# Patient Record
Sex: MALE | Race: WHITE | Employment: OTHER | URBAN - METROPOLITAN AREA
[De-identification: names, ages, dates, MRNs, and addresses within clinical notes are randomized per-mention and may not be internally consistent; named-entity substitution may affect disease eponyms.]

---

## 2017-01-17 ENCOUNTER — ANTI-COAG VISIT (OUTPATIENT)
Dept: PHARMACY | Facility: CLINIC | Age: 82
End: 2017-01-17

## 2017-01-17 DIAGNOSIS — I48.91 ATRIAL FIBRILLATION, UNSPECIFIED TYPE (HCC): ICD-10-CM

## 2017-01-17 LAB — INR BLD: 2.1

## 2017-02-14 ENCOUNTER — ANTI-COAG VISIT (OUTPATIENT)
Dept: PHARMACY | Facility: CLINIC | Age: 82
End: 2017-02-14

## 2017-02-14 DIAGNOSIS — I48.91 ATRIAL FIBRILLATION, UNSPECIFIED TYPE (HCC): ICD-10-CM

## 2017-02-14 LAB — INR BLD: 2.4

## 2017-03-14 ENCOUNTER — ANTI-COAG VISIT (OUTPATIENT)
Dept: PHARMACY | Facility: CLINIC | Age: 82
End: 2017-03-14

## 2017-03-14 DIAGNOSIS — I48.91 ATRIAL FIBRILLATION, UNSPECIFIED TYPE (HCC): ICD-10-CM

## 2017-03-14 LAB — INR BLD: 2.5

## 2017-04-01 ENCOUNTER — HOSPITAL ENCOUNTER (OUTPATIENT)
Dept: OTHER | Age: 82
Discharge: HOME | End: 2017-04-30
Attending: INTERNAL MEDICINE | Admitting: INTERNAL MEDICINE

## 2017-04-10 ENCOUNTER — ANTI-COAG VISIT (OUTPATIENT)
Dept: PHARMACY | Facility: CLINIC | Age: 82
End: 2017-04-10

## 2017-04-10 DIAGNOSIS — I48.91 ATRIAL FIBRILLATION, UNSPECIFIED TYPE (HCC): ICD-10-CM

## 2017-04-10 LAB — INR BLD: 2.1

## 2017-05-08 ENCOUNTER — ANTI-COAG VISIT (OUTPATIENT)
Dept: PHARMACY | Facility: CLINIC | Age: 82
End: 2017-05-08

## 2017-05-08 DIAGNOSIS — I48.91 ATRIAL FIBRILLATION, UNSPECIFIED TYPE (HCC): ICD-10-CM

## 2017-05-08 LAB — INR BLD: 1.9

## 2017-06-05 ENCOUNTER — ANTI-COAG VISIT (OUTPATIENT)
Dept: PHARMACY | Facility: CLINIC | Age: 82
End: 2017-06-05

## 2017-06-05 LAB — INR BLD: 2

## 2017-07-03 ENCOUNTER — ANTI-COAG VISIT (OUTPATIENT)
Dept: PHARMACY | Facility: CLINIC | Age: 82
End: 2017-07-03

## 2017-07-03 DIAGNOSIS — I48.91 ATRIAL FIBRILLATION, UNSPECIFIED TYPE (HCC): ICD-10-CM

## 2017-07-03 LAB — INR BLD: 2.2

## 2017-08-01 ENCOUNTER — ANTI-COAG VISIT (OUTPATIENT)
Dept: PHARMACY | Facility: CLINIC | Age: 82
End: 2017-08-01

## 2017-08-01 DIAGNOSIS — I48.91 ATRIAL FIBRILLATION, UNSPECIFIED TYPE (HCC): ICD-10-CM

## 2017-08-01 LAB — INR BLD: 2.2

## 2017-08-29 ENCOUNTER — ANTI-COAG VISIT (OUTPATIENT)
Dept: PHARMACY | Facility: CLINIC | Age: 82
End: 2017-08-29

## 2017-08-29 DIAGNOSIS — I48.91 ATRIAL FIBRILLATION, UNSPECIFIED TYPE (HCC): ICD-10-CM

## 2017-08-29 LAB — INR BLD: 2.3

## 2017-09-26 ENCOUNTER — ANTI-COAG VISIT (OUTPATIENT)
Dept: PHARMACY | Facility: CLINIC | Age: 82
End: 2017-09-26

## 2017-09-26 DIAGNOSIS — I48.91 ATRIAL FIBRILLATION, UNSPECIFIED TYPE (HCC): ICD-10-CM

## 2017-09-26 LAB — INR BLD: 2.5

## 2017-10-24 ENCOUNTER — ANTI-COAG VISIT (OUTPATIENT)
Dept: PHARMACY | Facility: CLINIC | Age: 82
End: 2017-10-24

## 2017-10-24 LAB — INR BLD: 2

## 2017-10-24 NOTE — PROGRESS NOTES
Mr. Nissa Todd is a 80 y.o.  male with history of Afib who presents today for anticoagulation monitoring and adjustment. Patient verifies current dosing regimen. Patient denies s/s bleeding/bruising/swelling/SOB. No blood in urine or stool. No dietary changes. No changes in medication/OTC agents/Herbals. No change in alcohol use. No missed doses. No Procedures scheduled in the future at this time. Lab Results   Component Value Date    INR 2 10/24/2017    INR 2.5 09/26/2017    INR 2.3 08/29/2017       Looks and feels well today. No changes in medications or diet. No bruising or bleeding noted. No change in dose for INR of  2. Will see in 4 weeks for next INR. Coumadin dose: Continue Warfarin 2.5mg daily EXCEPT 1.25mg every Tuesday and Thursday. .  Recheck INR in 4 weeks. Patient reminded to call the Anticoagulation Clinic with any signs or symptoms of bleeding or with any medication changes. Patient given instructions utilizing the teach back method. After visit summary printed and reviewed with patient.       Influenza vaccine:   [] given    [x] declined   [x] received previously   [] plans to receive at a later time   [] refused    [x] documented in EPIC

## 2017-11-01 ENCOUNTER — HOSPITAL ENCOUNTER (OUTPATIENT)
Dept: OTHER | Age: 82
Discharge: HOME | End: 2017-11-30
Attending: INTERNAL MEDICINE | Admitting: INTERNAL MEDICINE

## 2017-11-21 ENCOUNTER — ANTI-COAG VISIT (OUTPATIENT)
Dept: PHARMACY | Facility: CLINIC | Age: 82
End: 2017-11-21

## 2017-11-21 DIAGNOSIS — I48.91 ATRIAL FIBRILLATION, UNSPECIFIED TYPE (HCC): ICD-10-CM

## 2017-11-21 LAB — INR BLD: 1.8

## 2017-11-21 RX ORDER — DICYCLOMINE HYDROCHLORIDE 10 MG/1
10 CAPSULE ORAL PRN
COMMUNITY
End: 2019-04-05

## 2017-11-21 RX ORDER — AMOXICILLIN AND CLAVULANATE POTASSIUM 875; 125 MG/1; MG/1
1 TABLET, FILM COATED ORAL 2 TIMES DAILY
COMMUNITY
Start: 2017-11-20 | End: 2017-11-29

## 2017-11-29 ENCOUNTER — ANTI-COAG VISIT (OUTPATIENT)
Dept: PHARMACY | Facility: CLINIC | Age: 82
End: 2017-11-29

## 2017-11-29 LAB — INTERNATIONAL NORMALIZATION RATIO, POC: 1.9

## 2017-11-29 NOTE — PROGRESS NOTES
Mr. Corinna Torres is a 80 y.o.  male with history of Afib who presents today for anticoagulation monitoring and adjustment. Patient verifies current dosing regimen. Patient denies s/s bleeding/bruising/swelling/SOB. No blood in urine or stool. No dietary changes. No changes in medication/OTC agents/Herbals. No change in alcohol use. No missed doses. No Procedures scheduled in the future at this time. Lab Results   Component Value Date    INR 1.9 11/29/2017    INR 1.8 11/21/2017    INR 2 10/24/2017       He looks and feels well today. No changes in meds or diet noted. No bruising or bleeding issues. His INR remains low at 1.9 today. We will increase his warfarin dose to 2.5mg daily except 1.25mg on Tuesday. He will return on 12-19 for his next INR. Coumadin dose: NEW DOSE:Warfarin 2.5mg daily EXCEPT 1.25mg(1/2 tablet) every Tuesday    . Recheck INR in 3 weeks. Patient reminded to call the Anticoagulation Clinic with any signs or symptoms of bleeding or with any medication changes. Patient given instructions utilizing the teach back method. After visit summary printed and reviewed with patient.       Medications reviewed and updated on home medication list Yes  Warfarin dose updated on patient home medication list  Yes

## 2017-12-01 ENCOUNTER — HOSPITAL ENCOUNTER (OUTPATIENT)
Dept: OTHER | Age: 82
Discharge: HOME | End: 2017-12-31
Attending: INTERNAL MEDICINE | Admitting: INTERNAL MEDICINE

## 2017-12-19 ENCOUNTER — ANTI-COAG VISIT (OUTPATIENT)
Dept: PHARMACY | Facility: CLINIC | Age: 82
End: 2017-12-19

## 2017-12-19 DIAGNOSIS — I48.91 ATRIAL FIBRILLATION, UNSPECIFIED TYPE (HCC): ICD-10-CM

## 2017-12-19 LAB — INTERNATIONAL NORMALIZATION RATIO, POC: 2.3

## 2018-01-01 ENCOUNTER — HOSPITAL ENCOUNTER (OUTPATIENT)
Dept: OTHER | Age: 83
Discharge: HOME | End: 2018-01-31
Attending: INTERNAL MEDICINE | Admitting: INTERNAL MEDICINE

## 2018-01-16 ENCOUNTER — TELEPHONE (OUTPATIENT)
Dept: PHARMACY | Facility: CLINIC | Age: 83
End: 2018-01-16

## 2018-01-16 NOTE — TELEPHONE ENCOUNTER
Mr. Sally Vanegas called to let us know he would not be able to make his appointment today. Patient has been sick (flu). He has been on Cefprozil 250mg for 5 days and has 2 more day of the antibiotic. Mr. Sally Vanegas reports he missed his medication one day including his Coumadin. I discussed with patient that the antibiotic he is on can possibly affect his INR, but usually I do not see much affect clinically. Patient's INR = 2.3 at his last appointment and he has missed a Coumadin dose, so I feel his INR will not go too high. Instructed patient to continue his current Coumadin dose and I rescheduled him to come in next Tuesday for INR.

## 2018-01-23 ENCOUNTER — ANTI-COAG VISIT (OUTPATIENT)
Dept: PHARMACY | Facility: CLINIC | Age: 83
End: 2018-01-23

## 2018-01-23 DIAGNOSIS — I48.91 ATRIAL FIBRILLATION, UNSPECIFIED TYPE (HCC): ICD-10-CM

## 2018-01-23 LAB — INTERNATIONAL NORMALIZATION RATIO, POC: 2.4

## 2018-01-23 RX ORDER — FLUTICASONE PROPIONATE 50 MCG
1 SPRAY, SUSPENSION (ML) NASAL DAILY PRN
COMMUNITY
End: 2019-05-08

## 2018-01-23 RX ORDER — ALBUTEROL SULFATE 90 UG/1
2 AEROSOL, METERED RESPIRATORY (INHALATION) EVERY 6 HOURS PRN
COMMUNITY
End: 2019-05-08

## 2018-01-23 NOTE — PROGRESS NOTES
Mr. Ángel Willett is a 80 y.o.  male with history of Afib who presents today for anticoagulation monitoring and adjustment. Patient verifies current dosing regimen. Patient denies s/s bleeding/bruising/swelling/SOB. No blood in urine or stool. No dietary changes. No changes in medication/OTC agents/Herbals. No change in alcohol use. No missed doses. No Procedures scheduled in the future at this time. Lab Results   Component Value Date    INR 2.4 01/23/2018    INR 2.3 12/19/2017    INR 1.9 11/29/2017       Patient appears well. No changes, no problems. Patient reminded to call the Anticoagulation Clinic with any medication changes. Patient given instructions utilizing the teach back method. After visit summary printed and reviewed with patient. Warfarin dose updated.

## 2018-02-01 ENCOUNTER — HOSPITAL ENCOUNTER (OUTPATIENT)
Dept: OTHER | Age: 83
Discharge: HOME | End: 2018-02-28
Attending: INTERNAL MEDICINE | Admitting: INTERNAL MEDICINE

## 2018-02-20 ENCOUNTER — ANTI-COAG VISIT (OUTPATIENT)
Dept: PHARMACY | Facility: CLINIC | Age: 83
End: 2018-02-20

## 2018-02-20 DIAGNOSIS — I48.91 ATRIAL FIBRILLATION, UNSPECIFIED TYPE (HCC): ICD-10-CM

## 2018-02-20 LAB — INR BLD: 2.5

## 2018-03-01 ENCOUNTER — HOSPITAL ENCOUNTER (OUTPATIENT)
Dept: OTHER | Age: 83
Discharge: HOME | End: 2018-03-31
Attending: INTERNAL MEDICINE | Admitting: INTERNAL MEDICINE

## 2018-03-20 ENCOUNTER — ANTI-COAG VISIT (OUTPATIENT)
Dept: PHARMACY | Facility: CLINIC | Age: 83
End: 2018-03-20

## 2018-03-20 DIAGNOSIS — I48.91 ATRIAL FIBRILLATION, UNSPECIFIED TYPE (HCC): ICD-10-CM

## 2018-03-20 LAB — INTERNATIONAL NORMALIZATION RATIO, POC: 2.1

## 2018-04-01 ENCOUNTER — HOSPITAL ENCOUNTER (OUTPATIENT)
Dept: OTHER | Age: 83
Discharge: HOME | End: 2018-04-30
Attending: INTERNAL MEDICINE | Admitting: INTERNAL MEDICINE

## 2018-04-17 ENCOUNTER — ANTI-COAG VISIT (OUTPATIENT)
Dept: PHARMACY | Facility: CLINIC | Age: 83
End: 2018-04-17

## 2018-04-17 DIAGNOSIS — I48.91 ATRIAL FIBRILLATION, UNSPECIFIED TYPE (HCC): ICD-10-CM

## 2018-04-17 LAB — INTERNATIONAL NORMALIZATION RATIO, POC: 2.1

## 2018-05-01 ENCOUNTER — HOSPITAL ENCOUNTER (OUTPATIENT)
Dept: OTHER | Age: 83
Discharge: HOME | End: 2018-05-31
Attending: INTERNAL MEDICINE | Admitting: INTERNAL MEDICINE

## 2018-05-15 ENCOUNTER — ANTI-COAG VISIT (OUTPATIENT)
Dept: PHARMACY | Facility: CLINIC | Age: 83
End: 2018-05-15

## 2018-05-15 DIAGNOSIS — I48.91 ATRIAL FIBRILLATION, UNSPECIFIED TYPE (HCC): ICD-10-CM

## 2018-05-15 LAB — INR BLD: 2.1

## 2018-06-01 ENCOUNTER — HOSPITAL ENCOUNTER (OUTPATIENT)
Dept: OTHER | Age: 83
Discharge: HOME | End: 2018-06-30
Attending: INTERNAL MEDICINE | Admitting: INTERNAL MEDICINE

## 2018-06-19 ENCOUNTER — ANTI-COAG VISIT (OUTPATIENT)
Dept: PHARMACY | Facility: CLINIC | Age: 83
End: 2018-06-19

## 2018-06-19 DIAGNOSIS — I48.91 ATRIAL FIBRILLATION, UNSPECIFIED TYPE (HCC): ICD-10-CM

## 2018-06-19 LAB — INR BLD: 2.1

## 2018-07-01 ENCOUNTER — HOSPITAL ENCOUNTER (OUTPATIENT)
Dept: OTHER | Age: 83
Discharge: HOME | End: 2018-07-31
Attending: INTERNAL MEDICINE | Admitting: INTERNAL MEDICINE

## 2018-07-24 ENCOUNTER — ANTI-COAG VISIT (OUTPATIENT)
Dept: PHARMACY | Facility: CLINIC | Age: 83
End: 2018-07-24

## 2018-07-24 DIAGNOSIS — I48.91 ATRIAL FIBRILLATION, UNSPECIFIED TYPE (HCC): ICD-10-CM

## 2018-07-24 LAB — INTERNATIONAL NORMALIZATION RATIO, POC: 2.6

## 2018-08-01 ENCOUNTER — HOSPITAL ENCOUNTER (OUTPATIENT)
Dept: OTHER | Age: 83
Discharge: HOME | End: 2018-08-31
Attending: INTERNAL MEDICINE | Admitting: INTERNAL MEDICINE

## 2018-08-21 ENCOUNTER — ANTI-COAG VISIT (OUTPATIENT)
Dept: PHARMACY | Facility: CLINIC | Age: 83
End: 2018-08-21

## 2018-08-21 DIAGNOSIS — I48.91 ATRIAL FIBRILLATION, UNSPECIFIED TYPE (HCC): ICD-10-CM

## 2018-08-21 LAB — INTERNATIONAL NORMALIZATION RATIO, POC: 3.4

## 2018-09-01 ENCOUNTER — HOSPITAL ENCOUNTER (OUTPATIENT)
Dept: OTHER | Age: 83
Discharge: HOME | End: 2018-09-01
Attending: INTERNAL MEDICINE | Admitting: INTERNAL MEDICINE

## 2018-09-18 ENCOUNTER — ANTI-COAG VISIT (OUTPATIENT)
Dept: PHARMACY | Facility: CLINIC | Age: 83
End: 2018-09-18

## 2018-09-18 DIAGNOSIS — I48.91 ATRIAL FIBRILLATION, UNSPECIFIED TYPE (HCC): ICD-10-CM

## 2018-09-18 LAB — INTERNATIONAL NORMALIZATION RATIO, POC: 2.3

## 2018-10-16 ENCOUNTER — ANTI-COAG VISIT (OUTPATIENT)
Dept: PHARMACY | Age: 83
End: 2018-10-16
Payer: MEDICARE

## 2018-10-16 LAB
INTERNATIONAL NORMALIZATION RATIO, POC: 3.1
INTERNATIONAL NORMALIZATION RATIO, POC: 3.1

## 2018-10-16 PROCEDURE — 85610 PROTHROMBIN TIME: CPT

## 2018-10-16 PROCEDURE — 99211 OFF/OP EST MAY X REQ PHY/QHP: CPT

## 2018-11-14 ENCOUNTER — ANTI-COAG VISIT (OUTPATIENT)
Dept: PHARMACY | Age: 83
End: 2018-11-14
Payer: MEDICARE

## 2018-11-14 DIAGNOSIS — I48.91 ATRIAL FIBRILLATION, UNSPECIFIED TYPE (HCC): ICD-10-CM

## 2018-11-14 LAB — INR BLD: 2.4

## 2018-11-14 PROCEDURE — 85610 PROTHROMBIN TIME: CPT

## 2018-11-14 PROCEDURE — 99211 OFF/OP EST MAY X REQ PHY/QHP: CPT

## 2018-11-14 NOTE — PROGRESS NOTES
Mr. Nelli Cruz is a 80 y.o.  male with history of Afib who presents today for anticoagulation monitoring and adjustment. Patient verifies current dosing regimen  Patient denies s/s bleeding/bruising/swelling/SOB  No blood in urine or stool. No dietary changes. No changes in medication/OTC agents/Herbals. No change in alcohol use. No Procedures scheduled in the future at this time. Lab Results   Component Value Date    INR 2.4 11/14/2018    INR 3.1 10/16/2018    INR 3.1 10/16/2018       Pertinent findings: Patient states he missed his half tablet dose 2 weeks ago on 10/30/18. And then continued his normal regimen the next day. His INR is good today so we will continue his current warfarin dose. Warfarin dosing: Continue Warfarin 2.5mg daily EXCEPT 1.25mg(1/2 tablet) every Tuesday    Recheck in 4 weeks. After visit summary printed and reviewed with patient.

## 2018-12-11 ENCOUNTER — ANTI-COAG VISIT (OUTPATIENT)
Dept: PHARMACY | Age: 83
End: 2018-12-11
Payer: MEDICARE

## 2018-12-11 LAB — INTERNATIONAL NORMALIZATION RATIO, POC: 2.6

## 2018-12-11 PROCEDURE — 85610 PROTHROMBIN TIME: CPT

## 2018-12-11 PROCEDURE — 99211 OFF/OP EST MAY X REQ PHY/QHP: CPT

## 2018-12-11 NOTE — PROGRESS NOTES
Mr. Krystle Arnold is a 80 y.o.  male with history of Afib who presents today for anticoagulation monitoring and adjustment. Patient verifies current dosing regimen  Patient denies s/s bleeding/bruising/swelling/SOB  No blood in urine or stool. No dietary changes. No changes in medication/OTC agents/Herbals. No change in alcohol use. No missed doses. No Procedures scheduled in the future at this time. Lab Results   Component Value Date    INR 2.6 12/11/2018    INR 2.4 11/14/2018    INR 3.1 10/16/2018    INR 3.1 10/16/2018       Pertinent findings: No problems, no changes, patient is in good spirits. INR today is therapeutic and stable. No dose adjustment is necessary. Will follow up with patient in 4 weeks for next INR check. Warfarin dosing: Continue Warfarin 2.5mg daily EXCEPT 1.25mg(1/2 tablet) every Tuesday    After visit summary printed and reviewed with patient.       Warfarin dose updated on patient home medication list: Yes

## 2018-12-28 ENCOUNTER — TELEPHONE (OUTPATIENT)
Dept: PHARMACY | Age: 83
End: 2018-12-28

## 2018-12-28 NOTE — TELEPHONE ENCOUNTER
I placed a call to Mr. Shannan Wang but he was not home. His wife said that he has an upper respiratory infection. He went to the store to  a new prescription and do some shopping, but she did not know the name of the new medication. She will have Mr. Shannan Wang call us when he gets back.

## 2018-12-28 NOTE — TELEPHONE ENCOUNTER
Kaley Donnieurdon called to let me know he is starting Amoxicillin 500mg TID x 10 days. I instructed him to continue his current Warfarin dose and keep his next appointment.

## 2019-01-08 ENCOUNTER — ANTI-COAG VISIT (OUTPATIENT)
Dept: PHARMACY | Age: 84
End: 2019-01-08
Payer: MEDICARE

## 2019-01-08 LAB — INTERNATIONAL NORMALIZATION RATIO, POC: 2.3

## 2019-01-08 PROCEDURE — 99211 OFF/OP EST MAY X REQ PHY/QHP: CPT

## 2019-01-08 PROCEDURE — 85610 PROTHROMBIN TIME: CPT

## 2019-02-05 ENCOUNTER — ANTI-COAG VISIT (OUTPATIENT)
Dept: PHARMACY | Age: 84
End: 2019-02-05
Payer: MEDICARE

## 2019-02-05 LAB — INTERNATIONAL NORMALIZATION RATIO, POC: 2.3

## 2019-02-05 PROCEDURE — 99211 OFF/OP EST MAY X REQ PHY/QHP: CPT

## 2019-02-05 PROCEDURE — 85610 PROTHROMBIN TIME: CPT

## 2019-03-05 ENCOUNTER — ANTI-COAG VISIT (OUTPATIENT)
Dept: PHARMACY | Age: 84
End: 2019-03-05
Payer: MEDICARE

## 2019-03-05 LAB — INTERNATIONAL NORMALIZATION RATIO, POC: 3

## 2019-03-05 PROCEDURE — 99211 OFF/OP EST MAY X REQ PHY/QHP: CPT

## 2019-03-05 PROCEDURE — 85610 PROTHROMBIN TIME: CPT

## 2019-04-02 ENCOUNTER — APPOINTMENT (OUTPATIENT)
Dept: PHARMACY | Age: 84
End: 2019-04-02
Payer: MEDICARE

## 2019-04-05 ENCOUNTER — ANTI-COAG VISIT (OUTPATIENT)
Dept: PHARMACY | Age: 84
End: 2019-04-05
Payer: MEDICARE

## 2019-04-05 DIAGNOSIS — I48.91 ATRIAL FIBRILLATION, UNSPECIFIED TYPE (HCC): ICD-10-CM

## 2019-04-05 LAB — INTERNATIONAL NORMALIZATION RATIO, POC: 2.8

## 2019-04-05 PROCEDURE — 85610 PROTHROMBIN TIME: CPT

## 2019-04-05 PROCEDURE — 99211 OFF/OP EST MAY X REQ PHY/QHP: CPT

## 2019-04-05 NOTE — PROGRESS NOTES
Mr. Graeme Nicole is a 80 y.o.  male with history of Afib who presents today for anticoagulation monitoring and adjustment. Patient verifies current dosing regimen. Lab Results   Component Value Date    INR 2.8 04/05/2019    INR 3.0 03/05/2019    INR 2.3 02/05/2019     Patient doing really well. No problems or changes. INR has been stable    Coumadin dose: continue same dose. Recheck INR in ~4 weeks. Patient reminded to call the Anticoagulation Clinic with any signs or symptoms of bleeding or with any medication changes. Patient given instructions utilizing the teach back method. After visit summary printed and reviewed with patient.     Medications reviewed and Warfarin dose updated

## 2019-05-08 ENCOUNTER — ANTI-COAG VISIT (OUTPATIENT)
Dept: PHARMACY | Age: 84
End: 2019-05-08
Payer: MEDICARE

## 2019-05-08 LAB — INTERNATIONAL NORMALIZATION RATIO, POC: 2.4

## 2019-05-08 PROCEDURE — 99211 OFF/OP EST MAY X REQ PHY/QHP: CPT

## 2019-05-08 PROCEDURE — 85610 PROTHROMBIN TIME: CPT

## 2019-05-08 NOTE — PROGRESS NOTES
Mr. Александр Beyer is a 80 y.o.  male with history of Afib who presents today for anticoagulation monitoring and adjustment. Patient verifies current dosing regimen. Patient denies s/s bleeding/bruising/swelling/SOB. No blood in urine or stool. No dietary changes. No changes in medication/OTC agents/Herbals. No change in alcohol use. No missed doses. No Procedures scheduled in the future at this time. Lab Results   Component Value Date    INR 2.4 05/08/2019    INR 2.8 04/05/2019    INR 3.0 03/05/2019       Patient appears well. No changes, no problems. Coumadin Dose :   Continue Warfarin 2.5mg daily EXCEPT 1.25mg(1/2 tablet) every Tuesday    Return in 1 month. Patient reminded to call the Anticoagulation Clinic with any medication changes. Patient given instructions utilizing the teach back method. After visit summary printed and reviewed with patient. Medications reviewed and Warfarin dose updated.

## 2019-06-11 ENCOUNTER — ANTI-COAG VISIT (OUTPATIENT)
Dept: PHARMACY | Age: 84
End: 2019-06-11
Payer: MEDICARE

## 2019-06-11 LAB — INTERNATIONAL NORMALIZATION RATIO, POC: 1.2

## 2019-06-11 PROCEDURE — 99211 OFF/OP EST MAY X REQ PHY/QHP: CPT

## 2019-06-11 PROCEDURE — 85610 PROTHROMBIN TIME: CPT

## 2019-06-11 NOTE — PROGRESS NOTES
Mr. Donavon Tirado is a 80 y.o.  male with history of Afib who presents today for anticoagulation monitoring and adjustment. Patient denies s/s bleeding/bruising/swelling/SOB  No blood in urine or stool. No dietary changes. No change in alcohol use. No Procedures scheduled in the future at this time. Lab Results   Component Value Date    INR 1.2 06/11/2019    INR 2.4 05/08/2019    INR 2.8 04/05/2019       Pertinent findings: Patient had an epidural on 6/7/19. He reports stopping his warfarin on 5/30/19 and restarting his normal warfarin dosing schedule on 6/7/19 after the procedure. His INR was subtherapeutic today due to being off warfarin for 8 days. Patient was instructed to take 5 mg today and tomorrow and then continue his normal dosing schedule to try and raise his INR back into therapeutic range. He will be rechecked in 2 weeks. Warfarin dosing:   Take 5 mg (2 tablets) today 6/11/19 and tomorrow 6/12/19, then  Continue Warfarin 2.5mg daily EXCEPT 1.25mg(1/2 tablet) every Tuesday    After visit summary printed and reviewed with patient. Medications reviewed and updated on home medication list: Yes, patient denied any changes.   Warfarin dose updated on patient home medication list: Yes

## 2019-06-25 ENCOUNTER — ANTI-COAG VISIT (OUTPATIENT)
Dept: PHARMACY | Age: 84
End: 2019-06-25
Payer: MEDICARE

## 2019-06-25 DIAGNOSIS — I48.91 ATRIAL FIBRILLATION, UNSPECIFIED TYPE (HCC): ICD-10-CM

## 2019-06-25 LAB — INTERNATIONAL NORMALIZATION RATIO, POC: 2.8

## 2019-06-25 PROCEDURE — 85610 PROTHROMBIN TIME: CPT

## 2019-06-25 PROCEDURE — 99211 OFF/OP EST MAY X REQ PHY/QHP: CPT

## 2019-06-25 NOTE — PROGRESS NOTES
Mr. Susanne Graham is a 80 y.o.  male with history of Afib who presents today for anticoagulation monitoring and adjustment. Patient verifies current dosing regimen  Patient denies s/s bleeding/bruising/swelling/SOB  No blood in urine or stool. No dietary changes. No changes in medication/OTC agents/Herbals. No change in alcohol use. No missed doses. No Procedures scheduled in the future at this time. Lab Results   Component Value Date    INR 2.8 06/25/2019    INR 1.2 06/11/2019    INR 2.4 05/08/2019       Pertinent findings: Patient appears well, no changes. His INR is back in therapeutic range today since his procedure. We will recheck him in 4 weeks. Warfarin dosing:   Continue Warfarin 2.5mg daily EXCEPT 1.25mg(1/2 tablet) every Tuesday    After visit summary printed and reviewed with patient.       Medications reviewed and updated on home medication list: Yes, patient denied any changes  Warfarin dose updated on patient home medication list: Yes

## 2019-07-23 ENCOUNTER — ANTI-COAG VISIT (OUTPATIENT)
Dept: PHARMACY | Age: 84
End: 2019-07-23
Payer: MEDICARE

## 2019-07-23 DIAGNOSIS — I48.91 ATRIAL FIBRILLATION, UNSPECIFIED TYPE (HCC): ICD-10-CM

## 2019-07-23 LAB — INTERNATIONAL NORMALIZATION RATIO, POC: 2.6

## 2019-07-23 PROCEDURE — 99211 OFF/OP EST MAY X REQ PHY/QHP: CPT

## 2019-07-23 PROCEDURE — 85610 PROTHROMBIN TIME: CPT

## 2019-08-20 ENCOUNTER — ANTI-COAG VISIT (OUTPATIENT)
Dept: PHARMACY | Age: 84
End: 2019-08-20
Payer: MEDICARE

## 2019-08-20 DIAGNOSIS — I48.91 ATRIAL FIBRILLATION, UNSPECIFIED TYPE (HCC): ICD-10-CM

## 2019-08-20 LAB — INTERNATIONAL NORMALIZATION RATIO, POC: 2.2

## 2019-08-20 PROCEDURE — 85610 PROTHROMBIN TIME: CPT

## 2019-08-20 PROCEDURE — 99211 OFF/OP EST MAY X REQ PHY/QHP: CPT

## 2019-08-20 RX ORDER — FLUTICASONE FUROATE AND VILANTEROL 100; 25 UG/1; UG/1
1 POWDER RESPIRATORY (INHALATION) DAILY
COMMUNITY
End: 2019-10-15

## 2019-08-20 NOTE — PROGRESS NOTES
Mr. Tyson Monge is a 80 y.o.  male with history of Afib who presents today for anticoagulation monitoring and adjustment. Patient verifies current dosing regimen. Patient denies s/s bleeding/bruising/swelling/SOB. No blood in urine or stool. No dietary changes. No changes in medication/OTC agents/Herbals. No change in alcohol use. No missed doses. No Procedures scheduled in the future at this time. Lab Results   Component Value Date    INR 2.2 08/20/2019    INR 2.6 07/23/2019    INR 2.8 06/25/2019       Patient appears well. No changes, no problems. Coumadin Dose :   Continue Warfarin 2.5mg daily EXCEPT 1.25mg(1/2 tablet) every Tuesday    Return in 4 weeks. Patient reminded to call the Anticoagulation Clinic with any medication changes. Patient given instructions utilizing the teach back method. After visit summary printed and reviewed with patient. Medications reviewed and Warfarin dose updated.

## 2019-09-17 ENCOUNTER — ANTI-COAG VISIT (OUTPATIENT)
Dept: PHARMACY | Age: 84
End: 2019-09-17
Payer: MEDICARE

## 2019-09-17 DIAGNOSIS — I48.91 ATRIAL FIBRILLATION, UNSPECIFIED TYPE (HCC): ICD-10-CM

## 2019-09-17 LAB — INTERNATIONAL NORMALIZATION RATIO, POC: 2.5

## 2019-09-17 PROCEDURE — 85610 PROTHROMBIN TIME: CPT

## 2019-09-17 PROCEDURE — 99211 OFF/OP EST MAY X REQ PHY/QHP: CPT

## 2019-09-17 NOTE — PROGRESS NOTES
Mr. Osmani Lott is a 80 y.o.  male with history of Afib who presents today for anticoagulation monitoring and adjustment. Patient verifies current dosing regimen  Patient denies s/s bleeding/bruising/swelling/SOB  No blood in urine or stool. No dietary changes. No changes in medication/OTC agents/Herbals. No change in alcohol use. No missed doses. No Procedures scheduled in the future at this time. Lab Results   Component Value Date    INR 2.5 09/17/2019    INR 2.2 08/20/2019    INR 2.6 07/23/2019       Pertinent findings: None    Warfarin dosing: Continue Warfarin 2.5mg daily EXCEPT 1.25mg(1/2 tablet) every Tuesday    After visit summary printed and reviewed with patient.       Medications reviewed and updated on home medication list: No: Patient states no changes    Warfarin dose updated on patient home medication list: Yes

## 2019-10-15 ENCOUNTER — ANTI-COAG VISIT (OUTPATIENT)
Dept: PHARMACY | Age: 84
End: 2019-10-15
Payer: MEDICARE

## 2019-10-15 DIAGNOSIS — I48.91 ATRIAL FIBRILLATION, UNSPECIFIED TYPE (HCC): ICD-10-CM

## 2019-10-15 LAB — INTERNATIONAL NORMALIZATION RATIO, POC: 2.5

## 2019-10-15 PROCEDURE — 99211 OFF/OP EST MAY X REQ PHY/QHP: CPT

## 2019-10-15 PROCEDURE — 85610 PROTHROMBIN TIME: CPT

## 2019-10-15 RX ORDER — BUDESONIDE AND FORMOTEROL FUMARATE DIHYDRATE 80; 4.5 UG/1; UG/1
2 AEROSOL RESPIRATORY (INHALATION) 2 TIMES DAILY
COMMUNITY
End: 2020-06-30 | Stop reason: ALTCHOICE

## 2019-11-08 ENCOUNTER — TELEPHONE (OUTPATIENT)
Dept: PHARMACY | Age: 84
End: 2019-11-08

## 2019-11-13 ENCOUNTER — ANTI-COAG VISIT (OUTPATIENT)
Dept: PHARMACY | Age: 84
End: 2019-11-13
Payer: MEDICARE

## 2019-11-13 DIAGNOSIS — I48.91 ATRIAL FIBRILLATION, UNSPECIFIED TYPE (HCC): ICD-10-CM

## 2019-11-13 LAB — INTERNATIONAL NORMALIZATION RATIO, POC: 2.5

## 2019-11-13 PROCEDURE — 99211 OFF/OP EST MAY X REQ PHY/QHP: CPT

## 2019-11-13 PROCEDURE — 85610 PROTHROMBIN TIME: CPT

## 2019-12-10 ENCOUNTER — ANTI-COAG VISIT (OUTPATIENT)
Dept: PHARMACY | Age: 84
End: 2019-12-10
Payer: MEDICARE

## 2019-12-10 DIAGNOSIS — I48.91 ATRIAL FIBRILLATION, UNSPECIFIED TYPE (HCC): ICD-10-CM

## 2019-12-10 LAB — INTERNATIONAL NORMALIZATION RATIO, POC: 3.2

## 2019-12-10 PROCEDURE — 99211 OFF/OP EST MAY X REQ PHY/QHP: CPT

## 2019-12-10 PROCEDURE — 85610 PROTHROMBIN TIME: CPT

## 2019-12-11 ENCOUNTER — APPOINTMENT (OUTPATIENT)
Dept: PHARMACY | Age: 84
End: 2019-12-11
Payer: MEDICARE

## 2020-01-09 ENCOUNTER — TELEPHONE (OUTPATIENT)
Dept: PHARMACY | Age: 85
End: 2020-01-09

## 2020-01-09 NOTE — TELEPHONE ENCOUNTER
Patient called to report that he is having surgery on January 13th. He stopped his warfarin and Aspirin 5 days prior on the 8th. Patient believes he will be coming home same day as procedure. Instructed patient to restart his Warfarin when his surgeon tells him to. Patient should restart his current Warfarin dose. I changed patient's appointment to the week following his surgery. Patient is to call with any other changes.

## 2020-01-14 ENCOUNTER — APPOINTMENT (OUTPATIENT)
Dept: PHARMACY | Age: 85
End: 2020-01-14
Payer: MEDICARE

## 2020-01-21 ENCOUNTER — ANTI-COAG VISIT (OUTPATIENT)
Dept: PHARMACY | Age: 85
End: 2020-01-21
Payer: MEDICARE

## 2020-01-21 LAB — INTERNATIONAL NORMALIZATION RATIO, POC: 1.7

## 2020-01-21 PROCEDURE — 85610 PROTHROMBIN TIME: CPT

## 2020-01-21 PROCEDURE — 99211 OFF/OP EST MAY X REQ PHY/QHP: CPT

## 2020-01-21 NOTE — PROGRESS NOTES
Mr. Gallo Pineda is a 80 y.o.  male with history of Afib who presents today for anticoagulation monitoring and adjustment. Patient verifies current dosing regimen  Patient denies s/s bleeding/bruising/swelling/SOB  No blood in urine or stool. No dietary changes. No changes in medication/OTC agents/Herbals. No change in alcohol use. No Procedures scheduled in the future at this time. Lab Results   Component Value Date    INR 1.7 01/21/2020    INR 3.2 12/10/2019    INR 2.5 11/13/2019       Pertinent findings:   Patient states doing well. No complaints at this time. He was off his warfarin for 5 days prior and three days after his surgery. INR low today. Gave a 2.5 mg dose for today then continued his weekly dosing of 2.5 mg daily except 1.25 mg on Tuesdays. Next INR in 4 weeks. After visit summary printed and reviewed with patient.       Medications reviewed and updated on home medication list: YES     Warfarin dose updated on patient home medication list: NO      Immunizations:   Most Recent Immunizations   Administered Date(s) Administered    Influenza Vaccine, unspecified formulation 10/10/2019    Pneumococcal Conjugate 7-valent (Prevnar7) 09/19/2016    Pneumococcal Polysaccharide ([id number]) 04/13/2004

## 2020-02-19 ENCOUNTER — ANTI-COAG VISIT (OUTPATIENT)
Dept: PHARMACY | Age: 85
End: 2020-02-19
Payer: MEDICARE

## 2020-02-19 LAB — INTERNATIONAL NORMALIZATION RATIO, POC: 2.3

## 2020-02-19 PROCEDURE — 99211 OFF/OP EST MAY X REQ PHY/QHP: CPT

## 2020-02-19 PROCEDURE — 85610 PROTHROMBIN TIME: CPT

## 2020-02-19 NOTE — PROGRESS NOTES
Mr. Belkys Osei is a 80 y.o.  male with history of Afib who presents today for anticoagulation monitoring and adjustment. Patient verifies current dosing regimen  Patient denies s/s bleeding/bruising/swelling/SOB  No blood in urine or stool. No dietary changes. No changes in medication/OTC agents/Herbals. No change in alcohol use. No missed doses. No Procedures scheduled in the future at this time. Lab Results   Component Value Date    INR 2.3 02/19/2020    INR 1.7 01/21/2020    INR 3.2 12/10/2019       Pertinent findings: Chan Lau will be having another back procedure on 2-28-20. He will begin holding his warfarin and aspirin tomorrow, 2-20-20. He denies any medication or dietary changes. I advised him to follow his physician's instructions for restarting his warfarin. Assuming her will restart on 2-28, we will see him 7 days after the procedure. He will restart at his previous dosing. Warfarin dosing: Hold warfarin as directed by your physician then CONTINUE: Warfarin 2.5mg daily EXCEPT 1.25mg(1/2 tablet) every Tuesday    After visit summary printed and reviewed with patient.

## 2020-03-02 ENCOUNTER — TELEPHONE (OUTPATIENT)
Dept: PHARMACY | Age: 85
End: 2020-03-02

## 2020-03-06 ENCOUNTER — APPOINTMENT (OUTPATIENT)
Dept: PHARMACY | Age: 85
End: 2020-03-06
Payer: MEDICARE

## 2020-03-13 ENCOUNTER — ANTI-COAG VISIT (OUTPATIENT)
Dept: PHARMACY | Age: 85
End: 2020-03-13
Payer: MEDICARE

## 2020-03-13 LAB — INTERNATIONAL NORMALIZATION RATIO, POC: 1.7

## 2020-03-13 PROCEDURE — 99211 OFF/OP EST MAY X REQ PHY/QHP: CPT

## 2020-03-13 PROCEDURE — 85610 PROTHROMBIN TIME: CPT

## 2020-03-13 RX ORDER — WARFARIN SODIUM 2.5 MG/1
TABLET ORAL
Qty: 30 TABLET | Refills: 0
Start: 2020-03-13 | End: 2021-02-26

## 2020-04-06 ENCOUNTER — TELEPHONE (OUTPATIENT)
Dept: PHARMACY | Age: 85
End: 2020-04-06

## 2020-04-07 ENCOUNTER — ANTI-COAG VISIT (OUTPATIENT)
Dept: PHARMACY | Age: 85
End: 2020-04-07
Payer: MEDICARE

## 2020-04-07 LAB — INR BLD: 3.5

## 2020-04-07 PROCEDURE — 99211 OFF/OP EST MAY X REQ PHY/QHP: CPT

## 2020-05-05 ENCOUNTER — ANTI-COAG VISIT (OUTPATIENT)
Dept: PHARMACY | Age: 85
End: 2020-05-05
Payer: MEDICARE

## 2020-05-05 LAB — INR BLD: 2.5

## 2020-05-05 PROCEDURE — 99211 OFF/OP EST MAY X REQ PHY/QHP: CPT

## 2020-06-01 ENCOUNTER — TELEPHONE (OUTPATIENT)
Dept: PHARMACY | Age: 85
End: 2020-06-01

## 2020-06-02 ENCOUNTER — ANTI-COAG VISIT (OUTPATIENT)
Dept: PHARMACY | Age: 85
End: 2020-06-02

## 2020-06-02 VITALS — TEMPERATURE: 98.2 F

## 2020-06-02 LAB — INTERNATIONAL NORMALIZATION RATIO, POC: 1.6

## 2020-06-02 PROCEDURE — 99211 OFF/OP EST MAY X REQ PHY/QHP: CPT

## 2020-06-02 NOTE — PROGRESS NOTES
Tracking Only    PHSO: Yes  Total # of Interventions Recommended: 0      Total Interventions Accepted: 0  Time Spent (min): [address], RPh, [place] 6/2/2020  2:05 PM

## 2020-06-29 ENCOUNTER — TELEPHONE (OUTPATIENT)
Dept: PHARMACY | Age: 85
End: 2020-06-29

## 2020-06-30 ENCOUNTER — ANTI-COAG VISIT (OUTPATIENT)
Dept: PHARMACY | Age: 85
End: 2020-06-30
Payer: MEDICARE

## 2020-06-30 VITALS — TEMPERATURE: 97 F

## 2020-06-30 LAB — INTERNATIONAL NORMALIZATION RATIO, POC: 3.3

## 2020-06-30 PROCEDURE — 99211 OFF/OP EST MAY X REQ PHY/QHP: CPT

## 2020-06-30 PROCEDURE — 85610 PROTHROMBIN TIME: CPT

## 2020-07-30 ENCOUNTER — ANTI-COAG VISIT (OUTPATIENT)
Dept: PHARMACY | Age: 85
End: 2020-07-30
Payer: MEDICARE

## 2020-07-30 VITALS — TEMPERATURE: 96.7 F

## 2020-07-30 LAB — INTERNATIONAL NORMALIZATION RATIO, POC: 2

## 2020-07-30 PROCEDURE — 99211 OFF/OP EST MAY X REQ PHY/QHP: CPT

## 2020-07-30 PROCEDURE — 85610 PROTHROMBIN TIME: CPT

## 2020-07-30 NOTE — PROGRESS NOTES
Mr. Sophia Archer is a 80 y.o.  male. Mr. Sophia Archer had an INR test today. Results were reviewed and appropriate warfarin management was completed. THIS VISIT WAS COMPLETED AS:   []    A VIRTUAL VISIT VIA TELEPHONE IN EFFORTS TO REDUCE THE SPREAD OF COVID-19.  []    A DRIVE-THRU VISIT IN EFFORTS TO REDUCE THE SPREAD OF COVID-19. [x]    AN IN PERSON VISIT. PROTOCOLS WERE FOLLOWED WITH PRECAUTIONS TO REDUCE THE SPREAD OF COVID-19. Patient verifies current dosing regimen: Yes     Medications reviewed and updated on the patient 's home medication list: No: no changes     Lab Results   Component Value Date    INR 2.0 2020    INR 3.3 2020    INR 1.6 2020       Patient Findings     Positives:   Bruising, Other complaints    Negatives:   Signs/symptoms of bleeding, Missed doses, Change in medications, Change in diet/appetite    Comments:   Bumped a door and has a bruise on his left forearm but states it is improving. Doesn't eat many greens-iceberg lettuce sometimes and broccoli every once in a while  He had an injection done on  and his warfarin was held for 10 days. His INR was 1.3 the day of the injection but they went ahead and did it anyway. He restarted his warfarin on . Anticoagulation Summary  As of 2020    INR goal:   2.0-3.0   TTR:   77.0 % (7.9 y)   INR used for dosin.0 (2020)   Warfarin maintenance plan:   1.25 mg (2.5 mg x 0.5) every Tue; 2.5 mg (2.5 mg x 1) all other days   Weekly warfarin total:   16.25 mg   Plan last modified:   Mami Arciniega RPH (2020)   Next INR check:   2020   Priority:   Maintenance   Target end date:    Indefinite    Indications    A-fib ([address] [I48.91]             Anticoagulation Episode Summary     INR check location:       Preferred lab:       Send INR reminders to:   [facility]    Comments:   [facility]        Anticoagulation Care Providers     Provider Role Specialty Phone number

## 2020-08-25 ENCOUNTER — ANTI-COAG VISIT (OUTPATIENT)
Dept: PHARMACY | Age: 85
End: 2020-08-25
Payer: MEDICARE

## 2020-08-25 VITALS — TEMPERATURE: 96.4 F

## 2020-08-25 LAB — INTERNATIONAL NORMALIZATION RATIO, POC: 2.8

## 2020-08-25 PROCEDURE — 85610 PROTHROMBIN TIME: CPT

## 2020-08-25 PROCEDURE — 99211 OFF/OP EST MAY X REQ PHY/QHP: CPT

## 2020-08-25 NOTE — PROGRESS NOTES
Mr. Karri Herrera is a 80 y.o.  male. Mr. Karri Herrera had an INR test today. Results were reviewed and appropriate warfarin management was completed. THIS VISIT WAS COMPLETED AS:   []    A VIRTUAL VISIT VIA TELEPHONE IN EFFORTS TO REDUCE THE SPREAD OF COVID-19.  []    A DRIVE-THRU VISIT IN EFFORTS TO REDUCE THE SPREAD OF COVID-19. [x]    AN IN PERSON VISIT. PROTOCOLS WERE FOLLOWED WITH PRECAUTIONS TO REDUCE THE SPREAD OF COVID-19. Patient verifies current dosing regimen: Yes     Warfarin medication reviewed and updated on the patient 's home medication list: Yes   All other medications reviewed and updated on the patient 's home medication list: No: There has not been any changes in patient's other medications. Lab Results   Component Value Date    INR 2.8 2020    INR 2.0 2020    INR 3.3 2020           Anticoagulation Summary  As of 2020    INR goal:   2.0-3.0   TTR:   77.2 % (7.9 y)   INR used for dosin.8 (2020)   Warfarin maintenance plan:   1.25 mg (2.5 mg x 0.5) every Tue; 2.5 mg (2.5 mg x 1) all other days   Weekly warfarin total:   16.25 mg   Plan last modified:   Mami Arciniega RPH (2020)   Next INR check:   2020   Priority:   Maintenance   Target end date: Indefinite    Indications    A-fib ([address] [I48.91]             Anticoagulation Episode Summary     INR check location:       Preferred lab:       Send INR reminders to:   [facility]    Comments:   [facility]        Anticoagulation Care Providers     Provider Role Specialty Phone number    Nery Messer Responsible Internal Medicine [phone]          Warfarin plan:   Patient states he is doing well on his warfarin dose. He denies any signs of bleeding but has bruises on his arms and legs which he states are usual and only happens when he takes warfarin. He denies any missed doses. His INR (2.8) today (20) is within range.  Taking into consideration the fact that patient's INR today (08-25-20) and the immediate past INR on 07-30-20 have been at goal on the same weekly warfarin maintenance plan, it is not necessary to adjust his warfarin dose at this time. Patient will return for his next INR check after four weeks. Description    CONTINUE: Warfarin 2.5mg daily EXCEPT 1.25mg every Tuesday     Keep greens consistent     Call with medications changes, especially antibiotics and steroids and including any over-the-counter medications or herbal products. Call if you stop any medications. Please arrived 15 minutes prior to your appointment           Reviewed AVS with patient / caregiver.       CLINICAL PHARMACY CONSULT: MED RECONCILIATION/REVIEW ADDENDUM    For Pharmacy Admin Tracking Only    PHSO: No  Total # of Interventions Recommended: 0    - Maintenance Safety Lab Monitoring #: 1  Total Interventions Accepted: 0  Time Spent (min): 15

## 2020-08-26 ENCOUNTER — TELEPHONE (OUTPATIENT)
Dept: PHARMACY | Age: 85
End: 2020-08-26

## 2020-08-26 NOTE — TELEPHONE ENCOUNTER
I received a call from Chelsea Levine at Dr. Bridges Do office to let us know that Narendra Abel will be starting a course of Levaquin for a UTI. I spoke to Narendra Abel and he states that he will be on a 5 day course of Levaquin. I advised him to reduce his warfarin dose, dur to the interaction with the antibiotic. He will alternate warfarin 2.5mg with 1.25mg every other day for the next 5 days. He will then resume his previous dosing. He will call with any problems or issues.     [address]  Anticoagulation Service  [phone]

## 2020-09-22 ENCOUNTER — ANTI-COAG VISIT (OUTPATIENT)
Dept: PHARMACY | Age: 85
End: 2020-09-22
Payer: MEDICARE

## 2020-09-22 LAB — INTERNATIONAL NORMALIZATION RATIO, POC: 3.2

## 2020-09-22 PROCEDURE — 99211 OFF/OP EST MAY X REQ PHY/QHP: CPT

## 2020-09-22 PROCEDURE — 85610 PROTHROMBIN TIME: CPT

## 2020-09-22 NOTE — PROGRESS NOTES
[phone]          Warfarin plan:   Patient states doing well. No complaints regarding warfarin therapy. No change to weekly warfarin dosing. No changes in diet, medications, no extra doses. Description    Hold warfarin on 9-22-20  CONTINUE: Warfarin 2.5mg daily EXCEPT 1.25mg every Tuesday     Keep greens consistent     Call with medications changes, especially antibiotics and steroids and including any over-the-counter medications or herbal products. Call if you stop any medications. Please arrived 15 minutes prior to your appointment           Reviewed AVS with patient / caregiver.       CLINICAL PHARMACY CONSULT: MED RECONCILIATION/REVIEW ADDENDUM    For Pharmacy Admin Tracking Only    PHSO: Yes  Total # of Interventions Recommended: 1  - Decreased Dose #: 1  - Maintenance Safety Lab Monitoring #: 1  Total Interventions Accepted: 1  Time Spent (min): [address], [place]Ph, [place] 9/22/2020  11:25 AM

## 2020-10-27 ENCOUNTER — ANTI-COAG VISIT (OUTPATIENT)
Dept: PHARMACY | Age: 85
End: 2020-10-27
Payer: MEDICARE

## 2020-10-27 VITALS — TEMPERATURE: 97 F

## 2020-10-27 LAB — INTERNATIONAL NORMALIZATION RATIO, POC: 2.7

## 2020-10-27 PROCEDURE — 99211 OFF/OP EST MAY X REQ PHY/QHP: CPT

## 2020-10-27 PROCEDURE — 85610 PROTHROMBIN TIME: CPT

## 2020-10-27 NOTE — PROGRESS NOTES
Mr. Xochitl Watkins is a 80 y.o.  male. Mr. Xochitl Watkins had an INR test today. Results were reviewed and appropriate warfarin management was completed. THIS VISIT WAS COMPLETED AS:   []    A VIRTUAL VISIT VIA TELEPHONE IN EFFORTS TO REDUCE THE SPREAD OF COVID-19.  []    A DRIVE-THRU VISIT IN EFFORTS TO REDUCE THE SPREAD OF COVID-19. [x]    AN IN PERSON VISIT. PROTOCOLS WERE FOLLOWED WITH PRECAUTIONS TO REDUCE THE SPREAD OF COVID-19. Patient verifies current dosing regimen: Yes     Warfarin medication reviewed and updated on the patient 's home medication list: Yes   All other medications reviewed and updated on the patient 's home medication list: No: No changes     Lab Results   Component Value Date    INR 2.7 10/27/2020    INR 3.2 2020    INR 2.8 2020           Anticoagulation Summary  As of 10/27/2020    INR goal:   2.0-3.0   TTR:   76.7 % (8.1 y)   INR used for dosin.7 (10/27/2020)   Warfarin maintenance plan:   1.25 mg (2.5 mg x 0.5) every Tue; 2.5 mg (2.5 mg x 1) all other days   Weekly warfarin total:   16.25 mg   Plan last modified:   Mami Arciniega, [address] [facility] (2020)   Next INR check:   2020   Priority:   Maintenance   Target end date: Indefinite    Indications    [facility]fib ([facility] [I48.91]             Anticoagulation Episode Summary     INR check location:   Anticoagulation Clinic    Preferred lab:       Send INR reminders to:   [address] [place] [address] 60    Comments:   Nayan Mariscal        Anticoagulation Care Providers     Provider Role Specialty Phone number    Avani Elkins [facility] Internal Medicine [phone]          Warfarin plan:   Patient states he is doing well on warfarin therapy. Denies any missed doses and changes in medications or his diet. Reports he bruises easily but nothing out of the ordinary. No changes to current dose for an INR of 2.7 today. Will recheck INR in 5 weeks.      Description    CONTINUE: Warfarin 2.5mg daily EXCEPT 1.25mg every Tuesday     Keep greens consistent     Call with medications changes, especially antibiotics and steroids and including any over-the-counter medications or herbal products. Call if you stop any medications. Please arrived 15 minutes prior to your appointment           Reviewed AVS with patient / caregiver. CLINICAL PHARMACY CONSULT: MED RECONCILIATION/REVIEW ADDENDUM    For Pharmacy Admin Tracking Only    PHSO: No  Total # of Interventions Recommended: 0  - Maintenance Safety Lab Monitoring #: 1  Total Interventions Accepted: 0  Time Spent (min): 50 Jerzy Arteaga, Pharmacy Student.  10-

## 2020-11-03 PROBLEM — I48.91 A-FIB (HCC): Status: RESOLVED | Noted: 2020-11-03 | Resolved: 2020-11-03

## 2020-12-01 ENCOUNTER — ANTI-COAG VISIT (OUTPATIENT)
Dept: PHARMACY | Age: 85
End: 2020-12-01
Payer: MEDICARE

## 2020-12-01 VITALS — TEMPERATURE: 97 F

## 2020-12-01 LAB — INTERNATIONAL NORMALIZATION RATIO, POC: 2.5

## 2020-12-01 PROCEDURE — 99211 OFF/OP EST MAY X REQ PHY/QHP: CPT

## 2020-12-01 PROCEDURE — 85610 PROTHROMBIN TIME: CPT

## 2020-12-01 NOTE — PROGRESS NOTES
Mr. Namrata Hsu is a 80 y.o.  male. Mr. Namrata Hsu had an INR test today. Results were reviewed and appropriate warfarin management was completed. THIS VISIT WAS COMPLETED AS:   []    A VIRTUAL VISIT VIA TELEPHONE IN EFFORTS TO REDUCE THE SPREAD OF COVID-19.  []    A DRIVE-THRU VISIT IN EFFORTS TO REDUCE THE SPREAD OF COVID-19. [x]    AN IN PERSON VISIT. PROTOCOLS WERE FOLLOWED WITH PRECAUTIONS TO REDUCE THE SPREAD OF COVID-19. Patient verifies current dosing regimen: Yes     Warfarin medication reviewed and updated on the patient 's home medication list: Yes   All other medications reviewed and updated on the patient 's home medication list: No: no change     Lab Results   Component Value Date    INR 2.5 2020    INR 2.7 10/27/2020    INR 3.2 2020           Anticoagulation Summary  As of 2020    INR goal:   2.0-3.0   TTR:   77.0 % (8.2 y)   INR used for dosin.5 (2020)   Warfarin maintenance plan:   1.25 mg (2.5 mg x 0.5) every Tue; 2.5 mg (2.5 mg x 1) all other days   Weekly warfarin total:   16.25 mg   Plan last modified:   Mami Arciniega, [address] [facility] (2020)   Next INR check:   2021   Priority:   Maintenance   Target end date: Indefinite    Indications    A-fib ([facility] (Resolved) [I48.91]             Anticoagulation Episode Summary     INR check location:   Anticoagulation Clinic    Preferred lab:       Send INR reminders to:   [address] [place] [address] 60    Comments:   [facility]        Anticoagulation Care Providers     Provider Role Specialty Phone number    Edin Zuñiga [facility] Internal Medicine [phone]          Warfarin plan:   Looks and feels well today. No changes in medications or diet. No bruising or bleeding noted. No change in dose for INR of  2.5. Will see in 5 weeks for next INR.       Description    CONTINUE: Warfarin 2.5mg daily EXCEPT 1.25mg every Tuesday     Keep greens consistent     Call with medications changes, especially

## 2020-12-31 ENCOUNTER — TELEPHONE (OUTPATIENT)
Dept: PHARMACY | Age: 85
End: 2020-12-31

## 2020-12-31 NOTE — TELEPHONE ENCOUNTER
Patient called, started Keflex on 12/30/20. I anticipate a low to moderate interaction with Coumadin. Advised patient to Take a one time lower dose 1/1/21 of 1.25mg (vs 2.5mg) and follow up 1/5/21 as planned.     Lab Results   Component Value Date    INR 2.5 12/01/2020         Mami Arciniega, PharmD    CLINICAL PHARMACY CONSULT: MED RECONCILIATION/REVIEW ADDENDUM    For Pharmacy Admin Tracking Only    PHSO: No  Total # of Interventions Recommended: 1  - Decreased Dose #: 1  Total Interventions Accepted: 1  Time Spent (min): 15

## 2021-01-05 ENCOUNTER — ANTI-COAG VISIT (OUTPATIENT)
Dept: PHARMACY | Age: 86
End: 2021-01-05
Payer: MEDICARE

## 2021-01-05 VITALS — TEMPERATURE: 97.2 F

## 2021-01-05 LAB — INTERNATIONAL NORMALIZATION RATIO, POC: 2.5

## 2021-01-05 PROCEDURE — 99211 OFF/OP EST MAY X REQ PHY/QHP: CPT

## 2021-01-05 PROCEDURE — 85610 PROTHROMBIN TIME: CPT

## 2021-01-05 NOTE — PROGRESS NOTES
Mr. Diaz Chen is a 80 y.o.  male. Mr. Diaz Chen had an INR test today. Results were reviewed and appropriate warfarin management was completed. THIS VISIT WAS COMPLETED AS:   []    A VIRTUAL VISIT VIA TELEPHONE IN EFFORTS TO REDUCE THE SPREAD OF COVID-19.  []    A DRIVE-THRU VISIT IN EFFORTS TO REDUCE THE SPREAD OF COVID-19. [x]    AN IN PERSON VISIT. PROTOCOLS WERE FOLLOWED WITH PRECAUTIONS TO REDUCE THE SPREAD OF COVID-19. Patient verifies current dosing regimen: Yes     Warfarin medication reviewed and updated on the patient 's home medication list: Yes   All other medications reviewed and updated on the patient 's home medication list: No: no permanent changes     Lab Results   Component Value Date    INR 2.5 2021    INR 2.5 2020    INR 2.7 10/27/2020       Patient Findings     Positives:  Change in medications, Bruising    Negatives:  Signs/symptoms of bleeding, Change in diet/appetite    Comments:  Bruising - but not unusual. Keflex - low interaction          Anticoagulation Summary  As of 2021    INR goal:  2.0-3.0   TTR:  77.2 % (8.3 y)   INR used for dosin.5 (2021)   Warfarin maintenance plan:  1.25 mg (2.5 mg x 0.5) every Tue; 2.5 mg (2.5 mg x 1) all other days   Weekly warfarin total:  16.25 mg   Plan last modified:  Mami Arciniega [facility] (2020)   Next INR check:  2021   Priority:  Maintenance   Target end date: Indefinite    Indications    A-fib ([address] (Resolved) [I48.91]             Anticoagulation Episode Summary     INR check location:  Anticoagulation Clinic    Preferred lab:      Send INR reminders to:  [address] [facility] 60    Comments:  [facility]        Anticoagulation Care Providers     Provider Role Specialty Phone number    Rusty Charles [facility] Internal Medicine [phone]          Warfarin plan:   Mr. Jessica Coronado is on Keflex. I adjusted one day of warfarin while taking this and the adjustment worked well. We will continue his regular warfarin dosing schedule. Description    CONTINUE: Warfarin 2.5mg daily EXCEPT 1.25mg every Tuesday     Keep greens consistent     Call with medications changes, especially antibiotics and steroids and including any over-the-counter medications or herbal products. Call if you stop any medications. Please arrived 15 minutes prior to your appointment           Immunization History   Administered Date(s) Administered    Influenza Vaccine, unspecified formulation 09/28/2012, 09/25/2013, 09/04/2014, 09/16/2015, 10/04/2016, 09/20/2017, 10/01/2018, 10/10/2019    Pneumococcal Conjugate 7-valent (Prevnar7) 09/19/2016    Pneumococcal Polysaccharide ([id number]) 04/13/2004       Reviewed AVS with patient / caregiver.       CLINICAL PHARMACY CONSULT: MED RECONCILIATION/REVIEW ADDENDUM    For Pharmacy Admin Tracking Only    PHSO (orange banner): No  Total # of Interventions Recommended (warfarin changes): 0  (#1 for reviewing INR) - Maintenance Safety Lab Monitoring #: 1  Total Interventions Accepted (warfarin related): 0  Time Spent (min) (round up): 15

## 2021-02-16 ENCOUNTER — TELEPHONE (OUTPATIENT)
Dept: PHARMACY | Age: 86
End: 2021-02-16

## 2021-02-16 NOTE — TELEPHONE ENCOUNTER
Mr. Michael Fagan was contacted 2/16 @ [phone] for follow-up appointment. Patient stated he prefers to come in on Wednesdays and would call when the weather improves to schedule f/u. Luis Manuel Crane, Pharm. D Candidate 2021

## 2021-02-25 ENCOUNTER — TELEPHONE (OUTPATIENT)
Dept: PHARMACY | Age: 86
End: 2021-02-25

## 2021-02-26 ENCOUNTER — ANTI-COAG VISIT (OUTPATIENT)
Dept: PHARMACY | Age: 86
End: 2021-02-26
Payer: MEDICARE

## 2021-02-26 VITALS — TEMPERATURE: 97 F

## 2021-02-26 DIAGNOSIS — I48.91 ATRIAL FIBRILLATION, UNSPECIFIED TYPE (HCC): ICD-10-CM

## 2021-02-26 LAB — INTERNATIONAL NORMALIZATION RATIO, POC: 2.5

## 2021-02-26 PROCEDURE — 85610 PROTHROMBIN TIME: CPT

## 2021-02-26 PROCEDURE — 99211 OFF/OP EST MAY X REQ PHY/QHP: CPT

## 2021-02-26 RX ORDER — WARFARIN SODIUM 2.5 MG/1
2.5 TABLET ORAL DAILY
COMMUNITY

## 2021-02-26 NOTE — PROGRESS NOTES
Mr. Jacquie Dance is a 80 y.o.  male. Mr. Jacquie Dance had an INR test today. Results were reviewed and appropriate warfarin management was completed. THIS VISIT WAS COMPLETED AS:   []    A VIRTUAL VISIT VIA TELEPHONE IN EFFORTS TO REDUCE THE SPREAD OF COVID-19.  []    A DRIVE-THRU VISIT IN EFFORTS TO REDUCE THE SPREAD OF COVID-19. [x]    AN IN PERSON VISIT. PROTOCOLS WERE FOLLOWED WITH PRECAUTIONS TO REDUCE THE SPREAD OF COVID-19. Patient verifies current dosing regimen: Yes     Warfarin medication reviewed and updated on the patient 's home medication list: Yes   All other medications reviewed and updated on the patient 's home medication list: No: no changes     Lab Results   Component Value Date    INR 2.5 2021    INR 2.5 2021    INR 2.5 2020       Patient Findings     Negatives:  Signs/symptoms of bleeding, Change in medications, Change in diet/appetite, Bruising          Anticoagulation Summary  As of 2021    INR goal:  2.0-3.0   TTR:  77.6 % (8.4 y)   INR used for dosin.5 (2021)   Warfarin maintenance plan:  1.25 mg (2.5 mg x 0.5) every Tue; 2.5 mg (2.5 mg x 1) all other days   Weekly warfarin total:  16.25 mg   Plan last modified:  Mami Arciniega RPH (2020)   Next INR check:  3/30/2021   Priority:  Maintenance   Target end date: Indefinite    Indications    A-fib ([address] (Resolved) [I48.91]             Anticoagulation Episode Summary     INR check location:  Anticoagulation Clinic    Preferred lab:      Send INR reminders to:  [address] [place] [address] 60    Comments:  [address] [facility]        Anticoagulation Care Providers     Provider Role Specialty Phone number    Shannan Bryant [facility] Internal Medicine [phone]          Warfarin assessment / plan:   No complaints, no changes. No change to warfarin therapy today.      Description    CONTINUE: Warfarin 2.5mg daily EXCEPT 1.25mg every Tuesday     Keep greens consistent Call with medications changes, especially antibiotics and steroids and including any over-the-counter medications or herbal products. Call if you stop any medications. Please arrived 15 minutes prior to your appointment           Immunization History   Administered Date(s) Administered    Influenza Vaccine, unspecified formulation 09/28/2012, 09/25/2013, 09/04/2014, 09/16/2015, 10/04/2016, 09/20/2017, 10/01/2018, 10/10/2019    Pneumococcal Conjugate 7-valent (Prevnar7) 09/19/2016    Pneumococcal Polysaccharide ([id number]) 04/13/2004     Reviewed AVS with patient / caregiver.       CLINICAL PHARMACY CONSULT: MED RECONCILIATION/REVIEW ADDENDUM    For Pharmacy Admin Tracking Only    PHSO (orange banner): No  Total # of Interventions Recommended (warfarin changes): 0  (#1 for reviewing INR) - Maintenance Safety Lab Monitoring #: 1  Total Interventions Accepted (warfarin related): 0  Time Spent (min) (round up): 15

## 2021-03-30 ENCOUNTER — ANTI-COAG VISIT (OUTPATIENT)
Dept: PHARMACY | Age: 86
End: 2021-03-30
Payer: MEDICARE

## 2021-03-30 DIAGNOSIS — I48.91 ATRIAL FIBRILLATION, UNSPECIFIED TYPE (HCC): ICD-10-CM

## 2021-03-30 LAB — INR BLD: 2

## 2021-03-30 PROCEDURE — 85610 PROTHROMBIN TIME: CPT

## 2021-03-30 PROCEDURE — 99211 OFF/OP EST MAY X REQ PHY/QHP: CPT

## 2021-03-30 NOTE — PROGRESS NOTES
Mr. Jacquie Dance is a 80 y.o.  male. Mr. Jacquie Dance had an INR test today. Results were reviewed and appropriate warfarin management was completed. THIS VISIT WAS COMPLETED AS:   []    A VIRTUAL VISIT VIA TELEPHONE IN EFFORTS TO REDUCE THE SPREAD OF COVID-19.  []    A DRIVE-THRU VISIT IN EFFORTS TO REDUCE THE SPREAD OF COVID-19. [x]    AN IN PERSON VISIT. PROTOCOLS WERE FOLLOWED WITH PRECAUTIONS TO REDUCE THE SPREAD OF COVID-19. Patient verifies current dosing regimen: Yes     Warfarin medication reviewed and updated on the patient 's home medication list: Yes   All other medications reviewed and updated on the patient 's home medication list: Yes     Lab Results   Component Value Date    INR 2.00 2021    INR 2.5 2021    INR 2.5 2021       Patient Findings     Positives:  Missed doses, Change in medications    Negatives:  Signs/symptoms of bleeding, Change in diet/appetite    Comments: On amoxicillin x10 days (3/15-3/24). Low interaction w warfarin. Took 1/2 doses of warfarin on two doses to compensate which he says has worked in the past.          Anticoagulation Summary  As of 3/30/2021    INR goal:  2.0-3.0   TTR:  77.9 % (8.5 y)   INR used for dosin.00 (3/30/2021)   Warfarin maintenance plan:  1.25 mg (2.5 mg x 0.5) every Tue; 2.5 mg (2.5 mg x 1) all other days   Weekly warfarin total:  16.25 mg   Plan last modified:  Albertus Gottron, [address] [facility] (2020)   Next INR check:  2021   Priority:  Maintenance   Target end date:   Indefinite    Indications    Atrial fibrillation (HCC) [I48.91]             Anticoagulation Episode Summary     INR check location:  Anticoagulation Clinic    Preferred lab:      Send INR reminders to:  [address]    Comments:  [facility]        Anticoagulation Care Providers     Provider Role Specialty Phone number    Shannan Annette [facility] Internal Medicine [phone]          Warfarin assessment / plan:   Patient appears well, no complaints. Was on Amoxicillin x10 days for LE cellulitis (3/15-3/24). He took half-doses on 2 days during that course because we had told him to do that in the past.    INR is within goal at 2.0. No changes to warfarin therapy, f/u 4 weeks    Description    CONTINUE: Warfarin 2.5mg daily EXCEPT 1.25mg every Tuesday     Keep greens consistent     Call with medications changes, especially antibiotics and steroids and including any over-the-counter medications or herbal products. Call if you stop any medications. Please arrived 15 minutes prior to your appointment           Immunization History   Administered Date(s) Administered    Influenza Vaccine, unspecified formulation 09/28/2012, 09/25/2013, 09/04/2014, 09/16/2015, 10/04/2016, 09/20/2017, 10/01/2018, 10/10/2019    Pneumococcal Conjugate 7-valent (Prevnar7) 09/19/2016    Pneumococcal Polysaccharide ([id number]) 04/13/2004         Reviewed AVS with patient / caregiver.       CLINICAL PHARMACY CONSULT: MED RECONCILIATION/REVIEW ADDENDUM    For Pharmacy Admin Tracking Only    PHSO (orange banner): No  Total # of Interventions Recommended (warfarin changes): 0  (other medication updates)- Updated Order #: 0 Updated Order Reason(s)  (#1 for reviewing INR) - Maintenance Safety Lab Monitoring #: 1  Total Interventions Accepted (warfarin related): 0  Time Spent (min) (round up): 15

## 2021-04-29 ENCOUNTER — ANTI-COAG VISIT (OUTPATIENT)
Dept: PHARMACY | Age: 86
End: 2021-04-29
Payer: MEDICARE

## 2021-04-29 DIAGNOSIS — I48.91 ATRIAL FIBRILLATION, UNSPECIFIED TYPE (HCC): ICD-10-CM

## 2021-04-29 LAB — INTERNATIONAL NORMALIZATION RATIO, POC: 2.3

## 2021-04-29 PROCEDURE — 99211 OFF/OP EST MAY X REQ PHY/QHP: CPT

## 2021-04-29 PROCEDURE — 85610 PROTHROMBIN TIME: CPT

## 2021-04-29 NOTE — PROGRESS NOTES
Mr. Cassandra Parada is a 80 y.o.  male. Mr. Cassandra Parada had an INR test today. Results were reviewed and appropriate warfarin management was completed. THIS VISIT WAS COMPLETED AS:  []    A VIRTUAL VISIT VIA TELEPHONE IN EFFORTS TO REDUCE THE SPREAD OF COVID-19.  []    A DRIVE-THRU VISIT IN EFFORTS TO REDUCE THE SPREAD OF COVID-19. [x]    AN IN PERSON VISIT. PROTOCOLS WERE FOLLOWED WITH PRECAUTIONS TO REDUCE THE SPREAD OF COVID-19. Patient verifies current dosing regimen: Yes     Warfarin medication reviewed and updated on the patient 's home medication list: Yes   All other medications reviewed and updated on the patient 's home medication list: No new medications     Lab Results   Component Value Date    INR 2.3 2021    INR 2.00 2021    INR 2.5 2021       Patient Findings     Negatives:  Signs/symptoms of thrombosis, Signs/symptoms of bleeding, Change in health, Missed doses, Change in medications, Change in diet/appetite, Bruising          Anticoagulation Summary  As of 2021    INR goal:  2.0-3.0   TTR:  78.1 % (8.6 y)   INR used for dosin.3 (2021)   Warfarin maintenance plan:  1.25 mg (2.5 mg x 0.5) every Tue; 2.5 mg (2.5 mg x 1) all other days   Weekly warfarin total:  16.25 mg   Plan last modified:  [place], [facility] (2020)   Next INR check:  2021   Priority:  Maintenance   Target end date: Indefinite    Indications    Atrial fibrillation (HCC) [I48.91]             Anticoagulation Episode Summary     INR check location:  Anticoagulation Clinic    Preferred lab:      Send INR reminders to:  [address] [facility] 60    Comments:  [facility]        Anticoagulation Care Providers     Provider Role Specialty Phone number    Jody Zca [facility] Internal Medicine [phone]          Warfarin assessment / plan:   Patient appears well. No complaints regarding warfarin therapy. No change to weekly warfarin dosing.     Description    CONTINUE: Warfarin 2.5mg daily EXCEPT 1.25mg every Tuesday     Keep greens consistent     Call with medications changes, especially antibiotics and steroids and including any over-the-counter medications or herbal products. Call if you stop any medications. Please arrived 15 minutes prior to your appointment           Immunization History   Administered Date(s) Administered    COVID-19, Moderna, PF, 100mcg/0.5mL 01/29/2021, 02/26/2021    Influenza Vaccine, unspecified formulation 09/28/2012, 09/25/2013, 09/04/2014, 09/16/2015, 10/04/2016, 09/20/2017, 10/01/2018, 10/10/2019    Pneumococcal Conjugate 7-valent (Prevnar7) 09/19/2016    Pneumococcal Polysaccharide ([id number]) 04/13/2004         Reviewed AVS with patient / caregiver.       CLINICAL PHARMACY CONSULT: MED RECONCILIATION/REVIEW ADDENDUM    For Pharmacy Admin Tracking Only    PHSO (orange banner): No  Total # of Interventions Recommended (warfarin changes): 0  (warfarin changes)   (other medication updates)- Updated Order #: 0 Updated Order Reason(s):   (#1 for reviewing INR) - Maintenance Safety Lab Monitoring #: 1  Total Interventions Accepted (warfarin related): 0  Time Spent (min) (round up): 15

## 2021-05-27 ENCOUNTER — ANTI-COAG VISIT (OUTPATIENT)
Dept: PHARMACY | Age: 86
End: 2021-05-27
Payer: MEDICARE

## 2021-05-27 DIAGNOSIS — I48.91 ATRIAL FIBRILLATION, UNSPECIFIED TYPE (HCC): Primary | ICD-10-CM

## 2021-05-27 LAB — INTERNATIONAL NORMALIZATION RATIO, POC: 2.4

## 2021-05-27 PROCEDURE — 99211 OFF/OP EST MAY X REQ PHY/QHP: CPT

## 2021-05-27 PROCEDURE — 85610 PROTHROMBIN TIME: CPT

## 2021-05-27 NOTE — PROGRESS NOTES
Mr. Nando Barclay is a 80 y.o.  male. Mr. Nando Barclay had an INR test today. Results were reviewed and appropriate warfarin management was completed. THIS VISIT WAS PERFORMED AS: AN IN PERSON VISIT. PROTOCOLS WERE FOLLOWED WITH PRECAUTIONS TO REDUCE THE SPREAD OF COVID-19. Patient verifies current dosing regimen: Yes     Warfarin medication reviewed and updated on the patient 's home medication list: Yes   All other medications reviewed and updated on the patient 's home medication list: No changes reported     Lab Results   Component Value Date    INR 2.4 2021    INR 2.3 2021    INR 2.00 2021       Patient Findings     Negatives:  Signs/symptoms of thrombosis, Signs/symptoms of bleeding, Change in health, Missed doses, Change in medications, Change in diet/appetite, Bruising          Anticoagulation Summary  As of 2021    INR goal:  2.0-3.0   TTR:  78.3 % (8.7 y)   INR used for dosin.4 (2021)   Warfarin maintenance plan:  1.25 mg (2.5 mg x 0.5) every Tue; 2.5 mg (2.5 mg x 1) all other days   Weekly warfarin total:  16.25 mg   Plan last modified:  Filomena Monroe, [facility] (2020)   Next INR check:  2021   Priority:  Maintenance   Target end date: Indefinite    Indications    Atrial fibrillation (HCC) [I48.91]             Anticoagulation Episode Summary     INR check location:  Anticoagulation Clinic    Preferred lab:      Send INR reminders to:  [address] [facility] 60    Comments:  [facility]        Anticoagulation Care Providers     Provider Role Specialty Phone number    Janine Pedraza [facility] Internal Medicine [phone]          Warfarin assessment / plan:   Patient appears well. No complaints regarding warfarin therapy. No change to weekly warfarin dosing.       Description    CONTINUE: Warfarin 2.5mg daily EXCEPT 1.25mg every Tuesday     Keep greens consistent     Call with medications changes, especially antibiotics and steroids and including any over-the-counter medications or herbal products. Call if you stop any medications. Please arrived 15 minutes prior to your appointment           Immunization History   Administered Date(s) Administered    COVID-19, Moderna, PF, 100mcg/0.5mL 01/29/2021, 02/26/2021    Influenza Vaccine, unspecified formulation 09/28/2012, 09/25/2013, 09/04/2014, 09/16/2015, 10/04/2016, 09/20/2017, 10/01/2018, 10/10/2019    Pneumococcal Conjugate 7-valent (Prevnar7) 09/19/2016    Pneumococcal Polysaccharide ([id number]) 04/13/2004         Reviewed AVS with patient / caregiver.       CLINICAL PHARMACY CONSULT: MED RECONCILIATION/REVIEW ADDENDUM    For Pharmacy Admin Tracking Only     Total # of Interventions Recommended: 0   Total # of Interventions Accepted: 0   Time Spent (min): [facility] [address] [facility]: Brooks Michael [facility], [facility] 5/27/2021  11:24 AM

## 2021-06-24 ENCOUNTER — ANTI-COAG VISIT (OUTPATIENT)
Dept: PHARMACY | Age: 86
End: 2021-06-24
Payer: MEDICARE

## 2021-06-24 DIAGNOSIS — I48.11 LONGSTANDING PERSISTENT ATRIAL FIBRILLATION (HCC): Primary | ICD-10-CM

## 2021-06-24 LAB — INR BLD: 2.8

## 2021-06-24 PROCEDURE — 99211 OFF/OP EST MAY X REQ PHY/QHP: CPT

## 2021-06-24 PROCEDURE — 85610 PROTHROMBIN TIME: CPT

## 2021-06-24 NOTE — PROGRESS NOTES
medications or herbal products. Call if you stop any medications. Please arrived 15 minutes prior to your appointment           Immunization History   Administered Date(s) Administered    COVID-19, Moderna, PF, 100mcg/0.5mL 01/29/2021, 02/26/2021    Influenza Vaccine, unspecified formulation 09/28/2012, 09/25/2013, 09/04/2014, 09/16/2015, 10/04/2016, 09/20/2017, 10/01/2018, 10/10/2019    Pneumococcal Conjugate 7-valent (Prevnar7) 09/19/2016    Pneumococcal Polysaccharide ([id number]) 04/13/2004         Reviewed AVS with patient / caregiver.       CLINICAL PHARMACY CONSULT: MED RECONCILIATION/REVIEW ADDENDUM    For Pharmacy Admin Tracking Only     Intervention Detail:   Total # of Interventions Recommended: 0   Total # of Interventions Accepted: 0   Time Spent (min): 20

## 2021-07-22 ENCOUNTER — APPOINTMENT (OUTPATIENT)
Dept: PHARMACY | Age: 86
End: 2021-07-22
Payer: MEDICARE

## 2021-07-22 ENCOUNTER — ANTI-COAG VISIT (OUTPATIENT)
Dept: PHARMACY | Age: 86
End: 2021-07-22
Payer: MEDICARE

## 2021-07-22 DIAGNOSIS — I48.91 ATRIAL FIBRILLATION, UNSPECIFIED TYPE (HCC): Primary | ICD-10-CM

## 2021-07-22 LAB — INTERNATIONAL NORMALIZATION RATIO, POC: 3.2

## 2021-07-22 PROCEDURE — 99211 OFF/OP EST MAY X REQ PHY/QHP: CPT

## 2021-07-22 PROCEDURE — 85610 PROTHROMBIN TIME: CPT

## 2021-07-22 NOTE — PROGRESS NOTES
elevated INR today. We will hold his warfarin today and then resume his previous dosing. He will call with any issues. Description    HOLD WARFARIN TOMORROW 7/23/21  CONTINUE: Warfarin 2.5mg daily EXCEPT 1.25mg every Tuesday     Keep greens consistent     Call with medications changes, especially antibiotics and steroids and including any over-the-counter medications or herbal products. Call if you stop any medications. Please arrived 15 minutes prior to your appointment           Immunization History   Administered Date(s) Administered    COVID-19, Moderna, PF, 100mcg/0.5mL 01/29/2021, 02/26/2021    Influenza Vaccine, unspecified formulation 09/28/2012, 09/25/2013, 09/04/2014, 09/16/2015, 10/04/2016, 09/20/2017, 10/01/2018, 10/10/2019    Pneumococcal Conjugate 7-valent (Prevnar7) 09/19/2016    Pneumococcal Polysaccharide ([id number]) 04/13/2004         Reviewed AVS with patient / caregiver.       CLINICAL PHARMACY CONSULT: MED RECONCILIATION/REVIEW ADDENDUM    For Pharmacy Admin Tracking Only     Intervention Detail: Dose Adjustment: 1, reason: Therapy Optimization   Total # of Interventions Recommended: 1   Total # of Interventions Accepted: 1   Time Spent (min): 20       Seen by Jackie Garcia, Student P4

## 2021-08-26 ENCOUNTER — ANTI-COAG VISIT (OUTPATIENT)
Dept: PHARMACY | Age: 86
End: 2021-08-26
Payer: MEDICARE

## 2021-08-26 DIAGNOSIS — I48.91 ATRIAL FIBRILLATION, UNSPECIFIED TYPE (HCC): Primary | ICD-10-CM

## 2021-08-26 LAB — INTERNATIONAL NORMALIZATION RATIO, POC: 2.6

## 2021-08-26 PROCEDURE — 99211 OFF/OP EST MAY X REQ PHY/QHP: CPT

## 2021-08-26 PROCEDURE — 85610 PROTHROMBIN TIME: CPT

## 2021-08-26 NOTE — PROGRESS NOTES
Mr. Jeffry Payan is a 80 y.o.  male. Mr. Jeffry Payan had an INR test today. Results were reviewed and appropriate warfarin management was completed. This visit was performed as: An in person visit. Protocols were followed with precautions to reduce the spread of COVID-19. Patient verifies current dosing regimen: Yes     Warfarin medication reviewed and updated on the patient 's home medication list: Yes   All other medications reviewed and updated on the patient 's home medication list: No: no changes     Lab Results   Component Value Date    INR 2.6 2021    INR 3.2 2021    INR 2.80 2021       Patient Findings     Positives:  Bruising    Negatives:  Signs/symptoms of bleeding, Change in medications, Change in diet/appetite          Anticoagulation Summary  As of 2021    INR goal:  2.0-3.0   TTR:  78.1 % (8.9 y)   INR used for dosin.6 (2021)   Warfarin maintenance plan:  1.25 mg (2.5 mg x 0.5) every Tue; 2.5 mg (2.5 mg x 1) all other days   Weekly warfarin total:  16.25 mg   Plan last modified:  Mami Arciniega RPH (2020)   Next INR check:  2021   Priority:  Maintenance   Target end date: Indefinite    Indications    Atrial fibrillation (HCC) [I48.91]             Anticoagulation Episode Summary     INR check location:  Anticoagulation Clinic    Preferred lab:      Send INR reminders to:  [address] [place] [address] 60    Comments:  [facility]        Anticoagulation Care Providers     Provider Role Specialty Phone number    Michelle Lorraine [facility] Internal Medicine [phone]          Warfarin assessment / plan:     Appears well. No changes   No acute findings     No change to warfarin therapy today. Description    CONTINUE: Warfarin 2.5mg daily EXCEPT 1.25mg every Tuesday     Keep greens consistent     Call with medications changes, especially antibiotics and steroids and including any over-the-counter medications or herbal products.   Call if you stop any medications. Immunization History   Administered Date(s) Administered    COVID-19, Moderna, PF, 100mcg/0.5mL 01/29/2021, 02/26/2021    Influenza Vaccine, unspecified formulation 09/28/2012, 09/25/2013, 09/04/2014, 09/16/2015, 10/04/2016, 09/20/2017, 10/01/2018, 10/10/2019    Pneumococcal Conjugate 7-valent (Prevnar7) 09/19/2016    Pneumococcal Polysaccharide ([id number]) 04/13/2004         Reviewed AVS with patient / caregiver.       CLINICAL PHARMACY CONSULT: MED RECONCILIATION/REVIEW ADDENDUM    For Pharmacy Admin Tracking Only     Intervention Detail:    Total # of Interventions Recommended: 0   Total # of Interventions Accepted: 0   Time Spent (min): 15

## 2021-09-24 ENCOUNTER — ANTI-COAG VISIT (OUTPATIENT)
Dept: PHARMACY | Age: 86
End: 2021-09-24
Payer: MEDICARE

## 2021-09-24 DIAGNOSIS — I48.20 CHRONIC ATRIAL FIBRILLATION (HCC): Primary | ICD-10-CM

## 2021-09-24 LAB — INR BLD: 2.7

## 2021-09-24 PROCEDURE — 99211 OFF/OP EST MAY X REQ PHY/QHP: CPT

## 2021-09-24 PROCEDURE — 85610 PROTHROMBIN TIME: CPT

## 2021-09-24 NOTE — PROGRESS NOTES
Mr. Vicki Gusman is a 80 y.o.  male. Mr. Vicki uGsman had an INR test today. Results were reviewed and appropriate warfarin management was completed. This visit was performed as: An in person visit. Protocols were followed with precautions to reduce the spread of COVID-19. Patient verifies current dosing regimen: Yes     Warfarin medication reviewed and updated on the patient 's home medication list: Yes   All other medications reviewed and updated on the patient 's home medication list: No: no changes     Lab Results   Component Value Date    INR 2.70 2021    INR 2.6 2021    INR 3.2 2021       Patient Findings     Positives:  Signs/symptoms of bleeding, Bruising    Negatives:  Missed doses, Change in medications, Change in diet/appetite    Comments:  Reported an injury of his forearm - reports it was taken care of. Anticoagulation Summary  As of 2021    INR goal:  2.0-3.0   TTR:  78.3 % (9 y)   INR used for dosin.70 (2021)   Warfarin maintenance plan:  1.25 mg (2.5 mg x 0.5) every Tue; 2.5 mg (2.5 mg x 1) all other days   Weekly warfarin total:  16.25 mg   Plan last modified:  Pat Stewart RPH (2020)   Next INR check:  10/28/2021   Priority:  Maintenance   Target end date: Indefinite    Indications    Atrial fibrillation (HCC) [I48.91]             Anticoagulation Episode Summary     INR check location:  Anticoagulation Clinic    Preferred lab:      Send INR reminders to:  [facility] 60    Comments:  [facility]        Anticoagulation Care Providers     Provider Role Specialty Phone number    Amara Guerrero [facility] Internal Medicine [phone]          Warfarin assessment / plan:   Appears well. No changes   No acute findings     No change to warfarin therapy today.    Description    CONTINUE: Warfarin 2.5mg daily EXCEPT 1.25mg every Tuesday     Keep greens consistent     Call with medications changes, especially antibiotics and steroids and including any over-the-counter medications or herbal products. Call if you stop any medications. Immunization History   Administered Date(s) Administered    COVID-19, Moderna, PF, 100mcg/0.5mL 2021, 2021    Influenza Vaccine, unspecified formulation 2012, 2013, 2014, 2015, 10/04/2016, 2017, 10/01/2018, 10/10/2019    Pneumococcal Conjugate 7-valent (Prevnar7) 2016    Pneumococcal Polysaccharide ([id number]) 2004         Reviewed AVS with patient / caregiver.       CLINICAL PHARMACY CONSULT: MED RECONCILIATION/REVIEW ADDENDUM    For Pharmacy Admin Tracking Only     Intervention Detail: Adherence Monitorin   Total # of Interventions Recommended: 0   Total # of Interventions Accepted: 0   Time Spent (min): 15

## 2021-10-28 ENCOUNTER — ANTI-COAG VISIT (OUTPATIENT)
Dept: PHARMACY | Age: 86
End: 2021-10-28
Payer: MEDICARE

## 2021-10-28 DIAGNOSIS — I48.91 ATRIAL FIBRILLATION, UNSPECIFIED TYPE (HCC): Primary | ICD-10-CM

## 2021-10-28 LAB — INR BLD: 2.5

## 2021-10-28 PROCEDURE — 85610 PROTHROMBIN TIME: CPT

## 2021-10-28 PROCEDURE — 99211 OFF/OP EST MAY X REQ PHY/QHP: CPT

## 2021-10-28 NOTE — PROGRESS NOTES
Mr. Vinod Dias is a 80 y.o.  male. Mr. Vinod Dias had an INR test today. Results were reviewed and appropriate warfarin management was completed. This visit was performed as: An in person visit. Protocols were followed with precautions to reduce the spread of COVID-19. Patient verifies current dosing regimen: Yes     Warfarin medication reviewed and updated on the patient 's home medication list: Yes     All other medications reviewed and updated on the patient 's home medication list: No: No Changes      Lab Results   Component Value Date    INR 2.50 10/28/2021    INR 2.70 2021    INR 2.6 2021       Patient Findings     Negatives:  Signs/symptoms of bleeding, Missed doses, Change in medications, Change in diet/appetite, Bruising          Anticoagulation Summary  As of 10/28/2021    INR goal:  2.0-3.0   TTR:  78.5 % (9.1 y)   INR used for dosin.50 (10/28/2021)   Warfarin maintenance plan:  1.25 mg (2.5 mg x 0.5) every Tue; 2.5 mg (2.5 mg x 1) all other days   Weekly warfarin total:  16.25 mg   Plan last modified:  [place], GILBERT [facility] (2020)   Next INR check:  2021   Priority:  Maintenance   Target end date: Indefinite    Indications    Atrial fibrillation ([facility] [address] [I48.91]             Anticoagulation Episode Summary     INR check location:  Anticoagulation Clinic    Preferred lab:      Send INR reminders to:  [address] [facility] 60    Comments:  [facility]        Anticoagulation Care Providers     Provider Role Specialty Phone number    Aide Deb [facility] Internal Medicine [phone]          Warfarin assessment / plan:     Patient appears well with no complaints. Reports no unusual signs of bleeding, bruising, or missed doses. States no diet changes. Today's INR was 2.5. Patient has been very consistent and will continue current warfarin dose. Will recheck INR in 5 weeks.      Description    CONTINUE: Warfarin 2.5mg daily EXCEPT 1.25mg every Tuesday     Keep greens consistent     Call with medications changes, especially antibiotics and steroids and including any over-the-counter medications or herbal products. Call if you stop any medications. Immunization History   Administered Date(s) Administered    COVID-19, Lara Mccracken, Primary or Immunocompromised, PF, 100mcg/0.5mL 01/29/2021, 02/26/2021    Influenza Vaccine, unspecified formulation 09/28/2012, 09/25/2013, 09/04/2014, 09/16/2015, 10/04/2016, 09/20/2017, 10/01/2018, 10/10/2019    Influenza, High-dose, Quadv, 65 yrs +, IM (Fluzone) 10/19/2021    Pneumococcal Conjugate 7-valent (Prevnar7) 09/19/2016    Pneumococcal Polysaccharide ([id number]) 04/13/2004             Reviewed AVS with patient / caregiver.       CLINICAL PHARMACY CONSULT: MED RECONCILIATION/REVIEW ADDENDUM    For Pharmacy Admin Tracking Only     Intervention Detail:    Total # of Interventions Recommended: 0   Total # of Interventions Accepted: 0   Time Spent (min): 20

## 2021-12-02 ENCOUNTER — ANTI-COAG VISIT (OUTPATIENT)
Dept: PHARMACY | Age: 86
End: 2021-12-02
Payer: MEDICARE

## 2021-12-02 DIAGNOSIS — I48.91 ATRIAL FIBRILLATION, UNSPECIFIED TYPE (HCC): Primary | ICD-10-CM

## 2021-12-02 LAB — INTERNATIONAL NORMALIZATION RATIO, POC: 3.6

## 2021-12-02 PROCEDURE — 99211 OFF/OP EST MAY X REQ PHY/QHP: CPT

## 2021-12-02 PROCEDURE — 85610 PROTHROMBIN TIME: CPT

## 2021-12-02 NOTE — PROGRESS NOTES
Oliver Mann is a 80 y.o. here for warfarin management. Daron Montoya had an INR test today. Results were reviewed and appropriate warfarin management was completed. This visit was performed as: An in person visit. Protocols were followed with precautions to reduce the spread of COVID-19. Patient verifies current dosing regimen: Yes     Warfarin medication reviewed and updated on the patient 's home medication list: Yes   All other medications reviewed and updated on the patient 's home medication list: No: No changes     Lab Results   Component Value Date    INR 3.6 12/02/2021    INR 2.50 10/28/2021    INR 2.70 09/24/2021       Patient Findings     Negatives:  Signs/symptoms of bleeding, Change in health, Missed doses, Change in diet/appetite, Bruising          Anticoagulation Summary  As of 12/2/2021    INR goal:  2.0-3.0   TTR:  78.2 % (9.2 y)   INR used for dosing:  3.6 (12/2/2021)   Warfarin maintenance plan:  1.25 mg (2.5 mg x 0.5) every Tue; 2.5 mg (2.5 mg x 1) all other days   Weekly warfarin total:  16.25 mg   Plan last modified:  Mami Arciniega RPH (5/5/2020)   Next INR check:  12/30/2021   Priority:  Maintenance   Target end date: Indefinite    Indications    Atrial fibrillation (HCC) [I48.91]             Anticoagulation Episode Summary     INR check location:  Anticoagulation Clinic    Preferred lab:      Send INR reminders to:  Mimi STAFF    Comments:  [facility]        Anticoagulation Care Providers     Provider Role Specialty Phone number    Bonnie Castañeda [facility] Internal Medicine [phone]          Warfarin assessment / plan:     Denies medication changes   Denies extra warfarin doses  Denies increased alcohol intake  Denies change in appetite  Denies cranberry juice intake  Denies signs and symptoms of bleeding/bruising    Due to his supratherapeutic INR of 3.6 today, patient has been instructed to hold today's dose then resume his normal dosing regimen.  Next INR check in 4 weeks on 12/30/21. Description    Hold warfarin TODAY ONLY then CONTINUE: Warfarin 2.5mg daily EXCEPT 1.25mg every Tuesday     Keep greens consistent     Call with medications changes, especially antibiotics and steroids and including any over-the-counter medications or herbal products. Call if you stop any medications. Immunization History   Administered Date(s) Administered    COVID-19, Claudeen Fermo, Primary or Immunocompromised, PF, 100mcg/0.5mL 01/29/2021, 02/26/2021, 11/09/2021    Influenza Vaccine, unspecified formulation 09/28/2012, 09/25/2013, 09/04/2014, 09/16/2015, 10/04/2016, 09/20/2017, 10/01/2018, 10/10/2019    Influenza, High-dose, Quadv, 65 yrs +, IM (Fluzone) 10/19/2021    Pneumococcal Conjugate 7-valent (Prevnar7) 09/19/2016    Pneumococcal Polysaccharide ([id number]) 04/13/2004       Reviewed AVS with patient / caregiver.       CLINICAL PHARMACY CONSULT: MED RECONCILIATION/REVIEW ADDENDUM    For Pharmacy Admin Tracking Only     Intervention Detail: Dose Adjustment: 1, reason: Therapy Optimization   Total # of Interventions Recommended: 1   Total # of Interventions Accepted: 1   Time Spent (min): 15

## 2021-12-29 ENCOUNTER — ANTI-COAG VISIT (OUTPATIENT)
Dept: PHARMACY | Age: 86
End: 2021-12-29
Payer: MEDICARE

## 2021-12-29 DIAGNOSIS — I48.91 ATRIAL FIBRILLATION, UNSPECIFIED TYPE (HCC): Primary | ICD-10-CM

## 2021-12-29 LAB — INR BLD: 3.2

## 2021-12-29 PROCEDURE — 99211 OFF/OP EST MAY X REQ PHY/QHP: CPT

## 2021-12-29 PROCEDURE — 85610 PROTHROMBIN TIME: CPT

## 2021-12-29 NOTE — PROGRESS NOTES
Margie Peralta is a 80 y.o. here for warfarin management. James Cumberland had an INR test today. Results were reviewed and appropriate warfarin management was completed. This visit was performed as: An in person visit. Protocols were followed with precautions to reduce the spread of COVID-19. Patient verifies current dosing regimen: Yes     Warfarin medication reviewed and updated on the patient 's home medication list: Yes   All other medications reviewed and updated on the patient 's home medication list: No: No changes     Lab Results   Component Value Date    INR 3.20 12/29/2021    INR 3.6 12/02/2021    INR 2.50 10/28/2021       Patient Findings     Negatives:  Signs/symptoms of bleeding, Missed doses, Change in medications, Change in diet/appetite, Bruising          Anticoagulation Summary  As of 12/29/2021    INR goal:  2.0-3.0   TTR:  77.5 % (9.3 y)   INR used for dosing:  3.20 (12/29/2021)   Warfarin maintenance plan:  1.25 mg (2.5 mg x 0.5) every Tue, Fri; 2.5 mg (2.5 mg x 1) all other days   Weekly warfarin total:  15 mg   Plan last modified:  Belinda Lucio (12/29/2021)   Next INR check:  1/27/2022   Priority:  Maintenance   Target end date: Indefinite    Indications    Atrial fibrillation (HCC) [I48.91]             Anticoagulation Episode Summary     INR check location:  Anticoagulation Clinic    Preferred lab:      Send INR reminders to:  [facility]    Comments:  [facility]        Anticoagulation Care Providers     Provider Role Specialty Phone number    Mariaa Herrera [facility] Internal Medicine [phone]          Warfarin assessment / plan:     Denies medication changes   Denies extra warfarin doses  Denies change in appetite  Decrease in vitamin K intake      Patient looks and feels well today. INR was supratherapeutic at 3.2. He reports he usually eats about 2 salads per week but has not had any this week which may be the cause of his supratherapeutic INR today. His INR was supratherapeutic at his last appointment as well, so he was instructed to hold warfarin tomorrow only (he had already taken warfarin today) then begin a new regimen of warfarin 2.5 mg (1 tablet) daily except 1.25 mg (half tablet) every Tuesday and Friday. This is a 7.7% decrease in dose. Next INR check 1/27/22. Description    TOMORROW ONLY: hold warfarin then   NEW DOSE: Warfarin 2.5mg daily EXCEPT 1.25mg every Tuesday and Friday    INR >3 is too thin  INR <2 is too thick    Keep greens consistent. Usually eats about 2 salads per week    Call with medications changes, especially antibiotics and steroids and including any over-the-counter medications or herbal products. Call if you stop any medications. Immunization History   Administered Date(s) Administered    COVID-19, Clartrinity Reynosop, Primary or Immunocompromised, PF, 100mcg/0.5mL 01/29/2021, 02/26/2021, 11/09/2021    Influenza Vaccine, unspecified formulation 09/28/2012, 09/25/2013, 09/04/2014, 09/16/2015, 10/04/2016, 09/20/2017, 10/01/2018, 10/10/2019    Influenza, High-dose, Quadv, 65 yrs +, IM (Fluzone) 10/19/2021    Pneumococcal Conjugate 7-valent (Prevnar7) 09/19/2016    Pneumococcal Polysaccharide ([id number]) 04/13/2004             Reviewed AVS with patient / caregiver.       CLINICAL PHARMACY CONSULT: MED RECONCILIATION/REVIEW ADDENDUM    For Pharmacy Admin Tracking Only     Intervention Detail: Dose Adjustment: 1, reason: Therapy Optimization   Total # of Interventions Recommended: 1   Total # of Interventions Accepted: 1   Time Spent (min): 20

## 2022-01-27 ENCOUNTER — ANTI-COAG VISIT (OUTPATIENT)
Dept: PHARMACY | Age: 87
End: 2022-01-27
Payer: MEDICARE

## 2022-01-27 DIAGNOSIS — I48.91 ATRIAL FIBRILLATION, UNSPECIFIED TYPE (HCC): Primary | ICD-10-CM

## 2022-01-27 LAB — INTERNATIONAL NORMALIZATION RATIO, POC: 2.6

## 2022-01-27 PROCEDURE — 99211 OFF/OP EST MAY X REQ PHY/QHP: CPT

## 2022-01-27 PROCEDURE — 85610 PROTHROMBIN TIME: CPT

## 2022-01-27 NOTE — PROGRESS NOTES
Samantha Santos is a 80 y.o. here for warfarin management. France Hickey had an INR test today. Results were reviewed and appropriate warfarin management was completed. This visit was performed as: An in person visit. Protocols were followed with precautions to reduce the spread of COVID-19. Patient verifies current dosing regimen: Yes     Warfarin medication reviewed and updated on the patient 's home medication list: Yes   All other medications reviewed and updated on the patient 's home medication list: No: no changes     Lab Results   Component Value Date    INR 2.6 2022    INR 3.20 2021    INR 3.6 2021       Patient Findings     Negatives:  Signs/symptoms of bleeding, Change in medications, Change in diet/appetite, Bruising          Anticoagulation Summary  As of 2022    INR goal:  2.0-3.0   TTR:  77.4 % (9.3 y)   INR used for dosin.6 (2022)   Warfarin maintenance plan:  1.25 mg (2.5 mg x 0.5) every Tue, Fri; 2.5 mg (2.5 mg x 1) all other days   Weekly warfarin total:  15 mg   Plan last modified:  Summer Garcia (2021)   Next INR check:  2022   Priority:  Maintenance   Target end date: Indefinite    Indications    Atrial fibrillation (HCC) [I48.91]             Anticoagulation Episode Summary     INR check location:  Anticoagulation Clinic    Preferred lab:      Send INR reminders to:  [address] [facility] 60    Comments:  [facility]        Anticoagulation Care Providers     Provider Role Specialty Phone number    Alisha Cotton [facility] Internal Medicine [phone]          Warfarin assessment / plan:     Appears well. No changes   No acute findings     No change to warfarin therapy today. Description    Warfarin 2.5mg daily EXCEPT 1.25mg every Tuesday and Friday    INR above 3 is too thin  INR below 2 is too thick    Keep greens consistent.  Usually eats about 2 salads per week    Call with medications changes, especially antibiotics and steroids and including any over-the-counter medications or herbal products. Call if you stop any medications. Immunization History   Administered Date(s) Administered    COVID-19, Keeley Markham, Primary or Immunocompromised, PF, 100mcg/0.5mL 01/29/2021, 02/26/2021, 11/09/2021    Influenza Vaccine, unspecified formulation 09/28/2012, 09/25/2013, 09/04/2014, 09/16/2015, 10/04/2016, 09/20/2017, 10/01/2018, 10/10/2019    Influenza, High-dose, Quadv, 65 yrs +, IM (Fluzone) 10/19/2021    Pneumococcal Conjugate 7-valent (Prevnar7) 09/19/2016    Pneumococcal Polysaccharide ([id number]) 04/13/2004             Reviewed AVS with patient / caregiver.       CLINICAL PHARMACY CONSULT: MED RECONCILIATION/REVIEW ADDENDUM    For Pharmacy Admin Tracking Only     Intervention Detail:    Total # of Interventions Recommended: 0   Total # of Interventions Accepted: 0   Time Spent (min): 10

## 2022-02-21 ENCOUNTER — ANTI-COAG VISIT (OUTPATIENT)
Dept: PHARMACY | Age: 87
End: 2022-02-21
Payer: MEDICARE

## 2022-02-21 DIAGNOSIS — I48.91 ATRIAL FIBRILLATION, UNSPECIFIED TYPE (HCC): Primary | ICD-10-CM

## 2022-02-21 LAB — INTERNATIONAL NORMALIZATION RATIO, POC: 2.2

## 2022-02-21 PROCEDURE — 99211 OFF/OP EST MAY X REQ PHY/QHP: CPT

## 2022-02-21 PROCEDURE — 85610 PROTHROMBIN TIME: CPT

## 2022-02-21 NOTE — PROGRESS NOTES
Li Castellanos is a 80 y.o. here for warfarin management. Devika Harrison had an INR test today. Results were reviewed and appropriate warfarin management was completed. This visit was performed as: An in person visit. Protocols were followed with precautions to reduce the spread of COVID-19. Patient verifies current dosing regimen: Yes     Warfarin medication reviewed and updated on the patient 's home medication list: Yes   All other medications reviewed and updated on the patient 's home medication list: No new medications     Lab Results   Component Value Date    INR 2.2 2022    INR 2.6 2022    INR 3.20 2021       Patient Findings     Negatives:  Signs/symptoms of thrombosis, Signs/symptoms of bleeding, Change in health, Missed doses, Change in medications, Change in diet/appetite, Bruising          Anticoagulation Summary  As of 2022    INR goal:  2.0-3.0   TTR:  77.6 % (9.4 y)   INR used for dosin.2 (2022)   Warfarin maintenance plan:  1.25 mg (2.5 mg x 0.5) every Tue, Fri; 2.5 mg (2.5 mg x 1) all other days   Weekly warfarin total:  15 mg   Plan last modified:  [place] (2021)   Next INR check:  3/21/2022   Priority:  Maintenance   Target end date: Indefinite    Indications    Atrial fibrillation (HCC) [I48.91]             Anticoagulation Episode Summary     INR check location:  Anticoagulation Clinic    Preferred lab:      Send INR reminders to:  [address] [facility] 60    Comments:  [facility]ol[facility] Lipoma        Anticoagulation Care Providers     Provider Role Specialty Phone number    Victorinaira Lebron [facility] Internal Medicine [phone]          Warfarin assessment / plan:   Patient appears well. No complaints regarding warfarin therapy. No change to weekly warfarin dosing. Description    CONTINUE: Warfarin 2.5mg daily EXCEPT 1.25mg every Tuesday and Friday    INR above 3 is too thin  INR below 2 is too thick    Keep greens consistent.  Usually eats about 2 salads per week    Call with medications changes, especially antibiotics and steroids and including any over-the-counter medications or herbal products. Call if you stop any medications. Immunization History   Administered Date(s) Administered    COVID-Arleen, Merlyn Silva, Primary or Immunocompromised, PF, 100mcg/0.5mL 01/29/2021, 02/26/2021, 11/09/2021    Influenza Vaccine, unspecified formulation 09/28/2012, 09/25/2013, 09/04/2014, 09/16/2015, 10/04/2016, 09/20/2017, 10/01/2018, 10/10/2019    Influenza, High-dose, Quadv, 65 yrs +, IM (Fluzone) 10/19/2021    Pneumococcal Conjugate 7-valent (Prevnar7) 09/19/2016    Pneumococcal Polysaccharide ([id number]) 04/13/2004           Orders Placed This Encounter   Procedures    POCT INR     This external order was created through the results console. No orders of the defined types were placed in this encounter. Reviewed AVS with patient / caregiver.     Billing Points:  0 billing points this visit       CLINICAL PHARMACY CONSULT: MED RECONCILIATION/REVIEW ADDENDUM    For Pharmacy Admin Tracking Only     Intervention Detail:    Total # of Interventions Recommended: 0   Total # of Interventions Accepted: 0   Time Spent (min): 20

## 2022-02-25 ENCOUNTER — TELEPHONE (OUTPATIENT)
Dept: PHARMACY | Age: 87
End: 2022-02-25

## 2022-02-25 NOTE — TELEPHONE ENCOUNTER
Jesse Coe called from Dr. Jordon Ewing office  And left a message to report Lupe Mclean will be having a sacroiliac joint injection 3/8/22 and has been approved to hold his warfarin for 7 days prior and to hold aspirin 6 days prior. To restart as soon as possible after the procedure either that night or the following day with a f/u INR 5 days after his procedure. Advised to call with any questions [phone]. I reached out to Mr. Yaz Wheeler. No answer. I left a message to please return our call for instructions with regards to his upcoming procedure. He would take his last dose of warfarin 2/28/22. He would take his last dose of aspirin 3/1/22. Restart warfarin the evening after his procedure on 3/8/22 at Warfarin 2.5mg daily EXCEPT 1.25mg every Tuesday and Friday.     Next INR 3/14/22 at 11:40am.    Maddi Saenz, PharmD, [address] [place] [address]  Anticoagulation Service  [phone]

## 2022-03-14 ENCOUNTER — ANTI-COAG VISIT (OUTPATIENT)
Dept: PHARMACY | Age: 87
End: 2022-03-14
Payer: MEDICARE

## 2022-03-14 DIAGNOSIS — I48.91 ATRIAL FIBRILLATION, UNSPECIFIED TYPE (HCC): Primary | ICD-10-CM

## 2022-03-14 LAB — INR BLD: 1.3

## 2022-03-14 PROCEDURE — 99211 OFF/OP EST MAY X REQ PHY/QHP: CPT

## 2022-03-14 PROCEDURE — 85610 PROTHROMBIN TIME: CPT

## 2022-03-14 NOTE — PROGRESS NOTES
Mitesh Haines is a 80 y.o. here for warfarin management. Luz Gill had an INR test today. Results were reviewed and appropriate warfarin management was completed. This visit was performed as: An in person visit. Protocols were followed with precautions to reduce the spread of COVID-19. Patient verifies current dosing regimen: Yes     Warfarin medication reviewed and updated on the patient 's home medication list: Yes   All other medications reviewed and updated on the patient 's home medication list: Yes     Lab Results   Component Value Date    INR 1.30 2022    INR 2.2 2022    INR 2.6 2022       Patient Findings     Positives:  Missed doses    Negatives:  Signs/symptoms of bleeding, Change in medications, Change in diet/appetite, Bruising    Comments:  Off Warfarin 7 days and Aspirin for 6 days prior to 3/8 procedure. Restarted on the . Also had a steroid injection           Anticoagulation Summary  As of 3/14/2022    INR goal:  2.0-3.0   TTR:  77.3 % (9.5 y)   INR used for dosin.30 (3/14/2022)   Warfarin maintenance plan:  1.25 mg (2.5 mg x 0.5) every Tue, Fri; 2.5 mg (2.5 mg x 1) all other days   Weekly warfarin total:  15 mg   Plan last modified:  Charanjit Salgado (2021)   Next INR check:  3/28/2022   Priority:  High   Target end date: Indefinite    Indications    Atrial fibrillation ([facility] [address] [I48.91]             Anticoagulation Episode Summary     INR check location:  Anticoagulation Clinic    Preferred lab:      Send INR reminders to:  [address] [facility] 60    Comments:  [facility]        Anticoagulation Care Providers     Provider Role Specialty Phone number    Rachel Fleming [facility] Internal Medicine [phone]          Warfarin assessment / plan:     Denies increased vitamin K intake. Denies medication changes. Denies alcohol changes. Denies increased activity. Denies signs or symptoms of clotting.   Denies signs or symptoms of a stroke  Denies changes to smoking. Missed dose(s). Off Warfarin and Aspirin for procedure 3/8. Restarted on the 9th. Also had a steroid injection March 8th. Will give a higher warfarin dose today and tomorrow to get him back in range a little sooner. TODAY TAKE WARFARIN 3.75 mg (1.5 tablets) and TOMORROW TAKE 2.5 mg (1 tablet) then CONTINUE: Warfarin 2.5mg daily EXCEPT 1.25mg every Tuesday and Friday    Description    TODAY TAKE WARFARIN 3.75 mg (1.5 tablets) and TOMORROW TAKE 2.5 mg (1 tablet) then CONTINUE: Warfarin 2.5mg daily EXCEPT 1.25mg every Tuesday and Friday    Keep greens consistent. Usually eats about 2 salads per week    Call [phone] with signs or symptoms of bleeding or ANY medication changes (including over-the-counter medications or herbal supplements). Especially if you begin oral steroids and/or antibiotics. If significant bleeding occurs please seek immediate medical attention. Keep the number of servings and portion size of vitamin K containing foods (dark green, leafy vegetables) the same each week. Vegetables high in vitamin K : broccoli, spinach, leaf lettuce, keke lettuce, kale, collards, asparagus, brussel sprouts. Please call if you routine diet changes. Limit alcohol intake. Please call if this changes. Please arrive 15 minutes prior to your appointment. Allow 72 hours for warfarin refills.            Immunization History   Administered Date(s) Administered    COVIDStephanie Steinberg, Primary or Immunocompromised, PF, 100mcg/0.5mL 01/29/2021, 02/26/2021, 11/09/2021    Influenza Vaccine, unspecified formulation 09/28/2012, 09/25/2013, 09/04/2014, 09/16/2015, 10/04/2016, 09/20/2017, 10/01/2018, 10/10/2019    Influenza, High-dose, Quadv, 65 yrs +, IM (Fluzone) 10/19/2021    Pneumococcal Conjugate 7-valent (Prevnar7) 09/19/2016    Pneumococcal Polysaccharide ([id number]) 04/13/2004           Orders Placed This Encounter   Procedures    Protime-INR This external order was created through the results console. No orders of the defined types were placed in this encounter. Reviewed AVS with patient / caregiver.     Billing Points:  Adjust dosage and/or reconcile meds (fill pill box) </= 5 medications - 2 points       CLINICAL PHARMACY CONSULT: MED RECONCILIATION/REVIEW ADDENDUM    For Pharmacy Admin Tracking Only     Intervention Detail: Dose Adjustment: 1, reason: Therapy Optimization   Total # of Interventions Recommended: 1   Total # of Interventions Accepted: 1   Time Spent (min): 20

## 2022-03-28 ENCOUNTER — ANTI-COAG VISIT (OUTPATIENT)
Dept: PHARMACY | Age: 87
End: 2022-03-28
Payer: MEDICARE

## 2022-03-28 DIAGNOSIS — I48.91 ATRIAL FIBRILLATION, UNSPECIFIED TYPE (HCC): Primary | ICD-10-CM

## 2022-03-28 LAB — INTERNATIONAL NORMALIZATION RATIO, POC: 1.6

## 2022-03-28 PROCEDURE — 85610 PROTHROMBIN TIME: CPT

## 2022-03-28 PROCEDURE — 99211 OFF/OP EST MAY X REQ PHY/QHP: CPT

## 2022-03-28 NOTE — PROGRESS NOTES
Yoni Haney is a 80 y.o. here for warfarin management. Isidro Jones had an INR test today. Results were reviewed and appropriate warfarin management was completed. This visit was performed as: An in person visit. Protocols were followed with precautions to reduce the spread of COVID-19. Patient verifies current dosing regimen: Yes     Warfarin medication reviewed and updated on the patient 's home medication list: Yes   All other medications reviewed and updated on the patient 's home medication list: No new medications     Lab Results   Component Value Date    INR 1.6 2022    INR 1.30 2022    INR 2.2 2022       Patient Findings     Negatives:  Signs/symptoms of thrombosis, Signs/symptoms of bleeding, Change in health, Missed doses, Change in medications, Change in diet/appetite, Bruising          Anticoagulation Summary  As of 3/28/2022    INR goal:  2.0-3.0   TTR:  76.9 % (9.5 y)   INR used for dosin.6 (3/28/2022)   Warfarin maintenance plan:  1.25 mg (2.5 mg x 0.5) every Tue, Fri; 2.5 mg (2.5 mg x 1) all other days   Weekly warfarin total:  15 mg   Plan last modified:  Andry Mansfield (2021)   Next INR check:  2022   Priority:  High   Target end date: Indefinite    Indications    Atrial fibrillation (HCC) [I48.91]             Anticoagulation Episode Summary     INR check location:  Anticoagulation Clinic    Preferred lab:      Send INR reminders to:  [address] [facility] 60    Comments:  [facility]        Anticoagulation Care Providers     Provider Role Specialty Phone number    Govind Hunter [facility] Internal Medicine [phone]          Warfarin assessment / plan:   Patient appears well. No complaints regarding warfarin therapy. INR still low from holding warfarin due to a procedure. No change to weekly warfarin dosing.   Description    Take warfarin 5 mg on 3-28-22  CONTINUE: Warfarin 2.5mg daily EXCEPT 1.25mg every Tuesday and Friday    Keep greens consistent. Usually eats about 2 salads per week    Call [phone] with signs or symptoms of bleeding or ANY medication changes (including over-the-counter medications or herbal supplements). Especially if you begin oral steroids and/or antibiotics. If significant bleeding occurs please seek immediate medical attention. Keep the number of servings and portion size of vitamin K containing foods (dark green, leafy vegetables) the same each week. Vegetables high in vitamin K : broccoli, spinach, leaf lettuce, keke lettuce, kale, collards, asparagus, brussel sprouts. Please call if you routine diet changes. Limit alcohol intake. Please call if this changes. Allow 72 hours for warfarin refills. Immunization History   Administered Date(s) Administered    COVID-19, Elfida Dinning, Primary or Immunocompromised, PF, 100mcg/0.5mL 01/29/2021, 02/26/2021, 11/09/2021    Influenza Vaccine, unspecified formulation 09/28/2012, 09/25/2013, 09/04/2014, 09/16/2015, 10/04/2016, 09/20/2017, 10/01/2018, 10/10/2019    Influenza, High-dose, Quadv, 65 yrs +, IM (Fluzone) 10/19/2021    Pneumococcal Conjugate 7-valent (Prevnar7) 09/19/2016    Pneumococcal Polysaccharide ([id number]) 04/13/2004           Orders Placed This Encounter   Procedures    POCT INR     This external order was created through the results console. No orders of the defined types were placed in this encounter. Reviewed AVS with patient / caregiver.     Billing Points:  Adjust dosage and/or reconcile meds (fill pill box) </= 5 medications - 2 points       CLINICAL PHARMACY CONSULT: MED RECONCILIATION/REVIEW ADDENDUM    For Pharmacy Admin Tracking Only     Intervention Detail: Dose Adjustment: 1, reason: Therapy Optimization   Total # of Interventions Recommended: 0   Total # of Interventions Accepted: 0   Time Spent (min): 20

## 2022-04-11 ENCOUNTER — ANTI-COAG VISIT (OUTPATIENT)
Dept: PHARMACY | Age: 87
End: 2022-04-11
Payer: MEDICARE

## 2022-04-11 DIAGNOSIS — I48.91 ATRIAL FIBRILLATION, UNSPECIFIED TYPE (HCC): Primary | ICD-10-CM

## 2022-04-11 LAB — INTERNATIONAL NORMALIZATION RATIO, POC: 2.3

## 2022-04-11 PROCEDURE — 85610 PROTHROMBIN TIME: CPT

## 2022-04-11 PROCEDURE — 99211 OFF/OP EST MAY X REQ PHY/QHP: CPT

## 2022-04-11 NOTE — PROGRESS NOTES
Cali Narnajo is a 80 y.o. here for warfarin management. Gil Amato had an INR test today. Results were reviewed and appropriate warfarin management was completed. This visit was performed as: An in person visit. Protocols were followed with precautions to reduce the spread of COVID-19. Patient verifies current dosing regimen: Yes     Warfarin medication reviewed and updated on the patient 's home medication list: Yes   All other medications reviewed and updated on the patient 's home medication list: No new medications     Lab Results   Component Value Date    INR 2.3 2022    INR 1.6 2022    INR 1.30 2022       Patient Findings     Negatives:  Signs/symptoms of thrombosis, Signs/symptoms of bleeding, Change in health, Missed doses, Change in medications, Change in diet/appetite, Bruising          Anticoagulation Summary  As of 2022    INR goal:  2.0-3.0   TTR:  76.8 % (9.6 y)   INR used for dosin.3 (2022)   Warfarin maintenance plan:  1.25 mg (2.5 mg x 0.5) every Tue, Fri; 2.5 mg (2.5 mg x 1) all other days   Weekly warfarin total:  15 mg   Plan last modified:  Leodan Kerr (2021)   Next INR check:  2022   Priority:  Maintenance   Target end date: Indefinite    Indications    Atrial fibrillation (HCC) [I48.91]             Anticoagulation Episode Summary     INR check location:  Anticoagulation Clinic    Preferred lab:      Send INR reminders to:  [address] [facility] 60    Comments:  [facility]        Anticoagulation Care Providers     Provider Role Specialty Phone number    Tomas Donaldson [facility] Internal Medicine [phone]          Warfarin assessment / plan:   Patient appears well. No complaints regarding warfarin therapy. No change to weekly warfarin dosing. Description    CONTINUE: Warfarin 2.5mg daily EXCEPT 1.25mg every Tuesday and Friday    Keep greens consistent.  Usually eats about 2 salads per week    Call [phone] with signs or symptoms of bleeding or ANY medication changes (including over-the-counter medications or herbal supplements). Especially if you begin oral steroids and/or antibiotics. If significant bleeding occurs please seek immediate medical attention. Keep the number of servings and portion size of vitamin K containing foods (dark green, leafy vegetables) the same each week. Vegetables high in vitamin K : broccoli, spinach, leaf lettuce, keke lettuce, kale, collards, asparagus, brussel sprouts. Please call if you routine diet changes. Limit alcohol intake. Please call if this changes. Allow 72 hours for warfarin refills. Immunization History   Administered Date(s) Administered    COVID-19, Jake Guevarao, Primary or Immunocompromised, PF, 100mcg/0.5mL 01/29/2021, 02/26/2021, 11/09/2021    Influenza Vaccine, unspecified formulation 09/28/2012, 09/25/2013, 09/04/2014, 09/16/2015, 10/04/2016, 09/20/2017, 10/01/2018, 10/10/2019    Influenza, High-dose, Quadv, 65 yrs +, IM (Fluzone) 10/19/2021    Pneumococcal Conjugate 7-valent (Prevnar7) 09/19/2016    Pneumococcal Polysaccharide ([id number]) 04/13/2004           Orders Placed This Encounter   Procedures    POCT INR     This external order was created through the results console. No orders of the defined types were placed in this encounter. Reviewed AVS with patient / caregiver.     Billing Points:  0 billing points this visit       CLINICAL PHARMACY CONSULT: MED RECONCILIATION/REVIEW ADDENDUM    For Pharmacy Admin Tracking Only     Intervention Detail:    Total # of Interventions Recommended: 0   Total # of Interventions Accepted: 0   Time Spent (min): 20

## 2022-05-11 ENCOUNTER — ANTI-COAG VISIT (OUTPATIENT)
Dept: PHARMACY | Age: 87
End: 2022-05-11
Payer: MEDICARE

## 2022-05-11 DIAGNOSIS — I48.91 ATRIAL FIBRILLATION, UNSPECIFIED TYPE (HCC): Primary | ICD-10-CM

## 2022-05-11 LAB — INTERNATIONAL NORMALIZATION RATIO, POC: 2.1

## 2022-05-11 PROCEDURE — 99211 OFF/OP EST MAY X REQ PHY/QHP: CPT

## 2022-05-11 PROCEDURE — 85610 PROTHROMBIN TIME: CPT

## 2022-05-11 NOTE — PROGRESS NOTES
Judge Abel is a 80 y.o. here for warfarin management. David Pearson had an INR test today. Results were reviewed and appropriate warfarin management was completed. This visit was performed as: An in person visit. Protocols were followed with precautions to reduce the spread of COVID-19. Patient verifies current dosing regimen: Yes     Warfarin medication reviewed and updated on the patient 's home medication list: Yes   All other medications reviewed and updated on the patient 's home medication list: No: no changes     Lab Results   Component Value Date    INR 2.1 2022    INR 2.3 2022    INR 1.6 2022       Patient Findings     Negatives:  Signs/symptoms of thrombosis, Signs/symptoms of bleeding, Change in medications, Change in diet/appetite, Bruising          Anticoagulation Summary  As of 2022    INR goal:  2.0-3.0   TTR:  77.0 % (9.6 y)   INR used for dosin.1 (2022)   Warfarin maintenance plan:  1.25 mg (2.5 mg x 0.5) every Tue, Fri; 2.5 mg (2.5 mg x 1) all other days   Weekly warfarin total:  15 mg   Plan last modified:  Jordi Houser (2021)   Next INR check:  2022   Priority:  Maintenance   Target end date: Indefinite    Indications    Atrial fibrillation (HCC) [I48.91]             Anticoagulation Episode Summary     INR check location:  Anticoagulation Clinic    Preferred lab:      Send INR reminders to:  [address] [facility] 60    Comments:  [facility]        Anticoagulation Care Providers     Provider Role Specialty Phone number    Adele Corey [facility] Internal Medicine [phone]          Warfarin assessment / plan:     Appears well. No changes. No acute findings. No change to warfarin therapy today. Description    CONTINUE: Warfarin 2.5mg daily EXCEPT 1.25mg every Tuesday and Friday    Keep greens consistent.  Usually eats about 2 salads per week    Call [phone] with signs or symptoms of bleeding or ANY medication changes (including over-the-counter medications or herbal supplements). Especially if you begin oral steroids and/or antibiotics. If significant bleeding occurs please seek immediate medical attention. Keep the number of servings and portion size of vitamin K containing foods (dark green, leafy vegetables) the same each week. Vegetables high in vitamin K : broccoli, spinach, leaf lettuce, keke lettuce, kale, collards, asparagus, brussel sprouts. Please call if you routine diet changes. Limit alcohol intake. Please call if this changes. Allow 72 hours for warfarin refills. Immunization History   Administered Date(s) Administered    COVID-19, Forrestdank Sukhdeep, Primary or Immunocompromised, PF, 100mcg/0.5mL 01/29/2021, 02/26/2021, 11/09/2021    Influenza Vaccine, unspecified formulation 09/28/2012, 09/25/2013, 09/04/2014, 09/16/2015, 10/04/2016, 09/20/2017, 10/01/2018, 10/10/2019    Influenza, High-dose, Quadv, 65 yrs +, IM (Fluzone) 10/19/2021    Pneumococcal Conjugate 7-valent (Prevnar7) 09/19/2016    Pneumococcal Polysaccharide ([id number]) 04/13/2004           Orders Placed This Encounter   Procedures    POCT INR     This external order was created through the results console. No orders of the defined types were placed in this encounter. Reviewed AVS with patient / caregiver.     Billing Points:  0 billing points this visit       CLINICAL PHARMACY CONSULT: MED RECONCILIATION/REVIEW ADDENDUM    For Pharmacy Admin Tracking Only     Intervention Detail:    Total # of Interventions Recommended: 0   Total # of Interventions Accepted: 0   Time Spent (min): 10

## 2022-06-08 ENCOUNTER — ANTI-COAG VISIT (OUTPATIENT)
Dept: PHARMACY | Age: 87
End: 2022-06-08
Payer: MEDICARE

## 2022-06-08 LAB — INTERNATIONAL NORMALIZATION RATIO, POC: 2.2

## 2022-06-08 PROCEDURE — 99211 OFF/OP EST MAY X REQ PHY/QHP: CPT

## 2022-06-08 PROCEDURE — 85610 PROTHROMBIN TIME: CPT

## 2022-06-08 NOTE — PROGRESS NOTES
Fredrick Wilkes is a 80 y.o. here for warfarin management. Bobbi Alegre had an INR test today. Results were reviewed and appropriate warfarin management was completed. This visit was performed as: An in person visit. Protocols were followed with precautions to reduce the spread of COVID-19. Patient verifies current dosing regimen: Yes     Warfarin medication reviewed and updated on the patient 's home medication list: Yes   All other medications reviewed and updated on the patient 's home medication list: No: No changes     Lab Results   Component Value Date    INR 2.2 2022    INR 2.1 2022    INR 2.3 2022       Patient Findings     Negatives:  Signs/symptoms of thrombosis, Signs/symptoms of bleeding, Change in health, Change in medications, Change in diet/appetite, Bruising          Anticoagulation Summary  As of 2022    INR goal:  2.0-3.0   TTR:  77.2 % (9.7 y)   INR used for dosin.2 (2022)   Warfarin maintenance plan:  1.25 mg (2.5 mg x 0.5) every Tue, Fri; 2.5 mg (2.5 mg x 1) all other days   Weekly warfarin total:  15 mg   Plan last modified:  Paty Botello (2021)   Next INR check:  2022   Priority:  Maintenance   Target end date: Indefinite    Indications    Atrial fibrillation (HCC) [I48.91]             Anticoagulation Episode Summary     INR check location:  Anticoagulation Clinic    Preferred lab:      Send INR reminders to:  [address] [facility] 60    Comments:  [facility]        Anticoagulation Care Providers     Provider Role Specialty Phone number    Avani Elkins [facility] Internal Medicine [phone]          Warfarin assessment / plan:     Appears well. No changes affecting warfarin therapy were noted. No acute findings. INR within goal range. No change to warfarin therapy today. Description    CONTINUE: Warfarin 2.5mg daily EXCEPT 1.25mg every Tuesday and Friday    Keep greens consistent.  Usually eats about 2 salads per week    Call [phone] with signs or symptoms of bleeding or ANY medication changes (including over-the-counter medications or herbal supplements). Especially if you begin oral steroids and/or antibiotics. If significant bleeding occurs please seek immediate medical attention. Keep the number of servings and portion size of vitamin K containing foods (dark green, leafy vegetables) the same each week. Vegetables high in vitamin K : broccoli, spinach, leaf lettuce, keke lettuce, kale, collards, asparagus, brussel sprouts. Please call if you routine diet changes. Limit alcohol intake. Please call if this changes. Allow 72 hours for warfarin refills. Immunization History   Administered Date(s) Administered    MEGID-19, Silviano Gruber, Primary or Immunocompromised, PF, 100mcg/0.5mL 01/29/2021, 02/26/2021, 11/09/2021    Influenza Vaccine, unspecified formulation 09/28/2012, 09/25/2013, 09/04/2014, 09/16/2015, 10/04/2016, 09/20/2017, 10/01/2018, 10/10/2019    Influenza, High-dose, Quadv, 65 yrs +, IM (Fluzone) 10/19/2021    Pneumococcal Conjugate 7-valent (Prevnar7) 09/19/2016    Pneumococcal Polysaccharide ([id number]) 04/13/2004           Orders Placed This Encounter   Procedures    POCT INR     This external order was created through the results console. No orders of the defined types were placed in this encounter. Reviewed AVS with patient / caregiver.     Billing Points:  0 billing points this visit       CLINICAL PHARMACY CONSULT: MED RECONCILIATION/REVIEW ADDENDUM    For Pharmacy Admin Tracking Only     Intervention Detail:    Total # of Interventions Recommended: 0   Total # of Interventions Accepted: 0   Time Spent (min): 20

## 2022-07-07 ENCOUNTER — TELEPHONE (OUTPATIENT)
Dept: PHARMACY | Age: 87
End: 2022-07-07

## 2022-07-07 NOTE — TELEPHONE ENCOUNTER
Left message to reschedule missed appointment today.     Obed Grider, PharmD, [address] [facility]  Anticoagulation Service  [phone]

## 2022-07-08 ENCOUNTER — ANTI-COAG VISIT (OUTPATIENT)
Dept: PHARMACY | Age: 87
End: 2022-07-08
Payer: MEDICARE

## 2022-07-08 DIAGNOSIS — I48.91 ATRIAL FIBRILLATION, UNSPECIFIED TYPE (HCC): Primary | ICD-10-CM

## 2022-07-08 LAB — INTERNATIONAL NORMALIZATION RATIO, POC: 2.4

## 2022-07-08 PROCEDURE — 99211 OFF/OP EST MAY X REQ PHY/QHP: CPT

## 2022-07-08 PROCEDURE — 85610 PROTHROMBIN TIME: CPT

## 2022-07-08 NOTE — PROGRESS NOTES
Forest Dover is a 80 y.o. here for warfarin management. Delilah Chen had an INR test today. Results were reviewed and appropriate warfarin management was completed. This visit was performed as: An in person visit. Protocols were followed with precautions to reduce the spread of COVID-19. Patient verifies current dosing regimen: Yes     Warfarin medication reviewed and updated on the patient 's home medication list: Yes   All other medications reviewed and updated on the patient 's home medication list: No: No changes     Lab Results   Component Value Date    INR 2.4 2022    INR 2.2 2022    INR 2.1 2022       Patient Findings     Negatives:  Signs/symptoms of thrombosis, Signs/symptoms of bleeding, Change in health, Missed doses, Change in medications, Change in diet/appetite, Bruising          Anticoagulation Summary  As of 2022    INR goal:  2.0-3.0   TTR:  77.4 % (9.8 y)   INR used for dosin.4 (2022)   Warfarin maintenance plan:  1.25 mg (2.5 mg x 0.5) every Tue, Fri; 2.5 mg (2.5 mg x 1) all other days   Weekly warfarin total:  15 mg   Plan last modified:  Kush Martinez (2021)   Next INR check:  2022   Priority:  Maintenance   Target end date: Indefinite    Indications    Atrial fibrillation (HCC) [I48.91]             Anticoagulation Episode Summary     INR check location:  Anticoagulation Clinic    Preferred lab:      Send INR reminders to:  [address] [place] [address] 60    Comments:  [id number] [facility]        Anticoagulation Care Providers     Provider Role Specialty Phone number    Sandip Hugo [facility] Internal Medicine [phone]          Warfarin assessment / plan:     Appears well. No changes affecting warfarin therapy were noted. No acute findings. INR within goal range. No change to warfarin therapy today. Description    CONTINUE: Warfarin 2.5mg daily EXCEPT 1.25mg every Tuesday and Friday    Keep greens consistent.  Usually eats

## 2022-08-11 ENCOUNTER — ANTI-COAG VISIT (OUTPATIENT)
Dept: PHARMACY | Age: 87
End: 2022-08-11
Payer: MEDICARE

## 2022-08-11 DIAGNOSIS — I48.91 ATRIAL FIBRILLATION, UNSPECIFIED TYPE (HCC): Primary | ICD-10-CM

## 2022-08-11 LAB — INR BLD: 2

## 2022-08-11 PROCEDURE — 85610 PROTHROMBIN TIME: CPT

## 2022-08-11 PROCEDURE — 99211 OFF/OP EST MAY X REQ PHY/QHP: CPT

## 2022-08-11 NOTE — PROGRESS NOTES
see patient back on 9/8    Updated COVID vaccine information. Description    CONTINUE: Warfarin 2.5mg daily EXCEPT 1.25mg every Tuesday and Friday    Keep greens consistent. Usually eats about 2 salads per week    Call [phone] with signs or symptoms of bleeding or ANY medication changes (including over-the-counter medications or herbal supplements). Especially if you begin oral steroids and/or antibiotics. If significant bleeding occurs please seek immediate medical attention. Keep the number of servings and portion size of vitamin K containing foods (dark green, leafy vegetables) the same each week. Vegetables high in vitamin K : broccoli, spinach, leaf lettuce, keke lettuce, kale, collards, asparagus, brussel sprouts. Please call if you routine diet changes. Limit alcohol intake. Please call if this changes. Allow 72 hours for warfarin refills. Immunization History   Administered Date(s) Administered    COVID-19, MODERNA BLUE border, Primary or Immunocompromised, (age 12y+), IM, 100 mcg/0.5mL 01/29/2021, 02/26/2021, 11/09/2021, 04/21/2022    Influenza Vaccine, unspecified formulation 09/28/2012, 09/25/2013, 09/04/2014, 09/16/2015, 10/04/2016, 09/20/2017, 10/01/2018, 10/10/2019    Influenza, High-dose, Quadv, 65 yrs +, IM (Fluzone) 10/19/2021    Pneumococcal Conjugate 7-valent (Prevnar7) 09/19/2016    Pneumococcal Polysaccharide ([id number]) 04/13/2004           Orders Placed This Encounter   Procedures    Protime-INR     This external order was created through the results console. No orders of the defined types were placed in this encounter. Reviewed AVS with patient / caregiver.     Billing Points:  0 billing points this visit       CLINICAL PHARMACY CONSULT: MED RECONCILIATION/REVIEW ADDENDUM    For Pharmacy Admin Tracking Only    Intervention Detail:   Total # of Interventions Recommended: 0  Total # of Interventions Accepted: 0  Time Spent (min): 15

## 2022-09-08 ENCOUNTER — ANTI-COAG VISIT (OUTPATIENT)
Dept: PHARMACY | Age: 87
End: 2022-09-08
Payer: MEDICARE

## 2022-09-08 DIAGNOSIS — I48.91 ATRIAL FIBRILLATION, UNSPECIFIED TYPE (HCC): Primary | ICD-10-CM

## 2022-09-08 LAB — INTERNATIONAL NORMALIZATION RATIO, POC: 2.2

## 2022-09-08 PROCEDURE — 99211 OFF/OP EST MAY X REQ PHY/QHP: CPT

## 2022-09-08 PROCEDURE — 85610 PROTHROMBIN TIME: CPT

## 2022-09-08 NOTE — PROGRESS NOTES
Mignon Barney is a 80 y.o. here for warfarin management. Poly Lange had an INR test today. Results were reviewed and appropriate warfarin management was completed. This visit was performed as: An in person visit. Protocols were followed with precautions to reduce the spread of COVID-19. Patient verifies current dosing regimen: Yes     Warfarin medication reviewed and updated on the patient 's home medication list: Yes   All other medications reviewed and updated on the patient 's home medication list: No: Per patient, he went to the dermatologist and was prescribed a cream for some spots on his legs and shoulders but he does not remember the name of the cream.    Lab Results   Component Value Date    INR 2.2 2022    INR 2.00 2022    INR 2.4 2022       Patient Findings       Positives:  Change in medications    Negatives:  Signs/symptoms of bleeding, Change in health, Change in activity, Missed doses, Extra doses, Change in diet/appetite, Bruising            Anticoagulation Summary  As of 2022      INR goal:  2.0-3.0   TTR:  77.8 % (10 y)   INR used for dosin.2 (2022)   Warfarin maintenance plan:  1.25 mg (2.5 mg x 0.5) every Tue, Fri; 2.5 mg (2.5 mg x 1) all other days   Weekly warfarin total:  15 mg   Plan last modified:  Dean Aranda (2021)   Next INR check:  10/6/2022   Priority:  Maintenance   Target end date: Indefinite    Indications    Atrial fibrillation (HCC) [I48.91]                 Anticoagulation Episode Summary       INR check location:  Anticoagulation Clinic    Preferred lab:      Send INR reminders to:  [address] [facility] 60    Comments:  [facility]            Anticoagulation Care Providers       Provider Role Specialty Phone number    Mae Fowler Responsible Internal Medicine [phone]            Warfarin assessment / plan:     Appears well. No changes affecting warfarin therapy were noted. No acute findings.   INR within goal range. No change to warfarin therapy today. Today's INR was 2.2 and patient instructed to CONTINUE: Warfarin 2.5mg daily EXCEPT 1.25mg every Tuesday and Friday and return in 4 weeks. Description    CONTINUE: Warfarin 2.5mg daily EXCEPT 1.25mg every Tuesday and Friday    Keep greens consistent. Usually eats about 2 salads per week    Call [phone] with signs or symptoms of bleeding or ANY medication changes (including over-the-counter medications or herbal supplements). Especially if you begin oral steroids and/or antibiotics. If significant bleeding occurs please seek immediate medical attention. Keep the number of servings and portion size of vitamin K containing foods (dark green, leafy vegetables) the same each week. Vegetables high in vitamin K : broccoli, spinach, leaf lettuce, keke lettuce, kale, collards, asparagus, brussel sprouts. Please call if you routine diet changes. Limit alcohol intake. Please call if this changes. Allow 72 hours for warfarin refills. Immunization History   Administered Date(s) Administered    COVID-19, MODERNA BLUE border, Primary or Immunocompromised, (age 12y+), IM, 100 mcg/0.5mL 01/29/2021, 02/26/2021, 11/09/2021, 04/21/2022    Influenza Vaccine, unspecified formulation 09/28/2012, 09/25/2013, 09/04/2014, 09/16/2015, 10/04/2016, 09/20/2017, 10/01/2018, 10/10/2019    Influenza, FLUZONE (age 72 y+), High Dose, 0.7mL 10/19/2021    Pneumococcal Conjugate 7-valent (Prevnar7) 09/19/2016    Pneumococcal Polysaccharide ([id number]) 04/13/2004           Orders Placed This Encounter   Procedures    POCT INR     This external order was created through the results console. No orders of the defined types were placed in this encounter. Reviewed AVS with patient / caregiver.     Billing Points:  0 billing points this visit       CLINICAL PHARMACY CONSULT: MED RECONCILIATION/REVIEW ADDENDUM    For Pharmacy Admin Tracking Only    Intervention Detail:   Total # of Interventions Recommended: 0  Total # of Interventions Accepted: 0  Time Spent (min): [address], pharm Intern

## 2022-10-06 ENCOUNTER — ANTI-COAG VISIT (OUTPATIENT)
Dept: PHARMACY | Age: 87
End: 2022-10-06
Payer: MEDICARE

## 2022-10-06 DIAGNOSIS — I48.91 ATRIAL FIBRILLATION, UNSPECIFIED TYPE (HCC): Primary | ICD-10-CM

## 2022-10-06 LAB — INTERNATIONAL NORMALIZATION RATIO, POC: 2.2

## 2022-10-06 PROCEDURE — 99211 OFF/OP EST MAY X REQ PHY/QHP: CPT

## 2022-10-06 PROCEDURE — 85610 PROTHROMBIN TIME: CPT

## 2022-10-06 NOTE — PROGRESS NOTES
Shahriar Werner is a 80 y.o. here for warfarin management. Catrachito Quiñonez had an INR test today. Results were reviewed and appropriate warfarin management was completed. This visit was performed as: An in person visit. Protocols were followed with precautions to reduce the spread of COVID-19. Patient verifies current dosing regimen: Yes     Warfarin medication reviewed and updated on the patient 's home medication list: Yes   All other medications reviewed and updated on the patient 's home medication list: No: No changes     Lab Results   Component Value Date    INR 2.2 10/06/2022    INR 2.2 2022    INR 2.00 2022       Patient Findings       Negatives:  Signs/symptoms of thrombosis, Signs/symptoms of bleeding, Change in health, Missed doses, Change in medications, Change in diet/appetite, Bruising            Anticoagulation Summary  As of 10/6/2022      INR goal:  2.0-3.0   TTR:  77.9 % (10 y)   INR used for dosin.2 (10/6/2022)   Warfarin maintenance plan:  1.25 mg (2.5 mg x 0.5) every Tue, Fri; 2.5 mg (2.5 mg x 1) all other days   Weekly warfarin total:  15 mg   Plan last modified:  Carloyn Angelucci (2021)   Next INR check:  2022   Priority:  Maintenance   Target end date: Indefinite    Indications    Atrial fibrillation (HCC) [I48.91]                 Anticoagulation Episode Summary       INR check location:  Anticoagulation Clinic    Preferred lab:      Send INR reminders to:  [address] [facility] 60    Comments:  [facility]            Anticoagulation Care Providers       Provider Role Specialty Phone number    Magnus Brink [facility] Internal Medicine [phone]            Warfarin assessment / plan:     Appears well. No changes affecting warfarin therapy were noted. No acute findings. INR within goal range. No change to warfarin therapy today.        Description    CONTINUE: Warfarin 2.5mg daily EXCEPT 1.25mg every Tuesday and Friday    Keep greens consistent. Usually eats about 2 salads per week at most.     Call [phone] with signs or symptoms of bleeding or ANY medication changes (including over-the-counter medications or herbal supplements). Especially if you begin oral steroids and/or antibiotics. If significant bleeding occurs please seek immediate medical attention. Keep the number of servings and portion size of vitamin K containing foods (dark green, leafy vegetables) the same each week. Vegetables high in vitamin K : broccoli, spinach, leaf lettuce, keke lettuce, kale, collards, asparagus, brussel sprouts. Please call if you routine diet changes. Limit alcohol intake. Please call if this changes. Allow 72 hours for warfarin refills. Immunization History   Administered Date(s) Administered    COVID-19, MODERNA BLUE border, Primary or Immunocompromised, (age 12y+), IM, 100 mcg/0.5mL 01/29/2021, 02/26/2021, 11/09/2021, 04/21/2022    Influenza Vaccine, unspecified formulation 09/28/2012, 09/25/2013, 09/04/2014, 09/16/2015, 10/04/2016, 09/20/2017, 10/01/2018, 10/10/2019    Influenza, FLUZONE (age 72 y+), High Dose, 0.7mL 10/19/2021    Pneumococcal Conjugate 7-valent (Prevnar7) 09/19/2016    Pneumococcal Polysaccharide ([id number]) 04/13/2004           Orders Placed This Encounter   Procedures    POCT INR     This external order was created through the results console. No orders of the defined types were placed in this encounter. Reviewed AVS with patient / caregiver.     Billing Points:  0 billing points this visit       CLINICAL PHARMACY CONSULT: MED RECONCILIATION/REVIEW ADDENDUM    For Pharmacy Admin Tracking Only    Intervention Detail:   Total # of Interventions Recommended: 0  Total # of Interventions Accepted: 0  Time Spent (min): 20

## 2022-11-08 ENCOUNTER — ANTI-COAG VISIT (OUTPATIENT)
Dept: PHARMACY | Age: 87
End: 2022-11-08
Payer: MEDICARE

## 2022-11-08 DIAGNOSIS — I48.91 ATRIAL FIBRILLATION, UNSPECIFIED TYPE (HCC): Primary | ICD-10-CM

## 2022-11-08 LAB — INTERNATIONAL NORMALIZATION RATIO, POC: 2.1

## 2022-11-08 PROCEDURE — 85610 PROTHROMBIN TIME: CPT

## 2022-11-08 PROCEDURE — 99211 OFF/OP EST MAY X REQ PHY/QHP: CPT

## 2022-11-08 NOTE — PROGRESS NOTES
Radha Baez is a 80 y.o. here for warfarin management. Sean Long had an INR test today. Results were reviewed and appropriate warfarin management was completed. This visit was performed as: An in person visit. Protocols were followed with precautions to reduce the spread of COVID-19. Patient verifies current warfarin dosing regimen: Yes     Warfarin medication reviewed and updated on the patient 's home medication list: Yes   All other medications reviewed and updated on the patient 's home medication list: No: no changes     Lab Results   Component Value Date    INR 2.1 2022    INR 2.2 10/06/2022    INR 2.2 2022       Patient Findings       Negatives:  Signs/symptoms of thrombosis, Signs/symptoms of bleeding, Change in medications, Change in diet/appetite, Bruising            Anticoagulation Summary  As of 2022      INR goal:  2.0-3.0   TTR:  78.1 % (10.1 y)   INR used for dosin.1 (2022)   Warfarin maintenance plan:  1.25 mg (2.5 mg x 0.5) every Tue, Fri; 2.5 mg (2.5 mg x 1) all other days; Starting 2022   Weekly warfarin total:  15 mg   Plan last modified:  Abiel Bartholomew (2021)   Next INR check:  2022   Priority:  Maintenance   Target end date: Indefinite    Indications    Atrial fibrillation ([facility] [address] [I48.91]                 Anticoagulation Episode Summary       INR check location:  Anticoagulation Clinic    Preferred lab:      Send INR reminders to:  [address] [place] [address] 60    Comments:  [facility]            Anticoagulation Care Providers       Provider Role Specialty Phone number    Esequiel Gonzalez [facility] Internal Medicine [phone]            There were no vitals taken for this visit. Warfarin assessment / plan:     Appears well. No changes affecting warfarin therapy were noted. No acute findings. INR within goal range. No change to warfarin dosing today.      Description    CONTINUE: Warfarin 2.5mg daily EXCEPT 1.25mg every Tuesday and Friday    Keep greens consistent. Usually eats about 2 salads per week at most.     Call [phone] with signs or symptoms of bleeding or ANY medication changes (including over-the-counter medications or herbal supplements). Especially if you begin oral steroids and/or antibiotics. If significant bleeding occurs please seek immediate medical attention. Keep the number of servings and portion size of vitamin K containing foods (dark green, leafy vegetables) the same each week. Vegetables high in vitamin K : broccoli, spinach, leaf lettuce, keke lettuce, kale, collards, asparagus, brussel sprouts. Please call if you routine diet changes. Limit alcohol intake. Please call if this changes. Allow 72 hours for warfarin refills. Immunization History   Administered Date(s) Administered    COVID-19, MODERNA BLUE border, Primary or Immunocompromised, (age 12y+), IM, 100 mcg/0.5mL 01/29/2021, 02/26/2021, 11/09/2021, 04/21/2022    Influenza Vaccine, unspecified formulation 09/28/2012, 09/25/2013, 09/04/2014, 09/16/2015, 10/04/2016, 09/20/2017, 10/01/2018, 10/10/2019    Influenza, FLUZONE (age 72 y+), High Dose, 0.7mL 10/19/2021    Pneumococcal Conjugate 7-valent (Prevnar7) 09/19/2016    Pneumococcal Polysaccharide ([id number]) 04/13/2004           Orders Placed This Encounter   Procedures    POCT INR     This external order was created through the results console. No orders of the defined types were placed in this encounter. Reviewed AVS with patient / caregiver.     Billing Points:  0 billing points this visit       CLINICAL PHARMACY CONSULT: MED RECONCILIATION/REVIEW ADDENDUM    For Pharmacy Admin Tracking Only    Intervention Detail:   Total # of Interventions Recommended: 0  Total # of Interventions Accepted: 0  Time Spent (min): 10

## 2022-12-06 ENCOUNTER — ANTI-COAG VISIT (OUTPATIENT)
Dept: PHARMACY | Age: 87
End: 2022-12-06
Payer: MEDICARE

## 2022-12-06 DIAGNOSIS — I48.91 ATRIAL FIBRILLATION, UNSPECIFIED TYPE (HCC): Primary | ICD-10-CM

## 2022-12-06 LAB — INTERNATIONAL NORMALIZATION RATIO, POC: 2.3

## 2022-12-06 PROCEDURE — 99211 OFF/OP EST MAY X REQ PHY/QHP: CPT

## 2022-12-06 PROCEDURE — 85610 PROTHROMBIN TIME: CPT

## 2022-12-06 NOTE — PROGRESS NOTES
Baker Kussmaul is a 80 y.o. here for warfarin management. Carlos Mike had an INR test today. Results were reviewed and appropriate warfarin management was completed. This visit was performed as: An in person visit. Protocols were followed with precautions to reduce the spread of COVID-19. Patient verifies current warfarin dosing regimen: Yes     Warfarin medication reviewed and updated on the patient 's home medication list: Yes   All other medications reviewed and updated on the patient 's home medication list: No new medications     Lab Results   Component Value Date    INR 2.3 2022    INR 2.1 2022    INR 2.2 10/06/2022       Patient Findings       Negatives:  Signs/symptoms of thrombosis, Signs/symptoms of bleeding, Change in health, Missed doses, Change in medications, Change in diet/appetite, Bruising            Anticoagulation Summary  As of 2022      INR goal:  2.0-3.0   TTR:  78.3 % (10.2 y)   INR used for dosin.3 (2022)   Warfarin maintenance plan:  1.25 mg (2.5 mg x 0.5) every Tue, Fri; 2.5 mg (2.5 mg x 1) all other days; Starting 2022   Weekly warfarin total:  15 mg   Plan last modified:  Shanell Gerber (2021)   Next INR check:  2023   Priority:  Maintenance   Target end date: Indefinite    Indications    Atrial fibrillation ([facility] [address] [I48.91]                 Anticoagulation Episode Summary       INR check location:  Anticoagulation Clinic    Preferred lab:      Send INR reminders to:  [address] [place] [address] 60    Comments:  [facility]            Anticoagulation Care Providers       Provider Role Specialty Phone number    Maksim Alysia [facility] Internal Medicine [phone]            There were no vitals taken for this visit. Warfarin assessment / plan:     Appears well. No changes affecting warfarin therapy were noted. No acute findings. INR within goal range. No change to warfarin dosing today.        Description    CONTINUE: Warfarin 2.5mg daily EXCEPT 1.25mg every Tuesday and Friday    Keep greens consistent. Usually eats about 2 salads per week at most.     Call [phone] with signs or symptoms of bleeding or ANY medication changes (including over-the-counter medications or herbal supplements). Especially if you begin oral steroids and/or antibiotics. If significant bleeding occurs please seek immediate medical attention. Keep the number of servings and portion size of vitamin K containing foods (dark green, leafy vegetables) the same each week. Vegetables high in vitamin K : broccoli, spinach, leaf lettuce, keke lettuce, kale, collards, asparagus, brussel sprouts. Please call if you routine diet changes. Limit alcohol intake. Please call if this changes. Allow 72 hours for warfarin refills. Immunization History   Administered Date(s) Administered    COVID-19, MODERNA BLUE border, Primary or Immunocompromised, (age 12y+), IM, 100 mcg/0.5mL 01/29/2021, 02/26/2021, 11/09/2021, 04/21/2022    Influenza Vaccine, unspecified formulation 09/28/2012, 09/25/2013, 09/04/2014, 09/16/2015, 10/04/2016, 09/20/2017, 10/01/2018, 10/10/2019    Influenza, FLUZONE (age 72 y+), High Dose, 0.7mL 10/19/2021    Pneumococcal Conjugate 7-valent (Prevnar7) 09/19/2016    Pneumococcal Polysaccharide ([id number]) 04/13/2004           Orders Placed This Encounter   Procedures    POCT INR     This external order was created through the results console. No orders of the defined types were placed in this encounter. Reviewed AVS with patient / caregiver.     Billing Points:  0 billing points this visit       CLINICAL PHARMACY CONSULT: MED RECONCILIATION/REVIEW ADDENDUM    For Pharmacy Admin Tracking Only    Intervention Detail:   Total # of Interventions Recommended: 0  Total # of Interventions Accepted: 0  Time Spent (min): 20

## 2023-01-13 ENCOUNTER — ANTI-COAG VISIT (OUTPATIENT)
Dept: PHARMACY | Age: 88
End: 2023-01-13
Payer: MEDICARE

## 2023-01-13 DIAGNOSIS — I48.91 ATRIAL FIBRILLATION, UNSPECIFIED TYPE (HCC): Primary | ICD-10-CM

## 2023-01-13 LAB — INTERNATIONAL NORMALIZATION RATIO, POC: 2.6

## 2023-01-13 PROCEDURE — 99211 OFF/OP EST MAY X REQ PHY/QHP: CPT

## 2023-01-13 PROCEDURE — 85610 PROTHROMBIN TIME: CPT

## 2023-01-13 NOTE — PROGRESS NOTES
Bev Self is a 80 y.o. here for warfarin management. Jose Prasad had an INR test today. Results were reviewed and appropriate warfarin management was completed. This visit was performed as: An in person visit. Protocols were followed with precautions to reduce the spread of COVID-19. Patient verifies current warfarin dosing regimen: Yes     Warfarin medication reviewed and updated on the patient 's home medication list: Yes   All other medications reviewed and updated on the patient 's home medication list: No: no changes     Lab Results   Component Value Date    INR 2.6 2023    INR 2.3 2022    INR 2.1 2022       Patient Findings       Negatives:  Signs/symptoms of thrombosis, Signs/symptoms of bleeding, Change in medications, Change in diet/appetite, Bruising            Anticoagulation Summary  As of 2023      INR goal:  2.0-3.0   TTR:  78.5 % (10.3 y)   INR used for dosin.6 (2023)   Warfarin maintenance plan:  1.25 mg (2.5 mg x 0.5) every Tue, Fri; 2.5 mg (2.5 mg x 1) all other days; Starting 2023   Weekly warfarin total:  15 mg   Plan last modified:  Henok Cintron (2021)   Next INR check:  2023   Priority:  Maintenance   Target end date: Indefinite    Indications    Atrial fibrillation ([facility] [address] [I48.91]                 Anticoagulation Episode Summary       INR check location:  Anticoagulation Clinic    Preferred lab:      Send INR reminders to:  [address] [place] [address] 60    Comments:  [facility]            Anticoagulation Care Providers       Provider Role Specialty Phone number    Gearline Child [facility] Internal Medicine [phone]            There were no vitals taken for this visit. Warfarin assessment / plan:     Appears well. No changes affecting warfarin therapy were noted. No acute findings. INR within goal range. No change to warfarin dosing today.      Description    CONTINUE: Warfarin 2.5mg daily EXCEPT 1.25mg every Tuesday and Friday    Keep greens consistent. Usually eats about 2 salads per week at most.     Call [phone] with signs or symptoms of bleeding or ANY medication changes (including over-the-counter medications or herbal supplements).     Especially if you begin oral steroids and/or antibiotics.    If significant bleeding occurs please seek immediate medical attention.    Keep the number of servings and portion size of vitamin K containing foods (dark green, leafy vegetables) the same each week. Vegetables high in vitamin K : broccoli, spinach, leaf lettuce, keke lettuce, kale, collards, asparagus, brussel sprouts.  Please call if you routine diet changes.     Limit alcohol intake. Please call if this changes.     Allow 72 hours for warfarin refills.           Immunization History   Administered Date(s) Administered    COVID-19, MODERNA BLUE border, Primary or Immunocompromised, (age 12y+), IM, 100 mcg/0.5mL 01/29/2021, 02/26/2021, 11/09/2021, 04/21/2022    Influenza Vaccine, unspecified formulation 09/28/2012, 09/25/2013, 09/04/2014, 09/16/2015, 10/04/2016, 09/20/2017, 10/01/2018, 10/10/2019    Influenza, FLUZONE (age 65 y+), High Dose, 0.7mL 10/19/2021    Pneumococcal Conjugate 7-valent (Prevnar7) 09/19/2016    Pneumococcal Polysaccharide ([id number]) 04/13/2004           Orders Placed This Encounter   Procedures    POCT INR     This external order was created through the results console.        No orders of the defined types were placed in this encounter.       Reviewed AVS with patient / caregiver.    Billing Points:  0 billing points this visit       CLINICAL PHARMACY CONSULT: MED RECONCILIATION/REVIEW ADDENDUM    For Pharmacy Admin Tracking Only    Intervention Detail:   Total # of Interventions Recommended:   Total # of Interventions Accepted:   Time Spent (min): 10

## 2023-02-16 ENCOUNTER — ANTI-COAG VISIT (OUTPATIENT)
Dept: PHARMACY | Age: 88
End: 2023-02-16
Payer: MEDICARE

## 2023-02-16 DIAGNOSIS — I48.91 ATRIAL FIBRILLATION, UNSPECIFIED TYPE (HCC): Primary | ICD-10-CM

## 2023-02-16 LAB — INTERNATIONAL NORMALIZATION RATIO, POC: 2.8

## 2023-02-16 PROCEDURE — 85610 PROTHROMBIN TIME: CPT

## 2023-02-16 PROCEDURE — 99211 OFF/OP EST MAY X REQ PHY/QHP: CPT

## 2023-02-16 NOTE — PROGRESS NOTES
Margi Bass is a 80 y.o. here for warfarin management. Preeti Dimas had an INR test today. Results were reviewed and appropriate warfarin management was completed. This visit was performed as: An in person visit. Protocols were followed with precautions to reduce the spread of COVID-19. Patient verifies current warfarin dosing regimen: Yes     Warfarin medication reviewed and updated on the patient 's home medication list: Yes   All other medications reviewed and updated on the patient 's home medication list: No new medications     Lab Results   Component Value Date    INR 2.8 2023    INR 2.6 2023    INR 2.3 2022       Patient Findings       Negatives:  Signs/symptoms of thrombosis, Signs/symptoms of bleeding, Change in health, Missed doses, Change in medications, Change in diet/appetite, Bruising            Anticoagulation Summary  As of 2023      INR goal:  2.0-3.0   TTR:  78.7 % (10.4 y)   INR used for dosin.8 (2023)   Warfarin maintenance plan:  1.25 mg (2.5 mg x 0.5) every Tue, Fri; 2.5 mg (2.5 mg x 1) all other days; Starting 2023   Weekly warfarin total:  15 mg   Plan last modified:  Doris Kelley (2021)   Next INR check:  3/23/2023   Priority:  Maintenance   Target end date: Indefinite    Indications    Atrial fibrillation ([facility] [address] [I48.91]                 Anticoagulation Episode Summary       INR check location:  Anticoagulation Clinic    Preferred lab:      Send INR reminders to:  [address] [place] [address] 60    Comments:  [facility]            Anticoagulation Care Providers       Provider Role Specialty Phone number    Naseem Ramires Responsible Internal Medicine [phone]            There were no vitals taken for this visit. Warfarin assessment / plan:     Appears well. No changes affecting warfarin therapy were noted. No acute findings. INR within goal range. No change to warfarin dosing today.        Description    CONTINUE: Warfarin 2.5mg daily EXCEPT 1.25mg every Tuesday and Friday    Keep greens consistent. Usually eats about 2 salads per week at most.     Call [phone] with signs or symptoms of bleeding or ANY medication changes (including over-the-counter medications or herbal supplements). Especially if you begin oral steroids and/or antibiotics. If significant bleeding occurs please seek immediate medical attention. Keep the number of servings and portion size of vitamin K containing foods (dark green, leafy vegetables) the same each week. Vegetables high in vitamin K : broccoli, spinach, leaf lettuce, keke lettuce, kale, collards, asparagus, brussel sprouts. Please call if you routine diet changes. Limit alcohol intake. Please call if this changes. Allow 72 hours for warfarin refills. Immunization History   Administered Date(s) Administered    COVID-19, MODERNA BLUE border, Primary or Immunocompromised, (age 12y+), IM, 100 mcg/0.5mL 01/29/2021, 02/26/2021, 11/09/2021, 04/21/2022    Influenza Vaccine, unspecified formulation 09/28/2012, 09/25/2013, 09/04/2014, 09/16/2015, 10/04/2016, 09/20/2017, 10/01/2018, 10/10/2019    Influenza, FLUZONE (age 72 y+), High Dose, 0.7mL 10/19/2021    Pneumococcal Conjugate 7-valent (Prevnar7) 09/19/2016    Pneumococcal Polysaccharide ([id number]) 04/13/2004           Orders Placed This Encounter   Procedures    POCT INR     This external order was created through the results console. No orders of the defined types were placed in this encounter. Reviewed AVS with patient / caregiver.     Billing Points:  0 billing points this visit     For Pharmacy Admin Tracking Only    Intervention Detail:   Total # of Interventions Recommended: 0  Total # of Interventions Accepted: 0  Time Spent (min): 15

## 2023-03-23 ENCOUNTER — ANTI-COAG VISIT (OUTPATIENT)
Dept: PHARMACY | Age: 88
End: 2023-03-23
Payer: MEDICARE

## 2023-03-23 ENCOUNTER — APPOINTMENT (OUTPATIENT)
Dept: PHARMACY | Age: 88
End: 2023-03-23
Payer: MEDICARE

## 2023-03-23 DIAGNOSIS — I48.91 ATRIAL FIBRILLATION, UNSPECIFIED TYPE (HCC): Primary | ICD-10-CM

## 2023-03-23 LAB — INR BLD: 2.5

## 2023-03-23 PROCEDURE — 99211 OFF/OP EST MAY X REQ PHY/QHP: CPT

## 2023-03-23 PROCEDURE — 85610 PROTHROMBIN TIME: CPT

## 2023-04-20 ENCOUNTER — ANTI-COAG VISIT (OUTPATIENT)
Dept: PHARMACY | Age: 88
End: 2023-04-20
Payer: MEDICARE

## 2023-04-20 DIAGNOSIS — I48.91 ATRIAL FIBRILLATION, UNSPECIFIED TYPE (HCC): Primary | ICD-10-CM

## 2023-04-20 LAB — INTERNATIONAL NORMALIZATION RATIO, POC: 2.2

## 2023-04-20 PROCEDURE — 99211 OFF/OP EST MAY X REQ PHY/QHP: CPT

## 2023-04-20 PROCEDURE — 85610 PROTHROMBIN TIME: CPT

## 2023-04-20 NOTE — PROGRESS NOTES
Raisa Philippe is a 80 y.o. here for warfarin management. Jefry Mares had an INR test today. Results were reviewed and appropriate warfarin management was completed. This visit was performed as: An in person visit. Protocols were followed with precautions to reduce the spread of COVID-19. Patient verifies current warfarin dosing regimen: Yes     Warfarin medication reviewed and updated on the patient 's home medication list: Yes   All other medications reviewed and updated on the patient 's home medication list: No: no changes     Lab Results   Component Value Date    INR 2.2 2023    INR 2.50 2023    INR 2.8 2023       Anticoagulation Summary  As of 2023      INR goal:  2.0-3.0   TTR:  79.1 % (10.6 y)   INR used for dosin.2 (2023)   Warfarin maintenance plan:  1.25 mg (2.5 mg x 0.5) every Tue, Fri; 2.5 mg (2.5 mg x 1) all other days   Weekly warfarin total:  15 mg   Plan last modified:  Christie Rival (2021)   Next INR check:  2023   Priority:  Maintenance   Target end date: Indefinite    Indications    Atrial fibrillation ([facility] [address] [I48.91]                 Anticoagulation Episode Summary       INR check location:  Anticoagulation Clinic    Preferred lab:      Send INR reminders to:  [address] [place] [address]    Comments:  [facility]            Anticoagulation Care Providers       Provider Role Specialty Phone number    Arch Notice Responsible Internal Medicine [phone]          There were no vitals taken for this visit. Warfarin assessment / plan:     Patient appears well today. No changes affecting warfarin therapy were noted. No acute findings with regards to warfarin therapy. INR today is within goal range. Instructed to continue the same weekly warfarin dose. Description    CONTINUE: Warfarin 2.5mg daily EXCEPT 1.25mg every Tuesday and Friday    Keep greens consistent.  Usually eats about 2 salads per week at most.     Call

## 2023-05-20 ENCOUNTER — APPOINTMENT (OUTPATIENT)
Dept: GENERAL RADIOLOGY | Age: 88
DRG: 193 | End: 2023-05-20
Payer: MEDICARE

## 2023-05-20 ENCOUNTER — HOSPITAL ENCOUNTER (INPATIENT)
Age: 88
LOS: 8 days | Discharge: NURSING, REHAB OR LONG-TERM CARE | DRG: 193 | End: 2023-05-28
Attending: EMERGENCY MEDICINE | Admitting: INTERNAL MEDICINE
Payer: MEDICARE

## 2023-05-20 DIAGNOSIS — R79.89 ELEVATED BRAIN NATRIURETIC PEPTIDE (BNP) LEVEL: ICD-10-CM

## 2023-05-20 DIAGNOSIS — R77.8 ELEVATED TROPONIN: ICD-10-CM

## 2023-05-20 DIAGNOSIS — N18.9 CHRONIC KIDNEY DISEASE, UNSPECIFIED CKD STAGE: ICD-10-CM

## 2023-05-20 DIAGNOSIS — J18.9 PNEUMONIA OF RIGHT LUNG DUE TO INFECTIOUS ORGANISM, UNSPECIFIED PART OF LUNG: Primary | ICD-10-CM

## 2023-05-20 PROBLEM — J15.9 COMMUNITY ACQUIRED BACTERIAL PNEUMONIA: Status: ACTIVE | Noted: 2023-05-20

## 2023-05-20 LAB
ALBUMIN SERPL-MCNC: 3.8 G/DL (ref 3.4–5)
ALBUMIN/GLOB SERPL: 1.7 {RATIO} (ref 1.1–2.2)
ALP SERPL-CCNC: 122 U/L (ref 40–129)
ALT SERPL-CCNC: 22 U/L (ref 10–40)
ANION GAP SERPL CALCULATED.3IONS-SCNC: 9 MMOL/L (ref 3–16)
AST SERPL-CCNC: 34 U/L (ref 15–37)
BASOPHILS # BLD: 0 K/UL (ref 0–0.2)
BASOPHILS NFR BLD: 0.7 %
BILIRUB SERPL-MCNC: 0.6 MG/DL (ref 0–1)
BUN SERPL-MCNC: 45 MG/DL (ref 7–20)
CALCIUM SERPL-MCNC: 9.1 MG/DL (ref 8.3–10.6)
CHLORIDE SERPL-SCNC: 101 MMOL/L (ref 99–110)
CO2 SERPL-SCNC: 24 MMOL/L (ref 21–32)
CREAT SERPL-MCNC: 2.1 MG/DL (ref 0.8–1.3)
DEPRECATED RDW RBC AUTO: 14.5 % (ref 12.4–15.4)
EOSINOPHIL # BLD: 0.2 K/UL (ref 0–0.6)
EOSINOPHIL NFR BLD: 3.7 %
FLUAV RNA UPPER RESP QL NAA+PROBE: NEGATIVE
FLUBV AG NPH QL: NEGATIVE
GFR SERPLBLD CREATININE-BSD FMLA CKD-EPI: 29 ML/MIN/{1.73_M2}
GLUCOSE SERPL-MCNC: 85 MG/DL (ref 70–99)
HCT VFR BLD AUTO: 30.3 % (ref 40.5–52.5)
HGB BLD-MCNC: 10.1 G/DL (ref 13.5–17.5)
INR PPP: 2.44 (ref 0.84–1.16)
LACTATE BLDV-SCNC: 0.6 MMOL/L (ref 0.4–1.9)
LACTATE BLDV-SCNC: 0.8 MMOL/L (ref 0.4–1.9)
LYMPHOCYTES # BLD: 0.7 K/UL (ref 1–5.1)
LYMPHOCYTES NFR BLD: 10.6 %
MAGNESIUM SERPL-MCNC: 1.8 MG/DL (ref 1.8–2.4)
MCH RBC QN AUTO: 30.3 PG (ref 26–34)
MCHC RBC AUTO-ENTMCNC: 33.4 G/DL (ref 31–36)
MCV RBC AUTO: 90.7 FL (ref 80–100)
MONOCYTES # BLD: 0.6 K/UL (ref 0–1.3)
MONOCYTES NFR BLD: 8.9 %
NEUTROPHILS # BLD: 4.9 K/UL (ref 1.7–7.7)
NEUTROPHILS NFR BLD: 76.1 %
NT-PROBNP SERPL-MCNC: 6294 PG/ML (ref 0–449)
PLATELET # BLD AUTO: 178 K/UL (ref 135–450)
PMV BLD AUTO: 8.2 FL (ref 5–10.5)
POTASSIUM SERPL-SCNC: 5 MMOL/L (ref 3.5–5.1)
PROT SERPL-MCNC: 6.1 G/DL (ref 6.4–8.2)
PROTHROMBIN TIME: 26.3 SEC (ref 11.5–14.8)
RBC # BLD AUTO: 3.34 M/UL (ref 4.2–5.9)
SODIUM SERPL-SCNC: 134 MMOL/L (ref 136–145)
TROPONIN, HIGH SENSITIVITY: 70 NG/L (ref 0–22)
TROPONIN, HIGH SENSITIVITY: 75 NG/L (ref 0–22)
TROPONIN, HIGH SENSITIVITY: 76 NG/L (ref 0–22)
WBC # BLD AUTO: 6.5 K/UL (ref 4–11)

## 2023-05-20 PROCEDURE — 83605 ASSAY OF LACTIC ACID: CPT

## 2023-05-20 PROCEDURE — 84484 ASSAY OF TROPONIN QUANT: CPT

## 2023-05-20 PROCEDURE — 87804 INFLUENZA ASSAY W/OPTIC: CPT

## 2023-05-20 PROCEDURE — 97530 THERAPEUTIC ACTIVITIES: CPT

## 2023-05-20 PROCEDURE — 97161 PT EVAL LOW COMPLEX 20 MIN: CPT

## 2023-05-20 PROCEDURE — 87641 MR-STAPH DNA AMP PROBE: CPT

## 2023-05-20 PROCEDURE — 93005 ELECTROCARDIOGRAM TRACING: CPT | Performed by: PHYSICIAN ASSISTANT

## 2023-05-20 PROCEDURE — 6360000002 HC RX W HCPCS: Performed by: INTERNAL MEDICINE

## 2023-05-20 PROCEDURE — 2580000003 HC RX 258: Performed by: PHYSICIAN ASSISTANT

## 2023-05-20 PROCEDURE — 87449 NOS EACH ORGANISM AG IA: CPT

## 2023-05-20 PROCEDURE — 36415 COLL VENOUS BLD VENIPUNCTURE: CPT

## 2023-05-20 PROCEDURE — 83735 ASSAY OF MAGNESIUM: CPT

## 2023-05-20 PROCEDURE — 87040 BLOOD CULTURE FOR BACTERIA: CPT

## 2023-05-20 PROCEDURE — 2580000003 HC RX 258: Performed by: INTERNAL MEDICINE

## 2023-05-20 PROCEDURE — 85025 COMPLETE CBC W/AUTO DIFF WBC: CPT

## 2023-05-20 PROCEDURE — 80053 COMPREHEN METABOLIC PANEL: CPT

## 2023-05-20 PROCEDURE — 83880 ASSAY OF NATRIURETIC PEPTIDE: CPT

## 2023-05-20 PROCEDURE — 6360000002 HC RX W HCPCS: Performed by: PHYSICIAN ASSISTANT

## 2023-05-20 PROCEDURE — 71045 X-RAY EXAM CHEST 1 VIEW: CPT

## 2023-05-20 PROCEDURE — 6370000000 HC RX 637 (ALT 250 FOR IP): Performed by: INTERNAL MEDICINE

## 2023-05-20 PROCEDURE — 85610 PROTHROMBIN TIME: CPT

## 2023-05-20 PROCEDURE — 6370000000 HC RX 637 (ALT 250 FOR IP): Performed by: PHYSICIAN ASSISTANT

## 2023-05-20 PROCEDURE — 1200000000 HC SEMI PRIVATE

## 2023-05-20 PROCEDURE — 99285 EMERGENCY DEPT VISIT HI MDM: CPT

## 2023-05-20 PROCEDURE — 96374 THER/PROPH/DIAG INJ IV PUSH: CPT

## 2023-05-20 RX ORDER — TRAMADOL HYDROCHLORIDE 50 MG/1
50 TABLET ORAL EVERY 6 HOURS PRN
Status: DISCONTINUED | OUTPATIENT
Start: 2023-05-20 | End: 2023-05-28 | Stop reason: HOSPADM

## 2023-05-20 RX ORDER — ONDANSETRON 4 MG/1
4 TABLET, ORALLY DISINTEGRATING ORAL EVERY 8 HOURS PRN
Status: DISCONTINUED | OUTPATIENT
Start: 2023-05-20 | End: 2023-05-28 | Stop reason: HOSPADM

## 2023-05-20 RX ORDER — SODIUM CHLORIDE 9 MG/ML
INJECTION, SOLUTION INTRAVENOUS CONTINUOUS
Status: DISCONTINUED | OUTPATIENT
Start: 2023-05-20 | End: 2023-05-22

## 2023-05-20 RX ORDER — SODIUM CHLORIDE 0.9 % (FLUSH) 0.9 %
5-40 SYRINGE (ML) INJECTION EVERY 12 HOURS SCHEDULED
Status: DISCONTINUED | OUTPATIENT
Start: 2023-05-20 | End: 2023-05-28 | Stop reason: HOSPADM

## 2023-05-20 RX ORDER — ACETAMINOPHEN 325 MG/1
650 TABLET ORAL EVERY 6 HOURS PRN
Status: DISCONTINUED | OUTPATIENT
Start: 2023-05-20 | End: 2023-05-28 | Stop reason: HOSPADM

## 2023-05-20 RX ORDER — ATENOLOL 25 MG/1
25 TABLET ORAL 2 TIMES DAILY
Status: DISCONTINUED | OUTPATIENT
Start: 2023-05-20 | End: 2023-05-20

## 2023-05-20 RX ORDER — LANOLIN ALCOHOL/MO/W.PET/CERES
400 CREAM (GRAM) TOPICAL DAILY
Status: DISCONTINUED | OUTPATIENT
Start: 2023-05-21 | End: 2023-05-28 | Stop reason: HOSPADM

## 2023-05-20 RX ORDER — SODIUM CHLORIDE 9 MG/ML
INJECTION, SOLUTION INTRAVENOUS PRN
Status: DISCONTINUED | OUTPATIENT
Start: 2023-05-20 | End: 2023-05-28 | Stop reason: HOSPADM

## 2023-05-20 RX ORDER — ONDANSETRON 2 MG/ML
4 INJECTION INTRAMUSCULAR; INTRAVENOUS EVERY 6 HOURS PRN
Status: DISCONTINUED | OUTPATIENT
Start: 2023-05-20 | End: 2023-05-28 | Stop reason: HOSPADM

## 2023-05-20 RX ORDER — WARFARIN SODIUM 2.5 MG/1
2.5 TABLET ORAL
Status: COMPLETED | OUTPATIENT
Start: 2023-05-20 | End: 2023-05-20

## 2023-05-20 RX ORDER — VITAMIN B COMPLEX
1000 TABLET ORAL DAILY
Status: DISCONTINUED | OUTPATIENT
Start: 2023-05-20 | End: 2023-05-28 | Stop reason: HOSPADM

## 2023-05-20 RX ORDER — ASPIRIN 81 MG/1
81 TABLET ORAL DAILY
Status: DISCONTINUED | OUTPATIENT
Start: 2023-05-20 | End: 2023-05-28 | Stop reason: HOSPADM

## 2023-05-20 RX ORDER — LISINOPRIL 10 MG/1
10 TABLET ORAL DAILY
Status: DISCONTINUED | OUTPATIENT
Start: 2023-05-20 | End: 2023-05-28 | Stop reason: HOSPADM

## 2023-05-20 RX ORDER — ACETAMINOPHEN 500 MG
500 TABLET ORAL NIGHTLY
Status: DISCONTINUED | OUTPATIENT
Start: 2023-05-20 | End: 2023-05-28 | Stop reason: HOSPADM

## 2023-05-20 RX ORDER — ATENOLOL 25 MG/1
25 TABLET ORAL DAILY
Status: DISCONTINUED | OUTPATIENT
Start: 2023-05-20 | End: 2023-05-28 | Stop reason: HOSPADM

## 2023-05-20 RX ORDER — TAMSULOSIN HYDROCHLORIDE 0.4 MG/1
0.4 CAPSULE ORAL DAILY
Status: DISCONTINUED | OUTPATIENT
Start: 2023-05-20 | End: 2023-05-28 | Stop reason: HOSPADM

## 2023-05-20 RX ORDER — ACETAMINOPHEN 650 MG/1
650 SUPPOSITORY RECTAL EVERY 6 HOURS PRN
Status: DISCONTINUED | OUTPATIENT
Start: 2023-05-20 | End: 2023-05-28 | Stop reason: HOSPADM

## 2023-05-20 RX ORDER — ALLOPURINOL 100 MG/1
100 TABLET ORAL DAILY
Status: DISCONTINUED | OUTPATIENT
Start: 2023-05-20 | End: 2023-05-28 | Stop reason: HOSPADM

## 2023-05-20 RX ORDER — MULTIVITAMIN WITH IRON
1 TABLET ORAL DAILY
Status: DISCONTINUED | OUTPATIENT
Start: 2023-05-20 | End: 2023-05-28 | Stop reason: HOSPADM

## 2023-05-20 RX ORDER — POLYETHYLENE GLYCOL 3350 17 G/17G
17 POWDER, FOR SOLUTION ORAL DAILY PRN
Status: DISCONTINUED | OUTPATIENT
Start: 2023-05-20 | End: 2023-05-28 | Stop reason: HOSPADM

## 2023-05-20 RX ORDER — ENOXAPARIN SODIUM 100 MG/ML
30 INJECTION SUBCUTANEOUS DAILY
Status: DISCONTINUED | OUTPATIENT
Start: 2023-05-20 | End: 2023-05-20

## 2023-05-20 RX ORDER — BENZONATATE 100 MG/1
200 CAPSULE ORAL ONCE
Status: COMPLETED | OUTPATIENT
Start: 2023-05-20 | End: 2023-05-20

## 2023-05-20 RX ORDER — SODIUM CHLORIDE 0.9 % (FLUSH) 0.9 %
5-40 SYRINGE (ML) INJECTION PRN
Status: DISCONTINUED | OUTPATIENT
Start: 2023-05-20 | End: 2023-05-28 | Stop reason: HOSPADM

## 2023-05-20 RX ORDER — NITROGLYCERIN 0.4 MG/1
0.4 TABLET SUBLINGUAL EVERY 5 MIN PRN
Status: DISCONTINUED | OUTPATIENT
Start: 2023-05-20 | End: 2023-05-28 | Stop reason: HOSPADM

## 2023-05-20 RX ORDER — FUROSEMIDE 40 MG/1
40 TABLET ORAL DAILY
Status: DISCONTINUED | OUTPATIENT
Start: 2023-05-20 | End: 2023-05-20

## 2023-05-20 RX ORDER — ASPIRIN 81 MG/1
81 TABLET, CHEWABLE ORAL DAILY
Status: DISCONTINUED | OUTPATIENT
Start: 2023-05-20 | End: 2023-05-20 | Stop reason: SDUPTHER

## 2023-05-20 RX ORDER — FERROUS SULFATE TAB EC 324 MG (65 MG FE EQUIVALENT) 324 (65 FE) MG
324 TABLET DELAYED RESPONSE ORAL
Status: DISCONTINUED | OUTPATIENT
Start: 2023-05-21 | End: 2023-05-28 | Stop reason: HOSPADM

## 2023-05-20 RX ORDER — ROSUVASTATIN CALCIUM 20 MG/1
20 TABLET, COATED ORAL DAILY
Status: DISCONTINUED | OUTPATIENT
Start: 2023-05-20 | End: 2023-05-28 | Stop reason: HOSPADM

## 2023-05-20 RX ORDER — BENZONATATE 100 MG/1
100 CAPSULE ORAL 3 TIMES DAILY PRN
Status: DISCONTINUED | OUTPATIENT
Start: 2023-05-20 | End: 2023-05-28 | Stop reason: HOSPADM

## 2023-05-20 RX ORDER — DIPHENHYDRAMINE HCL 25 MG
25 TABLET ORAL NIGHTLY
Status: DISCONTINUED | OUTPATIENT
Start: 2023-05-20 | End: 2023-05-28 | Stop reason: HOSPADM

## 2023-05-20 RX ORDER — MAGNESIUM GLUCONATE 30 MG(550)
595 TABLET ORAL DAILY
Status: DISCONTINUED | OUTPATIENT
Start: 2023-05-20 | End: 2023-05-20 | Stop reason: RX

## 2023-05-20 RX ORDER — WARFARIN SODIUM 2.5 MG/1
2.5 TABLET ORAL DAILY
Status: DISCONTINUED | OUTPATIENT
Start: 2023-05-20 | End: 2023-05-20

## 2023-05-20 RX ADMIN — ASPIRIN 81 MG: 81 TABLET, COATED ORAL at 12:09

## 2023-05-20 RX ADMIN — TAMSULOSIN HYDROCHLORIDE 0.4 MG: 0.4 CAPSULE ORAL at 12:09

## 2023-05-20 RX ADMIN — ALLOPURINOL 100 MG: 100 TABLET ORAL at 12:10

## 2023-05-20 RX ADMIN — CEFTRIAXONE 1000 MG: 1 INJECTION, POWDER, FOR SOLUTION INTRAMUSCULAR; INTRAVENOUS at 08:04

## 2023-05-20 RX ADMIN — ROSUVASTATIN CALCIUM 20 MG: 20 TABLET, FILM COATED ORAL at 12:10

## 2023-05-20 RX ADMIN — DIPHENHYDRAMINE HCL 25 MG: 25 TABLET ORAL at 20:03

## 2023-05-20 RX ADMIN — THERA TABS 1 TABLET: TAB at 12:10

## 2023-05-20 RX ADMIN — FUROSEMIDE 60 MG: 40 TABLET ORAL at 12:09

## 2023-05-20 RX ADMIN — SODIUM CHLORIDE: 9 INJECTION, SOLUTION INTRAVENOUS at 12:08

## 2023-05-20 RX ADMIN — BENZONATATE 200 MG: 100 CAPSULE ORAL at 06:40

## 2023-05-20 RX ADMIN — SODIUM CHLORIDE, PRESERVATIVE FREE 10 ML: 5 INJECTION INTRAVENOUS at 12:10

## 2023-05-20 RX ADMIN — ACETAMINOPHEN 500 MG: 500 TABLET ORAL at 20:03

## 2023-05-20 RX ADMIN — ATENOLOL 25 MG: 25 TABLET ORAL at 12:10

## 2023-05-20 RX ADMIN — SODIUM CHLORIDE, PRESERVATIVE FREE 10 ML: 5 INJECTION INTRAVENOUS at 20:03

## 2023-05-20 RX ADMIN — WARFARIN SODIUM 2.5 MG: 2.5 TABLET ORAL at 17:46

## 2023-05-20 RX ADMIN — Medication 1000 UNITS: at 12:09

## 2023-05-20 RX ADMIN — LISINOPRIL 10 MG: 10 TABLET ORAL at 12:09

## 2023-05-20 RX ADMIN — VANCOMYCIN HYDROCHLORIDE 1500 MG: 1.5 INJECTION, POWDER, LYOPHILIZED, FOR SOLUTION INTRAVENOUS at 12:08

## 2023-05-20 RX ADMIN — AZITHROMYCIN MONOHYDRATE 500 MG: 500 INJECTION, POWDER, LYOPHILIZED, FOR SOLUTION INTRAVENOUS at 08:57

## 2023-05-20 ASSESSMENT — LIFESTYLE VARIABLES
HOW OFTEN DO YOU HAVE A DRINK CONTAINING ALCOHOL: NEVER
HOW MANY STANDARD DRINKS CONTAINING ALCOHOL DO YOU HAVE ON A TYPICAL DAY: PATIENT DOES NOT DRINK

## 2023-05-20 ASSESSMENT — PAIN SCALES - GENERAL: PAINLEVEL_OUTOF10: 0

## 2023-05-21 LAB
ANION GAP SERPL CALCULATED.3IONS-SCNC: 8 MMOL/L (ref 3–16)
BUN SERPL-MCNC: 40 MG/DL (ref 7–20)
CALCIUM SERPL-MCNC: 9 MG/DL (ref 8.3–10.6)
CHLORIDE SERPL-SCNC: 102 MMOL/L (ref 99–110)
CO2 SERPL-SCNC: 23 MMOL/L (ref 21–32)
CREAT SERPL-MCNC: 1.8 MG/DL (ref 0.8–1.3)
DEPRECATED RDW RBC AUTO: 14.5 % (ref 12.4–15.4)
GFR SERPLBLD CREATININE-BSD FMLA CKD-EPI: 35 ML/MIN/{1.73_M2}
GLUCOSE SERPL-MCNC: 78 MG/DL (ref 70–99)
HCT VFR BLD AUTO: 29.9 % (ref 40.5–52.5)
HGB BLD-MCNC: 10.1 G/DL (ref 13.5–17.5)
INR PPP: 2.34 (ref 0.84–1.16)
LEGIONELLA AG UR QL: NORMAL
MCH RBC QN AUTO: 31 PG (ref 26–34)
MCHC RBC AUTO-ENTMCNC: 33.8 G/DL (ref 31–36)
MCV RBC AUTO: 91.6 FL (ref 80–100)
MRSA DNA SPEC QL NAA+PROBE: NORMAL
PLATELET # BLD AUTO: 155 K/UL (ref 135–450)
PMV BLD AUTO: 8.6 FL (ref 5–10.5)
POTASSIUM SERPL-SCNC: 4.7 MMOL/L (ref 3.5–5.1)
PROTHROMBIN TIME: 25.5 SEC (ref 11.5–14.8)
RBC # BLD AUTO: 3.26 M/UL (ref 4.2–5.9)
REASON FOR REJECTION: NORMAL
REJECTED TEST: NORMAL
S PNEUM AG UR QL: NORMAL
SODIUM SERPL-SCNC: 133 MMOL/L (ref 136–145)
VANCOMYCIN SERPL-MCNC: 4.8 UG/ML
WBC # BLD AUTO: 6.1 K/UL (ref 4–11)

## 2023-05-21 PROCEDURE — 36415 COLL VENOUS BLD VENIPUNCTURE: CPT

## 2023-05-21 PROCEDURE — 2580000003 HC RX 258: Performed by: INTERNAL MEDICINE

## 2023-05-21 PROCEDURE — 1200000000 HC SEMI PRIVATE

## 2023-05-21 PROCEDURE — 80202 ASSAY OF VANCOMYCIN: CPT

## 2023-05-21 PROCEDURE — 80048 BASIC METABOLIC PNL TOTAL CA: CPT

## 2023-05-21 PROCEDURE — 85610 PROTHROMBIN TIME: CPT

## 2023-05-21 PROCEDURE — 94760 N-INVAS EAR/PLS OXIMETRY 1: CPT

## 2023-05-21 PROCEDURE — 6370000000 HC RX 637 (ALT 250 FOR IP): Performed by: INTERNAL MEDICINE

## 2023-05-21 PROCEDURE — 85027 COMPLETE CBC AUTOMATED: CPT

## 2023-05-21 PROCEDURE — 6360000002 HC RX W HCPCS: Performed by: INTERNAL MEDICINE

## 2023-05-21 RX ORDER — LANOLIN ALCOHOL/MO/W.PET/CERES
3 CREAM (GRAM) TOPICAL NIGHTLY PRN
Status: DISCONTINUED | OUTPATIENT
Start: 2023-05-21 | End: 2023-05-28 | Stop reason: HOSPADM

## 2023-05-21 RX ORDER — WARFARIN SODIUM 2.5 MG/1
2.5 TABLET ORAL
Status: COMPLETED | OUTPATIENT
Start: 2023-05-21 | End: 2023-05-21

## 2023-05-21 RX ADMIN — BENZONATATE 100 MG: 100 CAPSULE ORAL at 09:02

## 2023-05-21 RX ADMIN — THERA TABS 1 TABLET: TAB at 08:28

## 2023-05-21 RX ADMIN — FUROSEMIDE 60 MG: 40 TABLET ORAL at 17:12

## 2023-05-21 RX ADMIN — FERROUS SULFATE TAB EC 324 MG (65 MG FE EQUIVALENT) 324 MG: 324 (65 FE) TABLET DELAYED RESPONSE at 08:28

## 2023-05-21 RX ADMIN — Medication 1000 UNITS: at 08:28

## 2023-05-21 RX ADMIN — FUROSEMIDE 60 MG: 40 TABLET ORAL at 08:28

## 2023-05-21 RX ADMIN — TAMSULOSIN HYDROCHLORIDE 0.4 MG: 0.4 CAPSULE ORAL at 08:28

## 2023-05-21 RX ADMIN — ASPIRIN 81 MG: 81 TABLET, COATED ORAL at 08:28

## 2023-05-21 RX ADMIN — Medication 400 MG: at 08:28

## 2023-05-21 RX ADMIN — LISINOPRIL 10 MG: 10 TABLET ORAL at 08:28

## 2023-05-21 RX ADMIN — Medication 1 LOZENGE: at 23:32

## 2023-05-21 RX ADMIN — CEFTRIAXONE 1000 MG: 1 INJECTION, POWDER, FOR SOLUTION INTRAMUSCULAR; INTRAVENOUS at 08:32

## 2023-05-21 RX ADMIN — SODIUM CHLORIDE: 9 INJECTION, SOLUTION INTRAVENOUS at 20:50

## 2023-05-21 RX ADMIN — BENZONATATE 100 MG: 100 CAPSULE ORAL at 01:03

## 2023-05-21 RX ADMIN — ALLOPURINOL 100 MG: 100 TABLET ORAL at 08:28

## 2023-05-21 RX ADMIN — ROSUVASTATIN CALCIUM 20 MG: 20 TABLET, FILM COATED ORAL at 08:28

## 2023-05-21 RX ADMIN — BENZONATATE 100 MG: 100 CAPSULE ORAL at 17:12

## 2023-05-21 RX ADMIN — Medication 3 MG: at 20:49

## 2023-05-21 RX ADMIN — AZITHROMYCIN MONOHYDRATE 500 MG: 500 INJECTION, POWDER, LYOPHILIZED, FOR SOLUTION INTRAVENOUS at 10:32

## 2023-05-21 RX ADMIN — SODIUM CHLORIDE: 9 INJECTION, SOLUTION INTRAVENOUS at 01:04

## 2023-05-21 RX ADMIN — ACETAMINOPHEN 500 MG: 500 TABLET ORAL at 20:49

## 2023-05-21 RX ADMIN — WARFARIN SODIUM 2.5 MG: 2.5 TABLET ORAL at 17:12

## 2023-05-21 RX ADMIN — ATENOLOL 25 MG: 25 TABLET ORAL at 08:28

## 2023-05-21 RX ADMIN — DIPHENHYDRAMINE HCL 25 MG: 25 TABLET ORAL at 20:49

## 2023-05-21 ASSESSMENT — PAIN SCALES - GENERAL
PAINLEVEL_OUTOF10: 0
PAINLEVEL_OUTOF10: 0

## 2023-05-22 ENCOUNTER — APPOINTMENT (OUTPATIENT)
Dept: GENERAL RADIOLOGY | Age: 88
DRG: 193 | End: 2023-05-22
Payer: MEDICARE

## 2023-05-22 LAB
EKG DIAGNOSIS: NORMAL
EKG Q-T INTERVAL: 414 MS
EKG QRS DURATION: 138 MS
EKG QTC CALCULATION (BAZETT): 477 MS
EKG R AXIS: 84 DEGREES
EKG T AXIS: 3 DEGREES
EKG VENTRICULAR RATE: 80 BPM
INR PPP: 2.85 (ref 0.84–1.16)
PROTHROMBIN TIME: 29.7 SEC (ref 11.5–14.8)

## 2023-05-22 PROCEDURE — 97166 OT EVAL MOD COMPLEX 45 MIN: CPT

## 2023-05-22 PROCEDURE — 36415 COLL VENOUS BLD VENIPUNCTURE: CPT

## 2023-05-22 PROCEDURE — 97535 SELF CARE MNGMENT TRAINING: CPT

## 2023-05-22 PROCEDURE — 93010 ELECTROCARDIOGRAM REPORT: CPT | Performed by: INTERNAL MEDICINE

## 2023-05-22 PROCEDURE — 6360000002 HC RX W HCPCS: Performed by: INTERNAL MEDICINE

## 2023-05-22 PROCEDURE — 97530 THERAPEUTIC ACTIVITIES: CPT

## 2023-05-22 PROCEDURE — 2500000003 HC RX 250 WO HCPCS: Performed by: INTERNAL MEDICINE

## 2023-05-22 PROCEDURE — 97110 THERAPEUTIC EXERCISES: CPT

## 2023-05-22 PROCEDURE — 85610 PROTHROMBIN TIME: CPT

## 2023-05-22 PROCEDURE — 71045 X-RAY EXAM CHEST 1 VIEW: CPT

## 2023-05-22 PROCEDURE — 1200000000 HC SEMI PRIVATE

## 2023-05-22 PROCEDURE — 6370000000 HC RX 637 (ALT 250 FOR IP): Performed by: INTERNAL MEDICINE

## 2023-05-22 PROCEDURE — 2580000003 HC RX 258: Performed by: INTERNAL MEDICINE

## 2023-05-22 RX ORDER — ALBUTEROL SULFATE 2.5 MG/3ML
2.5 SOLUTION RESPIRATORY (INHALATION) EVERY 4 HOURS PRN
Status: DISCONTINUED | OUTPATIENT
Start: 2023-05-22 | End: 2023-05-28 | Stop reason: HOSPADM

## 2023-05-22 RX ORDER — GUAIFENESIN 200 MG/10ML
200 LIQUID ORAL EVERY 4 HOURS PRN
Status: DISCONTINUED | OUTPATIENT
Start: 2023-05-22 | End: 2023-05-28 | Stop reason: HOSPADM

## 2023-05-22 RX ORDER — WARFARIN SODIUM 2.5 MG/1
1.25 TABLET ORAL
Status: COMPLETED | OUTPATIENT
Start: 2023-05-22 | End: 2023-05-22

## 2023-05-22 RX ADMIN — FUROSEMIDE 60 MG: 40 TABLET ORAL at 08:00

## 2023-05-22 RX ADMIN — Medication 400 MG: at 08:01

## 2023-05-22 RX ADMIN — BENZONATATE 100 MG: 100 CAPSULE ORAL at 05:27

## 2023-05-22 RX ADMIN — THERA TABS 1 TABLET: TAB at 08:00

## 2023-05-22 RX ADMIN — LISINOPRIL 10 MG: 10 TABLET ORAL at 08:01

## 2023-05-22 RX ADMIN — Medication 1000 UNITS: at 08:00

## 2023-05-22 RX ADMIN — FUROSEMIDE 60 MG: 40 TABLET ORAL at 17:54

## 2023-05-22 RX ADMIN — ASPIRIN 81 MG: 81 TABLET, COATED ORAL at 08:00

## 2023-05-22 RX ADMIN — ACETAMINOPHEN 500 MG: 500 TABLET ORAL at 21:17

## 2023-05-22 RX ADMIN — ROSUVASTATIN CALCIUM 20 MG: 20 TABLET, FILM COATED ORAL at 08:00

## 2023-05-22 RX ADMIN — DIPHENHYDRAMINE HCL 25 MG: 25 TABLET ORAL at 21:17

## 2023-05-22 RX ADMIN — CEFTRIAXONE 1000 MG: 1 INJECTION, POWDER, FOR SOLUTION INTRAMUSCULAR; INTRAVENOUS at 07:59

## 2023-05-22 RX ADMIN — ALLOPURINOL 100 MG: 100 TABLET ORAL at 08:00

## 2023-05-22 RX ADMIN — Medication 1 LOZENGE: at 08:10

## 2023-05-22 RX ADMIN — AZITHROMYCIN MONOHYDRATE 500 MG: 500 INJECTION, POWDER, LYOPHILIZED, FOR SOLUTION INTRAVENOUS at 09:01

## 2023-05-22 RX ADMIN — WARFARIN SODIUM 1.25 MG: 2.5 TABLET ORAL at 17:54

## 2023-05-22 RX ADMIN — ATENOLOL 25 MG: 25 TABLET ORAL at 08:00

## 2023-05-22 RX ADMIN — SODIUM CHLORIDE, PRESERVATIVE FREE 10 ML: 5 INJECTION INTRAVENOUS at 21:17

## 2023-05-22 RX ADMIN — TAMSULOSIN HYDROCHLORIDE 0.4 MG: 0.4 CAPSULE ORAL at 08:00

## 2023-05-22 RX ADMIN — GUAIFENESIN 200 MG: 200 SOLUTION ORAL at 16:30

## 2023-05-22 RX ADMIN — FERROUS SULFATE TAB EC 324 MG (65 MG FE EQUIVALENT) 324 MG: 324 (65 FE) TABLET DELAYED RESPONSE at 08:00

## 2023-05-22 ASSESSMENT — PAIN SCALES - GENERAL
PAINLEVEL_OUTOF10: 0
PAINLEVEL_OUTOF10: 0

## 2023-05-23 LAB
INR PPP: 3.08 (ref 0.84–1.16)
PROTHROMBIN TIME: 31.6 SEC (ref 11.5–14.8)

## 2023-05-23 PROCEDURE — 6370000000 HC RX 637 (ALT 250 FOR IP): Performed by: INTERNAL MEDICINE

## 2023-05-23 PROCEDURE — 97110 THERAPEUTIC EXERCISES: CPT

## 2023-05-23 PROCEDURE — 36415 COLL VENOUS BLD VENIPUNCTURE: CPT

## 2023-05-23 PROCEDURE — 2580000003 HC RX 258: Performed by: INTERNAL MEDICINE

## 2023-05-23 PROCEDURE — 97530 THERAPEUTIC ACTIVITIES: CPT

## 2023-05-23 PROCEDURE — 1200000000 HC SEMI PRIVATE

## 2023-05-23 PROCEDURE — 6360000002 HC RX W HCPCS: Performed by: INTERNAL MEDICINE

## 2023-05-23 PROCEDURE — 85610 PROTHROMBIN TIME: CPT

## 2023-05-23 PROCEDURE — 94760 N-INVAS EAR/PLS OXIMETRY 1: CPT

## 2023-05-23 PROCEDURE — 97535 SELF CARE MNGMENT TRAINING: CPT

## 2023-05-23 RX ORDER — FUROSEMIDE 10 MG/ML
40 INJECTION INTRAMUSCULAR; INTRAVENOUS 2 TIMES DAILY
Status: COMPLETED | OUTPATIENT
Start: 2023-05-23 | End: 2023-05-24

## 2023-05-23 RX ORDER — FUROSEMIDE 10 MG/ML
60 INJECTION INTRAMUSCULAR; INTRAVENOUS 2 TIMES DAILY
Status: DISCONTINUED | OUTPATIENT
Start: 2023-05-23 | End: 2023-05-23

## 2023-05-23 RX ADMIN — Medication 1000 UNITS: at 08:47

## 2023-05-23 RX ADMIN — Medication 3 MG: at 21:47

## 2023-05-23 RX ADMIN — FUROSEMIDE 40 MG: 10 INJECTION, SOLUTION INTRAMUSCULAR; INTRAVENOUS at 17:53

## 2023-05-23 RX ADMIN — LISINOPRIL 10 MG: 10 TABLET ORAL at 08:47

## 2023-05-23 RX ADMIN — Medication 400 MG: at 08:47

## 2023-05-23 RX ADMIN — THERA TABS 1 TABLET: TAB at 08:47

## 2023-05-23 RX ADMIN — FUROSEMIDE 60 MG: 40 TABLET ORAL at 08:47

## 2023-05-23 RX ADMIN — SODIUM CHLORIDE, PRESERVATIVE FREE 10 ML: 5 INJECTION INTRAVENOUS at 21:54

## 2023-05-23 RX ADMIN — ATENOLOL 25 MG: 25 TABLET ORAL at 08:47

## 2023-05-23 RX ADMIN — ROSUVASTATIN CALCIUM 20 MG: 20 TABLET, FILM COATED ORAL at 08:47

## 2023-05-23 RX ADMIN — TAMSULOSIN HYDROCHLORIDE 0.4 MG: 0.4 CAPSULE ORAL at 08:47

## 2023-05-23 RX ADMIN — ASPIRIN 81 MG: 81 TABLET, COATED ORAL at 08:47

## 2023-05-23 RX ADMIN — BENZONATATE 100 MG: 100 CAPSULE ORAL at 19:49

## 2023-05-23 RX ADMIN — ALLOPURINOL 100 MG: 100 TABLET ORAL at 08:47

## 2023-05-23 RX ADMIN — FERROUS SULFATE TAB EC 324 MG (65 MG FE EQUIVALENT) 324 MG: 324 (65 FE) TABLET DELAYED RESPONSE at 08:47

## 2023-05-23 RX ADMIN — ACETAMINOPHEN 500 MG: 500 TABLET ORAL at 21:47

## 2023-05-23 RX ADMIN — CEFTRIAXONE 1000 MG: 1 INJECTION, POWDER, FOR SOLUTION INTRAMUSCULAR; INTRAVENOUS at 08:43

## 2023-05-23 RX ADMIN — FUROSEMIDE 40 MG: 10 INJECTION, SOLUTION INTRAMUSCULAR; INTRAVENOUS at 11:41

## 2023-05-23 RX ADMIN — DIPHENHYDRAMINE HCL 25 MG: 25 TABLET ORAL at 21:47

## 2023-05-23 RX ADMIN — SODIUM CHLORIDE, PRESERVATIVE FREE 10 ML: 5 INJECTION INTRAVENOUS at 08:42

## 2023-05-24 ENCOUNTER — APPOINTMENT (OUTPATIENT)
Dept: CT IMAGING | Age: 88
DRG: 193 | End: 2023-05-24
Payer: MEDICARE

## 2023-05-24 LAB
BACTERIA BLD CULT ORG #2: NORMAL
BACTERIA BLD CULT ORG #2: NORMAL
BACTERIA BLD CULT: NORMAL
BACTERIA BLD CULT: NORMAL
INR PPP: 2.91 (ref 0.84–1.16)
PROTHROMBIN TIME: 30.2 SEC (ref 11.5–14.8)

## 2023-05-24 PROCEDURE — 2500000003 HC RX 250 WO HCPCS: Performed by: INTERNAL MEDICINE

## 2023-05-24 PROCEDURE — 85610 PROTHROMBIN TIME: CPT

## 2023-05-24 PROCEDURE — 36415 COLL VENOUS BLD VENIPUNCTURE: CPT

## 2023-05-24 PROCEDURE — 6360000002 HC RX W HCPCS: Performed by: INTERNAL MEDICINE

## 2023-05-24 PROCEDURE — 2580000003 HC RX 258: Performed by: INTERNAL MEDICINE

## 2023-05-24 PROCEDURE — 6370000000 HC RX 637 (ALT 250 FOR IP): Performed by: INTERNAL MEDICINE

## 2023-05-24 PROCEDURE — 99223 1ST HOSP IP/OBS HIGH 75: CPT | Performed by: INTERNAL MEDICINE

## 2023-05-24 PROCEDURE — 97530 THERAPEUTIC ACTIVITIES: CPT

## 2023-05-24 PROCEDURE — 97535 SELF CARE MNGMENT TRAINING: CPT

## 2023-05-24 PROCEDURE — 94760 N-INVAS EAR/PLS OXIMETRY 1: CPT

## 2023-05-24 PROCEDURE — 1200000000 HC SEMI PRIVATE

## 2023-05-24 PROCEDURE — 71250 CT THORAX DX C-: CPT

## 2023-05-24 RX ADMIN — FUROSEMIDE 40 MG: 10 INJECTION, SOLUTION INTRAMUSCULAR; INTRAVENOUS at 18:14

## 2023-05-24 RX ADMIN — ROSUVASTATIN CALCIUM 20 MG: 20 TABLET, FILM COATED ORAL at 07:46

## 2023-05-24 RX ADMIN — Medication 1000 UNITS: at 07:46

## 2023-05-24 RX ADMIN — ASPIRIN 81 MG: 81 TABLET, COATED ORAL at 07:46

## 2023-05-24 RX ADMIN — GUAIFENESIN 200 MG: 200 SOLUTION ORAL at 03:03

## 2023-05-24 RX ADMIN — DIPHENHYDRAMINE HCL 25 MG: 25 TABLET ORAL at 21:23

## 2023-05-24 RX ADMIN — CEFTRIAXONE 1000 MG: 1 INJECTION, POWDER, FOR SOLUTION INTRAMUSCULAR; INTRAVENOUS at 07:59

## 2023-05-24 RX ADMIN — FUROSEMIDE 40 MG: 10 INJECTION, SOLUTION INTRAMUSCULAR; INTRAVENOUS at 07:46

## 2023-05-24 RX ADMIN — FERROUS SULFATE TAB EC 324 MG (65 MG FE EQUIVALENT) 324 MG: 324 (65 FE) TABLET DELAYED RESPONSE at 07:46

## 2023-05-24 RX ADMIN — THERA TABS 1 TABLET: TAB at 07:46

## 2023-05-24 RX ADMIN — SODIUM CHLORIDE, PRESERVATIVE FREE 10 ML: 5 INJECTION INTRAVENOUS at 21:23

## 2023-05-24 RX ADMIN — TAMSULOSIN HYDROCHLORIDE 0.4 MG: 0.4 CAPSULE ORAL at 07:46

## 2023-05-24 RX ADMIN — ALLOPURINOL 100 MG: 100 TABLET ORAL at 07:46

## 2023-05-24 RX ADMIN — SODIUM CHLORIDE, PRESERVATIVE FREE 10 ML: 5 INJECTION INTRAVENOUS at 07:46

## 2023-05-24 RX ADMIN — ACETAMINOPHEN 500 MG: 500 TABLET ORAL at 21:23

## 2023-05-24 RX ADMIN — Medication 400 MG: at 07:46

## 2023-05-24 RX ADMIN — SODIUM CHLORIDE: 9 INJECTION, SOLUTION INTRAVENOUS at 07:58

## 2023-05-24 RX ADMIN — ATENOLOL 25 MG: 25 TABLET ORAL at 07:46

## 2023-05-24 RX ADMIN — LISINOPRIL 10 MG: 10 TABLET ORAL at 07:46

## 2023-05-25 PROBLEM — R79.89 ELEVATED TROPONIN: Status: ACTIVE | Noted: 2023-05-25

## 2023-05-25 PROBLEM — N18.4 STAGE 4 CHRONIC KIDNEY DISEASE (HCC): Status: ACTIVE | Noted: 2023-05-25

## 2023-05-25 PROBLEM — R77.8 ELEVATED TROPONIN: Status: ACTIVE | Noted: 2023-05-25

## 2023-05-25 LAB
ANION GAP SERPL CALCULATED.3IONS-SCNC: 11 MMOL/L (ref 3–16)
APTT BLD: 43.8 SEC (ref 22.7–35.9)
APTT BLD: 73.8 SEC (ref 22.7–35.9)
BASOPHILS # BLD: 0.1 K/UL (ref 0–0.2)
BASOPHILS NFR BLD: 1.2 %
BUN SERPL-MCNC: 39 MG/DL (ref 7–20)
CALCIUM SERPL-MCNC: 8.9 MG/DL (ref 8.3–10.6)
CHLORIDE SERPL-SCNC: 104 MMOL/L (ref 99–110)
CHLORIDE UR-SCNC: <20 MMOL/L
CO2 SERPL-SCNC: 24 MMOL/L (ref 21–32)
CREAT SERPL-MCNC: 2 MG/DL (ref 0.8–1.3)
DEPRECATED RDW RBC AUTO: 14.3 % (ref 12.4–15.4)
EOSINOPHIL # BLD: 0.2 K/UL (ref 0–0.6)
EOSINOPHIL NFR BLD: 4.1 %
GFR SERPLBLD CREATININE-BSD FMLA CKD-EPI: 31 ML/MIN/{1.73_M2}
GLUCOSE SERPL-MCNC: 85 MG/DL (ref 70–99)
HCT VFR BLD AUTO: 28.7 % (ref 40.5–52.5)
HGB BLD-MCNC: 9.9 G/DL (ref 13.5–17.5)
INR PPP: 2.5 (ref 0.84–1.16)
LYMPHOCYTES # BLD: 1 K/UL (ref 1–5.1)
LYMPHOCYTES NFR BLD: 20.1 %
MAGNESIUM SERPL-MCNC: 1.7 MG/DL (ref 1.8–2.4)
MCH RBC QN AUTO: 31.3 PG (ref 26–34)
MCHC RBC AUTO-ENTMCNC: 34.3 G/DL (ref 31–36)
MCV RBC AUTO: 91.2 FL (ref 80–100)
MONOCYTES # BLD: 0.5 K/UL (ref 0–1.3)
MONOCYTES NFR BLD: 9.1 %
NEUTROPHILS # BLD: 3.3 K/UL (ref 1.7–7.7)
NEUTROPHILS NFR BLD: 65.5 %
PHOSPHATE SERPL-MCNC: 3.3 MG/DL (ref 2.5–4.9)
PLATELET # BLD AUTO: 181 K/UL (ref 135–450)
PMV BLD AUTO: 8.8 FL (ref 5–10.5)
POTASSIUM SERPL-SCNC: 4.4 MMOL/L (ref 3.5–5.1)
POTASSIUM UR-SCNC: 41.2 MMOL/L
PROCALCITONIN SERPL IA-MCNC: 0.12 NG/ML (ref 0–0.15)
PROTHROMBIN TIME: 26.9 SEC (ref 11.5–14.8)
RBC # BLD AUTO: 3.15 M/UL (ref 4.2–5.9)
SODIUM SERPL-SCNC: 139 MMOL/L (ref 136–145)
SODIUM UR-SCNC: 30 MMOL/L
UUN UR-MCNC: 528.7 MG/DL (ref 800–1666)
WBC # BLD AUTO: 5.1 K/UL (ref 4–11)

## 2023-05-25 PROCEDURE — 85610 PROTHROMBIN TIME: CPT

## 2023-05-25 PROCEDURE — 6360000002 HC RX W HCPCS: Performed by: INTERNAL MEDICINE

## 2023-05-25 PROCEDURE — 85730 THROMBOPLASTIN TIME PARTIAL: CPT

## 2023-05-25 PROCEDURE — 99233 SBSQ HOSP IP/OBS HIGH 50: CPT | Performed by: INTERNAL MEDICINE

## 2023-05-25 PROCEDURE — 6370000000 HC RX 637 (ALT 250 FOR IP): Performed by: INTERNAL MEDICINE

## 2023-05-25 PROCEDURE — 85025 COMPLETE CBC W/AUTO DIFF WBC: CPT

## 2023-05-25 PROCEDURE — 84133 ASSAY OF URINE POTASSIUM: CPT

## 2023-05-25 PROCEDURE — 2580000003 HC RX 258: Performed by: INTERNAL MEDICINE

## 2023-05-25 PROCEDURE — 83735 ASSAY OF MAGNESIUM: CPT

## 2023-05-25 PROCEDURE — 80048 BASIC METABOLIC PNL TOTAL CA: CPT

## 2023-05-25 PROCEDURE — 9990000010 HC NO CHARGE VISIT

## 2023-05-25 PROCEDURE — 1200000000 HC SEMI PRIVATE

## 2023-05-25 PROCEDURE — 82436 ASSAY OF URINE CHLORIDE: CPT

## 2023-05-25 PROCEDURE — 84540 ASSAY OF URINE/UREA-N: CPT

## 2023-05-25 PROCEDURE — 84100 ASSAY OF PHOSPHORUS: CPT

## 2023-05-25 PROCEDURE — 99223 1ST HOSP IP/OBS HIGH 75: CPT | Performed by: INTERNAL MEDICINE

## 2023-05-25 PROCEDURE — 84145 PROCALCITONIN (PCT): CPT

## 2023-05-25 PROCEDURE — 36415 COLL VENOUS BLD VENIPUNCTURE: CPT

## 2023-05-25 PROCEDURE — 84300 ASSAY OF URINE SODIUM: CPT

## 2023-05-25 RX ORDER — HEPARIN SODIUM 1000 [USP'U]/ML
30 INJECTION, SOLUTION INTRAVENOUS; SUBCUTANEOUS PRN
Status: DISCONTINUED | OUTPATIENT
Start: 2023-05-25 | End: 2023-05-25

## 2023-05-25 RX ORDER — MAGNESIUM SULFATE IN WATER 40 MG/ML
2000 INJECTION, SOLUTION INTRAVENOUS ONCE
Status: COMPLETED | OUTPATIENT
Start: 2023-05-25 | End: 2023-05-25

## 2023-05-25 RX ORDER — HEPARIN SODIUM 1000 [USP'U]/ML
60 INJECTION, SOLUTION INTRAVENOUS; SUBCUTANEOUS PRN
Status: DISCONTINUED | OUTPATIENT
Start: 2023-05-25 | End: 2023-05-25

## 2023-05-25 RX ORDER — HEPARIN SODIUM 10000 [USP'U]/100ML
0-3000 INJECTION, SOLUTION INTRAVENOUS CONTINUOUS
Status: DISCONTINUED | OUTPATIENT
Start: 2023-05-25 | End: 2023-05-26

## 2023-05-25 RX ADMIN — ALLOPURINOL 100 MG: 100 TABLET ORAL at 08:41

## 2023-05-25 RX ADMIN — FERROUS SULFATE TAB EC 324 MG (65 MG FE EQUIVALENT) 324 MG: 324 (65 FE) TABLET DELAYED RESPONSE at 08:40

## 2023-05-25 RX ADMIN — THERA TABS 1 TABLET: TAB at 08:40

## 2023-05-25 RX ADMIN — ATENOLOL 25 MG: 25 TABLET ORAL at 08:40

## 2023-05-25 RX ADMIN — SODIUM CHLORIDE, PRESERVATIVE FREE 10 ML: 5 INJECTION INTRAVENOUS at 08:35

## 2023-05-25 RX ADMIN — ROSUVASTATIN CALCIUM 20 MG: 20 TABLET, FILM COATED ORAL at 08:40

## 2023-05-25 RX ADMIN — ASPIRIN 81 MG: 81 TABLET, COATED ORAL at 08:40

## 2023-05-25 RX ADMIN — CEFTRIAXONE 1000 MG: 1 INJECTION, POWDER, FOR SOLUTION INTRAMUSCULAR; INTRAVENOUS at 08:39

## 2023-05-25 RX ADMIN — TAMSULOSIN HYDROCHLORIDE 0.4 MG: 0.4 CAPSULE ORAL at 08:40

## 2023-05-25 RX ADMIN — LISINOPRIL 10 MG: 10 TABLET ORAL at 08:40

## 2023-05-25 RX ADMIN — PHYTONADIONE 1 MG: 1 INJECTION, EMULSION INTRAMUSCULAR; INTRAVENOUS; SUBCUTANEOUS at 12:56

## 2023-05-25 RX ADMIN — HEPARIN SODIUM 1000 UNITS/HR: 10000 INJECTION, SOLUTION INTRAVENOUS at 15:32

## 2023-05-25 RX ADMIN — Medication 400 MG: at 08:40

## 2023-05-25 RX ADMIN — SODIUM CHLORIDE: 9 INJECTION, SOLUTION INTRAVENOUS at 08:37

## 2023-05-25 RX ADMIN — ACETAMINOPHEN 500 MG: 500 TABLET ORAL at 21:42

## 2023-05-25 RX ADMIN — BENZONATATE 100 MG: 100 CAPSULE ORAL at 22:42

## 2023-05-25 RX ADMIN — MAGNESIUM SULFATE HEPTAHYDRATE 2000 MG: 40 INJECTION, SOLUTION INTRAVENOUS at 14:15

## 2023-05-25 RX ADMIN — Medication 1000 UNITS: at 08:40

## 2023-05-25 RX ADMIN — DIPHENHYDRAMINE HCL 25 MG: 25 TABLET ORAL at 21:42

## 2023-05-25 ASSESSMENT — ENCOUNTER SYMPTOMS
SINUS PAIN: 0
SORE THROAT: 0
EYE REDNESS: 0
SINUS PRESSURE: 0
NAUSEA: 0
WHEEZING: 0
COUGH: 0
DIARRHEA: 0
EYE PAIN: 0
SHORTNESS OF BREATH: 1
BACK PAIN: 0
ABDOMINAL PAIN: 0
VOMITING: 0
CONSTIPATION: 0
CHEST TIGHTNESS: 0
EYE ITCHING: 0

## 2023-05-25 ASSESSMENT — PAIN SCALES - GENERAL: PAINLEVEL_OUTOF10: 0

## 2023-05-26 ENCOUNTER — APPOINTMENT (OUTPATIENT)
Dept: GENERAL RADIOLOGY | Age: 88
DRG: 193 | End: 2023-05-26
Payer: MEDICARE

## 2023-05-26 LAB
ALBUMIN FLD-MCNC: 1.7 G/DL
ANION GAP SERPL CALCULATED.3IONS-SCNC: 8 MMOL/L (ref 3–16)
ANTI-XA UNFRAC HEPARIN: 0.15 IU/ML (ref 0.3–0.7)
APPEARANCE FLUID: CLEAR
BASOPHILS # BLD: 0.1 K/UL (ref 0–0.2)
BASOPHILS NFR BLD: 1.4 %
BDY FLUID QUALITY: NORMAL
BUN SERPL-MCNC: 46 MG/DL (ref 7–20)
CALCIUM SERPL-MCNC: 8.9 MG/DL (ref 8.3–10.6)
CELL COUNT FLUID TYPE: NORMAL
CHLORIDE SERPL-SCNC: 102 MMOL/L (ref 99–110)
CHOLEST FLD-MCNC: 27 MG/DL
CO2 SERPL-SCNC: 25 MMOL/L (ref 21–32)
COLOR FLUID: YELLOW
CREAT SERPL-MCNC: 1.8 MG/DL (ref 0.8–1.3)
DEPRECATED RDW RBC AUTO: 14.4 % (ref 12.4–15.4)
EOSINOPHIL # BLD: 0.2 K/UL (ref 0–0.6)
EOSINOPHIL NFR BLD: 4.3 %
GFR SERPLBLD CREATININE-BSD FMLA CKD-EPI: 35 ML/MIN/{1.73_M2}
GLUCOSE FLD-MCNC: 124 MG/DL
GLUCOSE SERPL-MCNC: 95 MG/DL (ref 70–99)
HCT VFR BLD AUTO: 29.5 % (ref 40.5–52.5)
HGB BLD-MCNC: 10 G/DL (ref 13.5–17.5)
INR PPP: 1.86 (ref 0.84–1.16)
LDH FLD L TO P-CCNC: 74 U/L
LDH SERPL L TO P-CCNC: 224 U/L (ref 100–190)
LYMPHOCYTES # BLD: 1.2 K/UL (ref 1–5.1)
LYMPHOCYTES NFR BLD: 24.7 %
LYMPHOCYTES NFR FLD: 77 %
MACROPHAGES # FLD: 7 %
MAGNESIUM SERPL-MCNC: 2.1 MG/DL (ref 1.8–2.4)
MCH RBC QN AUTO: 31.1 PG (ref 26–34)
MCHC RBC AUTO-ENTMCNC: 34 G/DL (ref 31–36)
MCV RBC AUTO: 91.5 FL (ref 80–100)
MESOTHL CELL NFR FLD: 1 %
MONOCYTES # BLD: 0.5 K/UL (ref 0–1.3)
MONOCYTES NFR BLD: 9 %
MONOCYTES NFR FLD: 4 %
MONONUCLEAR UNIDENTIFIED CELLS FLUID: 2 %
NEUTROPHIL, FLUID: 9 %
NEUTROPHILS # BLD: 3 K/UL (ref 1.7–7.7)
NEUTROPHILS NFR BLD: 60.6 %
NUC CELL # FLD: 258 /CUMM
PATH REV: YES
PHOSPHATE SERPL-MCNC: 3.4 MG/DL (ref 2.5–4.9)
PLATELET # BLD AUTO: 180 K/UL (ref 135–450)
PMV BLD AUTO: 8.8 FL (ref 5–10.5)
POTASSIUM SERPL-SCNC: 4.4 MMOL/L (ref 3.5–5.1)
PROT FLD-MCNC: 2.4 G/DL
PROT SERPL-MCNC: 6.5 G/DL (ref 6.4–8.2)
PROTHROMBIN TIME: 21.3 SEC (ref 11.5–14.8)
RBC # BLD AUTO: 3.22 M/UL (ref 4.2–5.9)
RBC FLUID: <2000 /CUMM
SODIUM SERPL-SCNC: 135 MMOL/L (ref 136–145)
SPECIMEN SOURCE FLD: NORMAL
TOTAL CELLS COUNTED FLD: 100
WBC # BLD AUTO: 5 K/UL (ref 4–11)

## 2023-05-26 PROCEDURE — 2580000003 HC RX 258: Performed by: INTERNAL MEDICINE

## 2023-05-26 PROCEDURE — 83615 LACTATE (LD) (LDH) ENZYME: CPT

## 2023-05-26 PROCEDURE — 89051 BODY FLUID CELL COUNT: CPT

## 2023-05-26 PROCEDURE — 6360000002 HC RX W HCPCS: Performed by: INTERNAL MEDICINE

## 2023-05-26 PROCEDURE — 71045 X-RAY EXAM CHEST 1 VIEW: CPT

## 2023-05-26 PROCEDURE — 88112 CYTOPATH CELL ENHANCE TECH: CPT

## 2023-05-26 PROCEDURE — 82042 OTHER SOURCE ALBUMIN QUAN EA: CPT

## 2023-05-26 PROCEDURE — 80048 BASIC METABOLIC PNL TOTAL CA: CPT

## 2023-05-26 PROCEDURE — 85520 HEPARIN ASSAY: CPT

## 2023-05-26 PROCEDURE — 0W993ZZ DRAINAGE OF RIGHT PLEURAL CAVITY, PERCUTANEOUS APPROACH: ICD-10-PCS | Performed by: INTERNAL MEDICINE

## 2023-05-26 PROCEDURE — 84157 ASSAY OF PROTEIN OTHER: CPT

## 2023-05-26 PROCEDURE — 1200000000 HC SEMI PRIVATE

## 2023-05-26 PROCEDURE — 88305 TISSUE EXAM BY PATHOLOGIST: CPT

## 2023-05-26 PROCEDURE — 83735 ASSAY OF MAGNESIUM: CPT

## 2023-05-26 PROCEDURE — 99233 SBSQ HOSP IP/OBS HIGH 50: CPT | Performed by: INTERNAL MEDICINE

## 2023-05-26 PROCEDURE — 32555 ASPIRATE PLEURA W/ IMAGING: CPT | Performed by: INTERNAL MEDICINE

## 2023-05-26 PROCEDURE — 84155 ASSAY OF PROTEIN SERUM: CPT

## 2023-05-26 PROCEDURE — 97116 GAIT TRAINING THERAPY: CPT

## 2023-05-26 PROCEDURE — 97110 THERAPEUTIC EXERCISES: CPT

## 2023-05-26 PROCEDURE — 85610 PROTHROMBIN TIME: CPT

## 2023-05-26 PROCEDURE — 87070 CULTURE OTHR SPECIMN AEROBIC: CPT

## 2023-05-26 PROCEDURE — 84100 ASSAY OF PHOSPHORUS: CPT

## 2023-05-26 PROCEDURE — 97530 THERAPEUTIC ACTIVITIES: CPT

## 2023-05-26 PROCEDURE — 97535 SELF CARE MNGMENT TRAINING: CPT

## 2023-05-26 PROCEDURE — 82945 GLUCOSE OTHER FLUID: CPT

## 2023-05-26 PROCEDURE — 87205 SMEAR GRAM STAIN: CPT

## 2023-05-26 PROCEDURE — 82465 ASSAY BLD/SERUM CHOLESTEROL: CPT

## 2023-05-26 PROCEDURE — 6370000000 HC RX 637 (ALT 250 FOR IP): Performed by: INTERNAL MEDICINE

## 2023-05-26 PROCEDURE — 36415 COLL VENOUS BLD VENIPUNCTURE: CPT

## 2023-05-26 PROCEDURE — 85025 COMPLETE CBC W/AUTO DIFF WBC: CPT

## 2023-05-26 RX ORDER — WARFARIN SODIUM 2.5 MG/1
2.5 TABLET ORAL
Status: COMPLETED | OUTPATIENT
Start: 2023-05-26 | End: 2023-05-26

## 2023-05-26 RX ORDER — HEPARIN SODIUM 1000 [USP'U]/ML
2000 INJECTION, SOLUTION INTRAVENOUS; SUBCUTANEOUS ONCE
Status: COMPLETED | OUTPATIENT
Start: 2023-05-26 | End: 2023-05-26

## 2023-05-26 RX ADMIN — THERA TABS 1 TABLET: TAB at 08:32

## 2023-05-26 RX ADMIN — CEFTRIAXONE 1000 MG: 1 INJECTION, POWDER, FOR SOLUTION INTRAMUSCULAR; INTRAVENOUS at 09:14

## 2023-05-26 RX ADMIN — ATENOLOL 25 MG: 25 TABLET ORAL at 08:32

## 2023-05-26 RX ADMIN — ASPIRIN 81 MG: 81 TABLET, COATED ORAL at 08:32

## 2023-05-26 RX ADMIN — DIPHENHYDRAMINE HCL 25 MG: 25 TABLET ORAL at 22:45

## 2023-05-26 RX ADMIN — HEPARIN SODIUM 2000 UNITS: 1000 INJECTION INTRAVENOUS; SUBCUTANEOUS at 04:06

## 2023-05-26 RX ADMIN — FERROUS SULFATE TAB EC 324 MG (65 MG FE EQUIVALENT) 324 MG: 324 (65 FE) TABLET DELAYED RESPONSE at 08:32

## 2023-05-26 RX ADMIN — LISINOPRIL 10 MG: 10 TABLET ORAL at 08:32

## 2023-05-26 RX ADMIN — HEPARIN SODIUM 1190 UNITS/HR: 10000 INJECTION, SOLUTION INTRAVENOUS at 12:07

## 2023-05-26 RX ADMIN — TAMSULOSIN HYDROCHLORIDE 0.4 MG: 0.4 CAPSULE ORAL at 08:31

## 2023-05-26 RX ADMIN — SODIUM CHLORIDE, PRESERVATIVE FREE 10 ML: 5 INJECTION INTRAVENOUS at 21:19

## 2023-05-26 RX ADMIN — WARFARIN SODIUM 2.5 MG: 2.5 TABLET ORAL at 12:07

## 2023-05-26 RX ADMIN — Medication 400 MG: at 08:32

## 2023-05-26 RX ADMIN — ACETAMINOPHEN 650 MG: 325 TABLET ORAL at 15:50

## 2023-05-26 RX ADMIN — HEPARIN SODIUM 1190 UNITS/HR: 10000 INJECTION, SOLUTION INTRAVENOUS at 04:10

## 2023-05-26 RX ADMIN — ALLOPURINOL 100 MG: 100 TABLET ORAL at 08:32

## 2023-05-26 RX ADMIN — Medication 1000 UNITS: at 08:32

## 2023-05-26 RX ADMIN — SODIUM CHLORIDE, PRESERVATIVE FREE 10 ML: 5 INJECTION INTRAVENOUS at 08:26

## 2023-05-26 RX ADMIN — ACETAMINOPHEN 500 MG: 500 TABLET ORAL at 21:17

## 2023-05-26 RX ADMIN — ROSUVASTATIN CALCIUM 20 MG: 20 TABLET, FILM COATED ORAL at 08:32

## 2023-05-26 ASSESSMENT — PAIN SCALES - GENERAL
PAINLEVEL_OUTOF10: 2
PAINLEVEL_OUTOF10: 5
PAINLEVEL_OUTOF10: 3

## 2023-05-26 ASSESSMENT — PAIN DESCRIPTION - ORIENTATION
ORIENTATION: LEFT;RIGHT
ORIENTATION: RIGHT;LEFT
ORIENTATION: RIGHT;LEFT

## 2023-05-26 ASSESSMENT — PAIN DESCRIPTION - LOCATION
LOCATION: SHOULDER

## 2023-05-26 ASSESSMENT — PAIN DESCRIPTION - DESCRIPTORS
DESCRIPTORS: ACHING

## 2023-05-27 LAB
ANION GAP SERPL CALCULATED.3IONS-SCNC: 7 MMOL/L (ref 3–16)
BASOPHILS # BLD: 0.1 K/UL (ref 0–0.2)
BASOPHILS NFR BLD: 1 %
BUN SERPL-MCNC: 41 MG/DL (ref 7–20)
CALCIUM SERPL-MCNC: 8.7 MG/DL (ref 8.3–10.6)
CHLORIDE SERPL-SCNC: 105 MMOL/L (ref 99–110)
CO2 SERPL-SCNC: 25 MMOL/L (ref 21–32)
CREAT SERPL-MCNC: 1.8 MG/DL (ref 0.8–1.3)
DEPRECATED RDW RBC AUTO: 14.3 % (ref 12.4–15.4)
EOSINOPHIL # BLD: 0.3 K/UL (ref 0–0.6)
EOSINOPHIL NFR BLD: 4.8 %
GFR SERPLBLD CREATININE-BSD FMLA CKD-EPI: 35 ML/MIN/{1.73_M2}
GLUCOSE SERPL-MCNC: 78 MG/DL (ref 70–99)
HCT VFR BLD AUTO: 28.5 % (ref 40.5–52.5)
HGB BLD-MCNC: 9.7 G/DL (ref 13.5–17.5)
INR PPP: 1.82 (ref 0.84–1.16)
LYMPHOCYTES # BLD: 1 K/UL (ref 1–5.1)
LYMPHOCYTES NFR BLD: 17.8 %
MAGNESIUM SERPL-MCNC: 1.9 MG/DL (ref 1.8–2.4)
MCH RBC QN AUTO: 31 PG (ref 26–34)
MCHC RBC AUTO-ENTMCNC: 34.1 G/DL (ref 31–36)
MCV RBC AUTO: 91 FL (ref 80–100)
MONOCYTES # BLD: 0.5 K/UL (ref 0–1.3)
MONOCYTES NFR BLD: 9.7 %
NEUTROPHILS # BLD: 3.6 K/UL (ref 1.7–7.7)
NEUTROPHILS NFR BLD: 66.7 %
PHOSPHATE SERPL-MCNC: 3 MG/DL (ref 2.5–4.9)
PLATELET # BLD AUTO: 195 K/UL (ref 135–450)
PMV BLD AUTO: 8.8 FL (ref 5–10.5)
POTASSIUM SERPL-SCNC: 4.8 MMOL/L (ref 3.5–5.1)
PROTHROMBIN TIME: 21 SEC (ref 11.5–14.8)
RBC # BLD AUTO: 3.14 M/UL (ref 4.2–5.9)
SODIUM SERPL-SCNC: 137 MMOL/L (ref 136–145)
WBC # BLD AUTO: 5.4 K/UL (ref 4–11)

## 2023-05-27 PROCEDURE — 85025 COMPLETE CBC W/AUTO DIFF WBC: CPT

## 2023-05-27 PROCEDURE — 6370000000 HC RX 637 (ALT 250 FOR IP): Performed by: INTERNAL MEDICINE

## 2023-05-27 PROCEDURE — 84100 ASSAY OF PHOSPHORUS: CPT

## 2023-05-27 PROCEDURE — 83735 ASSAY OF MAGNESIUM: CPT

## 2023-05-27 PROCEDURE — 99232 SBSQ HOSP IP/OBS MODERATE 35: CPT | Performed by: INTERNAL MEDICINE

## 2023-05-27 PROCEDURE — 6360000002 HC RX W HCPCS: Performed by: INTERNAL MEDICINE

## 2023-05-27 PROCEDURE — 94760 N-INVAS EAR/PLS OXIMETRY 1: CPT

## 2023-05-27 PROCEDURE — 36415 COLL VENOUS BLD VENIPUNCTURE: CPT

## 2023-05-27 PROCEDURE — 1200000000 HC SEMI PRIVATE

## 2023-05-27 PROCEDURE — 85610 PROTHROMBIN TIME: CPT

## 2023-05-27 PROCEDURE — 2580000003 HC RX 258: Performed by: INTERNAL MEDICINE

## 2023-05-27 PROCEDURE — 80048 BASIC METABOLIC PNL TOTAL CA: CPT

## 2023-05-27 RX ORDER — WARFARIN SODIUM 2.5 MG/1
2.5 TABLET ORAL
Status: COMPLETED | OUTPATIENT
Start: 2023-05-27 | End: 2023-05-27

## 2023-05-27 RX ADMIN — DIPHENHYDRAMINE HCL 25 MG: 25 TABLET ORAL at 21:42

## 2023-05-27 RX ADMIN — WARFARIN SODIUM 2.5 MG: 2.5 TABLET ORAL at 19:12

## 2023-05-27 RX ADMIN — Medication 1000 UNITS: at 08:48

## 2023-05-27 RX ADMIN — FERROUS SULFATE TAB EC 324 MG (65 MG FE EQUIVALENT) 324 MG: 324 (65 FE) TABLET DELAYED RESPONSE at 08:48

## 2023-05-27 RX ADMIN — Medication 400 MG: at 08:48

## 2023-05-27 RX ADMIN — ASPIRIN 81 MG: 81 TABLET, COATED ORAL at 08:48

## 2023-05-27 RX ADMIN — SODIUM CHLORIDE, PRESERVATIVE FREE 10 ML: 5 INJECTION INTRAVENOUS at 08:48

## 2023-05-27 RX ADMIN — ACETAMINOPHEN 500 MG: 500 TABLET ORAL at 21:42

## 2023-05-27 RX ADMIN — TAMSULOSIN HYDROCHLORIDE 0.4 MG: 0.4 CAPSULE ORAL at 08:48

## 2023-05-27 RX ADMIN — SODIUM CHLORIDE, PRESERVATIVE FREE 10 ML: 5 INJECTION INTRAVENOUS at 21:43

## 2023-05-27 RX ADMIN — LISINOPRIL 10 MG: 10 TABLET ORAL at 08:48

## 2023-05-27 RX ADMIN — ALLOPURINOL 100 MG: 100 TABLET ORAL at 08:48

## 2023-05-27 RX ADMIN — ROSUVASTATIN CALCIUM 20 MG: 20 TABLET, FILM COATED ORAL at 08:48

## 2023-05-27 RX ADMIN — CEFTRIAXONE 1000 MG: 1 INJECTION, POWDER, FOR SOLUTION INTRAMUSCULAR; INTRAVENOUS at 08:51

## 2023-05-27 RX ADMIN — ATENOLOL 25 MG: 25 TABLET ORAL at 08:48

## 2023-05-27 RX ADMIN — THERA TABS 1 TABLET: TAB at 08:48

## 2023-05-27 ASSESSMENT — PAIN SCALES - GENERAL
PAINLEVEL_OUTOF10: 0
PAINLEVEL_OUTOF10: 0

## 2023-05-28 VITALS
SYSTOLIC BLOOD PRESSURE: 126 MMHG | WEIGHT: 214.9 LBS | TEMPERATURE: 98 F | DIASTOLIC BLOOD PRESSURE: 58 MMHG | OXYGEN SATURATION: 98 % | RESPIRATION RATE: 18 BRPM | HEIGHT: 73 IN | HEART RATE: 88 BPM | BODY MASS INDEX: 28.48 KG/M2

## 2023-05-28 LAB
BASOPHILS # BLD: 0 K/UL (ref 0–0.2)
BASOPHILS NFR BLD: 0.8 %
DEPRECATED RDW RBC AUTO: 14.9 % (ref 12.4–15.4)
EOSINOPHIL # BLD: 0.3 K/UL (ref 0–0.6)
EOSINOPHIL NFR BLD: 4.8 %
HCT VFR BLD AUTO: 28.9 % (ref 40.5–52.5)
HGB BLD-MCNC: 9.7 G/DL (ref 13.5–17.5)
INR PPP: 1.81 (ref 0.84–1.16)
LYMPHOCYTES # BLD: 0.8 K/UL (ref 1–5.1)
LYMPHOCYTES NFR BLD: 14.7 %
MCH RBC QN AUTO: 30.6 PG (ref 26–34)
MCHC RBC AUTO-ENTMCNC: 33.6 G/DL (ref 31–36)
MCV RBC AUTO: 91.1 FL (ref 80–100)
MONOCYTES # BLD: 0.6 K/UL (ref 0–1.3)
MONOCYTES NFR BLD: 10.7 %
NEUTROPHILS # BLD: 3.7 K/UL (ref 1.7–7.7)
NEUTROPHILS NFR BLD: 69 %
PLATELET # BLD AUTO: 193 K/UL (ref 135–450)
PMV BLD AUTO: 8.9 FL (ref 5–10.5)
PROTHROMBIN TIME: 20.9 SEC (ref 11.5–14.8)
RBC # BLD AUTO: 3.17 M/UL (ref 4.2–5.9)
WBC # BLD AUTO: 5.4 K/UL (ref 4–11)

## 2023-05-28 PROCEDURE — 6370000000 HC RX 637 (ALT 250 FOR IP): Performed by: INTERNAL MEDICINE

## 2023-05-28 PROCEDURE — 85025 COMPLETE CBC W/AUTO DIFF WBC: CPT

## 2023-05-28 PROCEDURE — 36415 COLL VENOUS BLD VENIPUNCTURE: CPT

## 2023-05-28 PROCEDURE — 85610 PROTHROMBIN TIME: CPT

## 2023-05-28 PROCEDURE — 2580000003 HC RX 258: Performed by: INTERNAL MEDICINE

## 2023-05-28 PROCEDURE — 94760 N-INVAS EAR/PLS OXIMETRY 1: CPT

## 2023-05-28 RX ORDER — WARFARIN SODIUM 3 MG/1
3 TABLET ORAL
Status: DISCONTINUED | OUTPATIENT
Start: 2023-05-28 | End: 2023-05-28

## 2023-05-28 RX ORDER — WARFARIN SODIUM 3 MG/1
3 TABLET ORAL
Status: COMPLETED | OUTPATIENT
Start: 2023-05-28 | End: 2023-05-28

## 2023-05-28 RX ADMIN — Medication 400 MG: at 08:31

## 2023-05-28 RX ADMIN — ATENOLOL 25 MG: 25 TABLET ORAL at 08:31

## 2023-05-28 RX ADMIN — ALLOPURINOL 100 MG: 100 TABLET ORAL at 08:31

## 2023-05-28 RX ADMIN — WARFARIN SODIUM 3 MG: 3 TABLET ORAL at 12:23

## 2023-05-28 RX ADMIN — THERA TABS 1 TABLET: TAB at 08:31

## 2023-05-28 RX ADMIN — LISINOPRIL 10 MG: 10 TABLET ORAL at 08:31

## 2023-05-28 RX ADMIN — TAMSULOSIN HYDROCHLORIDE 0.4 MG: 0.4 CAPSULE ORAL at 08:31

## 2023-05-28 RX ADMIN — FERROUS SULFATE TAB EC 324 MG (65 MG FE EQUIVALENT) 324 MG: 324 (65 FE) TABLET DELAYED RESPONSE at 08:31

## 2023-05-28 RX ADMIN — Medication 1000 UNITS: at 08:31

## 2023-05-28 RX ADMIN — ASPIRIN 81 MG: 81 TABLET, COATED ORAL at 08:31

## 2023-05-28 RX ADMIN — ROSUVASTATIN CALCIUM 20 MG: 20 TABLET, FILM COATED ORAL at 08:31

## 2023-05-28 RX ADMIN — SODIUM CHLORIDE, PRESERVATIVE FREE 10 ML: 5 INJECTION INTRAVENOUS at 08:36

## 2023-05-29 LAB
BACTERIA FLD AEROBE CULT: NORMAL
GRAM STN SPEC: NORMAL

## 2023-05-30 LAB — PATH CONSULT FLUID: NORMAL

## 2023-06-05 ENCOUNTER — TELEPHONE (OUTPATIENT)
Dept: PHARMACY | Age: 88
End: 2023-06-05

## 2023-06-05 NOTE — TELEPHONE ENCOUNTER
Patience Jacobo is an active patient in the [address] [place] [address] for Warfarin management. Recent hospital admission to [facility] 5/20 to 5/28/2023. Discharged to:   Extended Care Facility:    [address] [facility]  Phone: [phone]      Lab Results   Component Value Date    INR 1.81 (H) 05/28/2023    INR 1.82 (H) 05/27/2023    INR 1.86 (H) 05/26/2023    INR 2.50 (H) 05/25/2023    INR 2.91 (H) 05/24/2023    INR 3.08 (H) 05/23/2023     Follow up:  Reached out to [facility]. Spoke with Manolo. She believes they are dosing warfarin and checking INR, but seemed unsure. They then transferred me and then I was disconnected. Plan: [facility]. No answer, left message for his spouse to return my call. Called [facility] again. Spoke with the nurse. They are dosing warfarin. Will f/u in 2-3 weeks. Liset Jimenez, PharmD, [address] [facility]  Anticoagulation  [phone]    For Pharmacy Admin Tracking Only    Intervention Detail:   Total # of Interventions Recommended:    Total # of Interventions Accepted:   Time Spent (min): 15

## 2023-06-08 NOTE — PROGRESS NOTES
Physician Progress Note      PATIENT:               Tyson Dietrich  CSN #:                  [id number]  :                       1930  ADMIT DATE:       2023 5:50 AM  DISCH DATE:        2023 3:42 PM  RESPONDING  PROVIDER #:        Elvin Doyle MD          QUERY TEXT:    Patient admitted with cough shortness of breath , noted to have hx   covid,pnrumonia, and pleural effusion . If possible, please document in   progress notes and d/c summary further specificity regarding the   type/underlying cause of pleural effusion: The medical record reflects the following:  Risk Factors: post covid, pna, pleural effusion  Clinical Indicators:  Admitted for cough and shortness of breath of note had   recently had COVID, came to the hospital found to have left most   COVID-pneumonia versus pleural effusion, found to have right-sided pleural   effusion, pulmonary, cardiology was involved, Coumadin was held, was placed on   heparin, patient underwent thoracentesis with pulmonary, work-up has been   negative. Was kept on antibiotics, did complete the course of ceftriaxone, no   need for further antibiotics. Per Cardio-I do not know how much of this is   truly diastolic heart failure as it appears he does have a  ? Therefore etiology unclear at this time. Treatment: thoracentesis ,iv rocephin, iv zithromax, iv lasix, monitor labs,   Cardiology and Pulmonology consult    Thank you, Slater Runner Work RN CDS [email or web address]  Options provided:  -- Pleural effusion due to post COVID PNA, CHF ruled out  -- Pleural effusion due to CHF, post COVID PNA ruled out  -- Pleural effusion due to both post COVID PNA and CHF  -- Other - I will add my own diagnosis  -- Disagree - Not applicable / Not valid  -- Disagree - Clinically unable to determine / Unknown  -- Refer to Clinical Documentation Reviewer    PROVIDER RESPONSE TEXT:    Pleural effusion due to CHF, post COVID PNA ruled out. Query created by:  Ernie Torres on

## 2023-06-21 ENCOUNTER — TELEPHONE (OUTPATIENT)
Dept: PHARMACY | Age: 88
End: 2023-06-21

## 2023-06-21 NOTE — TELEPHONE ENCOUNTER
Called and spoke to [facility] [facility] again today and Chaitanya Li is still there and they are managing his warfarin. Will follow and reach out again in 2-3 weeks.     Clement Aguilar, PharmD 06/21/23  9:08 AM

## 2023-06-24 PROBLEM — R79.89 ELEVATED TROPONIN: Status: RESOLVED | Noted: 2023-05-25 | Resolved: 2023-06-24

## 2023-06-24 PROBLEM — R77.8 ELEVATED TROPONIN: Status: RESOLVED | Noted: 2023-05-25 | Resolved: 2023-06-24

## 2023-07-14 ENCOUNTER — ANTI-COAG VISIT (OUTPATIENT)
Dept: PHARMACY | Age: 88
End: 2023-07-14
Payer: MEDICARE

## 2023-07-14 DIAGNOSIS — I48.91 ATRIAL FIBRILLATION, UNSPECIFIED TYPE (HCC): Primary | ICD-10-CM

## 2023-07-14 LAB — INR BLD: 2.9

## 2023-07-14 PROCEDURE — 99211 OFF/OP EST MAY X REQ PHY/QHP: CPT

## 2023-07-14 PROCEDURE — 85610 PROTHROMBIN TIME: CPT

## 2023-07-14 NOTE — PROGRESS NOTES
Brianne Shankar is a 80 y.o. here for warfarin management. Gavin Fountain had an INR test today. Results were reviewed and appropriate warfarin management was completed. Patient verifies current warfarin dosing regimen: Yes     Warfarin medication reviewed and updated on the patient 's home medication list: Yes   All other medications reviewed and updated on the patient 's home medication list: No: no changes      Lab Results   Component Value Date    INR 2.90 2023    INR 1.81 (H) 2023    INR 1.82 (H) 2023     Patient Findings       Positives:  Change in medications, Change in diet/appetite    Negatives:  Signs/symptoms of bleeding, Missed doses, Bruising    Comments:  Prednisone last dose  was on this for 5 days   Eating less than usual           Anticoagulation Summary  As of 2023      INR goal:  2.0-3.0   TTR:  79.2 % (10.7 y)   INR used for dosin.90 (2023)   Warfarin maintenance plan:  1.25 mg (2.5 mg x 0.5) every Tue, Fri; 2.5 mg (2.5 mg x 1) all other days   Weekly warfarin total:  15 mg   Plan last modified:  Lee Jauregui (2021)   Next INR check:  2023   Priority:  Maintenance   Target end date: Indefinite    Indications    Atrial fibrillation ([address]) [I48.91]                 Anticoagulation Episode Summary       INR check location:  Anticoagulation Clinic    Preferred lab:      Send INR reminders to:  [address] [place] [address]    Comments:  [facility]            Anticoagulation Care Providers       Provider Role Specialty Phone number    Gely Covarrubias [facility] Internal Medicine [phone]          There were no vitals taken for this visit. Warfarin assessment / plan:     Patient appears well today. We have not seen Dami Cruz in some time. He was in the hospital at the end of May with pneumonia. On discharge he spent two weeks at [facility]. He is now living at an assisted living facility, but still manages his own warfarin.      INR

## 2023-08-22 ENCOUNTER — ANTI-COAG VISIT (OUTPATIENT)
Dept: PHARMACY | Age: 88
End: 2023-08-22
Payer: MEDICARE

## 2023-08-22 DIAGNOSIS — I48.91 ATRIAL FIBRILLATION, UNSPECIFIED TYPE (HCC): Primary | ICD-10-CM

## 2023-08-22 LAB — INTERNATIONAL NORMALIZATION RATIO, POC: 3.6

## 2023-08-22 PROCEDURE — 85610 PROTHROMBIN TIME: CPT | Performed by: SPEECH-LANGUAGE PATHOLOGIST

## 2023-08-22 PROCEDURE — 99211 OFF/OP EST MAY X REQ PHY/QHP: CPT | Performed by: SPEECH-LANGUAGE PATHOLOGIST

## 2023-09-11 ENCOUNTER — ANTI-COAG VISIT (OUTPATIENT)
Dept: PHARMACY | Age: 88
End: 2023-09-11
Payer: MEDICARE

## 2023-09-11 DIAGNOSIS — I48.91 ATRIAL FIBRILLATION, UNSPECIFIED TYPE (HCC): Primary | ICD-10-CM

## 2023-09-11 LAB — INTERNATIONAL NORMALIZATION RATIO, POC: 2.2

## 2023-09-11 PROCEDURE — 85610 PROTHROMBIN TIME: CPT

## 2023-09-11 PROCEDURE — 99211 OFF/OP EST MAY X REQ PHY/QHP: CPT

## 2023-09-11 NOTE — PROGRESS NOTES
herbal supplements). Especially if you begin oral steroids and/or antibiotics. If significant bleeding occurs please seek immediate medical attention. Keep the number of servings and portion size of vitamin K containing foods (dark green, leafy vegetables) the same each week. Vegetables high in vitamin K : broccoli, spinach, leaf lettuce, keke lettuce, kale, collards, asparagus, brussel sprouts. Please call if you routine diet changes. Limit alcohol intake. Please call if this changes. Allow 72 hours for warfarin refills. Immunization History   Administered Date(s) Administered    COVID-19, MODERNA BLUE border, Primary or Immunocompromised, (age 12y+), IM, 100 mcg/0.5mL 01/29/2021, 02/26/2021, 11/09/2021, 04/21/2022    Influenza Vaccine, unspecified formulation 09/28/2012, 09/25/2013, 09/04/2014, 09/16/2015, 10/04/2016, 09/20/2017, 10/01/2018, 10/10/2019    Influenza, FLUZONE (age 72 y+), High Dose, 0.7mL 10/19/2021    Pneumococcal Conjugate 7-valent ([facility]) 09/19/2016    Pneumococcal, PPSV23, PNEUMOVAX 23, (age 2y+), SC/IM, 0.5mL 04/13/2004       Orders Placed This Encounter   Procedures    POCT INR     This external order was created through the results console. No orders of the defined types were placed in this encounter. Reviewed AVS with patient / caregiver.     Billing Points:  0 billing points this visit     For Pharmacy Admin Tracking Only    Intervention Detail:   Total # of Interventions Recommended: 0  Total # of Interventions Accepted: 0  Time Spent (min): 20

## 2023-10-09 ENCOUNTER — ANTI-COAG VISIT (OUTPATIENT)
Dept: PHARMACY | Age: 88
End: 2023-10-09
Payer: MEDICARE

## 2023-10-09 DIAGNOSIS — I48.91 ATRIAL FIBRILLATION, UNSPECIFIED TYPE (HCC): Primary | ICD-10-CM

## 2023-10-09 LAB — INTERNATIONAL NORMALIZATION RATIO, POC: 2.4

## 2023-10-09 PROCEDURE — 85610 PROTHROMBIN TIME: CPT | Performed by: SPEECH-LANGUAGE PATHOLOGIST

## 2023-10-09 PROCEDURE — 99211 OFF/OP EST MAY X REQ PHY/QHP: CPT | Performed by: SPEECH-LANGUAGE PATHOLOGIST

## 2023-10-09 NOTE — PROGRESS NOTES
Renetta Gomez is a 80 y.o. here for warfarin management. Courtney Mackenzie had an INR test today. Results were reviewed and appropriate warfarin management was completed. Patient verifies current warfarin dosing regimen: Yes     Warfarin medication reviewed and updated on the patient 's home medication list: Yes   All other medications reviewed and updated on the patient 's home medication list: Yes     Lab Results   Component Value Date    INR 2.2 09/11/2023    INR 3.6 08/22/2023    INR 2.90 07/14/2023       Anticoagulation Summary  As of 10/9/2023      INR goal:  2.0-3.0   TTR:  78.5 % (10.8 y)   INR used for dosing:     Plan last modified:  Za Stephens (12/29/2021)   Next INR check:     Target end date: Indefinite    Indications    Atrial fibrillation ([address]) [I48.91]                 Anticoagulation Episode Summary       INR check location:  Anticoagulation Clinic    Preferred lab:      Send INR reminders to:  [address] [place] [address]    Comments:  [facility]            Anticoagulation Care Providers       Provider Role Specialty Phone number    [address] [employer] Responsible Internal Medicine [phone]          There were no vitals taken for this visit. Warfarin assessment / plan:     Patient appears well today. No changes affecting warfarin therapy were noted. No acute findings with regards to warfarin therapy. INR today is within goal range. Instructed to continue the same weekly warfarin dose. Patient reports no changes to diet, medications, or activity. Will have patient continue warfarin 2.5 mg daily, except 1.25 mg every Tuesday and Friday. He will return in 4-5 weeks.       Immunization History   Administered Date(s) Administered    COVID-19, MODERNA BLUE border, Primary or Immunocompromised, (age 12y+), IM, 100 mcg/0.5mL 01/29/2021, 02/26/2021, 11/09/2021, 04/21/2022    Influenza Vaccine, unspecified formulation 09/28/2012, 09/25/2013, 09/04/2014, 09/16/2015,

## 2023-11-13 ENCOUNTER — ANTI-COAG VISIT (OUTPATIENT)
Dept: PHARMACY | Age: 88
End: 2023-11-13
Payer: MEDICARE

## 2023-11-13 DIAGNOSIS — I48.91 ATRIAL FIBRILLATION, UNSPECIFIED TYPE (HCC): Primary | ICD-10-CM

## 2023-11-13 LAB — INTERNATIONAL NORMALIZATION RATIO, POC: 2.6

## 2023-11-13 PROCEDURE — 99211 OFF/OP EST MAY X REQ PHY/QHP: CPT

## 2023-11-13 PROCEDURE — 85610 PROTHROMBIN TIME: CPT

## 2023-11-13 NOTE — PROGRESS NOTES
Manish Mcacbe is a 80 y.o. here for warfarin management. Kwaku Nance had an INR test today. Results were reviewed and appropriate warfarin management was completed. Patient verifies current warfarin dosing regimen: Yes     Warfarin medication reviewed and updated on the patient 's home medication list: Yes   All other medications reviewed and updated on the patient 's home medication list: Yes     Lab Results   Component Value Date    INR 2.6 2023    INR 2.4 10/09/2023    INR 2.2 2023       Anticoagulation Summary  As of 2023      INR goal:  2.0-3.0   TTR:  78.8 % (11 y)   INR used for dosin.6 (2023)   Warfarin maintenance plan:  1.25 mg (2.5 mg x 0.5) every Tue, Fri; 2.5 mg (2.5 mg x 1) all other days   Weekly warfarin total:  15 mg   Plan last modified:  Jennifer Cuellar (2021)   Next INR check:  2023   Priority:  Maintenance   Target end date: Indefinite    Indications    Atrial fibrillation ([address]) [I48.91]                 Anticoagulation Episode Summary       INR check location:  Anticoagulation Clinic    Preferred lab:      Send INR reminders to:  [address] [place] [address]    Comments:  [facility]            Anticoagulation Care Providers       Provider Role Specialty Phone number    [address] [facility] Internal Medicine [phone]          There were no vitals taken for this visit. Warfarin assessment / plan:     Patient appears well today. No changes affecting warfarin therapy were noted. No acute findings with regards to warfarin therapy. INR today is within goal range. Instructed to continue the same weekly warfarin dose. See in 4 weeks    Description    CONTINUE: Warfarin 2.5mg (1 tablet) daily EXCEPT 1.25mg (1/2 tablet) every Tuesday and Friday    Keep greens consistent.  Usually eats about 2 salads per week at most.     Call [phone] with signs or symptoms of bleeding or ANY medication changes (including

## 2023-12-11 ENCOUNTER — ANTI-COAG VISIT (OUTPATIENT)
Dept: PHARMACY | Age: 88
End: 2023-12-11
Payer: MEDICARE

## 2023-12-11 DIAGNOSIS — I48.91 ATRIAL FIBRILLATION, UNSPECIFIED TYPE (HCC): Primary | ICD-10-CM

## 2023-12-11 LAB — INTERNATIONAL NORMALIZATION RATIO, POC: 4.3

## 2023-12-11 PROCEDURE — 85610 PROTHROMBIN TIME: CPT

## 2023-12-11 PROCEDURE — 99211 OFF/OP EST MAY X REQ PHY/QHP: CPT

## 2023-12-11 NOTE — PROGRESS NOTES
Roxie Vidal is a 80 y.o. here for warfarin management. Bladimir Youngblood had an INR test today. Results were reviewed and appropriate warfarin management was completed. Patient verifies current warfarin dosing regimen: Yes     Warfarin medication reviewed and updated on the patient 's home medication list: Yes   All other medications reviewed and updated on the patient 's home medication list: Yes     Lab Results   Component Value Date    INR 4.3 2023    INR 2.6 2023    INR 2.4 10/09/2023       Anticoagulation Summary  As of 2023      INR goal:  2.0-3.0   TTR:  78.4 % (11.1 y)   INR used for dosin.3 (2023)   Warfarin maintenance plan:  1.25 mg (2.5 mg x 0.5) every Tue, Fri; 2.5 mg (2.5 mg x 1) all other days   Weekly warfarin total:  15 mg   Plan last modified:  Ilana Grajeda (2021)   Next INR check:  2024   Priority:  Maintenance   Target end date: Indefinite    Indications    Atrial fibrillation ([address] [I48.91]                 Anticoagulation Episode Summary       INR check location:  Anticoagulation Clinic    Preferred lab:      Send INR reminders to:  [address] [place] [address]    Comments:  [facility]            Anticoagulation Care Providers       Provider Role Specialty Phone number    [address] [facility] Internal Medicine [phone]          There were no vitals taken for this visit. Warfarin assessment / plan:     Appears well  Supra-therapeutic INR. Denies medication changes. Denies extra warfarin doses. Denies change in appetite. Denies changes in smoking habits. Denies fluid retention. May have eaten less greens since last visit. He to have salad tonight    Description    Hold 2 days  CONTINUE: Warfarin 2.5mg (1 tablet) daily EXCEPT 1.25mg (1/2 tablet) every Tuesday and Friday    Keep greens consistent.  Usually eats about 2 salads per week at most.     Call [phone] with signs or symptoms of bleeding or ANY medication

## 2023-12-28 ENCOUNTER — APPOINTMENT (OUTPATIENT)
Dept: GENERAL RADIOLOGY | Age: 88
DRG: 291 | End: 2023-12-28
Payer: MEDICARE

## 2023-12-28 ENCOUNTER — HOSPITAL ENCOUNTER (INPATIENT)
Age: 88
LOS: 12 days | Discharge: HOME HEALTH | DRG: 291 | End: 2024-01-09
Attending: HOSPITALIST | Admitting: HOSPITALIST
Payer: MEDICARE

## 2023-12-28 DIAGNOSIS — N18.9 ACUTE KIDNEY INJURY SUPERIMPOSED ON CKD (HCC): ICD-10-CM

## 2023-12-28 DIAGNOSIS — N17.9 ACUTE KIDNEY INJURY SUPERIMPOSED ON CKD (HCC): ICD-10-CM

## 2023-12-28 DIAGNOSIS — Z90.5 HISTORY OF RIGHT NEPHRECTOMY: ICD-10-CM

## 2023-12-28 DIAGNOSIS — R06.02 SHORTNESS OF BREATH: Primary | ICD-10-CM

## 2023-12-28 DIAGNOSIS — I50.31 ACUTE DIASTOLIC CHF (CONGESTIVE HEART FAILURE) (HCC): ICD-10-CM

## 2023-12-28 LAB
ALBUMIN SERPL-MCNC: 3.9 G/DL (ref 3.4–5)
ALBUMIN/GLOB SERPL: 1.6 {RATIO} (ref 1.1–2.2)
ALP SERPL-CCNC: 157 U/L (ref 40–129)
ALT SERPL-CCNC: 16 U/L (ref 10–40)
ANION GAP SERPL CALCULATED.3IONS-SCNC: 10 MMOL/L (ref 3–16)
ANISOCYTOSIS BLD QL SMEAR: ABNORMAL
AST SERPL-CCNC: 30 U/L (ref 15–37)
BASOPHILS # BLD: 0 K/UL (ref 0–0.2)
BASOPHILS NFR BLD: 0 %
BILIRUB SERPL-MCNC: 0.5 MG/DL (ref 0–1)
BUN SERPL-MCNC: 49 MG/DL (ref 7–20)
CALCIUM SERPL-MCNC: 9 MG/DL (ref 8.3–10.6)
CHLORIDE SERPL-SCNC: 104 MMOL/L (ref 99–110)
CO2 SERPL-SCNC: 27 MMOL/L (ref 21–32)
CREAT SERPL-MCNC: 2.1 MG/DL (ref 0.8–1.3)
DACRYOCYTES BLD QL SMEAR: ABNORMAL
DEPRECATED RDW RBC AUTO: 15.8 % (ref 12.4–15.4)
EKG DIAGNOSIS: NORMAL
EKG Q-T INTERVAL: 420 MS
EKG QRS DURATION: 140 MS
EKG QTC CALCULATION (BAZETT): 490 MS
EKG R AXIS: 77 DEGREES
EKG T AXIS: -25 DEGREES
EKG VENTRICULAR RATE: 82 BPM
EOSINOPHIL # BLD: 0.1 K/UL (ref 0–0.6)
EOSINOPHIL NFR BLD: 1 %
GFR SERPLBLD CREATININE-BSD FMLA CKD-EPI: 29 ML/MIN/{1.73_M2}
GLUCOSE SERPL-MCNC: 93 MG/DL (ref 70–99)
HCT VFR BLD AUTO: 30 % (ref 40.5–52.5)
HGB BLD-MCNC: 9.8 G/DL (ref 13.5–17.5)
INR PPP: 4.17 (ref 0.84–1.16)
IRON SATN MFR SERPL: 16 % (ref 20–50)
IRON SERPL-MCNC: 53 UG/DL (ref 59–158)
LYMPHOCYTES # BLD: 0.8 K/UL (ref 1–5.1)
LYMPHOCYTES NFR BLD: 12 %
MCH RBC QN AUTO: 30.3 PG (ref 26–34)
MCHC RBC AUTO-ENTMCNC: 32.5 G/DL (ref 31–36)
MCV RBC AUTO: 93.1 FL (ref 80–100)
MONOCYTES # BLD: 0.4 K/UL (ref 0–1.3)
MONOCYTES NFR BLD: 6 %
NEUTROPHILS # BLD: 5.3 K/UL (ref 1.7–7.7)
NEUTROPHILS NFR BLD: 65 %
NEUTS BAND NFR BLD MANUAL: 16 % (ref 0–7)
NT-PROBNP SERPL-MCNC: 6893 PG/ML (ref 0–449)
OVALOCYTES BLD QL SMEAR: ABNORMAL
PLATELET # BLD AUTO: 187 K/UL (ref 135–450)
PLATELET BLD QL SMEAR: ADEQUATE
PMV BLD AUTO: 8.3 FL (ref 5–10.5)
POIKILOCYTOSIS BLD QL SMEAR: ABNORMAL
POTASSIUM SERPL-SCNC: 5.1 MMOL/L (ref 3.5–5.1)
PROT SERPL-MCNC: 6.3 G/DL (ref 6.4–8.2)
PROTHROMBIN TIME: 39.9 SEC (ref 11.5–14.8)
RBC # BLD AUTO: 3.22 M/UL (ref 4.2–5.9)
SLIDE REVIEW: ABNORMAL
SODIUM SERPL-SCNC: 141 MMOL/L (ref 136–145)
TIBC SERPL-MCNC: 323 UG/DL (ref 260–445)
TROPONIN, HIGH SENSITIVITY: 85 NG/L (ref 0–22)
TSH SERPL DL<=0.005 MIU/L-ACNC: 1.5 UIU/ML (ref 0.27–4.2)
WBC # BLD AUTO: 6.5 K/UL (ref 4–11)

## 2023-12-28 PROCEDURE — 93005 ELECTROCARDIOGRAM TRACING: CPT | Performed by: EMERGENCY MEDICINE

## 2023-12-28 PROCEDURE — 6360000002 HC RX W HCPCS: Performed by: PHYSICIAN ASSISTANT

## 2023-12-28 PROCEDURE — 85610 PROTHROMBIN TIME: CPT

## 2023-12-28 PROCEDURE — 80053 COMPREHEN METABOLIC PANEL: CPT

## 2023-12-28 PROCEDURE — 96374 THER/PROPH/DIAG INJ IV PUSH: CPT

## 2023-12-28 PROCEDURE — 6370000000 HC RX 637 (ALT 250 FOR IP): Performed by: HOSPITALIST

## 2023-12-28 PROCEDURE — 83880 ASSAY OF NATRIURETIC PEPTIDE: CPT

## 2023-12-28 PROCEDURE — 93010 ELECTROCARDIOGRAM REPORT: CPT | Performed by: INTERNAL MEDICINE

## 2023-12-28 PROCEDURE — 83550 IRON BINDING TEST: CPT

## 2023-12-28 PROCEDURE — 87641 MR-STAPH DNA AMP PROBE: CPT

## 2023-12-28 PROCEDURE — 84484 ASSAY OF TROPONIN QUANT: CPT

## 2023-12-28 PROCEDURE — 71045 X-RAY EXAM CHEST 1 VIEW: CPT

## 2023-12-28 PROCEDURE — 99285 EMERGENCY DEPT VISIT HI MDM: CPT

## 2023-12-28 PROCEDURE — 2060000000 HC ICU INTERMEDIATE R&B

## 2023-12-28 PROCEDURE — 83540 ASSAY OF IRON: CPT

## 2023-12-28 PROCEDURE — 84443 ASSAY THYROID STIM HORMONE: CPT

## 2023-12-28 PROCEDURE — 6360000002 HC RX W HCPCS: Performed by: HOSPITALIST

## 2023-12-28 PROCEDURE — 2580000003 HC RX 258: Performed by: HOSPITALIST

## 2023-12-28 PROCEDURE — 85025 COMPLETE CBC W/AUTO DIFF WBC: CPT

## 2023-12-28 PROCEDURE — 36415 COLL VENOUS BLD VENIPUNCTURE: CPT

## 2023-12-28 RX ORDER — MAGNESIUM GLUCONATE 30 MG(550)
595 TABLET ORAL DAILY
Status: DISCONTINUED | OUTPATIENT
Start: 2023-12-28 | End: 2023-12-28 | Stop reason: CLARIF

## 2023-12-28 RX ORDER — NITROGLYCERIN 0.4 MG/1
0.4 TABLET SUBLINGUAL EVERY 5 MIN PRN
Status: DISCONTINUED | OUTPATIENT
Start: 2023-12-28 | End: 2024-01-09 | Stop reason: HOSPADM

## 2023-12-28 RX ORDER — TAMSULOSIN HYDROCHLORIDE 0.4 MG/1
0.4 CAPSULE ORAL DAILY
Status: DISCONTINUED | OUTPATIENT
Start: 2023-12-28 | End: 2024-01-09 | Stop reason: HOSPADM

## 2023-12-28 RX ORDER — FUROSEMIDE 10 MG/ML
40 INJECTION INTRAMUSCULAR; INTRAVENOUS 2 TIMES DAILY
Status: DISCONTINUED | OUTPATIENT
Start: 2023-12-28 | End: 2023-12-30

## 2023-12-28 RX ORDER — DIPHENHYDRAMINE HCL 25 MG
25 TABLET ORAL NIGHTLY PRN
Status: DISCONTINUED | OUTPATIENT
Start: 2023-12-28 | End: 2024-01-09 | Stop reason: HOSPADM

## 2023-12-28 RX ORDER — ONDANSETRON 2 MG/ML
4 INJECTION INTRAMUSCULAR; INTRAVENOUS EVERY 6 HOURS PRN
Status: DISCONTINUED | OUTPATIENT
Start: 2023-12-28 | End: 2024-01-09 | Stop reason: HOSPADM

## 2023-12-28 RX ORDER — ACETAMINOPHEN 325 MG/1
650 TABLET ORAL EVERY 6 HOURS PRN
Status: DISCONTINUED | OUTPATIENT
Start: 2023-12-28 | End: 2024-01-09 | Stop reason: HOSPADM

## 2023-12-28 RX ORDER — ACETAMINOPHEN 650 MG/1
650 SUPPOSITORY RECTAL EVERY 6 HOURS PRN
Status: DISCONTINUED | OUTPATIENT
Start: 2023-12-28 | End: 2024-01-09 | Stop reason: HOSPADM

## 2023-12-28 RX ORDER — SODIUM CHLORIDE 0.9 % (FLUSH) 0.9 %
5-40 SYRINGE (ML) INJECTION PRN
Status: DISCONTINUED | OUTPATIENT
Start: 2023-12-28 | End: 2024-01-09 | Stop reason: HOSPADM

## 2023-12-28 RX ORDER — MULTIVITAMIN WITH IRON
1 TABLET ORAL DAILY
Status: DISCONTINUED | OUTPATIENT
Start: 2023-12-28 | End: 2024-01-09 | Stop reason: HOSPADM

## 2023-12-28 RX ORDER — VITAMIN B COMPLEX
1000 TABLET ORAL DAILY
Status: DISCONTINUED | OUTPATIENT
Start: 2023-12-28 | End: 2024-01-09 | Stop reason: HOSPADM

## 2023-12-28 RX ORDER — TRAMADOL HYDROCHLORIDE 50 MG/1
50 TABLET ORAL EVERY 6 HOURS PRN
Status: DISCONTINUED | OUTPATIENT
Start: 2023-12-28 | End: 2024-01-09 | Stop reason: HOSPADM

## 2023-12-28 RX ORDER — LANOLIN ALCOHOL/MO/W.PET/CERES
400 CREAM (GRAM) TOPICAL DAILY
Status: DISCONTINUED | OUTPATIENT
Start: 2023-12-28 | End: 2024-01-09 | Stop reason: HOSPADM

## 2023-12-28 RX ORDER — ROSUVASTATIN CALCIUM 20 MG/1
20 TABLET, COATED ORAL DAILY
Status: DISCONTINUED | OUTPATIENT
Start: 2023-12-28 | End: 2023-12-31

## 2023-12-28 RX ORDER — ASPIRIN 81 MG/1
81 TABLET ORAL DAILY
Status: DISCONTINUED | OUTPATIENT
Start: 2023-12-28 | End: 2023-12-28

## 2023-12-28 RX ORDER — FUROSEMIDE 10 MG/ML
40 INJECTION INTRAMUSCULAR; INTRAVENOUS ONCE
Status: COMPLETED | OUTPATIENT
Start: 2023-12-28 | End: 2023-12-28

## 2023-12-28 RX ORDER — ACETAMINOPHEN 500 MG
500 TABLET ORAL NIGHTLY PRN
Status: DISCONTINUED | OUTPATIENT
Start: 2023-12-28 | End: 2024-01-09 | Stop reason: HOSPADM

## 2023-12-28 RX ORDER — METOPROLOL SUCCINATE 25 MG/1
25 TABLET, EXTENDED RELEASE ORAL DAILY
Status: DISCONTINUED | OUTPATIENT
Start: 2023-12-28 | End: 2023-12-28 | Stop reason: ALTCHOICE

## 2023-12-28 RX ORDER — LISINOPRIL 5 MG/1
2.5 TABLET ORAL DAILY
Status: DISCONTINUED | OUTPATIENT
Start: 2023-12-29 | End: 2024-01-09 | Stop reason: HOSPADM

## 2023-12-28 RX ORDER — SODIUM CHLORIDE 0.9 % (FLUSH) 0.9 %
5-40 SYRINGE (ML) INJECTION EVERY 12 HOURS SCHEDULED
Status: DISCONTINUED | OUTPATIENT
Start: 2023-12-28 | End: 2024-01-09 | Stop reason: HOSPADM

## 2023-12-28 RX ORDER — FAMOTIDINE 20 MG/1
20 TABLET, FILM COATED ORAL 2 TIMES DAILY
Status: DISCONTINUED | OUTPATIENT
Start: 2023-12-28 | End: 2023-12-29

## 2023-12-28 RX ORDER — ONDANSETRON 4 MG/1
4 TABLET, ORALLY DISINTEGRATING ORAL EVERY 8 HOURS PRN
Status: DISCONTINUED | OUTPATIENT
Start: 2023-12-28 | End: 2024-01-09 | Stop reason: HOSPADM

## 2023-12-28 RX ORDER — ENOXAPARIN SODIUM 100 MG/ML
30 INJECTION SUBCUTANEOUS DAILY
Status: DISCONTINUED | OUTPATIENT
Start: 2023-12-28 | End: 2023-12-28

## 2023-12-28 RX ORDER — WARFARIN SODIUM 2.5 MG/1
2.5 TABLET ORAL DAILY
Status: DISCONTINUED | OUTPATIENT
Start: 2023-12-28 | End: 2023-12-28

## 2023-12-28 RX ORDER — POLYETHYLENE GLYCOL 3350 17 G/17G
17 POWDER, FOR SOLUTION ORAL DAILY PRN
Status: DISCONTINUED | OUTPATIENT
Start: 2023-12-28 | End: 2024-01-09 | Stop reason: HOSPADM

## 2023-12-28 RX ORDER — FERROUS SULFATE 324(65)MG
324 TABLET, DELAYED RELEASE (ENTERIC COATED) ORAL DAILY
Status: DISCONTINUED | OUTPATIENT
Start: 2023-12-28 | End: 2024-01-09 | Stop reason: HOSPADM

## 2023-12-28 RX ORDER — SODIUM CHLORIDE 9 MG/ML
INJECTION, SOLUTION INTRAVENOUS PRN
Status: DISCONTINUED | OUTPATIENT
Start: 2023-12-28 | End: 2024-01-09 | Stop reason: HOSPADM

## 2023-12-28 RX ORDER — ALLOPURINOL 100 MG/1
100 TABLET ORAL DAILY
Status: DISCONTINUED | OUTPATIENT
Start: 2023-12-28 | End: 2024-01-09 | Stop reason: HOSPADM

## 2023-12-28 RX ORDER — ATENOLOL 50 MG/1
25 TABLET ORAL DAILY
Status: DISCONTINUED | OUTPATIENT
Start: 2023-12-29 | End: 2024-01-09 | Stop reason: HOSPADM

## 2023-12-28 RX ORDER — LISINOPRIL 10 MG/1
10 TABLET ORAL DAILY
Status: DISCONTINUED | OUTPATIENT
Start: 2023-12-28 | End: 2023-12-28

## 2023-12-28 RX ADMIN — Medication 10 ML: at 21:17

## 2023-12-28 RX ADMIN — ACETAMINOPHEN 500 MG: 500 TABLET ORAL at 23:13

## 2023-12-28 RX ADMIN — FERROUS SULFATE TAB EC 324 MG (65 MG FE EQUIVALENT) 324 MG: 324 (65 FE) TABLET DELAYED RESPONSE at 16:54

## 2023-12-28 RX ADMIN — FUROSEMIDE 40 MG: 10 INJECTION, SOLUTION INTRAMUSCULAR; INTRAVENOUS at 16:55

## 2023-12-28 RX ADMIN — THERA TABS 1 TABLET: TAB at 21:16

## 2023-12-28 RX ADMIN — FUROSEMIDE 40 MG: 10 INJECTION, SOLUTION INTRAMUSCULAR; INTRAVENOUS at 10:45

## 2023-12-28 RX ADMIN — CEFTRIAXONE 1000 MG: 1 INJECTION, POWDER, FOR SOLUTION INTRAMUSCULAR; INTRAVENOUS at 21:16

## 2023-12-28 RX ADMIN — FAMOTIDINE 20 MG: 20 TABLET ORAL at 21:17

## 2023-12-28 RX ADMIN — ENOXAPARIN SODIUM 30 MG: 100 INJECTION SUBCUTANEOUS at 16:53

## 2023-12-28 RX ADMIN — Medication 400 MG: at 16:55

## 2023-12-28 RX ADMIN — FAMOTIDINE 20 MG: 20 TABLET ORAL at 16:54

## 2023-12-28 RX ADMIN — TAMSULOSIN HYDROCHLORIDE 0.4 MG: 0.4 CAPSULE ORAL at 16:54

## 2023-12-28 RX ADMIN — Medication 1000 UNITS: at 16:54

## 2023-12-28 RX ADMIN — ROSUVASTATIN CALCIUM 20 MG: 20 TABLET, FILM COATED ORAL at 21:17

## 2023-12-28 RX ADMIN — ALLOPURINOL 100 MG: 100 TABLET ORAL at 16:54

## 2023-12-28 RX ADMIN — AZITHROMYCIN MONOHYDRATE 500 MG: 500 INJECTION, POWDER, LYOPHILIZED, FOR SOLUTION INTRAVENOUS at 23:21

## 2023-12-28 ASSESSMENT — PAIN SCALES - GENERAL
PAINLEVEL_OUTOF10: 0

## 2023-12-28 NOTE — ACP (ADVANCE CARE PLANNING)
plan:    [] Schedule follow-up conversation to continue planning  [] Referred individual to Provider for additional questions/concerns   [] Advised patient/agent/surrogate to review completed ACP document and update if needed with changes in condition, patient preferences or care setting    [x] This note routed to one or more involved healthcare providers

## 2023-12-28 NOTE — DISCHARGE INSTR - COC
requires Home Care for less 30 days.     Update Admission H&P: No change in H&P    PHYSICIAN SIGNATURE:  Electronically signed by Gordon Plunkett MD on 1/9/24 at 11:31 AM EST

## 2023-12-28 NOTE — ED TRIAGE NOTES
Patient to ED via squad from [facility] for sob, cough, bilateral leg edema going into scrotum. Denies CHF

## 2023-12-28 NOTE — ED PROVIDER NOTES
[facility] EMERGENCY DEPARTMENT  EMERGENCY DEPARTMENT ENCOUNTER        Pt Name: Maurice Ackerman Jr  MRN: [id number]  Birthdate 7/6/1930  Date of evaluation: 12/28/2023  Provider: Wilmer Kurtz PA-C  PCP: Holly Govea  Note Started: 11:22 AM EST 12/28/23      SE. I have evaluated this patient.        CHIEF COMPLAINT       Chief Complaint   Patient presents with    Shortness of Breath     Patient to ED via squad from [facility] for sob, cough, bilateral leg edema going into scrotum. Denies CHF       HISTORY OF PRESENT ILLNESS: 1 or more Elements     History From: Patient            Chief Complaint: Shortness of breath    Maurice Ackerman Jr is a 89 y.o. male who presents stating that he is more short of breath than usual and has had a persistent nonproductive cough that his family doctor has been treating with doxycycline as well as by adjusting his Lasix dosages up.  He states this has been going on for 2 or 3 weeks and is not making any progress.  He states that the swelling is even worse than typical in the left and right lower extremities and swells up even into his scrotum and into the penis causing more of a diffuse spray of urine when he pees than an actual stream.  No abdominal pain or vomiting.  He states that he is coughing but does not seem to be getting any flegm up.  The weight from the severe swelling of the lower legs as well as the shortness of breath also makes it more hard to ambulate and walk around then typical.  Patient states that he does live at home alone.    Nursing Notes were all reviewed and agreed with or any disagreements were addressed in the HPI.    REVIEW OF SYSTEMS :      Review of Systems  Positive history as above with cough, shortness of breath, no pain in the chest.  No syncope or near syncope headache or vision change neck pain or stiffness.  No abdominal pain or vomiting.  No diarrhea or constipation.  No extremity acute one-sided weakness or loss of range

## 2023-12-28 NOTE — DISCHARGE INSTRUCTIONS
Additional sites of heart failure:     https://MojivaitalThrasos.com/publication/?y=522214  --- this is the American Heart Association's interactive guide to healthier living with heart failure .  Click on the hyperlink or copy/paste the link into the search bar. Use your mouse to scroll through the pages.  Lots of information on weight management, tips on diets, activity, medications, etc    HF Mozelle Eric: This is a free smartphone eric available for download on iPhone and Android.  Use your phone to track sodium/fluid intake, track zone tool symptoms, weights, medications, etc. Click on this HF Mozelle Eric     hyperlink to get a QR code for easy download--.search for it in your eric store                                          DASH (Dietary Approach to Stop Hypertension)  Diet - https://www.nhlbi.nih.gov/education/dash-eating-plan - this diet is a flexible eating plan that promotes a heart-healthy eating style.  Click on the hyperlink or copy/paste the link into the search bar.  Lots of low-sodium recipes and tips.    https://www.Metrilo.Invoiceable/recipes -- more free recipes

## 2023-12-29 LAB
ANION GAP SERPL CALCULATED.3IONS-SCNC: 12 MMOL/L (ref 3–16)
BASOPHILS # BLD: 0 K/UL (ref 0–0.2)
BASOPHILS NFR BLD: 0.8 %
BUN SERPL-MCNC: 45 MG/DL (ref 7–20)
CALCIUM SERPL-MCNC: 9.3 MG/DL (ref 8.3–10.6)
CHLORIDE SERPL-SCNC: 102 MMOL/L (ref 99–110)
CHOLEST SERPL-MCNC: 100 MG/DL (ref 0–199)
CO2 SERPL-SCNC: 24 MMOL/L (ref 21–32)
CREAT SERPL-MCNC: 2 MG/DL (ref 0.8–1.3)
DEPRECATED RDW RBC AUTO: 15.6 % (ref 12.4–15.4)
EOSINOPHIL # BLD: 0.1 K/UL (ref 0–0.6)
EOSINOPHIL NFR BLD: 2.3 %
FERRITIN SERPL IA-MCNC: 104.9 NG/ML (ref 30–400)
GFR SERPLBLD CREATININE-BSD FMLA CKD-EPI: 30 ML/MIN/{1.73_M2}
GLUCOSE SERPL-MCNC: 78 MG/DL (ref 70–99)
HCT VFR BLD AUTO: 30.2 % (ref 40.5–52.5)
HDLC SERPL-MCNC: 61 MG/DL (ref 40–60)
HGB BLD-MCNC: 10.1 G/DL (ref 13.5–17.5)
INR PPP: 4.07 (ref 0.84–1.16)
LDLC SERPL CALC-MCNC: 29 MG/DL
LYMPHOCYTES # BLD: 0.7 K/UL (ref 1–5.1)
LYMPHOCYTES NFR BLD: 12.6 %
MAGNESIUM SERPL-MCNC: 1.9 MG/DL (ref 1.8–2.4)
MCH RBC QN AUTO: 30.5 PG (ref 26–34)
MCHC RBC AUTO-ENTMCNC: 33.3 G/DL (ref 31–36)
MCV RBC AUTO: 91.5 FL (ref 80–100)
MONOCYTES # BLD: 0.3 K/UL (ref 0–1.3)
MONOCYTES NFR BLD: 6.1 %
MRSA DNA SPEC QL NAA+PROBE: NORMAL
NEUTROPHILS # BLD: 4.3 K/UL (ref 1.7–7.7)
NEUTROPHILS NFR BLD: 78.2 %
PLATELET # BLD AUTO: 172 K/UL (ref 135–450)
PMV BLD AUTO: 8.8 FL (ref 5–10.5)
POTASSIUM SERPL-SCNC: 4.6 MMOL/L (ref 3.5–5.1)
PROCALCITONIN SERPL IA-MCNC: 0.14 NG/ML (ref 0–0.15)
PROTHROMBIN TIME: 39.1 SEC (ref 11.5–14.8)
RBC # BLD AUTO: 3.31 M/UL (ref 4.2–5.9)
SODIUM SERPL-SCNC: 138 MMOL/L (ref 136–145)
TRIGL SERPL-MCNC: 49 MG/DL (ref 0–150)
TROPONIN, HIGH SENSITIVITY: 87 NG/L (ref 0–22)
TROPONIN, HIGH SENSITIVITY: 88 NG/L (ref 0–22)
TROPONIN, HIGH SENSITIVITY: 88 NG/L (ref 0–22)
TROPONIN, HIGH SENSITIVITY: 92 NG/L (ref 0–22)
TROPONIN, HIGH SENSITIVITY: 93 NG/L (ref 0–22)
VLDLC SERPL CALC-MCNC: 10 MG/DL
WBC # BLD AUTO: 5.5 K/UL (ref 4–11)

## 2023-12-29 PROCEDURE — 85610 PROTHROMBIN TIME: CPT

## 2023-12-29 PROCEDURE — 84145 PROCALCITONIN (PCT): CPT

## 2023-12-29 PROCEDURE — 6370000000 HC RX 637 (ALT 250 FOR IP): Performed by: HOSPITALIST

## 2023-12-29 PROCEDURE — 6360000002 HC RX W HCPCS: Performed by: HOSPITALIST

## 2023-12-29 PROCEDURE — 2060000000 HC ICU INTERMEDIATE R&B

## 2023-12-29 PROCEDURE — 83735 ASSAY OF MAGNESIUM: CPT

## 2023-12-29 PROCEDURE — 2580000003 HC RX 258: Performed by: HOSPITALIST

## 2023-12-29 PROCEDURE — 82728 ASSAY OF FERRITIN: CPT

## 2023-12-29 PROCEDURE — 85025 COMPLETE CBC W/AUTO DIFF WBC: CPT

## 2023-12-29 PROCEDURE — 80048 BASIC METABOLIC PNL TOTAL CA: CPT

## 2023-12-29 PROCEDURE — 36415 COLL VENOUS BLD VENIPUNCTURE: CPT

## 2023-12-29 PROCEDURE — 84484 ASSAY OF TROPONIN QUANT: CPT

## 2023-12-29 PROCEDURE — 80061 LIPID PANEL: CPT

## 2023-12-29 RX ORDER — FAMOTIDINE 20 MG/1
20 TABLET, FILM COATED ORAL DAILY
Status: DISCONTINUED | OUTPATIENT
Start: 2023-12-30 | End: 2024-01-09 | Stop reason: HOSPADM

## 2023-12-29 RX ADMIN — Medication 10 ML: at 09:30

## 2023-12-29 RX ADMIN — THERA TABS 1 TABLET: TAB at 09:29

## 2023-12-29 RX ADMIN — ROSUVASTATIN CALCIUM 20 MG: 20 TABLET, FILM COATED ORAL at 09:29

## 2023-12-29 RX ADMIN — ALLOPURINOL 100 MG: 100 TABLET ORAL at 09:30

## 2023-12-29 RX ADMIN — FAMOTIDINE 20 MG: 20 TABLET ORAL at 09:28

## 2023-12-29 RX ADMIN — Medication 1000 UNITS: at 09:29

## 2023-12-29 RX ADMIN — Medication 400 MG: at 09:30

## 2023-12-29 RX ADMIN — FERROUS SULFATE TAB EC 324 MG (65 MG FE EQUIVALENT) 324 MG: 324 (65 FE) TABLET DELAYED RESPONSE at 09:29

## 2023-12-29 RX ADMIN — TAMSULOSIN HYDROCHLORIDE 0.4 MG: 0.4 CAPSULE ORAL at 09:21

## 2023-12-29 RX ADMIN — CEFTRIAXONE SODIUM 2000 MG: 2 INJECTION, POWDER, FOR SOLUTION INTRAMUSCULAR; INTRAVENOUS at 09:42

## 2023-12-29 RX ADMIN — ATENOLOL 25 MG: 50 TABLET ORAL at 09:10

## 2023-12-29 RX ADMIN — FUROSEMIDE 40 MG: 10 INJECTION, SOLUTION INTRAMUSCULAR; INTRAVENOUS at 17:14

## 2023-12-29 RX ADMIN — FUROSEMIDE 40 MG: 10 INJECTION, SOLUTION INTRAMUSCULAR; INTRAVENOUS at 09:30

## 2023-12-29 NOTE — H&P
Hospitalist  History and Physical    Patient:  Maurice Ackerman Jr  MRN: [id number]  PCP: Holly Govea    CHIEF COMPLAINT: Shortness of breath      HISTORY OF PRESENT ILLNESS:   The patient Maurice Ackerman Jr is a 89 y.o.male medical history significant for hypertension atrial fibrillation and malignant neoplasm of the kidney and patient is with a solitary kidney.    Patient has been feeling short of breath for the last several days patient also reports continuing cough that is nonproductive.  Patient failed outpatient treatment with doxycycline by the primary care physician.  Patient also reports that he has noticed gradually worsening swelling on the bilateral lower extremities and now he has noticed erythema and pain in both lower extremities.  Patient also reports swelling of the scrotum.    Patient denies fever chills no history of nausea vomiting or diarrhea no history of hematemesis or melena no history of urinary symptoms.      Past Medical History:        Diagnosis Date    A-fib (HCC)     Hypertension     Malignant neoplasm of kidney (HCC) 6/15/2012       Past Surgical History:        Procedure Laterality Date    ANGIOPLASTY      1998,2005,2010    APPENDECTOMY      CHOLECYSTECTOMY      KIDNEY REMOVAL         Medications Prior to Admission:    Prior to Admission medications    Medication Sig Start Date End Date Taking? Authorizing Provider   warfarin (COUMADIN) 2.5 MG tablet Take 1 tablet by mouth daily EXCEPT 1.25mg every Tuesday and Friday or as directed by [facility] [phone]    Ari Hooks MD   ferrous sulfate 325 (65 FE) MG tablet Take 1 tablet by mouth daily    Ari Hooks MD   Potassium Gluconate 595 MG TABS Take 595 mg by mouth daily    Ari Hooks MD   diphenhydrAMINE-APAP  MG TABS Take 1 tablet by mouth nightly as needed    Ari Hooks MD   vitamin D 1000 UNITS CAPS Take 1 capsule by mouth daily 3/4/11   Ari Hooks MD

## 2023-12-30 LAB
ALBUMIN SERPL-MCNC: 3.7 G/DL (ref 3.4–5)
ANION GAP SERPL CALCULATED.3IONS-SCNC: 9 MMOL/L (ref 3–16)
BUN SERPL-MCNC: 50 MG/DL (ref 7–20)
CALCIUM SERPL-MCNC: 9.5 MG/DL (ref 8.3–10.6)
CHLORIDE SERPL-SCNC: 99 MMOL/L (ref 99–110)
CO2 SERPL-SCNC: 28 MMOL/L (ref 21–32)
CREAT SERPL-MCNC: 2.3 MG/DL (ref 0.8–1.3)
DEPRECATED RDW RBC AUTO: 15.9 % (ref 12.4–15.4)
GFR SERPLBLD CREATININE-BSD FMLA CKD-EPI: 26 ML/MIN/{1.73_M2}
GLUCOSE SERPL-MCNC: 90 MG/DL (ref 70–99)
HCT VFR BLD AUTO: 29.6 % (ref 40.5–52.5)
HGB BLD-MCNC: 10 G/DL (ref 13.5–17.5)
INR PPP: 3.24 (ref 0.84–1.16)
MAGNESIUM SERPL-MCNC: 1.9 MG/DL (ref 1.8–2.4)
MCH RBC QN AUTO: 30.7 PG (ref 26–34)
MCHC RBC AUTO-ENTMCNC: 33.7 G/DL (ref 31–36)
MCV RBC AUTO: 91.1 FL (ref 80–100)
PHOSPHATE SERPL-MCNC: 3.9 MG/DL (ref 2.5–4.9)
PLATELET # BLD AUTO: 176 K/UL (ref 135–450)
PMV BLD AUTO: 8.7 FL (ref 5–10.5)
POTASSIUM SERPL-SCNC: 5 MMOL/L (ref 3.5–5.1)
PROTHROMBIN TIME: 32.8 SEC (ref 11.5–14.8)
RBC # BLD AUTO: 3.25 M/UL (ref 4.2–5.9)
SODIUM SERPL-SCNC: 136 MMOL/L (ref 136–145)
TROPONIN, HIGH SENSITIVITY: 92 NG/L (ref 0–22)
TROPONIN, HIGH SENSITIVITY: 98 NG/L (ref 0–22)
WBC # BLD AUTO: 5.7 K/UL (ref 4–11)

## 2023-12-30 PROCEDURE — 85027 COMPLETE CBC AUTOMATED: CPT

## 2023-12-30 PROCEDURE — 2580000003 HC RX 258: Performed by: INTERNAL MEDICINE

## 2023-12-30 PROCEDURE — 6360000002 HC RX W HCPCS: Performed by: INTERNAL MEDICINE

## 2023-12-30 PROCEDURE — 2580000003 HC RX 258: Performed by: HOSPITALIST

## 2023-12-30 PROCEDURE — 84484 ASSAY OF TROPONIN QUANT: CPT

## 2023-12-30 PROCEDURE — 6370000000 HC RX 637 (ALT 250 FOR IP): Performed by: HOSPITALIST

## 2023-12-30 PROCEDURE — 2060000000 HC ICU INTERMEDIATE R&B

## 2023-12-30 PROCEDURE — 94760 N-INVAS EAR/PLS OXIMETRY 1: CPT

## 2023-12-30 PROCEDURE — 6360000002 HC RX W HCPCS: Performed by: HOSPITALIST

## 2023-12-30 PROCEDURE — 80069 RENAL FUNCTION PANEL: CPT

## 2023-12-30 PROCEDURE — 36415 COLL VENOUS BLD VENIPUNCTURE: CPT

## 2023-12-30 PROCEDURE — 83735 ASSAY OF MAGNESIUM: CPT

## 2023-12-30 PROCEDURE — 85610 PROTHROMBIN TIME: CPT

## 2023-12-30 RX ORDER — WARFARIN SODIUM 1 MG/1
1 TABLET ORAL
Status: COMPLETED | OUTPATIENT
Start: 2023-12-30 | End: 2023-12-30

## 2023-12-30 RX ADMIN — ALLOPURINOL 100 MG: 100 TABLET ORAL at 09:09

## 2023-12-30 RX ADMIN — TAMSULOSIN HYDROCHLORIDE 0.4 MG: 0.4 CAPSULE ORAL at 09:09

## 2023-12-30 RX ADMIN — Medication 10 ML: at 09:10

## 2023-12-30 RX ADMIN — THERA TABS 1 TABLET: TAB at 09:09

## 2023-12-30 RX ADMIN — FUROSEMIDE 40 MG: 10 INJECTION, SOLUTION INTRAMUSCULAR; INTRAVENOUS at 18:04

## 2023-12-30 RX ADMIN — FUROSEMIDE 40 MG: 10 INJECTION, SOLUTION INTRAMUSCULAR; INTRAVENOUS at 09:09

## 2023-12-30 RX ADMIN — Medication 1000 UNITS: at 09:09

## 2023-12-30 RX ADMIN — ROSUVASTATIN CALCIUM 20 MG: 20 TABLET, FILM COATED ORAL at 09:09

## 2023-12-30 RX ADMIN — FUROSEMIDE 5 MG/HR: 10 INJECTION, SOLUTION INTRAMUSCULAR; INTRAVENOUS at 22:13

## 2023-12-30 RX ADMIN — Medication 10 ML: at 20:52

## 2023-12-30 RX ADMIN — FAMOTIDINE 20 MG: 20 TABLET ORAL at 09:09

## 2023-12-30 RX ADMIN — FERROUS SULFATE TAB EC 324 MG (65 MG FE EQUIVALENT) 324 MG: 324 (65 FE) TABLET DELAYED RESPONSE at 09:09

## 2023-12-30 RX ADMIN — AZITHROMYCIN MONOHYDRATE 500 MG: 500 INJECTION, POWDER, LYOPHILIZED, FOR SOLUTION INTRAVENOUS at 00:06

## 2023-12-30 RX ADMIN — CEFTRIAXONE SODIUM 2000 MG: 2 INJECTION, POWDER, FOR SOLUTION INTRAMUSCULAR; INTRAVENOUS at 09:23

## 2023-12-30 RX ADMIN — WARFARIN SODIUM 1 MG: 1 TABLET ORAL at 18:04

## 2023-12-30 RX ADMIN — ATENOLOL 25 MG: 50 TABLET ORAL at 09:09

## 2023-12-30 RX ADMIN — Medication 400 MG: at 09:09

## 2023-12-31 LAB
ALBUMIN SERPL-MCNC: 3.6 G/DL (ref 3.4–5)
ALBUMIN SERPL-MCNC: 3.8 G/DL (ref 3.4–5)
ANION GAP SERPL CALCULATED.3IONS-SCNC: 11 MMOL/L (ref 3–16)
ANION GAP SERPL CALCULATED.3IONS-SCNC: 12 MMOL/L (ref 3–16)
BUN SERPL-MCNC: 51 MG/DL (ref 7–20)
BUN SERPL-MCNC: 51 MG/DL (ref 7–20)
CALCIUM SERPL-MCNC: 9 MG/DL (ref 8.3–10.6)
CALCIUM SERPL-MCNC: 9.4 MG/DL (ref 8.3–10.6)
CHLORIDE SERPL-SCNC: 97 MMOL/L (ref 99–110)
CHLORIDE SERPL-SCNC: 98 MMOL/L (ref 99–110)
CO2 SERPL-SCNC: 25 MMOL/L (ref 21–32)
CO2 SERPL-SCNC: 28 MMOL/L (ref 21–32)
CREAT SERPL-MCNC: 2.4 MG/DL (ref 0.8–1.3)
CREAT SERPL-MCNC: 2.6 MG/DL (ref 0.8–1.3)
DEPRECATED RDW RBC AUTO: 15.8 % (ref 12.4–15.4)
GFR SERPLBLD CREATININE-BSD FMLA CKD-EPI: 22 ML/MIN/{1.73_M2}
GFR SERPLBLD CREATININE-BSD FMLA CKD-EPI: 24 ML/MIN/{1.73_M2}
GLUCOSE SERPL-MCNC: 121 MG/DL (ref 70–99)
GLUCOSE SERPL-MCNC: 94 MG/DL (ref 70–99)
HCT VFR BLD AUTO: 28.9 % (ref 40.5–52.5)
HGB BLD-MCNC: 9.7 G/DL (ref 13.5–17.5)
INR PPP: 2.66 (ref 0.84–1.16)
MAGNESIUM SERPL-MCNC: 1.9 MG/DL (ref 1.8–2.4)
MAGNESIUM SERPL-MCNC: 1.9 MG/DL (ref 1.8–2.4)
MCH RBC QN AUTO: 30.5 PG (ref 26–34)
MCHC RBC AUTO-ENTMCNC: 33.5 G/DL (ref 31–36)
MCV RBC AUTO: 91.1 FL (ref 80–100)
NT-PROBNP SERPL-MCNC: 7455 PG/ML (ref 0–449)
PHOSPHATE SERPL-MCNC: 3.7 MG/DL (ref 2.5–4.9)
PHOSPHATE SERPL-MCNC: 3.7 MG/DL (ref 2.5–4.9)
PLATELET # BLD AUTO: 195 K/UL (ref 135–450)
PMV BLD AUTO: 8.7 FL (ref 5–10.5)
POTASSIUM SERPL-SCNC: 4.2 MMOL/L (ref 3.5–5.1)
POTASSIUM SERPL-SCNC: 4.3 MMOL/L (ref 3.5–5.1)
PROTHROMBIN TIME: 28.2 SEC (ref 11.5–14.8)
RBC # BLD AUTO: 3.17 M/UL (ref 4.2–5.9)
SODIUM SERPL-SCNC: 135 MMOL/L (ref 136–145)
SODIUM SERPL-SCNC: 136 MMOL/L (ref 136–145)
WBC # BLD AUTO: 6.9 K/UL (ref 4–11)

## 2023-12-31 PROCEDURE — 2580000003 HC RX 258: Performed by: HOSPITALIST

## 2023-12-31 PROCEDURE — 6370000000 HC RX 637 (ALT 250 FOR IP): Performed by: HOSPITALIST

## 2023-12-31 PROCEDURE — 6370000000 HC RX 637 (ALT 250 FOR IP): Performed by: STUDENT IN AN ORGANIZED HEALTH CARE EDUCATION/TRAINING PROGRAM

## 2023-12-31 PROCEDURE — 6360000002 HC RX W HCPCS: Performed by: HOSPITALIST

## 2023-12-31 PROCEDURE — 2060000000 HC ICU INTERMEDIATE R&B

## 2023-12-31 PROCEDURE — 99222 1ST HOSP IP/OBS MODERATE 55: CPT | Performed by: INTERNAL MEDICINE

## 2023-12-31 PROCEDURE — 80069 RENAL FUNCTION PANEL: CPT

## 2023-12-31 PROCEDURE — 85027 COMPLETE CBC AUTOMATED: CPT

## 2023-12-31 PROCEDURE — 85610 PROTHROMBIN TIME: CPT

## 2023-12-31 PROCEDURE — 6360000002 HC RX W HCPCS: Performed by: INTERNAL MEDICINE

## 2023-12-31 PROCEDURE — 36415 COLL VENOUS BLD VENIPUNCTURE: CPT

## 2023-12-31 PROCEDURE — 83880 ASSAY OF NATRIURETIC PEPTIDE: CPT

## 2023-12-31 PROCEDURE — 2580000003 HC RX 258: Performed by: INTERNAL MEDICINE

## 2023-12-31 PROCEDURE — 83735 ASSAY OF MAGNESIUM: CPT

## 2023-12-31 RX ORDER — WARFARIN SODIUM 2.5 MG/1
1.25 TABLET ORAL
Status: COMPLETED | OUTPATIENT
Start: 2023-12-31 | End: 2023-12-31

## 2023-12-31 RX ORDER — POTASSIUM CHLORIDE 750 MG/1
20 TABLET, FILM COATED, EXTENDED RELEASE ORAL
Status: DISCONTINUED | OUTPATIENT
Start: 2024-01-01 | End: 2024-01-09 | Stop reason: HOSPADM

## 2023-12-31 RX ORDER — AZITHROMYCIN 500 MG/1
500 TABLET, FILM COATED ORAL NIGHTLY
Status: COMPLETED | OUTPATIENT
Start: 2023-12-31 | End: 2024-01-01

## 2023-12-31 RX ORDER — ROSUVASTATIN CALCIUM 10 MG/1
10 TABLET, COATED ORAL DAILY
Status: DISCONTINUED | OUTPATIENT
Start: 2023-12-31 | End: 2024-01-09 | Stop reason: HOSPADM

## 2023-12-31 RX ADMIN — WARFARIN SODIUM 1.25 MG: 2.5 TABLET ORAL at 18:22

## 2023-12-31 RX ADMIN — ACETAMINOPHEN 500 MG: 500 TABLET ORAL at 21:44

## 2023-12-31 RX ADMIN — FUROSEMIDE 5 MG/HR: 10 INJECTION, SOLUTION INTRAMUSCULAR; INTRAVENOUS at 15:11

## 2023-12-31 RX ADMIN — ALLOPURINOL 100 MG: 100 TABLET ORAL at 09:08

## 2023-12-31 RX ADMIN — TAMSULOSIN HYDROCHLORIDE 0.4 MG: 0.4 CAPSULE ORAL at 09:08

## 2023-12-31 RX ADMIN — FERROUS SULFATE TAB EC 324 MG (65 MG FE EQUIVALENT) 324 MG: 324 (65 FE) TABLET DELAYED RESPONSE at 09:08

## 2023-12-31 RX ADMIN — ROSUVASTATIN CALCIUM 10 MG: 20 TABLET, FILM COATED ORAL at 09:08

## 2023-12-31 RX ADMIN — CEFTRIAXONE SODIUM 2000 MG: 2 INJECTION, POWDER, FOR SOLUTION INTRAMUSCULAR; INTRAVENOUS at 09:17

## 2023-12-31 RX ADMIN — AZITHROMYCIN MONOHYDRATE 500 MG: 500 INJECTION, POWDER, LYOPHILIZED, FOR SOLUTION INTRAVENOUS at 01:32

## 2023-12-31 RX ADMIN — FAMOTIDINE 20 MG: 20 TABLET ORAL at 09:08

## 2023-12-31 RX ADMIN — THERA TABS 1 TABLET: TAB at 09:08

## 2023-12-31 RX ADMIN — ACETAMINOPHEN 500 MG: 500 TABLET ORAL at 04:42

## 2023-12-31 RX ADMIN — AZITHROMYCIN 500 MG: 500 TABLET, FILM COATED ORAL at 20:36

## 2023-12-31 RX ADMIN — Medication 1000 UNITS: at 09:08

## 2023-12-31 RX ADMIN — ATENOLOL 25 MG: 50 TABLET ORAL at 09:07

## 2023-12-31 RX ADMIN — Medication 10 ML: at 09:11

## 2023-12-31 RX ADMIN — Medication 400 MG: at 09:08

## 2023-12-31 NOTE — CONSULTS
Cardiology Consultation   Date: 12/30/2023  Admit Date:  12/28/2023  Reason for Consultation: acute CHF?  Consult Requesting Physician: Gordon Plunkett MD     Chief Complaint   Patient presents with    Shortness of Breath     Patient to ED via squad from [facility] for sob, cough, bilateral leg edema going into scrotum. Denies CHF     HPI: Maurice Ackerman Jr is a 89 y.o. M h/o HTN, permanent Afib well rate controlled, malignant renal neoplasm presents with dyspnea x several days PTA and non-productive cough and BLE swelling and scrotal swelling. Upon workup, BNP 6,800+, [place] 85 - 85- 87-88-92-98 with Cr 2.0-2.3. EKG shows atrial fibrillation with v-rates 80's bpm with nonspecific ST-T wave changes. CXR shows R pleural fluid.      Past Medical History:   Diagnosis Date    A-fib (HCC)     Hypertension     Malignant neoplasm of kidney (HCC) 6/15/2012        Past Surgical History:   Procedure Laterality Date    ANGIOPLASTY      1998,2005,2010    APPENDECTOMY      CHOLECYSTECTOMY      KIDNEY REMOVAL         Allergies   Allergen Reactions    2-(Ethylmercuriothio)Benzoic Acid      Leg pain, abd cramps    Atorvastatin      myalgias    Celecoxib Nausea Only    Ciprofloxacin Nausea Only and Other (See Comments)     Stomach upset    Clarithromycin Nausea Only    Ezetimibe      Leg pain    Fish Oil      abd upset    Metoprolol      abd upset    Moxifloxacin Other (See Comments)     tendonitis    Naproxen Nausea Only    Petrolatum-Zinc Oxide Other (See Comments)     Rxn unknown-poss caused a kidney stone    Rofecoxib Nausea Only    Simvastatin      Muscle aches    Sulfa Antibiotics     Adhesive Tape Nausea And Vomiting     Caused top layer of skin to tear off when removed       Social History:  Reviewed.  reports that he quit smoking about 53 years ago. He has never used smokeless tobacco. He reports current alcohol use. He reports that he does not use drugs.     Family History:  Reviewed. family history includes Parkinsonism in 
  Nephrology Consult Note   [employer]      Reason for consultation: CKD 4 / Volume Overload: follows with Dr. Lundberg in office. Cr baseline this past year has been ~ 2.1-2.3 mg/dL.     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 Patient presented to  ED on 12/28/2023 with complaints of SOB, persistent cough, and edema to BLE and genitals. States edema has been worsening over the past 2-3 months. PCP has been adjusting home diuretics. Patient states he was taking lasix 60 mg daily PTA. States cough is productive with greenish sputum -- took doxycycline recently. CXR (12/28/2023) positive for small right pleural effusion and bibasilar airspace disease. Started on IV azithromycin and rocephin inpatient. Also started on lasix IV 40 mg BID.    Cr since admission has been ~ 2.0 mg/dL. Cr baseline this past year has been 2.1 and 2.3 mg/dL. Last seen by Dr. Lundberg on 10/11/2023 at which time Cr was 1.9 mg/dL.     We have been consulted for CKD/volume overload management.     Subjective:      Patient seen and examined. Labs and chart reviewed. Resting in bed.    There were not complications last night.    Patient review of systems: Denies recent fever or chills. Endorses some difficulty with urinating due to swelling in genitals. Denies NSAID use. Denies N/V/D. Eating and drinking OK.     Scheduled Meds:   cefTRIAXone (ROCEPHIN) IV  2,000 mg IntraVENous Q24H    [START ON 12/30/2023] famotidine  20 mg Oral Daily    allopurinol  100 mg Oral Daily    atenolol  25 mg Oral Daily    ferrous sulfate  324 mg Oral Daily    magnesium oxide  400 mg Oral Daily    multivitamin  1 tablet Oral Daily    rosuvastatin  20 mg Oral Daily    tamsulosin  0.4 mg Oral Daily    Vitamin D  1,000 Units Oral Daily    sodium chloride flush  5-40 mL IntraVENous 2 times per day    furosemide  40 mg IntraVENous BID    warfarin placeholder: dosing by pharmacy   
(Please see full consult note by Dr. Gandhi, Cardiology, on 12/31/2023)  
Clinical Pharmacy Note  Warfarin Consult    Maurice Ackerman Jr is a 89 y.o. male receiving warfarin managed by pharmacy.      Warfarin Indication: afib  Target INR range: 2-3   Dose prior to admission: 2.5 mg daily except 1.25 mg on Tuesdays and Fridays      Current warfarin drug-drug interactions: none of clinical significance    Recent Labs     12/28/23  0950 12/28/23 1954   HGB 9.8*  --    HCT 30.0*  --    INR  --  4.17*       Assessment/Plan:    INR above goal range. Hold warfarin tonight. Daily PT/INR until stable within therapeutic range.     Thank you for the consult.  Will continue to follow.     Lorri BunchD  12/28/2023 8:25 PM    
Nutrition Education    Educated on balancing meals at [facility], avoiding ham which is his favorite.  CHF nutrition handout provided. Patient appreciated the visit, knows what changes need to be made and verbalized understanding  Learners: Patient and Family  Readiness: Eager  Method: Explanation and Handout  Response: Verbalizes Understanding  Contact name and number provided.    Time spent: 25 minutes    EMILY HICKS RD, [place]  Contact Number: Office: [phone]; [place]: [phone]      
Pharmacy has adjusted the Ancef dose per hospital policy  CrCl = 27    Ancef 2000 mg ivpb  q 12 h x 5 days  
to call Heart Failure Resource Line with any questions or concerns.  [x]  Educate further Mr. Ackerman Jr's on fluid restriction 48 oz- 64 oz during inpatient stay so he can understand how to measure intake at home.   [x]  Continue to educate on S/S of Heart Failure.  [x]  Emphasize daily weights, diet, and if changes, to call Heart Failure Resource Line  []  Cardiac Rehab Phase 1 referral placed and contact info given          Electronically signed by Catie Mccarthy, RN, BSN   on 12/29/2023 at 3:36 PM

## 2024-01-01 LAB
ALBUMIN SERPL-MCNC: 3.6 G/DL (ref 3.4–5)
ALBUMIN SERPL-MCNC: 3.7 G/DL (ref 3.4–5)
ANION GAP SERPL CALCULATED.3IONS-SCNC: 12 MMOL/L (ref 3–16)
ANION GAP SERPL CALCULATED.3IONS-SCNC: 14 MMOL/L (ref 3–16)
BUN SERPL-MCNC: 49 MG/DL (ref 7–20)
BUN SERPL-MCNC: 54 MG/DL (ref 7–20)
CALCIUM SERPL-MCNC: 9.1 MG/DL (ref 8.3–10.6)
CALCIUM SERPL-MCNC: 9.2 MG/DL (ref 8.3–10.6)
CHLORIDE SERPL-SCNC: 98 MMOL/L (ref 99–110)
CHLORIDE SERPL-SCNC: 99 MMOL/L (ref 99–110)
CO2 SERPL-SCNC: 25 MMOL/L (ref 21–32)
CO2 SERPL-SCNC: 28 MMOL/L (ref 21–32)
CREAT SERPL-MCNC: 2.3 MG/DL (ref 0.8–1.3)
CREAT SERPL-MCNC: 2.6 MG/DL (ref 0.8–1.3)
DEPRECATED RDW RBC AUTO: 15.6 % (ref 12.4–15.4)
GFR SERPLBLD CREATININE-BSD FMLA CKD-EPI: 22 ML/MIN/{1.73_M2}
GFR SERPLBLD CREATININE-BSD FMLA CKD-EPI: 26 ML/MIN/{1.73_M2}
GLUCOSE SERPL-MCNC: 118 MG/DL (ref 70–99)
GLUCOSE SERPL-MCNC: 95 MG/DL (ref 70–99)
HCT VFR BLD AUTO: 30.9 % (ref 40.5–52.5)
HGB BLD-MCNC: 10.5 G/DL (ref 13.5–17.5)
INR PPP: 2.36 (ref 0.84–1.16)
MAGNESIUM SERPL-MCNC: 1.9 MG/DL (ref 1.8–2.4)
MAGNESIUM SERPL-MCNC: 1.9 MG/DL (ref 1.8–2.4)
MCH RBC QN AUTO: 31 PG (ref 26–34)
MCHC RBC AUTO-ENTMCNC: 34.1 G/DL (ref 31–36)
MCV RBC AUTO: 91 FL (ref 80–100)
PHOSPHATE SERPL-MCNC: 3.7 MG/DL (ref 2.5–4.9)
PHOSPHATE SERPL-MCNC: 3.9 MG/DL (ref 2.5–4.9)
PLATELET # BLD AUTO: 183 K/UL (ref 135–450)
PMV BLD AUTO: 9 FL (ref 5–10.5)
POTASSIUM SERPL-SCNC: 4.1 MMOL/L (ref 3.5–5.1)
POTASSIUM SERPL-SCNC: 4.4 MMOL/L (ref 3.5–5.1)
PROTHROMBIN TIME: 25.7 SEC (ref 11.5–14.8)
RBC # BLD AUTO: 3.39 M/UL (ref 4.2–5.9)
SODIUM SERPL-SCNC: 138 MMOL/L (ref 136–145)
SODIUM SERPL-SCNC: 138 MMOL/L (ref 136–145)
WBC # BLD AUTO: 6.5 K/UL (ref 4–11)

## 2024-01-01 PROCEDURE — 2580000003 HC RX 258: Performed by: HOSPITALIST

## 2024-01-01 PROCEDURE — 6370000000 HC RX 637 (ALT 250 FOR IP): Performed by: STUDENT IN AN ORGANIZED HEALTH CARE EDUCATION/TRAINING PROGRAM

## 2024-01-01 PROCEDURE — 6360000002 HC RX W HCPCS: Performed by: HOSPITALIST

## 2024-01-01 PROCEDURE — 36415 COLL VENOUS BLD VENIPUNCTURE: CPT

## 2024-01-01 PROCEDURE — 99232 SBSQ HOSP IP/OBS MODERATE 35: CPT | Performed by: INTERNAL MEDICINE

## 2024-01-01 PROCEDURE — 6370000000 HC RX 637 (ALT 250 FOR IP): Performed by: HOSPITALIST

## 2024-01-01 PROCEDURE — 2060000000 HC ICU INTERMEDIATE R&B

## 2024-01-01 PROCEDURE — 80069 RENAL FUNCTION PANEL: CPT

## 2024-01-01 PROCEDURE — 83735 ASSAY OF MAGNESIUM: CPT

## 2024-01-01 PROCEDURE — 85610 PROTHROMBIN TIME: CPT

## 2024-01-01 PROCEDURE — 85027 COMPLETE CBC AUTOMATED: CPT

## 2024-01-01 RX ORDER — WARFARIN SODIUM 2 MG/1
2 TABLET ORAL
Status: COMPLETED | OUTPATIENT
Start: 2024-01-01 | End: 2024-01-01

## 2024-01-01 RX ADMIN — FAMOTIDINE 20 MG: 20 TABLET ORAL at 09:04

## 2024-01-01 RX ADMIN — ACETAMINOPHEN 500 MG: 500 TABLET ORAL at 21:57

## 2024-01-01 RX ADMIN — ROSUVASTATIN CALCIUM 10 MG: 20 TABLET, FILM COATED ORAL at 09:04

## 2024-01-01 RX ADMIN — ACETAMINOPHEN 650 MG: 325 TABLET ORAL at 12:26

## 2024-01-01 RX ADMIN — FERROUS SULFATE TAB EC 324 MG (65 MG FE EQUIVALENT) 324 MG: 324 (65 FE) TABLET DELAYED RESPONSE at 09:04

## 2024-01-01 RX ADMIN — DIPHENHYDRAMINE HCL 25 MG: 25 TABLET ORAL at 21:56

## 2024-01-01 RX ADMIN — AZITHROMYCIN 500 MG: 500 TABLET, FILM COATED ORAL at 21:56

## 2024-01-01 RX ADMIN — THERA TABS 1 TABLET: TAB at 09:04

## 2024-01-01 RX ADMIN — TAMSULOSIN HYDROCHLORIDE 0.4 MG: 0.4 CAPSULE ORAL at 09:04

## 2024-01-01 RX ADMIN — ATENOLOL 25 MG: 50 TABLET ORAL at 09:04

## 2024-01-01 RX ADMIN — Medication 1000 UNITS: at 09:05

## 2024-01-01 RX ADMIN — WARFARIN SODIUM 2 MG: 2 TABLET ORAL at 17:41

## 2024-01-01 RX ADMIN — CEFTRIAXONE SODIUM 2000 MG: 2 INJECTION, POWDER, FOR SOLUTION INTRAMUSCULAR; INTRAVENOUS at 09:11

## 2024-01-01 RX ADMIN — Medication 400 MG: at 09:05

## 2024-01-01 RX ADMIN — Medication 10 ML: at 21:56

## 2024-01-01 RX ADMIN — ALLOPURINOL 100 MG: 100 TABLET ORAL at 09:04

## 2024-01-01 ASSESSMENT — ENCOUNTER SYMPTOMS
SORE THROAT: 0
VOMITING: 0
ABDOMINAL PAIN: 0
COUGH: 0
DIARRHEA: 0
CONSTIPATION: 0
NAUSEA: 0
SHORTNESS OF BREATH: 0
SINUS PRESSURE: 0
COLOR CHANGE: 0
BLOOD IN STOOL: 0
WHEEZING: 0
TROUBLE SWALLOWING: 0
BACK PAIN: 0

## 2024-01-01 ASSESSMENT — PAIN SCALES - GENERAL
PAINLEVEL_OUTOF10: 5
PAINLEVEL_OUTOF10: 0
PAINLEVEL_OUTOF10: 6

## 2024-01-01 ASSESSMENT — PAIN DESCRIPTION - DESCRIPTORS: DESCRIPTORS: ACHING;DISCOMFORT

## 2024-01-01 ASSESSMENT — PAIN DESCRIPTION - ORIENTATION: ORIENTATION: LOWER;MID

## 2024-01-01 ASSESSMENT — PAIN - FUNCTIONAL ASSESSMENT: PAIN_FUNCTIONAL_ASSESSMENT: ACTIVITIES ARE NOT PREVENTED

## 2024-01-01 ASSESSMENT — PAIN DESCRIPTION - LOCATION: LOCATION: BACK

## 2024-01-01 ASSESSMENT — PAIN SCALES - WONG BAKER: WONGBAKER_NUMERICALRESPONSE: 0

## 2024-01-02 PROBLEM — R79.89 ELEVATED TROPONIN: Status: ACTIVE | Noted: 2024-01-02

## 2024-01-02 PROBLEM — I50.33 ACUTE ON CHRONIC DIASTOLIC HEART FAILURE (HCC): Status: ACTIVE | Noted: 2024-01-02

## 2024-01-02 LAB
ALBUMIN SERPL-MCNC: 3.5 G/DL (ref 3.4–5)
ALBUMIN SERPL-MCNC: 3.6 G/DL (ref 3.4–5)
ANION GAP SERPL CALCULATED.3IONS-SCNC: 12 MMOL/L (ref 3–16)
ANION GAP SERPL CALCULATED.3IONS-SCNC: 13 MMOL/L (ref 3–16)
BUN SERPL-MCNC: 55 MG/DL (ref 7–20)
BUN SERPL-MCNC: 58 MG/DL (ref 7–20)
CALCIUM SERPL-MCNC: 9.1 MG/DL (ref 8.3–10.6)
CALCIUM SERPL-MCNC: 9.2 MG/DL (ref 8.3–10.6)
CHLORIDE SERPL-SCNC: 95 MMOL/L (ref 99–110)
CHLORIDE SERPL-SCNC: 99 MMOL/L (ref 99–110)
CO2 SERPL-SCNC: 26 MMOL/L (ref 21–32)
CO2 SERPL-SCNC: 27 MMOL/L (ref 21–32)
CREAT SERPL-MCNC: 2.6 MG/DL (ref 0.8–1.3)
CREAT SERPL-MCNC: 2.8 MG/DL (ref 0.8–1.3)
DEPRECATED RDW RBC AUTO: 15.6 % (ref 12.4–15.4)
GFR SERPLBLD CREATININE-BSD FMLA CKD-EPI: 20 ML/MIN/{1.73_M2}
GFR SERPLBLD CREATININE-BSD FMLA CKD-EPI: 22 ML/MIN/{1.73_M2}
GLUCOSE SERPL-MCNC: 108 MG/DL (ref 70–99)
GLUCOSE SERPL-MCNC: 97 MG/DL (ref 70–99)
HCT VFR BLD AUTO: 28.9 % (ref 40.5–52.5)
HGB BLD-MCNC: 9.8 G/DL (ref 13.5–17.5)
INR PPP: 2.37 (ref 0.84–1.16)
MAGNESIUM SERPL-MCNC: 1.9 MG/DL (ref 1.8–2.4)
MAGNESIUM SERPL-MCNC: 2 MG/DL (ref 1.8–2.4)
MCH RBC QN AUTO: 30.7 PG (ref 26–34)
MCHC RBC AUTO-ENTMCNC: 33.8 G/DL (ref 31–36)
MCV RBC AUTO: 90.7 FL (ref 80–100)
PHOSPHATE SERPL-MCNC: 3.9 MG/DL (ref 2.5–4.9)
PHOSPHATE SERPL-MCNC: 4 MG/DL (ref 2.5–4.9)
PLATELET # BLD AUTO: 180 K/UL (ref 135–450)
PMV BLD AUTO: 9 FL (ref 5–10.5)
POTASSIUM SERPL-SCNC: 3.9 MMOL/L (ref 3.5–5.1)
POTASSIUM SERPL-SCNC: 4.4 MMOL/L (ref 3.5–5.1)
PROTHROMBIN TIME: 25.8 SEC (ref 11.5–14.8)
RBC # BLD AUTO: 3.18 M/UL (ref 4.2–5.9)
SODIUM SERPL-SCNC: 135 MMOL/L (ref 136–145)
SODIUM SERPL-SCNC: 137 MMOL/L (ref 136–145)
WBC # BLD AUTO: 6.2 K/UL (ref 4–11)

## 2024-01-02 PROCEDURE — 2580000003 HC RX 258: Performed by: INTERNAL MEDICINE

## 2024-01-02 PROCEDURE — 85610 PROTHROMBIN TIME: CPT

## 2024-01-02 PROCEDURE — 99232 SBSQ HOSP IP/OBS MODERATE 35: CPT | Performed by: INTERNAL MEDICINE

## 2024-01-02 PROCEDURE — 6360000002 HC RX W HCPCS

## 2024-01-02 PROCEDURE — 85027 COMPLETE CBC AUTOMATED: CPT

## 2024-01-02 PROCEDURE — 36415 COLL VENOUS BLD VENIPUNCTURE: CPT

## 2024-01-02 PROCEDURE — 2580000003 HC RX 258

## 2024-01-02 PROCEDURE — 83735 ASSAY OF MAGNESIUM: CPT

## 2024-01-02 PROCEDURE — 80069 RENAL FUNCTION PANEL: CPT

## 2024-01-02 PROCEDURE — 6370000000 HC RX 637 (ALT 250 FOR IP): Performed by: NURSE PRACTITIONER

## 2024-01-02 PROCEDURE — 6360000002 HC RX W HCPCS: Performed by: INTERNAL MEDICINE

## 2024-01-02 PROCEDURE — 6370000000 HC RX 637 (ALT 250 FOR IP): Performed by: HOSPITALIST

## 2024-01-02 PROCEDURE — 6370000000 HC RX 637 (ALT 250 FOR IP): Performed by: INTERNAL MEDICINE

## 2024-01-02 PROCEDURE — 94760 N-INVAS EAR/PLS OXIMETRY 1: CPT

## 2024-01-02 PROCEDURE — 6370000000 HC RX 637 (ALT 250 FOR IP): Performed by: STUDENT IN AN ORGANIZED HEALTH CARE EDUCATION/TRAINING PROGRAM

## 2024-01-02 PROCEDURE — 2060000000 HC ICU INTERMEDIATE R&B

## 2024-01-02 PROCEDURE — 92610 EVALUATE SWALLOWING FUNCTION: CPT

## 2024-01-02 RX ORDER — AZITHROMYCIN 250 MG/1
250 TABLET, FILM COATED ORAL NIGHTLY
Status: COMPLETED | OUTPATIENT
Start: 2024-01-02 | End: 2024-01-04

## 2024-01-02 RX ORDER — LANOLIN ALCOHOL/MO/W.PET/CERES
6 CREAM (GRAM) TOPICAL ONCE
Status: COMPLETED | OUTPATIENT
Start: 2024-01-02 | End: 2024-01-02

## 2024-01-02 RX ORDER — WARFARIN SODIUM 2.5 MG/1
2.5 TABLET ORAL
Status: COMPLETED | OUTPATIENT
Start: 2024-01-02 | End: 2024-01-02

## 2024-01-02 RX ADMIN — Medication 6 MG: at 01:11

## 2024-01-02 RX ADMIN — CEFTRIAXONE SODIUM 2000 MG: 2 INJECTION, POWDER, FOR SOLUTION INTRAMUSCULAR; INTRAVENOUS at 15:38

## 2024-01-02 RX ADMIN — AZITHROMYCIN 250 MG: 250 TABLET, FILM COATED ORAL at 20:47

## 2024-01-02 RX ADMIN — THERA TABS 1 TABLET: TAB at 09:05

## 2024-01-02 RX ADMIN — TAMSULOSIN HYDROCHLORIDE 0.4 MG: 0.4 CAPSULE ORAL at 09:06

## 2024-01-02 RX ADMIN — Medication 1000 UNITS: at 09:05

## 2024-01-02 RX ADMIN — WARFARIN SODIUM 2.5 MG: 2.5 TABLET ORAL at 18:11

## 2024-01-02 RX ADMIN — ATENOLOL 25 MG: 50 TABLET ORAL at 09:05

## 2024-01-02 RX ADMIN — FAMOTIDINE 20 MG: 20 TABLET ORAL at 09:05

## 2024-01-02 RX ADMIN — Medication 400 MG: at 09:05

## 2024-01-02 RX ADMIN — FUROSEMIDE 5 MG/HR: 10 INJECTION, SOLUTION INTRAMUSCULAR; INTRAVENOUS at 01:31

## 2024-01-02 RX ADMIN — ROSUVASTATIN CALCIUM 10 MG: 20 TABLET, FILM COATED ORAL at 09:05

## 2024-01-02 RX ADMIN — ALLOPURINOL 100 MG: 100 TABLET ORAL at 09:05

## 2024-01-02 RX ADMIN — FERROUS SULFATE TAB EC 324 MG (65 MG FE EQUIVALENT) 324 MG: 324 (65 FE) TABLET DELAYED RESPONSE at 09:05

## 2024-01-02 RX ADMIN — FUROSEMIDE 10 MG/HR: 10 INJECTION, SOLUTION INTRAMUSCULAR; INTRAVENOUS at 19:36

## 2024-01-03 ENCOUNTER — APPOINTMENT (OUTPATIENT)
Dept: GENERAL RADIOLOGY | Age: 89
DRG: 291 | End: 2024-01-03
Payer: MEDICARE

## 2024-01-03 LAB
ALBUMIN SERPL-MCNC: 3.6 G/DL (ref 3.4–5)
ANION GAP SERPL CALCULATED.3IONS-SCNC: 13 MMOL/L (ref 3–16)
BUN SERPL-MCNC: 52 MG/DL (ref 7–20)
CALCIUM SERPL-MCNC: 9.4 MG/DL (ref 8.3–10.6)
CHLORIDE SERPL-SCNC: 99 MMOL/L (ref 99–110)
CO2 SERPL-SCNC: 25 MMOL/L (ref 21–32)
CREAT SERPL-MCNC: 2.3 MG/DL (ref 0.8–1.3)
DEPRECATED RDW RBC AUTO: 16.4 % (ref 12.4–15.4)
GFR SERPLBLD CREATININE-BSD FMLA CKD-EPI: 26 ML/MIN/{1.73_M2}
GLUCOSE SERPL-MCNC: 137 MG/DL (ref 70–99)
HCT VFR BLD AUTO: 29.3 % (ref 40.5–52.5)
HGB BLD-MCNC: 9.7 G/DL (ref 13.5–17.5)
INR PPP: 2.45 (ref 0.84–1.16)
MAGNESIUM SERPL-MCNC: 1.8 MG/DL (ref 1.8–2.4)
MCH RBC QN AUTO: 30.3 PG (ref 26–34)
MCHC RBC AUTO-ENTMCNC: 33.1 G/DL (ref 31–36)
MCV RBC AUTO: 91.8 FL (ref 80–100)
NT-PROBNP SERPL-MCNC: 7943 PG/ML (ref 0–449)
PHOSPHATE SERPL-MCNC: 3.9 MG/DL (ref 2.5–4.9)
PLATELET # BLD AUTO: 191 K/UL (ref 135–450)
PMV BLD AUTO: 8.8 FL (ref 5–10.5)
POTASSIUM SERPL-SCNC: 3.8 MMOL/L (ref 3.5–5.1)
PROTHROMBIN TIME: 26.4 SEC (ref 11.5–14.8)
RBC # BLD AUTO: 3.19 M/UL (ref 4.2–5.9)
SODIUM SERPL-SCNC: 137 MMOL/L (ref 136–145)
WBC # BLD AUTO: 6 K/UL (ref 4–11)

## 2024-01-03 PROCEDURE — 6360000002 HC RX W HCPCS: Performed by: INTERNAL MEDICINE

## 2024-01-03 PROCEDURE — 92611 MOTION FLUOROSCOPY/SWALLOW: CPT

## 2024-01-03 PROCEDURE — 80069 RENAL FUNCTION PANEL: CPT

## 2024-01-03 PROCEDURE — 6370000000 HC RX 637 (ALT 250 FOR IP): Performed by: STUDENT IN AN ORGANIZED HEALTH CARE EDUCATION/TRAINING PROGRAM

## 2024-01-03 PROCEDURE — 6370000000 HC RX 637 (ALT 250 FOR IP): Performed by: HOSPITALIST

## 2024-01-03 PROCEDURE — 97161 PT EVAL LOW COMPLEX 20 MIN: CPT | Performed by: PHYSICAL THERAPIST

## 2024-01-03 PROCEDURE — 85610 PROTHROMBIN TIME: CPT

## 2024-01-03 PROCEDURE — 2580000003 HC RX 258: Performed by: INTERNAL MEDICINE

## 2024-01-03 PROCEDURE — 97116 GAIT TRAINING THERAPY: CPT | Performed by: PHYSICAL THERAPIST

## 2024-01-03 PROCEDURE — 6360000002 HC RX W HCPCS

## 2024-01-03 PROCEDURE — 97165 OT EVAL LOW COMPLEX 30 MIN: CPT

## 2024-01-03 PROCEDURE — 85027 COMPLETE CBC AUTOMATED: CPT

## 2024-01-03 PROCEDURE — 2060000000 HC ICU INTERMEDIATE R&B

## 2024-01-03 PROCEDURE — 2580000003 HC RX 258

## 2024-01-03 PROCEDURE — 36415 COLL VENOUS BLD VENIPUNCTURE: CPT

## 2024-01-03 PROCEDURE — 2580000003 HC RX 258: Performed by: HOSPITALIST

## 2024-01-03 PROCEDURE — 83735 ASSAY OF MAGNESIUM: CPT

## 2024-01-03 PROCEDURE — 74230 X-RAY XM SWLNG FUNCJ C+: CPT

## 2024-01-03 PROCEDURE — 83880 ASSAY OF NATRIURETIC PEPTIDE: CPT

## 2024-01-03 PROCEDURE — 92526 ORAL FUNCTION THERAPY: CPT

## 2024-01-03 PROCEDURE — 97535 SELF CARE MNGMENT TRAINING: CPT

## 2024-01-03 PROCEDURE — 6370000000 HC RX 637 (ALT 250 FOR IP): Performed by: INTERNAL MEDICINE

## 2024-01-03 RX ORDER — WARFARIN SODIUM 2.5 MG/1
2.5 TABLET ORAL
Status: COMPLETED | OUTPATIENT
Start: 2024-01-03 | End: 2024-01-03

## 2024-01-03 RX ADMIN — WARFARIN SODIUM 2.5 MG: 2.5 TABLET ORAL at 18:40

## 2024-01-03 RX ADMIN — FAMOTIDINE 20 MG: 20 TABLET ORAL at 09:06

## 2024-01-03 RX ADMIN — Medication 10 ML: at 09:07

## 2024-01-03 RX ADMIN — Medication 400 MG: at 09:06

## 2024-01-03 RX ADMIN — Medication 1000 UNITS: at 09:07

## 2024-01-03 RX ADMIN — FUROSEMIDE 10 MG/HR: 10 INJECTION, SOLUTION INTRAMUSCULAR; INTRAVENOUS at 16:47

## 2024-01-03 RX ADMIN — THERA TABS 1 TABLET: TAB at 09:07

## 2024-01-03 RX ADMIN — ROSUVASTATIN CALCIUM 10 MG: 20 TABLET, FILM COATED ORAL at 09:07

## 2024-01-03 RX ADMIN — AZITHROMYCIN 250 MG: 250 TABLET, FILM COATED ORAL at 19:43

## 2024-01-03 RX ADMIN — ACETAMINOPHEN 650 MG: 325 TABLET ORAL at 07:17

## 2024-01-03 RX ADMIN — CEFTRIAXONE SODIUM 2000 MG: 2 INJECTION, POWDER, FOR SOLUTION INTRAMUSCULAR; INTRAVENOUS at 16:51

## 2024-01-03 RX ADMIN — Medication 10 ML: at 19:43

## 2024-01-03 RX ADMIN — ATENOLOL 25 MG: 50 TABLET ORAL at 09:07

## 2024-01-03 RX ADMIN — FERROUS SULFATE TAB EC 324 MG (65 MG FE EQUIVALENT) 324 MG: 324 (65 FE) TABLET DELAYED RESPONSE at 09:06

## 2024-01-03 RX ADMIN — TAMSULOSIN HYDROCHLORIDE 0.4 MG: 0.4 CAPSULE ORAL at 09:06

## 2024-01-03 RX ADMIN — DIPHENHYDRAMINE HCL 25 MG: 25 TABLET ORAL at 21:22

## 2024-01-03 RX ADMIN — ACETAMINOPHEN 500 MG: 500 TABLET ORAL at 21:22

## 2024-01-03 RX ADMIN — ALLOPURINOL 100 MG: 100 TABLET ORAL at 09:06

## 2024-01-03 RX ADMIN — FUROSEMIDE 10 MG/HR: 10 INJECTION, SOLUTION INTRAMUSCULAR; INTRAVENOUS at 06:37

## 2024-01-03 ASSESSMENT — PAIN SCALES - GENERAL
PAINLEVEL_OUTOF10: 3
PAINLEVEL_OUTOF10: 4
PAINLEVEL_OUTOF10: 2
PAINLEVEL_OUTOF10: 2

## 2024-01-03 ASSESSMENT — PAIN SCALES - WONG BAKER: WONGBAKER_NUMERICALRESPONSE: 0

## 2024-01-03 ASSESSMENT — PAIN DESCRIPTION - ORIENTATION: ORIENTATION: RIGHT;LEFT

## 2024-01-03 ASSESSMENT — PAIN DESCRIPTION - DESCRIPTORS
DESCRIPTORS: ACHING
DESCRIPTORS: ACHING

## 2024-01-03 ASSESSMENT — PAIN DESCRIPTION - LOCATION
LOCATION: BACK
LOCATION: ABDOMEN

## 2024-01-03 ASSESSMENT — PAIN - FUNCTIONAL ASSESSMENT: PAIN_FUNCTIONAL_ASSESSMENT: ACTIVITIES ARE NOT PREVENTED

## 2024-01-03 NOTE — PROCEDURES
liquids, mildly (nectar) thick liquids , moderately (honey) thick liquids , puree , soft solids, and regular solids     Oral Phase  Prolonged/impaired mastication : anterior munching /mashing mastication pattern  Piecemeal swallows  Weak Lingual Manipulation: with reduced bolus formation and prolonged lingual mashing A-P oral transit  Variable Delayed Trigger of Palatal Elevation:   Reduced Tongue Base Retraction:all  Post swallow Lingual / Palatal Residue: all but most symptomatic food consistencies mid to posterior tongue to valleculae  Fatigue of Mechanism: can impact    Pharyngeal Phase  Pharyngeal pooling prior to swallow initiation  with premature loss/spillage to valleculae and slightly beyond of  liquids and of masticated food    Delayed initiation of swallow with :  Pooling Valleculae: across consistencies  Pooling Pyriform: inconsistently across thin and thick liquids  Reduced Epiglottic Distention: inconsistent liquids  Reduced Tongue Base: across all consistencies  Reduced Pharyngeal Peristalsis:   Pharyngeal Residue - Valleculae: all consistencies but more severe post swallow of foods with reduced pt sensation  Pharyngeal Residue - Pyriform: post swallow regular solid food with reduced pt sensation  Pharyngeal Residue - Posterior Pharynx: barium line PW  Penetration:   Episodes before/during /after liquids and inconsistently of mildly thick liquids  Risk post swallow of pharyngeal food residue if not cleared  Aspiration :   Risk Post swallow of residual penetration of liquids with reduced pt sensation  Risk post swallow of pharyngeal food residue if not cleared  Fatigue of Mechanism: noted    Upper Esophageal Phase  Upper Esophageal Screen- Major Contributing Deficits  Reduced Cricopharyngeal Opening:   Barium lines PES    Following Evaluation:  Provided education regarding role of SLP, results of assessment, recommendations and general speech pathology plan of care.   [x] Pt verbalized understanding

## 2024-01-04 LAB
ALBUMIN SERPL-MCNC: 3.6 G/DL (ref 3.4–5)
ANION GAP SERPL CALCULATED.3IONS-SCNC: 12 MMOL/L (ref 3–16)
BUN SERPL-MCNC: 57 MG/DL (ref 7–20)
CALCIUM SERPL-MCNC: 9.1 MG/DL (ref 8.3–10.6)
CHLORIDE SERPL-SCNC: 96 MMOL/L (ref 99–110)
CO2 SERPL-SCNC: 29 MMOL/L (ref 21–32)
CREAT SERPL-MCNC: 2.4 MG/DL (ref 0.8–1.3)
DEPRECATED RDW RBC AUTO: 15.7 % (ref 12.4–15.4)
GFR SERPLBLD CREATININE-BSD FMLA CKD-EPI: 24 ML/MIN/{1.73_M2}
GLUCOSE SERPL-MCNC: 81 MG/DL (ref 70–99)
HCT VFR BLD AUTO: 29.2 % (ref 40.5–52.5)
HGB BLD-MCNC: 9.8 G/DL (ref 13.5–17.5)
INR PPP: 2.52 (ref 0.84–1.16)
MCH RBC QN AUTO: 30.6 PG (ref 26–34)
MCHC RBC AUTO-ENTMCNC: 33.6 G/DL (ref 31–36)
MCV RBC AUTO: 91.1 FL (ref 80–100)
PHOSPHATE SERPL-MCNC: 4 MG/DL (ref 2.5–4.9)
PLATELET # BLD AUTO: 207 K/UL (ref 135–450)
PMV BLD AUTO: 9 FL (ref 5–10.5)
POTASSIUM SERPL-SCNC: 3.7 MMOL/L (ref 3.5–5.1)
PROTHROMBIN TIME: 27 SEC (ref 11.5–14.8)
RBC # BLD AUTO: 3.2 M/UL (ref 4.2–5.9)
SODIUM SERPL-SCNC: 137 MMOL/L (ref 136–145)
WBC # BLD AUTO: 5.2 K/UL (ref 4–11)

## 2024-01-04 PROCEDURE — 80069 RENAL FUNCTION PANEL: CPT

## 2024-01-04 PROCEDURE — 6360000002 HC RX W HCPCS: Performed by: INTERNAL MEDICINE

## 2024-01-04 PROCEDURE — 85610 PROTHROMBIN TIME: CPT

## 2024-01-04 PROCEDURE — 2580000003 HC RX 258

## 2024-01-04 PROCEDURE — 97116 GAIT TRAINING THERAPY: CPT | Performed by: PHYSICAL THERAPIST

## 2024-01-04 PROCEDURE — 2580000003 HC RX 258: Performed by: INTERNAL MEDICINE

## 2024-01-04 PROCEDURE — 2060000000 HC ICU INTERMEDIATE R&B

## 2024-01-04 PROCEDURE — 6370000000 HC RX 637 (ALT 250 FOR IP): Performed by: HOSPITALIST

## 2024-01-04 PROCEDURE — 94760 N-INVAS EAR/PLS OXIMETRY 1: CPT

## 2024-01-04 PROCEDURE — 6370000000 HC RX 637 (ALT 250 FOR IP): Performed by: INTERNAL MEDICINE

## 2024-01-04 PROCEDURE — 92526 ORAL FUNCTION THERAPY: CPT

## 2024-01-04 PROCEDURE — 6370000000 HC RX 637 (ALT 250 FOR IP): Performed by: STUDENT IN AN ORGANIZED HEALTH CARE EDUCATION/TRAINING PROGRAM

## 2024-01-04 PROCEDURE — 6360000002 HC RX W HCPCS

## 2024-01-04 PROCEDURE — 97129 THER IVNTJ 1ST 15 MIN: CPT

## 2024-01-04 PROCEDURE — 85027 COMPLETE CBC AUTOMATED: CPT

## 2024-01-04 PROCEDURE — 36415 COLL VENOUS BLD VENIPUNCTURE: CPT

## 2024-01-04 RX ORDER — WARFARIN SODIUM 2.5 MG/1
2.5 TABLET ORAL
Status: COMPLETED | OUTPATIENT
Start: 2024-01-04 | End: 2024-01-04

## 2024-01-04 RX ADMIN — AZITHROMYCIN 250 MG: 250 TABLET, FILM COATED ORAL at 19:35

## 2024-01-04 RX ADMIN — FUROSEMIDE 10 MG/HR: 10 INJECTION, SOLUTION INTRAMUSCULAR; INTRAVENOUS at 03:15

## 2024-01-04 RX ADMIN — ROSUVASTATIN CALCIUM 10 MG: 20 TABLET, FILM COATED ORAL at 09:43

## 2024-01-04 RX ADMIN — Medication 1000 UNITS: at 09:44

## 2024-01-04 RX ADMIN — FERROUS SULFATE TAB EC 324 MG (65 MG FE EQUIVALENT) 324 MG: 324 (65 FE) TABLET DELAYED RESPONSE at 09:43

## 2024-01-04 RX ADMIN — THERA TABS 1 TABLET: TAB at 09:44

## 2024-01-04 RX ADMIN — FUROSEMIDE 15 MG/HR: 10 INJECTION, SOLUTION INTRAMUSCULAR; INTRAVENOUS at 17:17

## 2024-01-04 RX ADMIN — ATENOLOL 25 MG: 50 TABLET ORAL at 09:44

## 2024-01-04 RX ADMIN — FUROSEMIDE 15 MG/HR: 10 INJECTION, SOLUTION INTRAMUSCULAR; INTRAVENOUS at 22:53

## 2024-01-04 RX ADMIN — TRAMADOL HYDROCHLORIDE 50 MG: 50 TABLET, FILM COATED ORAL at 03:03

## 2024-01-04 RX ADMIN — TAMSULOSIN HYDROCHLORIDE 0.4 MG: 0.4 CAPSULE ORAL at 09:44

## 2024-01-04 RX ADMIN — WARFARIN SODIUM 2.5 MG: 2.5 TABLET ORAL at 18:05

## 2024-01-04 RX ADMIN — ACETAMINOPHEN 650 MG: 325 TABLET ORAL at 03:02

## 2024-01-04 RX ADMIN — ALLOPURINOL 100 MG: 100 TABLET ORAL at 09:44

## 2024-01-04 RX ADMIN — FAMOTIDINE 20 MG: 20 TABLET ORAL at 09:43

## 2024-01-04 RX ADMIN — CEFTRIAXONE SODIUM 2000 MG: 2 INJECTION, POWDER, FOR SOLUTION INTRAMUSCULAR; INTRAVENOUS at 15:33

## 2024-01-04 RX ADMIN — Medication 400 MG: at 09:44

## 2024-01-04 ASSESSMENT — PAIN SCALES - GENERAL
PAINLEVEL_OUTOF10: 0
PAINLEVEL_OUTOF10: 3
PAINLEVEL_OUTOF10: 6
PAINLEVEL_OUTOF10: 0
PAINLEVEL_OUTOF10: 0

## 2024-01-04 ASSESSMENT — PAIN DESCRIPTION - LOCATION
LOCATION: BACK
LOCATION: ABDOMEN

## 2024-01-04 ASSESSMENT — PAIN DESCRIPTION - ORIENTATION: ORIENTATION: LEFT;RIGHT

## 2024-01-04 ASSESSMENT — PAIN DESCRIPTION - DESCRIPTORS
DESCRIPTORS: ACHING
DESCRIPTORS: ACHING

## 2024-01-05 LAB
ALBUMIN SERPL-MCNC: 3.7 G/DL (ref 3.4–5)
ANION GAP SERPL CALCULATED.3IONS-SCNC: 11 MMOL/L (ref 3–16)
BUN SERPL-MCNC: 59 MG/DL (ref 7–20)
CALCIUM SERPL-MCNC: 9.4 MG/DL (ref 8.3–10.6)
CHLORIDE SERPL-SCNC: 95 MMOL/L (ref 99–110)
CO2 SERPL-SCNC: 30 MMOL/L (ref 21–32)
CREAT SERPL-MCNC: 2.1 MG/DL (ref 0.8–1.3)
DEPRECATED RDW RBC AUTO: 15.8 % (ref 12.4–15.4)
GFR SERPLBLD CREATININE-BSD FMLA CKD-EPI: 29 ML/MIN/{1.73_M2}
GLUCOSE SERPL-MCNC: 86 MG/DL (ref 70–99)
HCT VFR BLD AUTO: 29.9 % (ref 40.5–52.5)
HGB BLD-MCNC: 10 G/DL (ref 13.5–17.5)
INR PPP: 2.57 (ref 0.84–1.16)
MAGNESIUM SERPL-MCNC: 1.9 MG/DL (ref 1.8–2.4)
MCH RBC QN AUTO: 30.3 PG (ref 26–34)
MCHC RBC AUTO-ENTMCNC: 33.3 G/DL (ref 31–36)
MCV RBC AUTO: 90.9 FL (ref 80–100)
PHOSPHATE SERPL-MCNC: 4.1 MG/DL (ref 2.5–4.9)
PLATELET # BLD AUTO: 197 K/UL (ref 135–450)
PMV BLD AUTO: 8.8 FL (ref 5–10.5)
POTASSIUM SERPL-SCNC: 3.8 MMOL/L (ref 3.5–5.1)
PROTHROMBIN TIME: 27.4 SEC (ref 11.5–14.8)
RBC # BLD AUTO: 3.29 M/UL (ref 4.2–5.9)
SODIUM SERPL-SCNC: 136 MMOL/L (ref 136–145)
WBC # BLD AUTO: 5.2 K/UL (ref 4–11)

## 2024-01-05 PROCEDURE — 6370000000 HC RX 637 (ALT 250 FOR IP): Performed by: STUDENT IN AN ORGANIZED HEALTH CARE EDUCATION/TRAINING PROGRAM

## 2024-01-05 PROCEDURE — 97116 GAIT TRAINING THERAPY: CPT | Performed by: PHYSICAL THERAPIST

## 2024-01-05 PROCEDURE — 94760 N-INVAS EAR/PLS OXIMETRY 1: CPT

## 2024-01-05 PROCEDURE — 6370000000 HC RX 637 (ALT 250 FOR IP): Performed by: HOSPITALIST

## 2024-01-05 PROCEDURE — 36415 COLL VENOUS BLD VENIPUNCTURE: CPT

## 2024-01-05 PROCEDURE — 6360000002 HC RX W HCPCS

## 2024-01-05 PROCEDURE — 85027 COMPLETE CBC AUTOMATED: CPT

## 2024-01-05 PROCEDURE — 80069 RENAL FUNCTION PANEL: CPT

## 2024-01-05 PROCEDURE — 2580000003 HC RX 258: Performed by: HOSPITALIST

## 2024-01-05 PROCEDURE — 2580000003 HC RX 258

## 2024-01-05 PROCEDURE — 92526 ORAL FUNCTION THERAPY: CPT

## 2024-01-05 PROCEDURE — 85610 PROTHROMBIN TIME: CPT

## 2024-01-05 PROCEDURE — 83735 ASSAY OF MAGNESIUM: CPT

## 2024-01-05 PROCEDURE — 2060000000 HC ICU INTERMEDIATE R&B

## 2024-01-05 RX ORDER — WARFARIN SODIUM 2 MG/1
2 TABLET ORAL
Status: COMPLETED | OUTPATIENT
Start: 2024-01-05 | End: 2024-01-05

## 2024-01-05 RX ADMIN — ATENOLOL 25 MG: 50 TABLET ORAL at 08:49

## 2024-01-05 RX ADMIN — FAMOTIDINE 20 MG: 20 TABLET ORAL at 08:48

## 2024-01-05 RX ADMIN — FUROSEMIDE 15 MG/HR: 10 INJECTION, SOLUTION INTRAMUSCULAR; INTRAVENOUS at 05:57

## 2024-01-05 RX ADMIN — TAMSULOSIN HYDROCHLORIDE 0.4 MG: 0.4 CAPSULE ORAL at 08:48

## 2024-01-05 RX ADMIN — WARFARIN SODIUM 2 MG: 2 TABLET ORAL at 17:46

## 2024-01-05 RX ADMIN — ACETAMINOPHEN 500 MG: 500 TABLET ORAL at 23:57

## 2024-01-05 RX ADMIN — ALLOPURINOL 100 MG: 100 TABLET ORAL at 08:48

## 2024-01-05 RX ADMIN — ROSUVASTATIN CALCIUM 10 MG: 20 TABLET, FILM COATED ORAL at 08:48

## 2024-01-05 RX ADMIN — Medication 1000 UNITS: at 08:49

## 2024-01-05 RX ADMIN — DIPHENHYDRAMINE HCL 25 MG: 25 TABLET ORAL at 23:57

## 2024-01-05 RX ADMIN — FERROUS SULFATE TAB EC 324 MG (65 MG FE EQUIVALENT) 324 MG: 324 (65 FE) TABLET DELAYED RESPONSE at 08:48

## 2024-01-05 RX ADMIN — Medication 10 ML: at 08:49

## 2024-01-05 RX ADMIN — THERA TABS 1 TABLET: TAB at 08:48

## 2024-01-05 RX ADMIN — FUROSEMIDE 15 MG/HR: 10 INJECTION, SOLUTION INTRAMUSCULAR; INTRAVENOUS at 22:52

## 2024-01-05 RX ADMIN — FUROSEMIDE 15 MG/HR: 10 INJECTION, SOLUTION INTRAMUSCULAR; INTRAVENOUS at 15:03

## 2024-01-05 RX ADMIN — Medication 400 MG: at 08:48

## 2024-01-05 ASSESSMENT — PAIN DESCRIPTION - LOCATION: LOCATION: BUTTOCKS

## 2024-01-05 ASSESSMENT — PAIN DESCRIPTION - DESCRIPTORS: DESCRIPTORS: ACHING

## 2024-01-05 ASSESSMENT — PAIN SCALES - GENERAL: PAINLEVEL_OUTOF10: 3

## 2024-01-06 LAB
ALBUMIN SERPL-MCNC: 3.6 G/DL (ref 3.4–5)
ANION GAP SERPL CALCULATED.3IONS-SCNC: 9 MMOL/L (ref 3–16)
BUN SERPL-MCNC: 64 MG/DL (ref 7–20)
CALCIUM SERPL-MCNC: 8.8 MG/DL (ref 8.3–10.6)
CHLORIDE SERPL-SCNC: 96 MMOL/L (ref 99–110)
CO2 SERPL-SCNC: 29 MMOL/L (ref 21–32)
CREAT SERPL-MCNC: 2.2 MG/DL (ref 0.8–1.3)
DEPRECATED RDW RBC AUTO: 15.6 % (ref 12.4–15.4)
GFR SERPLBLD CREATININE-BSD FMLA CKD-EPI: 27 ML/MIN/{1.73_M2}
GLUCOSE SERPL-MCNC: 94 MG/DL (ref 70–99)
HCT VFR BLD AUTO: 28.8 % (ref 40.5–52.5)
HGB BLD-MCNC: 10 G/DL (ref 13.5–17.5)
INR PPP: 2.59 (ref 0.84–1.16)
MAGNESIUM SERPL-MCNC: 1.8 MG/DL (ref 1.8–2.4)
MCH RBC QN AUTO: 31.2 PG (ref 26–34)
MCHC RBC AUTO-ENTMCNC: 34.6 G/DL (ref 31–36)
MCV RBC AUTO: 90.1 FL (ref 80–100)
NT-PROBNP SERPL-MCNC: 6715 PG/ML (ref 0–449)
PHOSPHATE SERPL-MCNC: 3.7 MG/DL (ref 2.5–4.9)
PLATELET # BLD AUTO: 182 K/UL (ref 135–450)
PMV BLD AUTO: 8.6 FL (ref 5–10.5)
POTASSIUM SERPL-SCNC: 3.6 MMOL/L (ref 3.5–5.1)
PROTHROMBIN TIME: 27.6 SEC (ref 11.5–14.8)
RBC # BLD AUTO: 3.19 M/UL (ref 4.2–5.9)
SODIUM SERPL-SCNC: 134 MMOL/L (ref 136–145)
WBC # BLD AUTO: 4.1 K/UL (ref 4–11)

## 2024-01-06 PROCEDURE — 83735 ASSAY OF MAGNESIUM: CPT

## 2024-01-06 PROCEDURE — 36415 COLL VENOUS BLD VENIPUNCTURE: CPT

## 2024-01-06 PROCEDURE — 6360000002 HC RX W HCPCS

## 2024-01-06 PROCEDURE — 85027 COMPLETE CBC AUTOMATED: CPT

## 2024-01-06 PROCEDURE — 85610 PROTHROMBIN TIME: CPT

## 2024-01-06 PROCEDURE — 6370000000 HC RX 637 (ALT 250 FOR IP): Performed by: STUDENT IN AN ORGANIZED HEALTH CARE EDUCATION/TRAINING PROGRAM

## 2024-01-06 PROCEDURE — 2060000000 HC ICU INTERMEDIATE R&B

## 2024-01-06 PROCEDURE — 2580000003 HC RX 258

## 2024-01-06 PROCEDURE — 6370000000 HC RX 637 (ALT 250 FOR IP): Performed by: HOSPITALIST

## 2024-01-06 PROCEDURE — 83880 ASSAY OF NATRIURETIC PEPTIDE: CPT

## 2024-01-06 PROCEDURE — 80069 RENAL FUNCTION PANEL: CPT

## 2024-01-06 RX ORDER — WARFARIN SODIUM 2 MG/1
2 TABLET ORAL
Status: COMPLETED | OUTPATIENT
Start: 2024-01-06 | End: 2024-01-06

## 2024-01-06 RX ADMIN — ROSUVASTATIN CALCIUM 10 MG: 20 TABLET, FILM COATED ORAL at 08:37

## 2024-01-06 RX ADMIN — FAMOTIDINE 20 MG: 20 TABLET ORAL at 08:37

## 2024-01-06 RX ADMIN — THERA TABS 1 TABLET: TAB at 08:37

## 2024-01-06 RX ADMIN — ATENOLOL 25 MG: 50 TABLET ORAL at 08:36

## 2024-01-06 RX ADMIN — Medication 400 MG: at 08:37

## 2024-01-06 RX ADMIN — FERROUS SULFATE TAB EC 324 MG (65 MG FE EQUIVALENT) 324 MG: 324 (65 FE) TABLET DELAYED RESPONSE at 08:37

## 2024-01-06 RX ADMIN — ACETAMINOPHEN 500 MG: 500 TABLET ORAL at 22:02

## 2024-01-06 RX ADMIN — FUROSEMIDE 15 MG/HR: 10 INJECTION, SOLUTION INTRAMUSCULAR; INTRAVENOUS at 23:19

## 2024-01-06 RX ADMIN — FUROSEMIDE 15 MG/HR: 10 INJECTION, SOLUTION INTRAMUSCULAR; INTRAVENOUS at 08:36

## 2024-01-06 RX ADMIN — Medication 1000 UNITS: at 08:36

## 2024-01-06 RX ADMIN — ALLOPURINOL 100 MG: 100 TABLET ORAL at 08:36

## 2024-01-06 RX ADMIN — WARFARIN SODIUM 2 MG: 2 TABLET ORAL at 18:59

## 2024-01-06 RX ADMIN — TAMSULOSIN HYDROCHLORIDE 0.4 MG: 0.4 CAPSULE ORAL at 08:37

## 2024-01-06 RX ADMIN — DIPHENHYDRAMINE HCL 25 MG: 25 TABLET ORAL at 22:02

## 2024-01-06 ASSESSMENT — PAIN SCALES - GENERAL: PAINLEVEL_OUTOF10: 0

## 2024-01-07 LAB
ALBUMIN SERPL-MCNC: 3.3 G/DL (ref 3.4–5)
ANION GAP SERPL CALCULATED.3IONS-SCNC: 13 MMOL/L (ref 3–16)
BUN SERPL-MCNC: 58 MG/DL (ref 7–20)
CALCIUM SERPL-MCNC: 9.1 MG/DL (ref 8.3–10.6)
CHLORIDE SERPL-SCNC: 94 MMOL/L (ref 99–110)
CO2 SERPL-SCNC: 29 MMOL/L (ref 21–32)
CREAT SERPL-MCNC: 2.2 MG/DL (ref 0.8–1.3)
DEPRECATED RDW RBC AUTO: 16.1 % (ref 12.4–15.4)
GFR SERPLBLD CREATININE-BSD FMLA CKD-EPI: 27 ML/MIN/{1.73_M2}
GLUCOSE SERPL-MCNC: 83 MG/DL (ref 70–99)
HCT VFR BLD AUTO: 30.2 % (ref 40.5–52.5)
HGB BLD-MCNC: 10.3 G/DL (ref 13.5–17.5)
INR PPP: 2.76 (ref 0.84–1.16)
MAGNESIUM SERPL-MCNC: 1.8 MG/DL (ref 1.8–2.4)
MCH RBC QN AUTO: 31 PG (ref 26–34)
MCHC RBC AUTO-ENTMCNC: 34.1 G/DL (ref 31–36)
MCV RBC AUTO: 90.9 FL (ref 80–100)
PHOSPHATE SERPL-MCNC: 3.6 MG/DL (ref 2.5–4.9)
PLATELET # BLD AUTO: 168 K/UL (ref 135–450)
PMV BLD AUTO: 8.4 FL (ref 5–10.5)
POTASSIUM SERPL-SCNC: 3.5 MMOL/L (ref 3.5–5.1)
PROTHROMBIN TIME: 29 SEC (ref 11.5–14.8)
RBC # BLD AUTO: 3.32 M/UL (ref 4.2–5.9)
SODIUM SERPL-SCNC: 136 MMOL/L (ref 136–145)
WBC # BLD AUTO: 4.3 K/UL (ref 4–11)

## 2024-01-07 PROCEDURE — 6360000002 HC RX W HCPCS

## 2024-01-07 PROCEDURE — 85027 COMPLETE CBC AUTOMATED: CPT

## 2024-01-07 PROCEDURE — 2580000003 HC RX 258: Performed by: HOSPITALIST

## 2024-01-07 PROCEDURE — 6370000000 HC RX 637 (ALT 250 FOR IP): Performed by: STUDENT IN AN ORGANIZED HEALTH CARE EDUCATION/TRAINING PROGRAM

## 2024-01-07 PROCEDURE — 85610 PROTHROMBIN TIME: CPT

## 2024-01-07 PROCEDURE — 6370000000 HC RX 637 (ALT 250 FOR IP): Performed by: HOSPITALIST

## 2024-01-07 PROCEDURE — 2060000000 HC ICU INTERMEDIATE R&B

## 2024-01-07 PROCEDURE — 80069 RENAL FUNCTION PANEL: CPT

## 2024-01-07 PROCEDURE — 83735 ASSAY OF MAGNESIUM: CPT

## 2024-01-07 PROCEDURE — 2580000003 HC RX 258

## 2024-01-07 RX ORDER — WARFARIN SODIUM 1 MG/1
1 TABLET ORAL
Status: COMPLETED | OUTPATIENT
Start: 2024-01-07 | End: 2024-01-07

## 2024-01-07 RX ADMIN — ATENOLOL 25 MG: 50 TABLET ORAL at 10:48

## 2024-01-07 RX ADMIN — ROSUVASTATIN CALCIUM 10 MG: 20 TABLET, FILM COATED ORAL at 10:49

## 2024-01-07 RX ADMIN — FUROSEMIDE 15 MG/HR: 10 INJECTION, SOLUTION INTRAMUSCULAR; INTRAVENOUS at 05:50

## 2024-01-07 RX ADMIN — FUROSEMIDE 15 MG/HR: 10 INJECTION, SOLUTION INTRAMUSCULAR; INTRAVENOUS at 17:24

## 2024-01-07 RX ADMIN — FERROUS SULFATE TAB EC 324 MG (65 MG FE EQUIVALENT) 324 MG: 324 (65 FE) TABLET DELAYED RESPONSE at 10:49

## 2024-01-07 RX ADMIN — ACETAMINOPHEN 500 MG: 500 TABLET ORAL at 22:16

## 2024-01-07 RX ADMIN — ALLOPURINOL 100 MG: 100 TABLET ORAL at 10:49

## 2024-01-07 RX ADMIN — Medication 400 MG: at 10:49

## 2024-01-07 RX ADMIN — Medication 10 ML: at 10:51

## 2024-01-07 RX ADMIN — FAMOTIDINE 20 MG: 20 TABLET ORAL at 10:49

## 2024-01-07 RX ADMIN — TAMSULOSIN HYDROCHLORIDE 0.4 MG: 0.4 CAPSULE ORAL at 10:48

## 2024-01-07 RX ADMIN — Medication 1000 UNITS: at 10:48

## 2024-01-07 RX ADMIN — Medication 10 ML: at 20:10

## 2024-01-07 RX ADMIN — DIPHENHYDRAMINE HCL 25 MG: 25 TABLET ORAL at 22:16

## 2024-01-07 RX ADMIN — WARFARIN SODIUM 1 MG: 1 TABLET ORAL at 18:28

## 2024-01-07 RX ADMIN — THERA TABS 1 TABLET: TAB at 10:49

## 2024-01-08 ENCOUNTER — APPOINTMENT (OUTPATIENT)
Dept: PHARMACY | Age: 89
End: 2024-01-08
Payer: MEDICARE

## 2024-01-08 LAB
ALBUMIN SERPL-MCNC: 3.4 G/DL (ref 3.4–5)
ANION GAP SERPL CALCULATED.3IONS-SCNC: 42 MMOL/L (ref 3–16)
BUN SERPL-MCNC: 63 MG/DL (ref 7–20)
CALCIUM SERPL-MCNC: 8.9 MG/DL (ref 8.3–10.6)
CHLORIDE SERPL-SCNC: 64 MMOL/L (ref 99–110)
CO2 SERPL-SCNC: 31 MMOL/L (ref 21–32)
CREAT SERPL-MCNC: 2.4 MG/DL (ref 0.8–1.3)
DEPRECATED RDW RBC AUTO: 16 % (ref 12.4–15.4)
GFR SERPLBLD CREATININE-BSD FMLA CKD-EPI: 24 ML/MIN/{1.73_M2}
GLUCOSE SERPL-MCNC: 90 MG/DL (ref 70–99)
HCT VFR BLD AUTO: 28.5 % (ref 40.5–52.5)
HGB BLD-MCNC: 9.6 G/DL (ref 13.5–17.5)
INR PPP: 2.87 (ref 0.84–1.16)
MAGNESIUM SERPL-MCNC: 1.9 MG/DL (ref 1.8–2.4)
MCH RBC QN AUTO: 30.6 PG (ref 26–34)
MCHC RBC AUTO-ENTMCNC: 33.6 G/DL (ref 31–36)
MCV RBC AUTO: 91.3 FL (ref 80–100)
PHOSPHATE SERPL-MCNC: 3.7 MG/DL (ref 2.5–4.9)
PLATELET # BLD AUTO: 177 K/UL (ref 135–450)
PMV BLD AUTO: 8.8 FL (ref 5–10.5)
POTASSIUM SERPL-SCNC: 3.6 MMOL/L (ref 3.5–5.1)
PROTHROMBIN TIME: 29.9 SEC (ref 11.5–14.8)
RBC # BLD AUTO: 3.13 M/UL (ref 4.2–5.9)
SODIUM SERPL-SCNC: 137 MMOL/L (ref 136–145)
WBC # BLD AUTO: 5 K/UL (ref 4–11)

## 2024-01-08 PROCEDURE — 6370000000 HC RX 637 (ALT 250 FOR IP)

## 2024-01-08 PROCEDURE — 6370000000 HC RX 637 (ALT 250 FOR IP): Performed by: STUDENT IN AN ORGANIZED HEALTH CARE EDUCATION/TRAINING PROGRAM

## 2024-01-08 PROCEDURE — 80069 RENAL FUNCTION PANEL: CPT

## 2024-01-08 PROCEDURE — 2580000003 HC RX 258

## 2024-01-08 PROCEDURE — 6360000002 HC RX W HCPCS

## 2024-01-08 PROCEDURE — 2060000000 HC ICU INTERMEDIATE R&B

## 2024-01-08 PROCEDURE — 6370000000 HC RX 637 (ALT 250 FOR IP): Performed by: HOSPITALIST

## 2024-01-08 PROCEDURE — 85027 COMPLETE CBC AUTOMATED: CPT

## 2024-01-08 PROCEDURE — 83735 ASSAY OF MAGNESIUM: CPT

## 2024-01-08 PROCEDURE — 85610 PROTHROMBIN TIME: CPT

## 2024-01-08 PROCEDURE — 2580000003 HC RX 258: Performed by: HOSPITALIST

## 2024-01-08 RX ORDER — TORSEMIDE 20 MG/1
40 TABLET ORAL 2 TIMES DAILY
Status: DISCONTINUED | OUTPATIENT
Start: 2024-01-08 | End: 2024-01-09

## 2024-01-08 RX ADMIN — FUROSEMIDE 15 MG/HR: 10 INJECTION, SOLUTION INTRAMUSCULAR; INTRAVENOUS at 00:08

## 2024-01-08 RX ADMIN — FUROSEMIDE 15 MG/HR: 10 INJECTION, SOLUTION INTRAMUSCULAR; INTRAVENOUS at 12:52

## 2024-01-08 RX ADMIN — FAMOTIDINE 20 MG: 20 TABLET ORAL at 08:54

## 2024-01-08 RX ADMIN — THERA TABS 1 TABLET: TAB at 08:55

## 2024-01-08 RX ADMIN — FERROUS SULFATE TAB EC 324 MG (65 MG FE EQUIVALENT) 324 MG: 324 (65 FE) TABLET DELAYED RESPONSE at 08:55

## 2024-01-08 RX ADMIN — ROSUVASTATIN CALCIUM 10 MG: 20 TABLET, FILM COATED ORAL at 08:55

## 2024-01-08 RX ADMIN — Medication 10 ML: at 09:03

## 2024-01-08 RX ADMIN — Medication 400 MG: at 08:55

## 2024-01-08 RX ADMIN — DIPHENHYDRAMINE HCL 25 MG: 25 TABLET ORAL at 21:55

## 2024-01-08 RX ADMIN — Medication 10 ML: at 21:56

## 2024-01-08 RX ADMIN — FUROSEMIDE 15 MG/HR: 10 INJECTION, SOLUTION INTRAMUSCULAR; INTRAVENOUS at 06:05

## 2024-01-08 RX ADMIN — ACETAMINOPHEN 500 MG: 500 TABLET ORAL at 21:55

## 2024-01-08 RX ADMIN — ATENOLOL 25 MG: 50 TABLET ORAL at 08:54

## 2024-01-08 RX ADMIN — TORSEMIDE 40 MG: 20 TABLET ORAL at 18:25

## 2024-01-08 RX ADMIN — TAMSULOSIN HYDROCHLORIDE 0.4 MG: 0.4 CAPSULE ORAL at 08:55

## 2024-01-08 RX ADMIN — ALLOPURINOL 100 MG: 100 TABLET ORAL at 08:55

## 2024-01-08 RX ADMIN — Medication 1000 UNITS: at 08:54

## 2024-01-08 NOTE — PLAN OF CARE
Problem: Respiratory - Adult  Goal: Achieves optimal ventilation and oxygenation  1/1/2024 1311 by Lauren Martinez RN  Outcome: Progressing     Problem: Metabolic/Fluid and Electrolytes - Adult  Goal: Electrolytes maintained within normal limits  1/1/2024 1311 by Lauren Martinez RN  Outcome: Progressing     Problem: Metabolic/Fluid and Electrolytes - Adult  Goal: Hemodynamic stability and optimal renal function maintained  1/1/2024 1311 by Lauren Martinez RN  Outcome: Progressing     Problem: Discharge Planning  Goal: Discharge to home or other facility with appropriate resources  1/1/2024 1311 by Lauren Martinez RN  Outcome: Progressing     Problem: Pain  Goal: Verbalizes/displays adequate comfort level or baseline comfort level  1/1/2024 1311 by Lauren Martinez RN  Outcome: Progressing     Problem: Safety - Adult  Goal: Free from fall injury  1/1/2024 1311 by Lauren Martinez RN  Outcome: Progressing     Problem: ABCDS Injury Assessment  Goal: Absence of physical injury  1/1/2024 1311 by Lauren Martinez RN  Outcome: Progressing     
  Problem: Respiratory - Adult  Goal: Achieves optimal ventilation and oxygenation  1/4/2024 2332 by Deshaun Mendez RN  Outcome: Progressing  1/4/2024 1203 by Lauren Martinez RN  Outcome: Progressing     Problem: Metabolic/Fluid and Electrolytes - Adult  Goal: Electrolytes maintained within normal limits  1/4/2024 2332 by Dehsaun Mendez RN  Outcome: Progressing  1/4/2024 1203 by Lauren Martinez RN  Outcome: Progressing  Goal: Hemodynamic stability and optimal renal function maintained  1/4/2024 2332 by Deshaun Mendez RN  Outcome: Progressing  1/4/2024 1203 by Lauren Martinez RN  Outcome: Progressing     Problem: Discharge Planning  Goal: Discharge to home or other facility with appropriate resources  1/4/2024 2332 by Deshaun Mendez RN  Outcome: Progressing  1/4/2024 1203 by Lauren Martinez RN  Outcome: Progressing     Problem: Pain  Goal: Verbalizes/displays adequate comfort level or baseline comfort level  1/4/2024 2332 by Deshaun Mendez RN  Outcome: Progressing  1/4/2024 1203 by Lauren Martinez RN  Outcome: Progressing     Problem: Safety - Adult  Goal: Free from fall injury  1/4/2024 2332 by Deshaun Mendez RN  Outcome: Progressing  1/4/2024 1203 by Lauren Martinez RN  Outcome: Progressing     Problem: ABCDS Injury Assessment  Goal: Absence of physical injury  1/4/2024 2332 by Deshaun Mendez RN  Outcome: Progressing  1/4/2024 1203 by Lauren Martinez RN  Outcome: Progressing     
  Problem: Respiratory - Adult  Goal: Achieves optimal ventilation and oxygenation  1/5/2024 1024 by Lauren Martinez RN  Outcome: Progressing     Problem: Metabolic/Fluid and Electrolytes - Adult  Goal: Electrolytes maintained within normal limits  1/5/2024 1024 by Lauren Martinez RN  Outcome: Progressing     Problem: Metabolic/Fluid and Electrolytes - Adult  Goal: Hemodynamic stability and optimal renal function maintained  1/5/2024 1024 by Lauren Martinez RN  Outcome: Progressing     Problem: Pain  Goal: Verbalizes/displays adequate comfort level or baseline comfort level  1/5/2024 1024 by Lauren Martinez RN  Outcome: Progressing     Problem: Safety - Adult  Goal: Free from fall injury  1/5/2024 1024 by Lauren Martinez RN  Outcome: Progressing     Problem: ABCDS Injury Assessment  Goal: Absence of physical injury  1/5/2024 1024 by Lauren Martinez RN  Outcome: Progressing     
  Problem: Respiratory - Adult  Goal: Achieves optimal ventilation and oxygenation  12/29/2023 1023 by Steph Pierce RN  Outcome: Progressing  12/28/2023 2204 by Macrina Jimenez RN  Outcome: Progressing     Problem: Metabolic/Fluid and Electrolytes - Adult  Goal: Electrolytes maintained within normal limits  12/29/2023 1023 by Steph Pierce RN  Outcome: Progressing  12/28/2023 2204 by Macrina Jimenez RN  Outcome: Progressing  Goal: Hemodynamic stability and optimal renal function maintained  12/29/2023 1023 by Steph Pierce RN  Outcome: Progressing  12/28/2023 2204 by Macrina Jimenez RN  Outcome: Progressing     Problem: Discharge Planning  Goal: Discharge to home or other facility with appropriate resources  12/29/2023 1023 by Steph Pierce RN  Outcome: Progressing  12/28/2023 2204 by Macrina Jimenez RN  Outcome: Progressing     Problem: Pain  Goal: Verbalizes/displays adequate comfort level or baseline comfort level  12/29/2023 1023 by Steph Pierce RN  Outcome: Progressing  12/28/2023 2204 by Macrina Jimenez RN  Outcome: Progressing     Problem: Safety - Adult  Goal: Free from fall injury  12/29/2023 1023 by Steph Pierce RN  Outcome: Progressing  12/28/2023 2204 by Macrina Jimenez RN  Outcome: Progressing     Problem: ABCDS Injury Assessment  Goal: Absence of physical injury  12/29/2023 1023 by Steph Pierce RN  Outcome: Progressing  12/28/2023 2204 by Macrina Jimenez RN  Outcome: Progressing     
  Problem: Respiratory - Adult  Goal: Achieves optimal ventilation and oxygenation  12/29/2023 2209 by Keerthi Ramos RN  Outcome: Progressing  12/29/2023 1023 by Steph Pierce RN  Outcome: Progressing     Problem: Metabolic/Fluid and Electrolytes - Adult  Goal: Electrolytes maintained within normal limits  12/29/2023 2209 by Keerthi Ramos RN  Outcome: Progressing  12/29/2023 1023 by Steph Pierce RN  Outcome: Progressing  Goal: Hemodynamic stability and optimal renal function maintained  12/29/2023 2209 by Keerthi Ramos RN  Outcome: Progressing  12/29/2023 1023 by Steph Pierce RN  Outcome: Progressing     Problem: Discharge Planning  Goal: Discharge to home or other facility with appropriate resources  12/29/2023 2209 by Keerthi Ramos RN  Outcome: Progressing  12/29/2023 1023 by Steph Pierce RN  Outcome: Progressing     Problem: Pain  Goal: Verbalizes/displays adequate comfort level or baseline comfort level  12/29/2023 2209 by Keerthi Ramos RN  Outcome: Progressing  12/29/2023 1023 by Steph Pierce RN  Outcome: Progressing     Problem: Safety - Adult  Goal: Free from fall injury  12/29/2023 2209 by Keerthi Ramos RN  Outcome: Progressing  12/29/2023 1023 by Steph Pierce RN  Outcome: Progressing     Problem: ABCDS Injury Assessment  Goal: Absence of physical injury  12/29/2023 2209 by Keerthi Ramos RN  Outcome: Progressing  12/29/2023 1023 by Steph Pierce RN  Outcome: Progressing     
  Problem: Respiratory - Adult  Goal: Achieves optimal ventilation and oxygenation  12/30/2023 1148 by Ashley Green RN  Outcome: Progressing  Flowsheets (Taken 12/30/2023 0752)  Achieves optimal ventilation and oxygenation:   Assess for changes in respiratory status   Assess for changes in mentation and behavior   Position to facilitate oxygenation and minimize respiratory effort   Oxygen supplementation based on oxygen saturation or arterial blood gases   Encourage broncho-pulmonary hygiene including cough, deep breathe, incentive spirometry   Assess and instruct to report shortness of breath or any respiratory difficulty   Respiratory therapy support as indicated  12/29/2023 2209 by Keerthi Ramos RN  Outcome: Progressing     Problem: Metabolic/Fluid and Electrolytes - Adult  Goal: Electrolytes maintained within normal limits  12/30/2023 1148 by Ashley Green RN  Outcome: Progressing  Flowsheets (Taken 12/30/2023 0752)  Electrolytes maintained within normal limits:   Monitor labs and assess patient for signs and symptoms of electrolyte imbalances   Instruct patient on fluid and nutrition restrictions as appropriate  12/29/2023 2209 by Keerthi Ramos RN  Outcome: Progressing  Goal: Hemodynamic stability and optimal renal function maintained  12/30/2023 1148 by Ashley Green RN  Outcome: Progressing  Flowsheets (Taken 12/30/2023 0752)  Hemodynamic stability and optimal renal function maintained:   Monitor labs and assess for signs and symptoms of volume excess or deficit   Monitor intake, output and patient weight   Instruct patient on fluid and nutrition restrictions as appropriate  12/29/2023 2209 by Keerthi Ramos RN  Outcome: Progressing     Problem: Discharge Planning  Goal: Discharge to home or other facility with appropriate resources  12/30/2023 1148 by Ashley Green RN  Outcome: Progressing  Flowsheets (Taken 12/30/2023 0752)  Discharge to home or other facility with appropriate resources:   
  Problem: Respiratory - Adult  Goal: Achieves optimal ventilation and oxygenation  12/31/2023 1226 by Ashley Green RN  Outcome: Progressing  Flowsheets (Taken 12/31/2023 0732)  Achieves optimal ventilation and oxygenation:   Assess for changes in respiratory status   Assess for changes in mentation and behavior   Position to facilitate oxygenation and minimize respiratory effort   Oxygen supplementation based on oxygen saturation or arterial blood gases   Encourage broncho-pulmonary hygiene including cough, deep breathe, incentive spirometry   Assess and instruct to report shortness of breath or any respiratory difficulty   Respiratory therapy support as indicated  12/31/2023 0116 by Keerthi Ramos RN  Outcome: Progressing     Problem: Metabolic/Fluid and Electrolytes - Adult  Goal: Electrolytes maintained within normal limits  12/31/2023 1226 by Ashley Green RN  Outcome: Progressing  Flowsheets (Taken 12/31/2023 0732)  Electrolytes maintained within normal limits:   Monitor labs and assess patient for signs and symptoms of electrolyte imbalances   Administer electrolyte replacement as ordered   Fluid restriction as ordered   Instruct patient on fluid and nutrition restrictions as appropriate  12/31/2023 0116 by Keerthi Ramos, RN  Outcome: Progressing  Goal: Hemodynamic stability and optimal renal function maintained  12/31/2023 1226 by Ashley Green RN  Outcome: Progressing  Flowsheets (Taken 12/31/2023 0732)  Hemodynamic stability and optimal renal function maintained:   Monitor labs and assess for signs and symptoms of volume excess or deficit   Monitor intake, output and patient weight   Monitor response to interventions for patient's volume status, including labs, urine output, blood pressure (other measures as available)   Instruct patient on fluid and nutrition restrictions as appropriate  12/31/2023 0116 by Keerthi Ramos RN  Outcome: Progressing     Problem: Discharge Planning  Goal: Discharge to 
  Problem: Respiratory - Adult  Goal: Achieves optimal ventilation and oxygenation  Outcome: Progressing     Problem: Metabolic/Fluid and Electrolytes - Adult  Goal: Electrolytes maintained within normal limits  Outcome: Progressing     Problem: Metabolic/Fluid and Electrolytes - Adult  Goal: Hemodynamic stability and optimal renal function maintained  Outcome: Progressing     Problem: Discharge Planning  Goal: Discharge to home or other facility with appropriate resources  Outcome: Progressing     Problem: Pain  Goal: Verbalizes/displays adequate comfort level or baseline comfort level  Outcome: Progressing     Problem: Safety - Adult  Goal: Free from fall injury  Outcome: Progressing     Problem: ABCDS Injury Assessment  Goal: Absence of physical injury  Outcome: Progressing     
  Problem: Respiratory - Adult  Goal: Achieves optimal ventilation and oxygenation  Outcome: Progressing     Problem: Metabolic/Fluid and Electrolytes - Adult  Goal: Electrolytes maintained within normal limits  Outcome: Progressing     Problem: Metabolic/Fluid and Electrolytes - Adult  Goal: Hemodynamic stability and optimal renal function maintained  Outcome: Progressing     Problem: Discharge Planning  Goal: Discharge to home or other facility with appropriate resources  Outcome: Progressing     Problem: Pain  Goal: Verbalizes/displays adequate comfort level or baseline comfort level  Outcome: Progressing     Problem: Safety - Adult  Goal: Free from fall injury  Outcome: Progressing     Problem: ABCDS Injury Assessment  Goal: Absence of physical injury  Outcome: Progressing     
  Problem: Respiratory - Adult  Goal: Achieves optimal ventilation and oxygenation  Outcome: Progressing     Problem: Metabolic/Fluid and Electrolytes - Adult  Goal: Electrolytes maintained within normal limits  Outcome: Progressing  Goal: Hemodynamic stability and optimal renal function maintained  Outcome: Progressing     Problem: Discharge Planning  Goal: Discharge to home or other facility with appropriate resources  Outcome: Progressing     Problem: Pain  Goal: Verbalizes/displays adequate comfort level or baseline comfort level  Outcome: Progressing     Problem: Safety - Adult  Goal: Free from fall injury  Outcome: Progressing     Problem: ABCDS Injury Assessment  Goal: Absence of physical injury  Outcome: Progressing     Problem: Nutrition Deficit:  Goal: Optimize nutritional status  Outcome: Progressing     
  Problem: Respiratory - Adult  Goal: Achieves optimal ventilation and oxygenation  Outcome: Progressing     Problem: Metabolic/Fluid and Electrolytes - Adult  Goal: Electrolytes maintained within normal limits  Outcome: Progressing  Goal: Hemodynamic stability and optimal renal function maintained  Outcome: Progressing     Problem: Discharge Planning  Goal: Discharge to home or other facility with appropriate resources  Outcome: Progressing     Problem: Pain  Goal: Verbalizes/displays adequate comfort level or baseline comfort level  Outcome: Progressing     Problem: Safety - Adult  Goal: Free from fall injury  Outcome: Progressing     Problem: ABCDS Injury Assessment  Goal: Absence of physical injury  Outcome: Progressing     Problem: Nutrition Deficit:  Goal: Optimize nutritional status  Outcome: Progressing     Problem: Skin/Tissue Integrity  Goal: Absence of new skin breakdown  Description: 1.  Monitor for areas of redness and/or skin breakdown  2.  Assess vascular access sites hourly  3.  Every 4-6 hours minimum:  Change oxygen saturation probe site  4.  Every 4-6 hours:  If on nasal continuous positive airway pressure, respiratory therapy assess nares and determine need for appliance change or resting period.  Outcome: Progressing     
Verbalizes/displays adequate comfort level or baseline comfort level  1/5/2024 2104 by Deshaun Mendez RN  Outcome: Progressing  Flowsheets (Taken 1/5/2024 1925)  Verbalizes/displays adequate comfort level or baseline comfort level:   Encourage patient to monitor pain and request assistance   Assess pain using appropriate pain scale   Administer analgesics based on type and severity of pain and evaluate response   Implement non-pharmacological measures as appropriate and evaluate response  1/5/2024 1027 by Lauren Martinez RN  Outcome: Progressing  1/5/2024 1024 by Lauren Martinez RN  Outcome: Progressing     Problem: Safety - Adult  Goal: Free from fall injury  1/5/2024 2104 by Deshaun Mendez RN  Outcome: Progressing  1/5/2024 1027 by Lauren Martinez RN  Outcome: Progressing  1/5/2024 1024 by Lauren Martinez RN  Outcome: Progressing     Problem: ABCDS Injury Assessment  Goal: Absence of physical injury  1/5/2024 2104 by Deshaun Mendez RN  Outcome: Progressing  1/5/2024 1027 by Lauren Martinez RN  Outcome: Progressing  1/5/2024 1024 by Lauren Martinez RN  Outcome: Progressing     Problem: Nutrition Deficit:  Goal: Optimize nutritional status  Outcome: Progressing     
home or other facility with appropriate resources  12/31/2023 2050 by Keerthi Ramos RN  Outcome: Progressing  12/31/2023 1226 by Ashley Green RN  Outcome: Progressing  Flowsheets (Taken 12/31/2023 0732)  Discharge to home or other facility with appropriate resources:   Identify barriers to discharge with patient and caregiver   Arrange for needed discharge resources and transportation as appropriate   Refer to discharge planning if patient needs post-hospital services based on physician order or complex needs related to functional status, cognitive ability or social support system     Problem: Pain  Goal: Verbalizes/displays adequate comfort level or baseline comfort level  12/31/2023 2050 by Keerthi Ramos RN  Outcome: Progressing  12/31/2023 1226 by Ashley Green RN  Outcome: Progressing  Flowsheets (Taken 12/31/2023 0732)  Verbalizes/displays adequate comfort level or baseline comfort level:   Encourage patient to monitor pain and request assistance   Assess pain using appropriate pain scale   Administer analgesics based on type and severity of pain and evaluate response   Implement non-pharmacological measures as appropriate and evaluate response   Notify Licensed Independent Practitioner if interventions unsuccessful or patient reports new pain     Problem: Safety - Adult  Goal: Free from fall injury  12/31/2023 2050 by Keerthi Ramos RN  Outcome: Progressing  12/31/2023 1226 by Ashley Green RN  Outcome: Progressing  Flowsheets (Taken 12/31/2023 0732)  Free From Fall Injury: Instruct family/caregiver on patient safety     Problem: ABCDS Injury Assessment  Goal: Absence of physical injury  12/31/2023 2050 by Keerthi Ramos RN  Outcome: Progressing  12/31/2023 1226 by Ashley Green RN  Outcome: Progressing  Flowsheets (Taken 12/31/2023 0732)  Absence of Physical Injury: Implement safety measures based on patient assessment     
home or other facility with appropriate resources:   Identify barriers to discharge with patient and caregiver   Arrange for needed discharge resources and transportation as appropriate   Refer to discharge planning if patient needs post-hospital services based on physician order or complex needs related to functional status, cognitive ability or social support system     Problem: Pain  Goal: Verbalizes/displays adequate comfort level or baseline comfort level  12/31/2023 0116 by Keerthi Ramos RN  Outcome: Progressing  12/30/2023 1148 by Ashley Green RN  Outcome: Progressing  Flowsheets (Taken 12/30/2023 0752)  Verbalizes/displays adequate comfort level or baseline comfort level:   Encourage patient to monitor pain and request assistance   Assess pain using appropriate pain scale   Administer analgesics based on type and severity of pain and evaluate response   Implement non-pharmacological measures as appropriate and evaluate response   Notify Licensed Independent Practitioner if interventions unsuccessful or patient reports new pain     Problem: Safety - Adult  Goal: Free from fall injury  12/31/2023 0116 by Keerthi Ramos RN  Outcome: Progressing  12/30/2023 1148 by Ashley Green RN  Outcome: Progressing  Flowsheets (Taken 12/30/2023 0752)  Free From Fall Injury: Instruct family/caregiver on patient safety     Problem: ABCDS Injury Assessment  Goal: Absence of physical injury  12/31/2023 0116 by Keerthi Ramos RN  Outcome: Progressing  12/30/2023 1148 by Ashley Green RN  Outcome: Progressing  Flowsheets (Taken 12/30/2023 0752)  Absence of Physical Injury: Implement safety measures based on patient assessment

## 2024-01-09 VITALS
TEMPERATURE: 97.2 F | HEART RATE: 59 BPM | HEIGHT: 73 IN | OXYGEN SATURATION: 96 % | RESPIRATION RATE: 16 BRPM | DIASTOLIC BLOOD PRESSURE: 62 MMHG | WEIGHT: 196.43 LBS | BODY MASS INDEX: 26.03 KG/M2 | SYSTOLIC BLOOD PRESSURE: 121 MMHG

## 2024-01-09 LAB
ALBUMIN SERPL-MCNC: 3.7 G/DL (ref 3.4–5)
ANION GAP SERPL CALCULATED.3IONS-SCNC: 9 MMOL/L (ref 3–16)
BUN SERPL-MCNC: 60 MG/DL (ref 7–20)
CALCIUM SERPL-MCNC: 9.4 MG/DL (ref 8.3–10.6)
CHLORIDE SERPL-SCNC: 94 MMOL/L (ref 99–110)
CO2 SERPL-SCNC: 33 MMOL/L (ref 21–32)
CREAT SERPL-MCNC: 2.5 MG/DL (ref 0.8–1.3)
DEPRECATED RDW RBC AUTO: 15.9 % (ref 12.4–15.4)
GFR SERPLBLD CREATININE-BSD FMLA CKD-EPI: 23 ML/MIN/{1.73_M2}
GLUCOSE SERPL-MCNC: 87 MG/DL (ref 70–99)
HCT VFR BLD AUTO: 30.3 % (ref 40.5–52.5)
HGB BLD-MCNC: 10 G/DL (ref 13.5–17.5)
INR PPP: 2.75 (ref 0.84–1.16)
MAGNESIUM SERPL-MCNC: 1.8 MG/DL (ref 1.8–2.4)
MCH RBC QN AUTO: 30.1 PG (ref 26–34)
MCHC RBC AUTO-ENTMCNC: 32.9 G/DL (ref 31–36)
MCV RBC AUTO: 91.5 FL (ref 80–100)
PHOSPHATE SERPL-MCNC: 3.6 MG/DL (ref 2.5–4.9)
PLATELET # BLD AUTO: 179 K/UL (ref 135–450)
PMV BLD AUTO: 8.6 FL (ref 5–10.5)
POTASSIUM SERPL-SCNC: 4.1 MMOL/L (ref 3.5–5.1)
PROTHROMBIN TIME: 28.9 SEC (ref 11.5–14.8)
RBC # BLD AUTO: 3.32 M/UL (ref 4.2–5.9)
SODIUM SERPL-SCNC: 136 MMOL/L (ref 136–145)
WBC # BLD AUTO: 5.7 K/UL (ref 4–11)

## 2024-01-09 PROCEDURE — 83735 ASSAY OF MAGNESIUM: CPT

## 2024-01-09 PROCEDURE — 2580000003 HC RX 258: Performed by: HOSPITALIST

## 2024-01-09 PROCEDURE — 97530 THERAPEUTIC ACTIVITIES: CPT | Performed by: PHYSICAL THERAPIST

## 2024-01-09 PROCEDURE — 80069 RENAL FUNCTION PANEL: CPT

## 2024-01-09 PROCEDURE — 36415 COLL VENOUS BLD VENIPUNCTURE: CPT

## 2024-01-09 PROCEDURE — 92526 ORAL FUNCTION THERAPY: CPT

## 2024-01-09 PROCEDURE — 85027 COMPLETE CBC AUTOMATED: CPT

## 2024-01-09 PROCEDURE — 6370000000 HC RX 637 (ALT 250 FOR IP)

## 2024-01-09 PROCEDURE — 97116 GAIT TRAINING THERAPY: CPT | Performed by: PHYSICAL THERAPIST

## 2024-01-09 PROCEDURE — 6370000000 HC RX 637 (ALT 250 FOR IP): Performed by: HOSPITALIST

## 2024-01-09 PROCEDURE — 6370000000 HC RX 637 (ALT 250 FOR IP): Performed by: STUDENT IN AN ORGANIZED HEALTH CARE EDUCATION/TRAINING PROGRAM

## 2024-01-09 PROCEDURE — 94760 N-INVAS EAR/PLS OXIMETRY 1: CPT

## 2024-01-09 PROCEDURE — 85610 PROTHROMBIN TIME: CPT

## 2024-01-09 RX ORDER — WARFARIN SODIUM 2 MG/1
2 TABLET ORAL
Status: DISCONTINUED | OUTPATIENT
Start: 2024-01-09 | End: 2024-01-09 | Stop reason: HOSPADM

## 2024-01-09 RX ORDER — TORSEMIDE 20 MG/1
20 TABLET ORAL 2 TIMES DAILY
Qty: 30 TABLET | Refills: 3 | Status: SHIPPED | OUTPATIENT
Start: 2024-01-09

## 2024-01-09 RX ORDER — TORSEMIDE 20 MG/1
20 TABLET ORAL 2 TIMES DAILY
Status: DISCONTINUED | OUTPATIENT
Start: 2024-01-09 | End: 2024-01-09 | Stop reason: HOSPADM

## 2024-01-09 RX ORDER — FAMOTIDINE 20 MG/1
10 TABLET, FILM COATED ORAL DAILY
Qty: 60 TABLET | Refills: 3 | Status: SHIPPED | OUTPATIENT
Start: 2024-01-10

## 2024-01-09 RX ADMIN — ATENOLOL 25 MG: 50 TABLET ORAL at 09:15

## 2024-01-09 RX ADMIN — FERROUS SULFATE TAB EC 324 MG (65 MG FE EQUIVALENT) 324 MG: 324 (65 FE) TABLET DELAYED RESPONSE at 09:15

## 2024-01-09 RX ADMIN — ROSUVASTATIN CALCIUM 10 MG: 20 TABLET, FILM COATED ORAL at 09:15

## 2024-01-09 RX ADMIN — Medication 10 ML: at 09:22

## 2024-01-09 RX ADMIN — Medication 400 MG: at 09:15

## 2024-01-09 RX ADMIN — ALLOPURINOL 100 MG: 100 TABLET ORAL at 09:15

## 2024-01-09 RX ADMIN — TORSEMIDE 40 MG: 20 TABLET ORAL at 09:14

## 2024-01-09 RX ADMIN — TAMSULOSIN HYDROCHLORIDE 0.4 MG: 0.4 CAPSULE ORAL at 09:15

## 2024-01-09 RX ADMIN — Medication 1000 UNITS: at 09:15

## 2024-01-09 RX ADMIN — FAMOTIDINE 20 MG: 20 TABLET ORAL at 09:15

## 2024-01-09 RX ADMIN — THERA TABS 1 TABLET: TAB at 09:14

## 2024-01-09 NOTE — DISCHARGE INSTR - DIET
Good nutrition is important when healing from an illness, injury, or surgery.  Follow any nutrition recommendations given to you during your hospital stay.   If you were given an oral nutrition supplement while in the hospital, continue to take this supplement at home.  You can take it with meals, in-between meals, and/or before bedtime. These supplements can be purchased at most local grocery stores, pharmacies, and chain [employer]-stores.   If you have any questions about your diet or nutrition, call the hospital and ask for the dietitian.    Recommended Diet  Dysphagia III Soft and bite sized ; Mildly (nectar) thick liquids    Strategies:   Upright as possible with all PO intake , No straws , Small bites/sips , Swallow 2 times per bite , Eat/feed slowly, Remain upright 30-45 min

## 2024-01-09 NOTE — CARE COORDINATION
Case Management Assessment  Initial Evaluation    Date/Time of Evaluation: 12/28/2023 6:34 PM  Assessment Completed by: SHAYE Torrez    If patient is discharged prior to next notation, then this note serves as note for discharge by case management.    Patient Name: Maurice Ackerman Jr                   YOB: 1930  Diagnosis: SOB (shortness of breath) [R06.02]                   Date / Time: 12/28/2023  9:36 AM    Patient Admission Status: Inpatient   Readmission Risk (Low < 19, Mod (19-27), High > 27): Readmission Risk Score: 17.2    Current PCP: Holly Govea  PCP verified by CM? Yes    Chart Reviewed: Yes      History Provided by: Patient  Patient Orientation: Alert and Oriented    Patient Cognition: Alert    Hospitalization in the last 30 days (Readmission):  No    If yes, Readmission Assessment in CM Navigator will be completed.    Advance Directives:      Code Status: Full Code   Patient's Primary Decision Maker is: Legal Next of Kin    Primary Decision Maker: Lizzie Ruiz Jo - Child - [phone]    Secondary Decision Maker: Hetal Hunter - Child - [phone]    Discharge Planning:    Patient lives with: Alone Type of Home: Independent Living  Primary Care Giver: Self  Patient Support Systems include: Family Members   Current Financial resources: None  Current community resources:    Current services prior to admission:              Current DME:              Type of Home Care services:  OT, PT    ADLS  Prior functional level: Independent in ADLs/IADLs  Current functional level: Independent in ADLs/IADLs    PT AM-PAC:   /24  OT AM-PAC:   /24    Family can provide assistance at DC: No ([place] [place] at [facility])  Would you like Case Management to discuss the discharge plan with any other family members/significant others, and if so, who? Yes  Plans to Return to Present Housing: Unknown at present  Other Identified Issues/Barriers to RETURNING to current housing: 
DISCHARGE PLANNING:    Per chart review and rounds, patient from [facility].  DC plan pending home with home care vs SNF.  Will need PT/OT calos, PS to MD.  Patient still diuresing and needs an ECHO.    #[phone]  Electronically signed by Lindsey Holt RN on 1/2/2024 at 1:37 PM    
DISCHARGE PLANNING:    The Plan for Transition of Care is related to the following treatment goals: strengthening    The Patient was provided with a choice of provider and agrees   with the discharge plan. [x] Yes [] No    Freedom of choice list was provided with basic dialogue that supports the patient's individualized plan of care/goals, treatment preferences and shares the quality data associated with the providers. [x] Yes [] No    Referral to [facility] for home care.  Will follow up with agency.  DC plan to return to [facility].    Electronically signed by Lindsey Holt RN on 1/4/2024 at 3:25 PM     [facility] is able to accept patient at discharge.    #[phone]  Electronically signed by Lindsey Holt RN on 1/4/2024 at 4:06 PM    
Discharge Planning:  HAYLEY spoke with patient's son Jacklyn, who called from [place].  Family's Discharge Plan is to return to [facility], where he is in Independent Living.  Patient score 20/24 in PT.  If home care is ordered , family to use [facility].  Patient may need thickened liquids at discharge and [facility] and family can provide assistance with this as needed.  
care.    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 Yes    Lindsey Holt RN  [facility]   Case Management Department  Ph: [phone]

## 2024-01-09 NOTE — NURSE NAVIGATOR
DC order noted. HF dc instructions are on AVS as well as on MIRELLA. Pt has 2 hospital follow up appts in place (see below) and bedside RN Natacha was made aware of these appts.    1/12 at 1:45 with PCP/NP  1/22 at 11:30 with Cardiology at [facility]    DC wt 196 lbs

## 2024-01-09 NOTE — PROGRESS NOTES
[facility]   Daily Progress Note      Admit Date:  12/28/2023    Reason for initial consultation: CHF    CC: \"Swelling is getting better\"    HPI: Per Dr. Gandhi-\"Maurice Ackerman Jr is a 89 y.o. M h/o HTN, permanent Afib well rate controlled, malignant renal neoplasm presents with dyspnea x several days PTA and non-productive cough and BLE swelling and scrotal swelling. Upon workup, BNP 6,800+, [place] 85 - 85- 87-88-92-98 with Cr 2.0-2.3. EKG shows atrial fibrillation with v-rates 80's bpm with nonspecific ST-T wave changes. CXR shows R pleural fluid.\"    Interval History:  Pt with no acute overnight cardiac events. Denies chest pain, palpitations, and lightheadedness.  Patient still with dyspnea on exertion and lower extremity edema.  He notes improvement but is not back to baseline.    Past surgical history/social history/family history:   has a past surgical history that includes Cholecystectomy; Kidney removal; Appendectomy; and angioplasty.   reports that he quit smoking about 53 years ago. He has never used smokeless tobacco. He reports current alcohol use. He reports that he does not use drugs.  family history includes Parkinsonism in his father.    Review of Systems:   Constitutional: No unanticipated weight loss. There's been no change in energy level, sleep pattern, or activity level. No fevers, chills.   Eyes: No visual changes or diplopia. No scleral icterus.  ENT: No Headaches, hearing loss or vertigo. No mouth sores or sore throat.  Cardiovascular: as reviewed in HPI  Respiratory: No cough or wheezing, no sputum production. No hemoptysis.   Gastrointestinal: No abdominal pain, appetite loss, blood in stools. No change in bowel or bladder habits.  Genitourinary: No dysuria, trouble voiding, or hematuria.  Musculoskeletal:  No gait disturbance, no joint complaints.  Integumentary: No rash or pruritis.  Neurological: No headache, diplopia, change in muscle strength, numbness or tingling. 
    Hospitalist Progress Note  1/1/2024 10:41 AM    PCP: Holly Govea    [phone]     Date of Admission: 12/28/2023        HOSPITAL COURSE    Patient demographics:  The patient  Maurice Ackerman Jr is a 89 y.o. male     Significant past medical history:   Patient Active Problem List   Diagnosis    Hypertension    Dermatophytosis of nail    Radial styloid tenosynovitis    Anemia    Atrial fibrillation (HCC)    Pain in soft tissues of limb    Coronary atherosclerosis    Malignant neoplasm of kidney (HCC)    Chronic kidney disease, stage III (moderate) (HCC)    Congenital cystic kidney disease    Edema    Congenital deformity of feet    Bacteremia    Other hammer toe (acquired)    Hypercholesterolemia    Enlarged prostate with lower urinary tract symptoms (LUTS)    Enthesopathy of ankle and tarsus    Myalgia and myositis    Nocturia    Pain in joint, shoulder region    Pain in limb    Numbness and tingling in right hand    Community acquired bacterial pneumonia    Stage 4 chronic kidney disease (HCC)    SOB (shortness of breath)     Presenting symptoms:    Diagnostic workup:    CONSULTS DURING ADMISSION :   IP CONSULT TO HEART FAILURE NURSE/COORDINATOR  IP CONSULT TO DIETITIAN  PHARMACY TO DOSE WARFARIN  IP CONSULT TO NEPHROLOGY  IP CONSULT TO CARDIOLOGY      SUBJECTIVE COMPLAINTS-  Patient reports that he feels his leg swelling has improved.  However remains significantly swollen still.  Shortness of breath, may be improved.  Not as noticeable of a difference.      Diet: ADULT DIET; Regular; Low Sodium (2 gm); 1500 ml      OBJECTIVE:   Patient Active Problem List   Diagnosis    Hypertension    Dermatophytosis of nail    Radial styloid tenosynovitis    Anemia    Atrial fibrillation (HCC)    Pain in soft tissues of limb    Coronary atherosclerosis    Malignant neoplasm of kidney (HCC)    Chronic kidney disease, stage III (moderate) (HCC)    Congenital cystic kidney disease    Edema    Congenital deformity of feet    
    Hospitalist Progress Note  1/3/2024 9:19 AM    PCP: Holly Govea    [phone]     Date of Admission: 12/28/2023                                                                                                                     HOSPITAL COURSE    Patient demographics:  The patient  Maurice Ackerman Jr is a 89 y.o. male     Significant past medical history:   Patient Active Problem List   Diagnosis    Hypertension    Dermatophytosis of nail    Radial styloid tenosynovitis    Anemia    Atrial fibrillation (HCC)    Pain in soft tissues of limb    Coronary atherosclerosis    Malignant neoplasm of kidney (HCC)    Chronic kidney disease, stage III (moderate) (HCC)    Congenital cystic kidney disease    Edema    Congenital deformity of feet    Bacteremia    Other hammer toe (acquired)    Hypercholesterolemia    Enlarged prostate with lower urinary tract symptoms (LUTS)    Enthesopathy of ankle and tarsus    Myalgia and myositis    Nocturia    Pain in joint, shoulder region    Pain in limb    Numbness and tingling in right hand    Community acquired bacterial pneumonia    Stage 4 chronic kidney disease (HCC)    SOB (shortness of breath)    Acute on chronic diastolic heart failure (HCC)    Elevated troponin         Presenting symptoms:      Diagnostic workup:      CONSULTS DURING ADMISSION :   IP CONSULT TO HEART FAILURE NURSE/COORDINATOR  IP CONSULT TO DIETITIAN  PHARMACY TO DOSE WARFARIN  IP CONSULT TO NEPHROLOGY  IP CONSULT TO CARDIOLOGY          SUBJECTIVE COMPLAINTS-shortness of breath    Diet: ADULT DIET; Easy to Chew; Low Sodium (2 gm); Mildly Thick (Nectar); 1500 ml      OBJECTIVE:   Patient Active Problem List   Diagnosis    Hypertension    Dermatophytosis of nail    Radial styloid tenosynovitis    Anemia    Atrial fibrillation (HCC)    Pain in soft tissues of limb    Coronary atherosclerosis    Malignant neoplasm of kidney (HCC)    Chronic kidney disease, stage III (moderate) (HCC)    Congenital cystic kidney 
    Hospitalist Progress Note  1/4/2024 8:10 AM    PCP: Holly Govea    [phone]     Date of Admission: 12/28/2023                                                                                                                     HOSPITAL COURSE    Patient demographics:  The patient  Maurice Ackerman Jr is a 89 y.o. male     Significant past medical history:   Patient Active Problem List   Diagnosis    Hypertension    Dermatophytosis of nail    Radial styloid tenosynovitis    Anemia    Atrial fibrillation (HCC)    Pain in soft tissues of limb    Coronary atherosclerosis    Malignant neoplasm of kidney (HCC)    Chronic kidney disease, stage III (moderate) (HCC)    Congenital cystic kidney disease    Edema    Congenital deformity of feet    Bacteremia    Other hammer toe (acquired)    Hypercholesterolemia    Enlarged prostate with lower urinary tract symptoms (LUTS)    Enthesopathy of ankle and tarsus    Myalgia and myositis    Nocturia    Pain in joint, shoulder region    Pain in limb    Numbness and tingling in right hand    Community acquired bacterial pneumonia    Stage 4 chronic kidney disease (HCC)    SOB (shortness of breath)    Acute on chronic diastolic heart failure (HCC)    Elevated troponin         Presenting symptoms:      Diagnostic workup:      CONSULTS DURING ADMISSION :   IP CONSULT TO HEART FAILURE NURSE/COORDINATOR  IP CONSULT TO DIETITIAN  PHARMACY TO DOSE WARFARIN  IP CONSULT TO NEPHROLOGY  IP CONSULT TO CARDIOLOGY          SUBJECTIVE COMPLAINTS-shortness of breath    Diet: ADULT DIET; Dysphagia - Soft and Bite Sized; Low Sodium (2 gm); Mildly Thick (Nectar); 1500 ml      OBJECTIVE:   Patient Active Problem List   Diagnosis    Hypertension    Dermatophytosis of nail    Radial styloid tenosynovitis    Anemia    Atrial fibrillation (HCC)    Pain in soft tissues of limb    Coronary atherosclerosis    Malignant neoplasm of kidney (HCC)    Chronic kidney disease, stage III (moderate) (HCC)    
    Hospitalist Progress Note  12/29/2023 9:06 AM    PCP: Holly Govea    [phone]     Date of Admission: 12/28/2023                                                                                                                     HOSPITAL COURSE    Patient demographics:  The patient  Maurice Ackerman Jr is a 89 y.o. male     Significant past medical history:   Patient Active Problem List   Diagnosis    Hypertension    Dermatophytosis of nail    Radial styloid tenosynovitis    Anemia    Atrial fibrillation (HCC)    Pain in soft tissues of limb    Coronary atherosclerosis    Malignant neoplasm of kidney (HCC)    Chronic kidney disease, stage III (moderate) (HCC)    Congenital cystic kidney disease    Edema    Congenital deformity of feet    Bacteremia    Other hammer toe (acquired)    Hypercholesterolemia    Enlarged prostate with lower urinary tract symptoms (LUTS)    Enthesopathy of ankle and tarsus    Myalgia and myositis    Nocturia    Pain in joint, shoulder region    Pain in limb    Numbness and tingling in right hand    Community acquired bacterial pneumonia    Stage 4 chronic kidney disease (HCC)    SOB (shortness of breath)         Presenting symptoms:      Diagnostic workup:      CONSULTS DURING ADMISSION :   IP CONSULT TO HEART FAILURE NURSE/COORDINATOR  IP CONSULT TO DIETITIAN  PHARMACY TO DOSE WARFARIN  IP CONSULT TO NEPHROLOGY          SUBJECTIVE COMPLAINTS-shortness of breath    Diet: ADULT DIET; Regular; Low Sodium (2 gm); 1500 ml      OBJECTIVE:   Patient Active Problem List   Diagnosis    Hypertension    Dermatophytosis of nail    Radial styloid tenosynovitis    Anemia    Atrial fibrillation (HCC)    Pain in soft tissues of limb    Coronary atherosclerosis    Malignant neoplasm of kidney (HCC)    Chronic kidney disease, stage III (moderate) (HCC)    Congenital cystic kidney disease    Edema    Congenital deformity of feet    Bacteremia    Other hammer toe (acquired)    Hypercholesterolemia    
    Hospitalist Progress Note  12/30/2023 9:26 AM    PCP: Holly Govea    [phone]     Date of Admission: 12/28/2023        HOSPITAL COURSE    Patient demographics:  The patient  Maurice Ackerman Jr is a 89 y.o. male     Significant past medical history:   Patient Active Problem List   Diagnosis    Hypertension    Dermatophytosis of nail    Radial styloid tenosynovitis    Anemia    Atrial fibrillation (HCC)    Pain in soft tissues of limb    Coronary atherosclerosis    Malignant neoplasm of kidney (HCC)    Chronic kidney disease, stage III (moderate) (HCC)    Congenital cystic kidney disease    Edema    Congenital deformity of feet    Bacteremia    Other hammer toe (acquired)    Hypercholesterolemia    Enlarged prostate with lower urinary tract symptoms (LUTS)    Enthesopathy of ankle and tarsus    Myalgia and myositis    Nocturia    Pain in joint, shoulder region    Pain in limb    Numbness and tingling in right hand    Community acquired bacterial pneumonia    Stage 4 chronic kidney disease (HCC)    SOB (shortness of breath)     Presenting symptoms:    Diagnostic workup:    CONSULTS DURING ADMISSION :   IP CONSULT TO HEART FAILURE NURSE/COORDINATOR  IP CONSULT TO DIETITIAN  PHARMACY TO DOSE WARFARIN  IP CONSULT TO NEPHROLOGY      SUBJECTIVE COMPLAINTS-shortness of breath      Diet: ADULT DIET; Regular; Low Sodium (2 gm); 1500 ml      OBJECTIVE:   Patient Active Problem List   Diagnosis    Hypertension    Dermatophytosis of nail    Radial styloid tenosynovitis    Anemia    Atrial fibrillation (HCC)    Pain in soft tissues of limb    Coronary atherosclerosis    Malignant neoplasm of kidney (HCC)    Chronic kidney disease, stage III (moderate) (HCC)    Congenital cystic kidney disease    Edema    Congenital deformity of feet    Bacteremia    Other hammer toe (acquired)    Hypercholesterolemia    Enlarged prostate with lower urinary tract symptoms (LUTS)    Enthesopathy of ankle and tarsus    Myalgia and myositis    
    Hospitalist Progress Note  12/31/2023 4:40 PM    PCP: Holly Govea    [phone]     Date of Admission: 12/28/2023        HOSPITAL COURSE    Patient demographics:  The patient  Maurice Ackerman Jr is a 89 y.o. male     Significant past medical history:   Patient Active Problem List   Diagnosis    Hypertension    Dermatophytosis of nail    Radial styloid tenosynovitis    Anemia    Atrial fibrillation (HCC)    Pain in soft tissues of limb    Coronary atherosclerosis    Malignant neoplasm of kidney (HCC)    Chronic kidney disease, stage III (moderate) (HCC)    Congenital cystic kidney disease    Edema    Congenital deformity of feet    Bacteremia    Other hammer toe (acquired)    Hypercholesterolemia    Enlarged prostate with lower urinary tract symptoms (LUTS)    Enthesopathy of ankle and tarsus    Myalgia and myositis    Nocturia    Pain in joint, shoulder region    Pain in limb    Numbness and tingling in right hand    Community acquired bacterial pneumonia    Stage 4 chronic kidney disease (HCC)    SOB (shortness of breath)     Presenting symptoms:    Diagnostic workup:    CONSULTS DURING ADMISSION :   IP CONSULT TO HEART FAILURE NURSE/COORDINATOR  IP CONSULT TO DIETITIAN  PHARMACY TO DOSE WARFARIN  IP CONSULT TO NEPHROLOGY  IP CONSULT TO CARDIOLOGY      SUBJECTIVE COMPLAINTS-  Patient reports that he feels his leg swelling has come down significantly.  However remains significantly swollen still.  Feels shortness of breath has improved some but not as noticeably as the leg swelling      Diet: ADULT DIET; Regular; Low Sodium (2 gm); 1500 ml      OBJECTIVE:   Patient Active Problem List   Diagnosis    Hypertension    Dermatophytosis of nail    Radial styloid tenosynovitis    Anemia    Atrial fibrillation (HCC)    Pain in soft tissues of limb    Coronary atherosclerosis    Malignant neoplasm of kidney (HCC)    Chronic kidney disease, stage III (moderate) (HCC)    Congenital cystic kidney disease    Edema    
    V2.0    [facility] Progress Note      Name:  Maurice Ackerman Jr /Age/Sex: 1930  (89 y.o. male)   MRN & CSN:  [id number] & [id number] Encounter Date/Time: 2024 8:57 AM EDT   Location:  [id number] PCP: Holly Govea     Attending:Cara White MD       Hospital Day: 11      HPI :   Chief Complaint: SOB     Maurice Ackerman Jr is a 89 y.o. male who presents with  medical history significant for hypertension atrial fibrillation and malignant neoplasm of the kidney and patient is with a solitary kidney.     Patient has been feeling short of breath for the last several days patient also reports continuing cough that is nonproductive.  Patient failed outpatient treatment with doxycycline by the primary care physician.  Patient also reports that he has noticed gradually worsening swelling on the bilateral lower extremities and now he has noticed erythema and pain in both lower extremities.  Patient also reports swelling of the scrotum.    Subjective:   Denies any complaints this morning.   Out 3.8 in the last 24 hours.   Continues to report improving lower limb swelling   Review of Systems:      Pertinent positives and negatives discussed in HPI    Objective:     Intake/Output Summary (Last 24 hours) at 2024 1254  Last data filed at 2024 1223  Gross per 24 hour   Intake 580 ml   Output 4465 ml   Net -3885 ml        Vitals:   Vitals:    24 2313 24 0423 24 0530 24 0845   BP: 130/74 137/74  (!) 121/59   Pulse: 65 63  68   Resp: 20 16  18   Temp: 97.2 °F (36.2 °C) 98 °F (36.7 °C)  97.3 °F (36.3 °C)   TempSrc: Oral Oral  Oral   SpO2: 97% 95%  92%   Weight:   98.9 kg (218 lb 0.6 oz)    Height:             Physical Exam:      Physical Exam Performed:    BP (!) 121/59   Pulse 68   Temp 97.3 °F (36.3 °C) (Oral)   Resp 18   Ht 1.854 m (6' 1\")   Wt 98.9 kg (218 lb 0.6 oz)   SpO2 92%   BMI 28.77 kg/m²     General appearance:pleasant male patient.   HEENT: Pupils equal, round, and 
    V2.0    [facility] [place]      Name:  Maurice Ackerman Jr /Age/Sex: 1930  (89 y.o. male)   MRN & CSN:  [id number] & [id number] Encounter Date/Time: 2024 8:57 AM EDT   Location:  [id number] PCP: Holly Govea     Attending:Cara White MD       Hospital Day: 10      HPI :   Chief Complaint: SOB     Maurice Ackerman Jr is a 89 y.o. male who presents with  medical history significant for hypertension atrial fibrillation and malignant neoplasm of the kidney and patient is with a solitary kidney.     Patient has been feeling short of breath for the last several days patient also reports continuing cough that is nonproductive.  Patient failed outpatient treatment with doxycycline by the primary care physician.  Patient also reports that he has noticed gradually worsening swelling on the bilateral lower extremities and now he has noticed erythema and pain in both lower extremities.  Patient also reports swelling of the scrotum.    Subjective:   Patient was complaining of thickened liquids, I explained reasons.   Continues to be on lasix infusion, urine output 2.2 liters in the last 24 hours.  Weight with continued improvement   Review of Systems:      Pertinent positives and negatives discussed in HPI    Objective:     Intake/Output Summary (Last 24 hours) at 2024 1226  Last data filed at 2024 0954  Gross per 24 hour   Intake 580 ml   Output 1710 ml   Net -1130 ml        Vitals:   Vitals:    24 0306 24 0338 24 0845 24 1211   BP: 127/60  121/73 123/61   Pulse: 67  64 62   Resp: 20  20 20   Temp: 97.5 °F (36.4 °C)  97.5 °F (36.4 °C) 97.3 °F (36.3 °C)   TempSrc: Oral  Oral Oral   SpO2: 97%  94% 99%   Weight:  98.6 kg (217 lb 6 oz)     Height:             Physical Exam:      Physical Exam Performed:    /61   Pulse 62   Temp 97.3 °F (36.3 °C) (Oral)   Resp 20   Ht 1.854 m (6' 1\")   Wt 98.6 kg (217 lb 6 oz)   SpO2 99%   BMI 28.68 kg/m²     General 
  Cardiology Progress Note     Admit Date: 12/28/2023     Reason for follow up: acute CHF    HPI: Maurice Ackerman Jr is a 89 y.o. M h/o HTN, permanent Afib well rate controlled, malignant renal neoplasm presents with dyspnea x several days PTA and non-productive cough and BLE swelling and scrotal swelling. Upon workup, BNP 6,800+, [place] 85 - 85- 87-88-92-98 with Cr 2.0-2.3. EKG shows atrial fibrillation with v-rates 80's bpm with nonspecific ST-T wave changes. CXR shows R pleural fluid.     Interval History: Patient seen and examined. Clinical notes reviewed. Telemetry reviewed. No new complaint today. No major events overnight. Denies having angina, shortness of breath, dyspnea on exertion, Orthopnea, PND at the time of this visit.    Review of Systems   Constitutional:  Negative for chills, fatigue, fever and unexpected weight change.   HENT:  Negative for congestion, hearing loss, sinus pressure, sore throat and trouble swallowing.    Respiratory:  Negative for cough, shortness of breath and wheezing.    Cardiovascular:  Negative for chest pain, palpitations and leg swelling.   Gastrointestinal:  Negative for abdominal pain, blood in stool, constipation, diarrhea, nausea and vomiting.   Genitourinary:  Negative for hematuria.   Musculoskeletal:  Negative for arthralgias, back pain, gait problem and myalgias.   Skin:  Negative for color change, rash and wound.   Neurological:  Negative for dizziness, seizures, syncope, speech difficulty, weakness and light-headedness.   Hematological:  Does not bruise/bleed easily.   All other systems reviewed and are negative.      Vitals:    01/01/24 0900   BP: 135/67   Pulse: 75   Resp: 18   Temp: 97.5 °F (36.4 °C)   SpO2: 94%        Intake/Output Summary (Last 24 hours) at 1/1/2024 1004  Last data filed at 1/1/2024 0740  Gross per 24 hour   Intake 360.41 ml   Output 2875 ml   Net -2514.59 ml     In: 423.8 [P.O.:320; I.V.:55.4]  Out: 2875    Wt Readings from Last 3 Encounters: 
  Cardiology Progress Note     Admit Date: 12/28/2023     Reason for follow up: acute CHF (unclear diastolic or systolic)    HPI: Maurice Ackerman Jr is a 89 y.o. M h/o HTN, permanent Afib well rate controlled, malignant renal neoplasm presents with dyspnea x several days PTA and non-productive cough and BLE swelling and scrotal swelling. Upon workup, BNP 6,800+, [place] 85 - 85- 87-88-92-98 with Cr 2.0-2.3. EKG shows atrial fibrillation with v-rates 80's bpm with nonspecific ST-T wave changes. CXR shows R pleural fluid.    Interval History: Patient seen and examined. Clinical notes reviewed. Telemetry reviewed. No new complaint today. No major events overnight. Denies having angina, shortness of breath, dyspnea on exertion, Orthopnea, PND at the time of this visit.    Review of Systems   Constitutional:  Negative for chills, fatigue, fever and unexpected weight change.   HENT:  Negative for congestion, hearing loss, sinus pressure, sore throat and trouble swallowing.    Respiratory:  Negative for cough, shortness of breath and wheezing.    Cardiovascular:  Negative for chest pain, palpitations and leg swelling.   Gastrointestinal:  Negative for abdominal pain, blood in stool, constipation, diarrhea, nausea and vomiting.   Genitourinary:  Negative for hematuria.   Musculoskeletal:  Negative for arthralgias, back pain, gait problem and myalgias.   Skin:  Negative for color change, rash and wound.   Neurological:  Negative for dizziness, seizures, syncope, speech difficulty, weakness and light-headedness.   Hematological:  Does not bruise/bleed easily.   All other systems reviewed and are negative.      Vitals:    12/31/23 1121   BP: 107/88   Pulse: 65   Resp: 20   Temp: 97 °F (36.1 °C)   SpO2: 94%        Intake/Output Summary (Last 24 hours) at 12/31/2023 1223  Last data filed at 12/31/2023 1220  Gross per 24 hour   Intake 848.43 ml   Output 2000 ml   Net -1151.57 ml     In: 1388.4 [P.O.:1280; I.V.:10]  Out: 2400    Wt 
  Nephrology Consult Note   [email or web address]      Reason for consultation: CKD 4 / Volume Overload: follows with Dr. Lundberg in office. Cr baseline this past year has been ~ 2.1-2.3 mg/dL.     History of Present Illness:   In summary, the patient presents with worsening dyspnea and edema over the last 4 weeks.  He moved into [facility] in 06/23, so he has been eating a higher sodium diet.  Apparently, Dr. Govea increased his diuretics.  He was more thirsty, so he drank more, and he has been crunching on ice in his apartment.  Plus, since he was not feeling well, his family has been pushing him to drink more fluids. .     Subjective:      HPI:  Breathing comfortably.  No CP.  O > I.  Renal function stable.   ROS:  In chair.  No fever.  PMFSH:  mediations reviewed.      Physical Exam:    /69   Pulse 73   Temp 97.9 °F (36.6 °C) (Oral)   Resp 18   Ht 1.854 m (6' 1\")   Wt 102.8 kg (226 lb 10.1 oz)   SpO2 99%   BMI 29.90 kg/m²       24HR INTAKE/OUTPUT:    Intake/Output Summary (Last 24 hours) at 1/1/2024 2007  Last data filed at 1/1/2024 1553  Gross per 24 hour   Intake 1040 ml   Output 2370 ml   Net -1330 ml         Patient Vitals for the past 96 hrs (Last 3 readings):   Weight   01/01/24 0342 102.8 kg (226 lb 10.1 oz)   12/31/23 0422 103.7 kg (228 lb 9.9 oz)   12/30/23 0430 103.3 kg (227 lb 11.8 oz)         General: Alert, Awake, NAD, Obese  Chest: Diminished with intermittent rhonchi to auscultation, no intercostal retractions  CVS: irregular, no murmur, no rub  Abdomen: soft, non tender  Extremities: 2+ edema to BLE, no cyanosis.  Skin: normal texture, normal skin turgor, no rash  Neurological: CN intact, no focal motor neurological deficit  Psych: poor insight, moderate recall    LAB DATA:    CBC:   Lab Results   Component Value Date/Time    WBC 6.5 01/01/2024 05:03 AM    RBC 3.39 01/01/2024 05:03 AM    HGB 10.5 01/01/2024 05:03 AM    HCT 30.9 01/01/2024 05:03 AM    MCV 91.0 01/01/2024 05:03 AM    [facility] 
  Nephrology Consult Note   [email or web address]      Reason for consultation: CKD 4 / Volume Overload: follows with Dr. Lundberg in office. Cr baseline this past year has been ~ 2.1-2.3 mg/dL.     History of Present Illness:   In summary, the patient presents with worsening dyspnea and edema over the last 4 weeks.  He moved into [facility] in 06/23, so he has been eating a higher sodium diet.  Apparently, Dr. Govea increased his diuretics.  He was more thirsty, so he drank more, and he has been crunching on ice in his apartment.  Plus, since he was not feeling well, his family has been pushing him to drink more fluids. .     Subjective:      HPI:  Breathing comfortably.  No CP.  O > I.  Renal function stable.  He is unsteady and yet got himself out of bed and over to the chair.  ROS:  In chair.  No fever.  PMFSH:  mediations reviewed.      Physical Exam:    /77   Pulse 71   Temp 97.8 °F (36.6 °C) (Oral)   Resp 18   Ht 1.854 m (6' 1\")   Wt 103.7 kg (228 lb 9.9 oz)   SpO2 91%   BMI 30.16 kg/m²       24HR INTAKE/OUTPUT:    Intake/Output Summary (Last 24 hours) at 12/31/2023 2041  Last data filed at 12/31/2023 1242  Gross per 24 hour   Intake 368.43 ml   Output 2325 ml   Net -1956.57 ml         Patient Vitals for the past 96 hrs (Last 3 readings):   Weight   12/31/23 0422 103.7 kg (228 lb 9.9 oz)   12/30/23 0430 103.3 kg (227 lb 11.8 oz)   12/29/23 0401 103.5 kg (228 lb 2.8 oz)         General: Alert, Awake, NAD, Obese  HEENT: Normocephalic, atraumatic  Neck: No Thyromegaly, Trachea is midline  Eyes: EOMI, RAKESH  Chest: Diminished with intermittent rhonchi to auscultation, no intercostal retractions  CVS: irregular, no murmur, no rub  Abdomen: soft, non tender  Extremities: 2+ edema to BLE, no cyanosis.  Skin: normal texture, normal skin turgor, no rash  Neurological: CN intact, no focal motor neurological deficit  Psych: normal affect; AAO x 3    LAB DATA:    CBC:   Lab Results   Component Value Date/Time    
  Nephrology Progress Note   [email or web address]      Reason for consultation: CKD 4 / Volume Overload: follows with Dr. Lundberg in office. Cr baseline this past year has been ~ 2.1-2.3 mg/dL.     Subjective:    The patient has been seen and examined. Improving SOB. Wt is stable s/p transition to PO diuretic. Cr a little above baseline, but stable.     ROS: Intermittent SOB. Denies N/V/D.    Physical Exam:    /76   Pulse 60   Temp 97.4 °F (36.3 °C) (Axillary)   Resp 16   Ht 1.854 m (6' 1\")   Wt 94.4 kg (208 lb 1.8 oz)   SpO2 95%   BMI 27.46 kg/m²       24HR INTAKE/OUTPUT:    Intake/Output Summary (Last 24 hours) at 1/9/2024 1008  Last data filed at 1/9/2024 0843  Gross per 24 hour   Intake 730 ml   Output 1525 ml   Net -795 ml         Patient Vitals for the past 96 hrs (Last 3 readings):   Weight   01/09/24 0341 94.4 kg (208 lb 1.8 oz)   01/08/24 0532 94 kg (207 lb 3.7 oz)   01/07/24 0530 98.9 kg (218 lb 0.6 oz)       General: Alert, Awake, NAD, Obese  Chest: Diminished  to auscultation, no intercostal retractions  CVS: irregular, no murmur, no rub  Abdomen: soft, non tender  Extremities: trace to 1+ edema to BLE, no cyanosis.  Skin: normal texture, normal skin turgor, no rash  Neurological: CN intact, no focal motor neurological deficit    Allergies:  Allergies   Allergen Reactions    2-(Ethylmercuriothio)Benzoic Acid      Leg pain, abd cramps    Atorvastatin      myalgias    Celecoxib Nausea Only    Ciprofloxacin Nausea Only and Other (See Comments)     Stomach upset    Clarithromycin Nausea Only    Ezetimibe      Leg pain    Fish Oil      abd upset    Metoprolol      abd upset    Moxifloxacin Other (See Comments)     tendonitis    Naproxen Nausea Only    Petrolatum-Zinc Oxide Other (See Comments)     Rxn unknown-poss caused a kidney stone    Rofecoxib Nausea Only    Simvastatin      Muscle aches    Sulfa Antibiotics     Adhesive Tape Nausea And Vomiting     Caused top layer of skin to tear off when removed 
  Nephrology Progress Note   [email or web address]      Reason for consultation: CKD 4 / Volume Overload: follows with Dr. Lundberg in office. Cr baseline this past year has been ~ 2.1-2.3 mg/dL.     Subjective:    The patient has been seen and examined.    HPI:  Breathing stable.  No CP.  Renal function stable.  O >  but weight unchanged.  ROS:  In bed.  PMFSH:  medication reviewed.    Physical Exam:    /85   Pulse 74   Temp 98.4 °F (36.9 °C) (Oral)   Resp 22   Ht 1.854 m (6' 1\")   Wt 98.9 kg (218 lb 0.6 oz)   SpO2 97%   BMI 28.77 kg/m²       24HR INTAKE/OUTPUT:    Intake/Output Summary (Last 24 hours) at 1/7/2024 2019  Last data filed at 1/7/2024 1523  Gross per 24 hour   Intake 480 ml   Output 2800 ml   Net -2320 ml         Patient Vitals for the past 96 hrs (Last 3 readings):   Weight   01/07/24 0530 98.9 kg (218 lb 0.6 oz)   01/06/24 0338 98.6 kg (217 lb 6 oz)   01/05/24 0439 98.9 kg (218 lb 0.6 oz)       General: Alert, Awake, NAD, Obese  Chest: Diminished  to auscultation, no intercostal retractions  CVS: irregular, no murmur, no rub  Abdomen: soft, non tender  Extremities: 2+ edema to BLE, no cyanosis.  Skin: normal texture, normal skin turgor, no rash  Neurological: CN intact, no focal motor neurological deficit    Allergies:  Allergies   Allergen Reactions    2-(Ethylmercuriothio)Benzoic Acid      Leg pain, abd cramps    Atorvastatin      myalgias    Celecoxib Nausea Only    Ciprofloxacin Nausea Only and Other (See Comments)     Stomach upset    Clarithromycin Nausea Only    Ezetimibe      Leg pain    Fish Oil      abd upset    Metoprolol      abd upset    Moxifloxacin Other (See Comments)     tendonitis    Naproxen Nausea Only    Petrolatum-Zinc Oxide Other (See Comments)     Rxn unknown-poss caused a kidney stone    Rofecoxib Nausea Only    Simvastatin      Muscle aches    Sulfa Antibiotics     Adhesive Tape Nausea And Vomiting     Caused top layer of skin to tear off when removed        LAB DATA:  
  Nephrology Progress Note   [email or web address]      Reason for consultation: CKD 4 / Volume Overload: follows with Dr. Lundberg in office. Cr baseline this past year has been ~ 2.1-2.3 mg/dL.     Subjective:    The patient has been seen and examined. Labs and chart reviewed. Resting in chair. Upset about dysphagia diet. Patient appears less edematous on exam, but wt is up -- ?accuracy. UOP is inaccurate d/t episodes of incontinence. Renal fx is pending.     There were down complications overnight.    Patient review of systems: SOB improving. Denies N/V/D.    Scheduled Meds:   warfarin  2.5 mg Oral Once    azithromycin  250 mg Oral Nightly    cefTRIAXone (ROCEPHIN) IV  2,000 mg IntraVENous Q24H    rosuvastatin  10 mg Oral Daily    [Held by provider] potassium chloride  20 mEq Oral Daily with breakfast    famotidine  20 mg Oral Daily    allopurinol  100 mg Oral Daily    atenolol  25 mg Oral Daily    ferrous sulfate  324 mg Oral Daily    magnesium oxide  400 mg Oral Daily    multivitamin  1 tablet Oral Daily    tamsulosin  0.4 mg Oral Daily    Vitamin D  1,000 Units Oral Daily    sodium chloride flush  5-40 mL IntraVENous 2 times per day    warfarin placeholder: dosing by pharmacy   Other RX Placeholder    [Held by provider] lisinopril  2.5 mg Oral Daily        furosemide (LASIX) 100 mg in sodium chloride 0.9 % 100 mL infusion 10 mg/hr (24 0315)    sodium chloride         PRN Meds:nitroGLYCERIN, traMADol, sodium chloride flush, sodium chloride, ondansetron **OR** ondansetron, polyethylene glycol, acetaminophen **OR** acetaminophen, acetaminophen **AND** diphenhydrAMINE    Physical Exam:    TEMPERATURE:  Current - Temp: 97.7 °F (36.5 °C); Max - Temp  Av.6 °F (36.4 °C)  Min: 97.5 °F (36.4 °C)  Max: 97.7 °F (36.5 °C)  RESPIRATIONS RANGE: Resp  Av  Min: 18  Max: 18  PULSE RANGE: Pulse  Av.3  Min: 64  Max: 86  BLOOD PRESSURE RANGE:  Systolic (24hrs), Av , Min:116 , Max:148   ; Diastolic (24hrs), Av, 
  Nephrology Progress Note   [facility]      Reason for consultation: CKD 4 / Volume Overload: follows with Dr. Lundberg in office. Cr baseline this past year has been ~ 2.1-2.3 mg/dL.     Subjective:    The patient has been seen and examined. Labs and chart reviewed. Resting in chair. Cr is stable and within baseline. Wt down 2.7 kg overnight. Lasix gtt infusing.     There were down complications overnight.    Patient review of systems: SOB improving. Denies N/V/D.    Scheduled Meds:   cefTRIAXone (ROCEPHIN) IV  2,000 mg IntraVENous Q24H    rosuvastatin  10 mg Oral Daily    [Held by provider] potassium chloride  20 mEq Oral Daily with breakfast    famotidine  20 mg Oral Daily    allopurinol  100 mg Oral Daily    atenolol  25 mg Oral Daily    ferrous sulfate  324 mg Oral Daily    magnesium oxide  400 mg Oral Daily    multivitamin  1 tablet Oral Daily    tamsulosin  0.4 mg Oral Daily    Vitamin D  1,000 Units Oral Daily    sodium chloride flush  5-40 mL IntraVENous 2 times per day    warfarin placeholder: dosing by pharmacy   Other RX Placeholder    [Held by provider] lisinopril  2.5 mg Oral Daily        furosemide (LASIX) 100 mg in sodium chloride 0.9 % 100 mL infusion 15 mg/hr (24 0557)    sodium chloride         PRN Meds:nitroGLYCERIN, traMADol, sodium chloride flush, sodium chloride, ondansetron **OR** ondansetron, polyethylene glycol, acetaminophen **OR** acetaminophen, acetaminophen **AND** diphenhydrAMINE    Physical Exam:    TEMPERATURE:  Current - Temp: 98.1 °F (36.7 °C); Max - Temp  Av.6 °F (36.4 °C)  Min: 97.3 °F (36.3 °C)  Max: 98.1 °F (36.7 °C)  RESPIRATIONS RANGE: Resp  Av.7  Min: 17  Max: 20  PULSE RANGE: Pulse  Av.4  Min: 66  Max: 78  BLOOD PRESSURE RANGE:  Systolic (24hrs), Av , Min:109 , Max:145   ; Diastolic (24hrs), Av, Min:61, Max:87    24HR INTAKE/OUTPUT:    Intake/Output Summary (Last 24 hours) at 2024 7797  Last data filed at 2024 0611  Gross per 24 hour 
  Nephrology Progress Note   [employer]      Reason for consultation: CKD 4 / Volume Overload: follows with Dr. Lundberg in office. Cr baseline this past year has been ~ 2.1-2.3 mg/dL.     Subjective:    The patient has been seen and examined. Improving SOB. Much less peripheral edema on exam. Wt down 4.9 kg overnight. Wt down 9.5 kg since admission. Cr is stable.     ROS: Intermittent SOB. Denies N/V/D.    Physical Exam:    /84   Pulse 64   Temp 97.5 °F (36.4 °C) (Oral)   Resp 18   Ht 1.854 m (6' 1\")   Wt 94 kg (207 lb 3.7 oz)   SpO2 98%   BMI 27.34 kg/m²       24HR INTAKE/OUTPUT:    Intake/Output Summary (Last 24 hours) at 1/8/2024 1349  Last data filed at 1/8/2024 1239  Gross per 24 hour   Intake 960 ml   Output 1550 ml   Net -590 ml         Patient Vitals for the past 96 hrs (Last 3 readings):   Weight   01/08/24 0532 94 kg (207 lb 3.7 oz)   01/07/24 0530 98.9 kg (218 lb 0.6 oz)   01/06/24 0338 98.6 kg (217 lb 6 oz)       General: Alert, Awake, NAD, Obese  Chest: Diminished  to auscultation, no intercostal retractions  CVS: irregular, no murmur, no rub  Abdomen: soft, non tender  Extremities: 2+ edema to BLE, no cyanosis.  Skin: normal texture, normal skin turgor, no rash  Neurological: CN intact, no focal motor neurological deficit    Allergies:  Allergies   Allergen Reactions    2-(Ethylmercuriothio)Benzoic Acid      Leg pain, abd cramps    Atorvastatin      myalgias    Celecoxib Nausea Only    Ciprofloxacin Nausea Only and Other (See Comments)     Stomach upset    Clarithromycin Nausea Only    Ezetimibe      Leg pain    Fish Oil      abd upset    Metoprolol      abd upset    Moxifloxacin Other (See Comments)     tendonitis    Naproxen Nausea Only    Petrolatum-Zinc Oxide Other (See Comments)     Rxn unknown-poss caused a kidney stone    Rofecoxib Nausea Only    Simvastatin      Muscle aches    Sulfa Antibiotics     Adhesive Tape Nausea And Vomiting     Caused top layer of skin to tear off when 
  Nephrology Progress Note   [email or web address]      Reason for consultation: CKD 4 / Volume Overload: follows with Dr. Lundberg in office. Cr baseline this past year has been ~ 2.1-2.3 mg/dL.     Subjective:    The patient has been seen and examined.    HPI:  Breathing stable.  No CP.  Renal function stable.  O > I.  ROS:  In bed.  PMFSH:  medication reviewed.    Physical Exam:    /65   Pulse 62   Temp 97.5 °F (36.4 °C) (Axillary)   Resp 20   Ht 1.854 m (6' 1\")   Wt 98.6 kg (217 lb 6 oz)   SpO2 98%   BMI 28.68 kg/m²       24HR INTAKE/OUTPUT:    Intake/Output Summary (Last 24 hours) at 1/6/2024 1710  Last data filed at 1/6/2024 1401  Gross per 24 hour   Intake 580 ml   Output 2635 ml   Net -2055 ml         Patient Vitals for the past 96 hrs (Last 3 readings):   Weight   01/06/24 0338 98.6 kg (217 lb 6 oz)   01/05/24 0439 98.9 kg (218 lb 0.6 oz)   01/04/24 0335 101.6 kg (223 lb 15.8 oz)       General: Alert, Awake, NAD, Obese  Chest: Diminished  to auscultation, no intercostal retractions  CVS: irregular, no murmur, no rub  Abdomen: soft, non tender  Extremities: 2+ edema to BLE, no cyanosis.  Skin: normal texture, normal skin turgor, no rash  Neurological: CN intact, no focal motor neurological deficit    Allergies:  Allergies   Allergen Reactions    2-(Ethylmercuriothio)Benzoic Acid      Leg pain, abd cramps    Atorvastatin      myalgias    Celecoxib Nausea Only    Ciprofloxacin Nausea Only and Other (See Comments)     Stomach upset    Clarithromycin Nausea Only    Ezetimibe      Leg pain    Fish Oil      abd upset    Metoprolol      abd upset    Moxifloxacin Other (See Comments)     tendonitis    Naproxen Nausea Only    Petrolatum-Zinc Oxide Other (See Comments)     Rxn unknown-poss caused a kidney stone    Rofecoxib Nausea Only    Simvastatin      Muscle aches    Sulfa Antibiotics     Adhesive Tape Nausea And Vomiting     Caused top layer of skin to tear off when removed        LAB DATA:    CBC:   Lab 
355ml water given, pt comfortable, no pain   Purwick on   Vitals stable   
4 Eyes Skin Assessment     NAME:  Maurice Ackerman Jr  YOB: 1930  MEDICAL RECORD NUMBER:  [id number]    The patient is being assessed for  Admission    I agree that at least one RN has performed a thorough Head to Toe Skin Assessment on the patient. ALL assessment sites listed below have been assessed.      Areas assessed by both nurses:    Head, Face, Ears, Shoulders, Back, Chest, Arms, Elbows, Hands, Sacrum. Buttock, Coccyx, Ischium, Legs. Feet and Heels, and Under Medical Devices         Does the Patient have a Wound? No noted wound(s) // BLE cellulitis (weeping)       Hcris Prevention initiated by RN: No  Wound Care Orders initiated by RN: No    Pressure Injury (Stage 3,4, Unstageable, DTI, NWPT, and Complex wounds) if present, place Wound referral order by RN under : No    New Ostomies, if present place, Ostomy referral order under : No     Nurse 1 eSignature: Electronically signed by Macrina Jimenez RN on 12/28/23 at 9:53 PM EST    **SHARE this note so that the co-signing nurse can place an eSignature**    Nurse 2 eSignature: Electronically signed by Sarai Rowley RN on 12/29/23 at 2:55 AM EST    
CLINICAL PHARMACY NOTE: MEDS TO BEDS    Total # of Prescriptions Filled: 2   The following medications were delivered to the patient:  Current Discharge Medication List        START taking these medications    Details   famotidine (PEPCID) 20 MG tablet Take 0.5 tablets by mouth daily  Qty: 60 tablet, Refills: 3      torsemide (DEMADEX) 20 MG tablet Take 1 tablet by mouth in the morning and 1 tablet in the evening.  Qty: 30 tablet, Refills: 3               Additional Documentation:    
Clinical Pharmacy Note  Ceftriaxone Dose Adjustment    Maurice Ackerman Jr is receiving ceftriaxone for pneumonia. The dose has been adjusted to 2gm IVPB Q24H per protocol,     Wt Readings from Last 1 Encounters:   01/02/24 103.1 kg (227 lb 4.7 oz)         Ceftriaxone Empiric Dosing Table    Indication Weight < 100 kg** Weight ? 100 kg*   Bacteremia      Non-pneumococcal      Pneumococcal     2 g daily  2 g Q12H    2 g daily  2 g Q12H   Community Acquired PNA      Non- critically ill      Critically ill   1 g daily  2 g daily   2 g daily  2 g daily   CNS Infection 2 g Q12H 2 g Q12H   COPD exacerbation 1 g daily 2 g daily   Diabetic Foot Infection or SSTI 1 g daily 2 g daily   Endocarditis      Enterococcus faecalis (in         combination with ampicillin)        Strep sp., gram-negative         organisms     2 g Q12H      2 g daily   2 g Q12H      2 g daily   Intra-abdominal infection 1 g daily 2 g daily   Osteomyelitis  Discitis  Prosthetic Joint infection    Septic arthritis 2 g daily 2 g daily   Urinary Tract Infection     Uncomplicated     Complicated   1 g daily  2 g daily   2 g daily  2 g daily     Sanaz Baltazar [facility], 1/2/2024 2:53 PM   
Clinical Pharmacy Note  Renal Dose Adjustment    Maurice Ackerman Jr is receiving Rosuvastatin 20 mg po daily.  This medication is renally eliminated.  Based on the patient's Estimated Creatinine Clearance: 22 mL/min (A) (based on SCr of 2.6 mg/dL (H)). and urine output, the dose has been adjusted to Rosuvastatin 10 mg po daily per protocol.    Pharmacy will continue to monitor and adjust dose as needed for changes in renal function.       Bo Acevedo RPH, PharmD, BCPS  12/31/2023 7:59 AM    
Clinical Pharmacy Note  Warfarin Consult    Maurice Ackerman Jr is a 89 y.o. male receiving warfarin managed by pharmacy.      Warfarin Indication: afib  Target INR range: 2-3   Dose prior to admission: 2.5 mg daily except 1.25 mg on Tuesdays and Fridays      Current warfarin drug-drug interactions: azithromycin, ceftriaxone    Recent Labs     01/01/24  0503 01/02/24  0443 01/03/24  0455   HGB 10.5* 9.8* 9.7*   HCT 30.9* 28.9* 29.3*   INR 2.36* 2.37* 2.45*         Assessment/Plan:  Warfarin 2.5 mg po x 1 tonight.  Will continue daily PT/INRs until stable within therapeutic range.    Thank you for the consult.  Will continue to follow.     Fracisco Marshall RPH, PharmD, BCPS  1/3/2024 10:13 AM        
Clinical Pharmacy Note  Warfarin Consult    Maurice Ackerman Jr is a 89 y.o. male receiving warfarin managed by pharmacy.      Warfarin Indication: afib  Target INR range: 2-3   Dose prior to admission: 2.5 mg daily except 1.25 mg on Tuesdays and Fridays      Current warfarin drug-drug interactions: azithromycin, ceftriaxone    Recent Labs     01/02/24  0443 01/03/24  0455 01/04/24  0553   HGB 9.8* 9.7* 9.8*   HCT 28.9* 29.3* 29.2*   INR 2.37* 2.45* 2.52*         Assessment/Plan:  Warfarin 2.5 mg po x 1 tonight. Anticipate giving lower home dose on Friday. Managed by [facility] as OP.  Will continue daily PT/INRs until stable within therapeutic range.    Thank you for the consult.  Will continue to follow.     Alonso Sinclair [facility], 1/4/2024 9:24 AM    
Clinical Pharmacy Note  Warfarin Consult    Maurice Ackerman Jr is a 89 y.o. male receiving warfarin managed by pharmacy.      Warfarin Indication: afib  Target INR range: 2-3   Dose prior to admission: 2.5 mg daily except 1.25 mg on Tuesdays and Fridays      Current warfarin drug-drug interactions: azithromycin, ceftriaxone    Recent Labs     01/03/24  0455 01/04/24  0553 01/05/24  0603   HGB 9.7* 9.8* 10.0*   HCT 29.3* 29.2* 29.9*   INR 2.45* 2.52* 2.57*         Assessment/Plan:  Warfarin 2 mg po x 1 tonight. . Managed by [facility] as OP.  Will continue daily PT/INRs until stable within therapeutic range.    Thank you for the consult.  Will continue to follow.     Fracisco Marshall RPH, 1/5/2024 10:32 AM    
Clinical Pharmacy Note  Warfarin Consult    Maurice Ackerman Jr is a 89 y.o. male receiving warfarin managed by pharmacy.      Warfarin Indication: afib  Target INR range: 2-3   Dose prior to admission: 2.5 mg daily except 1.25 mg on Tuesdays and Fridays      Current warfarin drug-drug interactions: azithromycin, ceftriaxone    Recent Labs     12/28/23  0950 12/28/23  1954 12/29/23  0616   HGB 9.8*  --  10.1*   HCT 30.0*  --  30.2*   INR  --  4.17* 4.07*         Assessment/Plan:    INR above goal range. Hold warfarin tonight. Daily PT/INR until stable within therapeutic range.     Thank you for the consult.  Will continue to follow.     Lily Alberto [facility], PharmD, 12/29/2023 7:46 AM      
Clinical Pharmacy Note  Warfarin Consult    Maurice Ackerman Jr is a 89 y.o. male receiving warfarin managed by pharmacy.      Warfarin Indication: afib  Target INR range: 2-3   Dose prior to admission: 2.5 mg daily except 1.25 mg on Tuesdays and Fridays      Current warfarin drug-drug interactions: azithromycin, ceftriaxone    Recent Labs     12/28/23  0950 12/28/23  1954 12/29/23  0616 12/30/23  0545   HGB 9.8*  --  10.1* 10.0*   HCT 30.0*  --  30.2* 29.6*   INR  --  4.17* 4.07* 3.24*         Assessment/Plan:  Warfarin was held the past 2 days.  INR is now trending back into therapeutic range (decreasing sharply from 4.07 > 3.24 over the past 24 hours)  Will order a small dose of Warfarin tonight and reassess PT/INR in the AM - Warfarin 1 mg po x 1 tonight.    Thank you for the consult.  Will continue to follow.     Bo Acevedo [facility], PharmD, BCPS  12/30/2023 7:53 AM        
Clinical Pharmacy Note  Warfarin Consult    Maurice Ackerman Jr is a 89 y.o. male receiving warfarin managed by pharmacy.      Warfarin Indication: afib  Target INR range: 2-3   Dose prior to admission: 2.5 mg daily except 1.25 mg on Tuesdays and Fridays      Current warfarin drug-drug interactions: azithromycin, ceftriaxone    Recent Labs     12/29/23  0616 12/30/23  0545 12/31/23  0457   HGB 10.1* 10.0* 9.7*   HCT 30.2* 29.6* 28.9*   INR 4.07* 3.24* 2.66*         Assessment/Plan:  Warfarin 1.25 mg po x 1 tonight.  Will continue daily PT/INRs until stable within therapeutic range.    Thank you for the consult.  Will continue to follow.     Bo Acevedo RPH, PharmD, BCPS  12/31/2023 7:58 AM        
Clinical Pharmacy Note  Warfarin Consult    Maurice Ackerman Jr is a 89 y.o. male receiving warfarin managed by pharmacy.      Warfarin Indication: afib  Target INR range: 2-3   Dose prior to admission: 2.5 mg daily except 1.25 mg on Tuesdays and Fridays      Current warfarin drug-drug interactions: azithromycin, ceftriaxone    Recent Labs     12/30/23  0545 12/31/23  0457 01/01/24  0503   HGB 10.0* 9.7* 10.5*   HCT 29.6* 28.9* 30.9*   INR 3.24* 2.66* 2.36*         Assessment/Plan:  Warfarin 2 mg po x 1 tonight.  Will continue daily PT/INRs until stable within therapeutic range.    Thank you for the consult.  Will continue to follow.     Bo Acevedo RP, PharmD, BCPS  1/1/2024 7:11 AM        
Clinical Pharmacy Note  Warfarin Consult    Maurice Ackerman Jr is a 89 y.o. male receiving warfarin managed by pharmacy.      Warfarin Indication: afib  Target INR range: 2-3   Dose prior to admission: 2.5 mg daily except 1.25 mg on Tuesdays and Fridays      Current warfarin drug-drug interactions: azithromycin, ceftriaxone    Recent Labs     12/31/23  0457 01/01/24  0503 01/02/24  0443   HGB 9.7* 10.5* 9.8*   HCT 28.9* 30.9* 28.9*   INR 2.66* 2.36* 2.37*         Assessment/Plan:  Warfarin 2.5 mg po x 1 tonight.  Will continue daily PT/INRs until stable within therapeutic range.    Thank you for the consult.  Will continue to follow.     Fracisco Marshall RPH, PharmD, BCPS  1/2/2024 10:39 AM        
Clinical Pharmacy Note  Warfarin Consult    Maurice Ackerman Jr is a 89 y.o. male receiving warfarin managed by pharmacy.      Warfarin Indication: afib  Target INR range: 2-3   Dose prior to admission: 2.5 mg daily except 1.25 mg on Tuesdays and Fridays      Current warfarin drug-drug interactions: none of signficance    Recent Labs     01/04/24  0553 01/05/24  0603 01/06/24  0511   HGB 9.8* 10.0* 10.0*   HCT 29.2* 29.9* 28.8*   INR 2.52* 2.57* 2.59*         Assessment/Plan:    Repeat warfarin 2 mg po x 1 tonight  Managed by [facility] as OP.  Will continue daily PT/INRs until stable within therapeutic range.    Thank you for the consult.  Will continue to follow.     Geno Wade, PharmD.  1/6/2024  12:28 PM      
Clinical Pharmacy Note  Warfarin Consult    Maurice Ackerman Jr is a 89 y.o. male receiving warfarin managed by pharmacy.      Warfarin Indication: afib  Target INR range: 2-3   Dose prior to admission: 2.5 mg daily except 1.25 mg on Tuesdays and Fridays      Current warfarin drug-drug interactions: none of signficance    Recent Labs     01/05/24  0603 01/06/24  0511 01/07/24  0802   HGB 10.0* 10.0* 10.3*   HCT 29.9* 28.8* 30.2*   INR 2.57* 2.59* 2.76*         Assessment/Plan:    Warfarin 1 mg tonight  Managed by [facility] as OP.  Will continue daily PT/INRs until stable within therapeutic range.    Thank you for the consult.  Will continue to follow.     Geno Wade, PharmD.  1/7/2024  11:47 AM      
Clinical Pharmacy Note  Warfarin Consult    Maurice Ackerman Jr is a 89 y.o. male receiving warfarin managed by pharmacy.      Warfarin Indication: afib  Target INR range: 2-3   Dose prior to admission: 2.5 mg daily except 1.25 mg on Tuesdays and Fridays      Current warfarin drug-drug interactions: none of signficance    Recent Labs     01/06/24  0511 01/07/24  0802 01/08/24  0444   HGB 10.0* 10.3* 9.6*   HCT 28.8* 30.2* 28.5*   INR 2.59* 2.76* 2.87*         Assessment/Plan:  INR up to 2.87 today  Will hold Warfarin tonight  Managed by [facility] as OP.  Will continue daily PT/INRs until stable within therapeutic range.    Thank you for the consult.  Will continue to follow.   Electronically signed by Fracisco Marshall RPH on 1/8/2024 at 7:56 AM    
Clinical Pharmacy Note  Warfarin Consult    Maurice Ackerman Jr is a 89 y.o. male receiving warfarin managed by pharmacy.      Warfarin Indication: afib  Target INR range: 2-3   Dose prior to admission: 2.5 mg daily except 1.25 mg on Tuesdays and Fridays      Current warfarin drug-drug interactions: none of signficance    Recent Labs     01/07/24  0802 01/08/24  0444 01/09/24  0512   HGB 10.3* 9.6* 10.0*   HCT 30.2* 28.5* 30.3*   INR 2.76* 2.87* 2.75*         Assessment/Plan:  Warfarin 2mg tonight  Managed by [facility] as OP.  Will continue daily PT/INRs until stable within therapeutic range.    Thank you for the consult.  Will continue to follow.   Electronically signed by Fracisco Marshall RPH on 1/9/2024 at 11:11 AM    
Comprehensive Nutrition Assessment    Type and Reason for Visit:  Initial, RD Nutrition Re-Screen/LOS    Nutrition Recommendations/Plan:   Diet textures and liquids per SLP  CHF Education provided on 12/30  Offer nutrition supplement if po intake drops below 50%     Malnutrition Assessment:  Malnutrition Status:  No malnutrition (01/05/24 1405)      Nutrition Assessment:    Patient admitted with SOB.  Currently on a dysphagia soft and bite sized textured diet with mildly thick liquids.  MBS on 1/3 noted oropharyngeal dysphagia.  Po intake has been mostly % meals.  This RD spoke with patient late last week regarding CHF guidelines.  Patient does reside at [facility], limited control with meal choices.  Patient did mention to stop eating ham which is offered as an alternate meal at the facility.    Nutrition Related Findings:    labs reviewed-lytes within normal limits; last BM on 1/4/24 Wound Type: Skin Tears (redness)       Current Nutrition Intake & Therapies:    Average Meal Intake: 26-50%, 51-75%  Average Supplements Intake: Unable to assess  ADULT DIET; Dysphagia - Soft and Bite Sized; Low Sodium (2 gm); Mildly Thick ([place]); 1500 ml    Anthropometric Measures:  Height: 185.4 cm (6' 1\")  Ideal Body Weight (IBW): 184 lbs (84 kg)       Current Body Weight: 98.9 kg (218 lb 0.6 oz),   IBW. Weight Source: Standing Scale  Current BMI (kg/m2): 28.8                          BMI Categories: Overweight (BMI 25.0-29.9)    Estimated Daily Nutrient Needs:  Energy Requirements Based On: Kcal/kg  Weight Used for Energy Requirements: Current  Energy (kcal/day): 5924-8366 (25-28 kcal/83.6 kg)  Weight Used for Protein Requirements: Current  Protein (g/day):  (1-1.2 g/83.6 kg)  Method Used for Fluid Requirements: 1 ml/kcal  Fluid (ml/day):      Nutrition Diagnosis:   Increased nutrient needs related to increase demand for energy/nutrients as evidenced by  (advanced age; extended hospital 
[facility]   Speech Therapy  Daily Dysphagia Treatment Note    Maurice Ackerman Jr  AGE: 89 y.o.   GENDER: male  : 1930  [id number]  EPISODE DATE:  2023    Patient Active Problem List   Diagnosis    Hypertension    Dermatophytosis of nail    Radial styloid tenosynovitis    Anemia    Atrial fibrillation (HCC)    Pain in soft tissues of limb    Coronary atherosclerosis    Malignant neoplasm of kidney (HCC)    Chronic kidney disease, stage III (moderate) (HCC)    Congenital cystic kidney disease    Edema    Congenital deformity of feet    Bacteremia    Other hammer toe (acquired)    Hypercholesterolemia    Enlarged prostate with lower urinary tract symptoms (LUTS)    Enthesopathy of ankle and tarsus    Myalgia and myositis    Nocturia    Pain in joint, shoulder region    Pain in limb    Numbness and tingling in right hand    Community acquired bacterial pneumonia    Stage 4 chronic kidney disease (HCC)    SOB (shortness of breath)    Acute on chronic diastolic heart failure (HCC)    Elevated troponin     Allergies   Allergen Reactions    2-(Ethylmercuriothio)Benzoic Acid      Leg pain, abd cramps    Atorvastatin      myalgias    Celecoxib Nausea Only    Ciprofloxacin Nausea Only and Other (See Comments)     Stomach upset    Clarithromycin Nausea Only    Ezetimibe      Leg pain    Fish Oil      abd upset    Metoprolol      abd upset    Moxifloxacin Other (See Comments)     tendonitis    Naproxen Nausea Only    Petrolatum-Zinc Oxide Other (See Comments)     Rxn unknown-poss caused a kidney stone    Rofecoxib Nausea Only    Simvastatin      Muscle aches    Sulfa Antibiotics     Adhesive Tape Nausea And Vomiting     Caused top layer of skin to tear off when removed       Treatment Diagnosis: Dysphagia     Chart review:   Admission H&P: Per MD History and Physical Documentation  The patient Maurice Ackerman Jr is a 89 y.o.male medical history significant for hypertension atrial fibrillation and 
Patient discharged to [facility] assisted living. Patients' family to transport . All belongings sent with patients family. Discharge education complete.  
Patient emptied his own urinal before output could be charted. This RN provided the patient with education in regards to the importance of intake and output. Urinal noted to be 1/2 full when last seen, will chart that amount.   
Pharmacy Medication Reconciliation Note     List of medications Maurice Ackerman Jr is currently taking is complete.    Source of information:   1. Conversation with patient and family at bedside  2. EMR  3. [facility] medication list    Notes regarding home medications:   1. Patient presents with medication list from [facility] but also takes medications himself -- reports last dose of all home medications was yesterday  2. Patient is prescribed warfarin for Afib with a goal INR of 2.0 - 3.0 . Patient has 2.5 mg tablets and their current dose is 2.5 mg daily except 1.25 mg on Tuesdays and Fridays. Patient is managed by the [facility] anti-coag clinic. Last dose taken on 12/27/2023  3. Denies taking aspirin 81 mg -- removed from list  4. Current Lasix dose is 60 mg QAM -- does not take Lasix 40 mg tablet that was filled on 10/17/2023     Patient denies taking any OTC or herbal medications other than those listed    Nathan Holm, Pharmacy Intern  12/28/2023  12:40 PM    
Physical Therapy    Maurice MENJIVAR Flsilviano   [id number]  [id number]    Nursing reporting pt is requesting for therapy to continue working with him as he is still on a lasix drip; will re-sign on to pt's treatment team while he is in hosp to decrease secondary effects of immobility  Electronically signed by SAVAGE DOWNS PT on 1/4/2024 at 11:15 AM       
Physical Therapy  Facility/Department: [address] PROGRESSIVE CARE  Physical Therapy Treatment Note    Name: Maurice Ackerman Jr  : 1930  MRN: [id number]  Date of Service: 2024    Discharge Recommendations:  Home with assist PRN, Home with Home health PT   PT Equipment Recommendations  Equipment Needed: No      Maurice Ackerman Jr scored a 20/24 on the AM-PAC short mobility form. Current research shows that an AM-PAC score of 18 or greater is typically associated with a discharge to the patient's home setting. Based on the patient's AM-PAC score and their current functional mobility deficits, it is recommended that the patient have 2-3 sessions per week of Physical Therapy at d/c to increase the patient's independence.  At this time, this patient demonstrates the endurance and safety to discharge home with Home PT and a follow up treatment frequency of 2-3x/wk.  Please see assessment section for further patient specific details.    If patient discharges prior to next session this note will serve as a discharge summary.  Please see below for the latest assessment towards goals.       Patient Diagnosis(es): The primary encounter diagnosis was Shortness of breath. Diagnoses of Acute diastolic CHF (congestive heart failure) (HCC), Acute kidney injury superimposed on CKD (HCC), and History of right nephrectomy were also pertinent to this visit.  Past Medical History:  has a past medical history of A-fib (HCC), Hypertension, and Malignant neoplasm of kidney (HCC).  Past Surgical History:  has a past surgical history that includes Cholecystectomy; Kidney removal; Appendectomy; and angioplasty.    Assessment   Assessment: pt is a 89 yo male who was adm to hosp with SOB; pt at baseline lives in Ind living at [facility]; pt appears to be close to his baseline for mobility tasks except for endurance and remains on an IV lasix drip; recommend home to his Ind living apt with PT eval/tx to ensure safe transition to home; will 
Pt arrived to floor via stretcher from ED and ambulated to bed. Telemetry activated. Patient oriented to room and use of call light. Call light and personal items within reach. Admission and assessment initiated. POC and education initiated and reviewed with patient. Denied further needs or questions at this time.   
Pt continues to refuse nectar thick liquids. Pt states it makes him gag and sick to his stomach. Pt educated on purpose of nectar thick liquids per speech's recommendations.    Electronically signed by Sandra Singh RN on 1/6/2024 at 2:26 AM    
Pt is refusing nectar thick fluids. Pt states he absolutely can not drink them that they make him gag. Pt states he will only take ice chips. Pt educated on importance of thickened fluids per speech recommendations to reduce the risk of choking.    Electronically signed by Sandra Singh RN on 1/4/2024 at 11:33 PM   
Report called to nurse on 5W. All questions answered.   
Av, Min:5, Max:76    24HR INTAKE/OUTPUT:    Intake/Output Summary (Last 24 hours) at 1/3/2024 1218  Last data filed at 1/3/2024 1047  Gross per 24 hour   Intake 440 ml   Output 2650 ml   Net -2210 ml       Patient Vitals for the past 96 hrs (Last 3 readings):   Weight   24 0245 100.8 kg (222 lb 3.6 oz)   24 0327 103.1 kg (227 lb 4.7 oz)   24 0342 102.8 kg (226 lb 10.1 oz)     General: Alert, Awake, NAD, Obese  Chest: Diminished  to auscultation, no intercostal retractions  CVS: irregular, no murmur, no rub  Abdomen: soft, non tender  Extremities: 2+ edema to BLE, no cyanosis.  Skin: normal texture, normal skin turgor, no rash  Neurological: CN intact, no focal motor neurological deficit    Allergies:  Allergies   Allergen Reactions    2-(Ethylmercuriothio)Benzoic Acid      Leg pain, abd cramps    Atorvastatin      myalgias    Celecoxib Nausea Only    Ciprofloxacin Nausea Only and Other (See Comments)     Stomach upset    Clarithromycin Nausea Only    Ezetimibe      Leg pain    Fish Oil      abd upset    Metoprolol      abd upset    Moxifloxacin Other (See Comments)     tendonitis    Naproxen Nausea Only    Petrolatum-Zinc Oxide Other (See Comments)     Rxn unknown-poss caused a kidney stone    Rofecoxib Nausea Only    Simvastatin      Muscle aches    Sulfa Antibiotics     Adhesive Tape Nausea And Vomiting     Caused top layer of skin to tear off when removed        LAB DATA:    CBC:   Lab Results   Component Value Date/Time    WBC 6.0 2024 04:55 AM    RBC 3.19 2024 04:55 AM    HGB 9.7 2024 04:55 AM    HCT 29.3 2024 04:55 AM    MCV 91.8 2024 04:55 AM    MCH 30.3 2024 04:55 AM    MCHC 33.1 2024 04:55 AM    RDW 16.4 2024 04:55 AM     2024 04:55 AM    MPV 8.8 2024 04:55 AM     BMP:    Lab Results   Component Value Date/Time     2024 04:55 AM    K 3.8 2024 04:55 AM    K 5.1 2023 09:50 AM    CL 99 2024 
MCV 90.7 01/02/2024 04:43 AM    MCH 30.7 01/02/2024 04:43 AM    MCHC 33.8 01/02/2024 04:43 AM    RDW 15.6 01/02/2024 04:43 AM     01/02/2024 04:43 AM    MPV 9.0 01/02/2024 04:43 AM     BMP:    Lab Results   Component Value Date/Time     01/02/2024 04:43 AM    K 3.9 01/02/2024 04:43 AM    K 5.1 12/28/2023 09:50 AM    CL 99 01/02/2024 04:43 AM    CO2 26 01/02/2024 04:43 AM    BUN 55 01/02/2024 04:43 AM    CREATININE 2.6 01/02/2024 04:43 AM    CALCIUM 9.1 01/02/2024 04:43 AM    LABGLOM 22 01/02/2024 04:43 AM    GLUCOSE 97 01/02/2024 04:43 AM     Ionized Calcium:  No results found for: \"IONCA\"  Magnesium:    Lab Results   Component Value Date/Time    MG 1.90 01/02/2024 04:43 AM     Phosphorus:    Lab Results   Component Value Date/Time    PHOS 3.9 01/02/2024 04:43 AM     U/A:  No results found for: \"NITRITE\", \"COLORU\", \"PHUR\", \"LABCAST\", \"WBCUA\", \"RBCUA\", \"MUCUS\", \"TRICHOMONAS\", \"YEAST\", \"BACTERIA\", \"CLARITYU\", \"SPECGRAV\", \"LEUKOCYTESUR\", \"UROBILINOGEN\", \"BILIRUBINUR\", \"BLOODU\", \"GLUCOSEU\", \"AMORPHOUS\"      IMPRESSION/RECOMMENDATIONS:      1) ACDHF: Secondary to increase sodium loading as well as fluid intake  - TTE (5/1/2023): LVEF 55%. Grade 1 diastolic dysfx. LA and RA dilated. Mod TR.    - Continue 1.5 L FR and low Na+ diet              - Continue lasix gtt at 5 mg/hr              - Will be difficult to reduce sodium intake outpatient, as patient lives in a facility   - Elevate legs as tolerated to mobilize fluid     2) CKD4              - sees Dr. Lundberg, baseline Cr 2.1-2.3, seen yearly   - Cr slightly above baseline, but stable              - He is not a dialysis candidate due to age     3) ID              - Pneumonia and cellulitis                          - on azithromycin     4) AF              - On coumadin    KINGSLEY Veras - CNP    Will discuss with nephrology attending physician, Dr. Davila.  See attestation for additional recommendations.    
secondary effects of immobility  Therapy Prognosis: Good  Decision Making: Low Complexity  Requires PT Follow-Up: Yes  Activity Tolerance  Activity Tolerance: Patient tolerated treatment well     Plan   Physical Therapy Plan  General Plan: 3-5 times per week  Current Treatment Recommendations: Functional mobility training, Endurance training  Additional Comments: no further acute PT indicated  Safety Devices  Type of Devices: Call light within reach, Left in chair, Nurse notified  Restraints  Restraints Initially in Place: No     Restrictions  Position Activity Restriction  Other position/activity restrictions: IV attached     Subjective   General  Chart Reviewed: Yes  Additional Pertinent Hx: per H&P note: \"The patient Maurice Ackerman Jr is a 89 y.o.male medical history significant for hypertension atrial fibrillation and malignant neoplasm of the kidney and patient is with a solitary kidney.     Patient has been feeling short of breath for the last several days patient also reports continuing cough that is nonproductive.  Patient failed outpatient treatment with doxycycline by the primary care physician.  Patient also reports that he has noticed gradually worsening swelling on the bilateral lower extremities and now he has noticed erythema and pain in both lower extremities.  Patient also reports swelling of the scrotum.  \"  Response To Previous Treatment: Patient with no complaints from previous session.  Family / Caregiver Present: No  Referring Practitioner: Dr Coleman  Referral Date : 01/03/24  Follows Commands: Within Functional Limits  Subjective  Subjective: pt pleasant and agreeable to working with therapy         Social/Functional History  Social/Functional History  Lives With: Alone ([place] at [facility])  Type of Home: Senior housing apartment  Home Layout: One level  Home Access: Level entry  Home Equipment: Walker, rolling  ADL Assistance: Independent  Ambulation Assistance: Independent (with 
3.4 - 5.0 g/dL   Magnesium    Collection Time: 24  7:00 PM   Result Value Ref Range    Magnesium 1.90 1.80 - 2.40 mg/dL   Protime-INR    Collection Time: 24  4:43 AM   Result Value Ref Range    Protime 25.8 (H) 11.5 - 14.8 sec    INR 2.37 (H) 0.84 - 1.16   CBC    Collection Time: 24  4:43 AM   Result Value Ref Range    WBC 6.2 4.0 - 11.0 K/uL    RBC 3.18 (L) 4.20 - 5.90 M/uL    Hemoglobin 9.8 (L) 13.5 - 17.5 g/dL    Hematocrit 28.9 (L) 40.5 - 52.5 %    MCV 90.7 80.0 - 100.0 fL    MCH 30.7 26.0 - 34.0 pg    MCHC 33.8 31.0 - 36.0 g/dL    RDW 15.6 (H) 12.4 - 15.4 %    Platelets 180 135 - 450 K/uL    MPV 9.0 5.0 - 10.5 fL   Renal Function Panel    Collection Time: 24  4:43 AM   Result Value Ref Range    Sodium 137 136 - 145 mmol/L    Potassium 3.9 3.5 - 5.1 mmol/L    Chloride 99 99 - 110 mmol/L    CO2 26 21 - 32 mmol/L    Anion Gap 12 3 - 16    Glucose 97 70 - 99 mg/dL    BUN 55 (H) 7 - 20 mg/dL    Creatinine 2.6 (H) 0.8 - 1.3 mg/dL    Est, Glom Filt Rate 22 (A) >60    Calcium 9.1 8.3 - 10.6 mg/dL    Phosphorus 3.9 2.5 - 4.9 mg/dL    Albumin 3.5 3.4 - 5.0 g/dL   Magnesium    Collection Time: 24  4:43 AM   Result Value Ref Range    Magnesium 1.90 1.80 - 2.40 mg/dL        Imaging/Diagnostics Last 24 Hours   No results found.    Electronically signed by Nany Calvo MD on 2024 at 2:41 PM    V2.0  [facility] Daily Progress Note      Name:  Maurice Ackerman Jr /Age/Sex: 1930  (89 y.o. male)   MRN & CSN:  [id number] & [id number] Encounter Date/Time: 2024 2:41 PM EST    Location:  [id number] PCP: Holly Govea       [facility] Day: 6    Assessment and Plan:   Maurice Ackerman Jr is a 89 y.o. male with medical history significant for who was admitted with     Assessment/Plan :   1.  2.  3.  4.  5.  6.    DVT prophylaxis:Lovenox     Disposition: in 1-2 days to         Subjective:     Chief Complaint:     HPI:Maurice Ackerman Jr is a 89 y.o. male who presents with          Review of Systems:  
avoid falls.  I am not sure how redirectable he is.  
level  Home Access: Level entry  Home Equipment: Walker, rolling  ADL Assistance: Independent  Ambulation Assistance: Independent (with RW)  Transfer Assistance: Independent  Active : Yes  Mode of Transportation: Car  Occupation: Retired  Vision/Hearing  Vision  Vision: Within Functional Limits  Hearing  Hearing: Within functional limits    Cognition   Orientation  Overall Orientation Status: Within Functional Limits  Cognition  Overall Cognitive Status: WFL     Objective      Bed mobility  Bed Mobility Comments: up in chair at beginning and end of session  Transfers  Sit to Stand: Supervision  Stand to Sit: Supervision  Ambulation  Device: Rolling Walker  Assistance: Stand by assistance;Supervision  Quality of Gait: no LOB; reports slightly slow pace and decreased endurance compared to his normal  Distance: 100'x2     Balance  Sitting - Static: Good  Sitting - Dynamic: Good  Standing - Static: Fair;+;Good  Standing - Dynamic: Fair;+;Good                             AM-PAC - Mobility    AM-PAC Basic Mobility - Inpatient   How much help is needed turning from your back to your side while in a flat bed without using bedrails?: None  How much help is needed moving from lying on your back to sitting on the side of a flat bed without using bedrails?: None  How much help is needed moving to and from a bed to a chair?: A Little  How much help is needed standing up from a chair using your arms?: A Little  How much help is needed walking in hospital room?: A Little  How much help is needed climbing 3-5 steps with a railing?: A Little  AM-[facility] Inpatient Mobility Raw Score : 20  AM-PAC Inpatient T-Scale Score : 47.67  Mobility Inpatient CMS 0-100% Score: 35.83  Mobility Inpatient CMS G-Code Modifier : CJ       Goals  Short Term Goals  Time Frame for Short Term Goals: by discharge  Short Term Goal 1: amb 100-200' with RW SBA/sup without becoming SOB  Patient Goals   Patient Goals : to return to his Ind living apt   
      Time Out 1052         Minutes 34                 SAVAGE DOWNS PT   Electronically signed by SAVAGE DOWNS PT on 1/3/2024 at 10:53 AM         
barrier     Activity Tolerance  Activity Tolerance: Patient tolerated treatment well  Bed mobility  Bed Mobility Comments: up in chair at beginning and end of session  Transfers  Sit to stand: Supervision;Stand by assistance  Stand to sit: Supervision;Stand by assistance  Transfer Comments: to/from RW  Vision  Vision: Within Functional Limits  Hearing  Hearing: Within functional limits  Cognition  Overall Cognitive Status: WFL  Orientation  Overall Orientation Status: Within Functional Limits                  Education Given To: Patient;Family  Education Provided: Role of Therapy;Transfer Training;Plan of Care  Education Method: Verbal  Barriers to Learning: None  Education Outcome: Verbalized understanding;Demonstrated understanding       AM-PAC - ADL  AM-PAC Daily Activity - Inpatient   How much help is needed for putting on and taking off regular lower body clothing?: A Little  How much help is needed for bathing (which includes washing, rinsing, drying)?: A Little  How much help is needed for toileting (which includes using toilet, bedpan, or urinal)?: A Little  How much help is needed for putting on and taking off regular upper body clothing?: None  How much help is needed for taking care of personal grooming?: None  How much help for eating meals?: None  AM-PAC Inpatient Daily Activity Raw Score: 21  AM-PAC Inpatient ADL T-Scale Score : 44.27  ADL Inpatient CMS 0-100% Score: 32.79  ADL Inpatient CMS G-Code Modifier : CJ    Goals  Short Term Goals  Time Frame for Short Term Goals: no goals identified  Patient Goals   Patient goals : to return home       Therapy Time   Individual Concurrent Group Co-treatment   Time In 1018         Time Out 1053         Minutes 35         Timed Code Treatment Minutes: 20 Minutes (15 min eval)       Jonathan Casey, OTR/L [id number]     
diarrheal process.  Mild colonic distension is noted with mild mesenteric swirling but no evidence for volvulus.  This may be due to a partial obstructing process due to an underlying internal hernia.  No significant small bowel distension. 2.  Additional chronic and benign findings, as described.       CBC:   Recent Labs     01/06/24  0511 01/07/24  0802 01/08/24  0444   WBC 4.1 4.3 5.0   HGB 10.0* 10.3* 9.6*    168 177       BMP:    Recent Labs     01/06/24  0511 01/07/24  0802 01/08/24  0444   * 136 137   K 3.6 3.5 3.6   CL 96* 94* 64*   CO2 29 29 31   BUN 64* 58* 63*   CREATININE 2.2* 2.2* 2.4*   GLUCOSE 94 83 90       Hepatic:   No results for input(s): \"AST\", \"ALT\", \"ALB\", \"BILITOT\", \"ALKPHOS\" in the last 72 hours.  Lipids:   Lab Results   Component Value Date/Time    CHOL 100 12/29/2023 06:16 AM    HDL 61 12/29/2023 06:16 AM    TRIG 49 12/29/2023 06:16 AM     Hemoglobin A1C: No results found for: \"LABA1C\"  TSH:   Lab Results   Component Value Date/Time    TSH 1.50 12/28/2023 07:54 PM     Troponin: No results found for: \"TROPONINT\"  Lactic Acid: No results for input(s): \"LACTA\" in the last 72 hours.  BNP:   Recent Labs     01/06/24 0511   PROBNP 6,715*       UA:  No results found for: \"NITRU\", \"COLORU\", \"PHUR\", \"LABCAST\", \"WBCUA\", \"RBCUA\", \"MUCUS\", \"TRICHOMONAS\", \"YEAST\", \"BACTERIA\", \"CLARITYU\", \"SPECGRAV\", \"LEUKOCYTESUR\", \"UROBILINOGEN\", \"BILIRUBINUR\", \"BLOODU\", \"GLUCOSEU\", \"KETUA\", \"AMORPHOUS\"  Urine Cultures: No results found for: \"LABURIN\"  Blood Cultures:   Lab Results   Component Value Date/Time    BC No Growth after 4 days of incubation. 05/20/2023 11:24 AM     Lab Results   Component Value Date/Time    BLOODCULT2 No Growth after 4 days of incubation. 05/20/2023 11:24 AM     Organism: No results found for: \"ORG\"      Assessment and Recommendations   Maurice Ackerman Jr is a 89 y.o. male who presents with lower limb swelling     Acute on chronic diastolic CHF  Hold torsemide home 
\"ORG\"      Assessment and Recommendations   Maurice Ackerman Jr is a 89 y.o. male who presents with lower limb swelling     Acute on chronic diastolic CHF  Hold torsemide home dose  continues to be on Lasix drip at 15 mg/hour   Recent echocardiogram ( May 2023)  shows normal ejection fraction and diastolic dysfunction on grade 1   Weight continues to improve.   Appreciate  nephrology input      Pneumonia  Received  Zithromax and ceftriaxone ( course completed )   Bronchodilators  MRSA SCREEN - negative         Swallow eval   -- recs for dysphagia diet     CKD 4  Cr 2.1 to 2.4  Nephrology monitoring diuresis  Cr today 2.1     Lower extremity cellulitis  Patient has swelling erythema and pain of the lower extremities  Ceftriaxone,  completed 8 days, DC 1/5  Procalcitonin - negative  Continue with Ace wrap and elevation        Atrial fibrillation   Heart rate is controlled  Chronic anticoagulation  Pharmacy to manage Coumadin      Diet ADULT DIET; Dysphagia - Soft and Bite Sized; Low Sodium (2 gm); Mildly Thick (Nectar); 1500 ml     DVT Prophylaxis [] Lovenox, []  Heparin, [] SCDs, [] Ambulation,  [] Eliquis, [] Xarelto  [x] Coumadin   Code Status Full Code   Disposition From: home   Expected Disposition: home   Estimated Date of Discharge: 1-2 days   Patient requires continued admission due to heart failure excerbation    Surrogate Decision Maker/ POA  Wife      Personally reviewed Lab Studies and Imaging        Medical Decision Making:  The following items were considered in medical decision making:  Discussion of patient care with other providers  Reviewed clinical lab tests  Reviewed radiology tests  Reviewed other diagnostic tests/interventions  Independent review of radiologic images  Microbiology cultures and other micro tests reviewed        Electronically signed by Cara White MD on 1/5/2024 at 2:28 PM  Comment: Please note this report has been produced using speech recognition software and may contain 
initial reminder; independent for positioning  Pt response to ex/training: general dysphagia education (with handout provided) completed as well as education on MBS results/recs and rationale for diet modification; patient with tendency to deny noted impairments as well as to refer symptoms of impairments to relation to post-nasal drip; reinforced symptoms of dysphagia and consistent concern for dysphagia opposed to an isolated episode as patient reports; introduced concept that reduced management of post-nasal drip may be 2/2 dysphagia impairments; suspect need for continued education/reinforcement    Goals:    Pt will functionally tolerate recommended diet level with use of recommended compensatory strategies with no s/s of aspiration/penetration continue; needs continued comp start reinforcement   Pt/family will demonstrate understanding of results Modified Barium Swallow Study, risk for aspiration, rationale for diet level and compensatory strategies continue; needs continued reinforcement for education  Pt will demonstrate improved sensory motor function via targeted exercises and appropriate treatment modalities to be directly addressed future visit  Pt will tolerate advance to least restrictive diet as evidenced by repeat instrumental swallow study not addressed this date    Assessment:   Impressions:   Accepted and tolerated tx at bedside. Patient with verbalized dislike for diet/liquid modification as well as for denial of need for modification and report for isolated episode of difficulty r/t post=nasal drip.  Patient alert and cooperative; open to dysphagia learning but with need for continued reinforcement. Patient verbally responsive and follows simple dx. Oriented to self, place ,and general situation.  Clinically, suspect patient comparable to above noted MBS. Concern for oropharyngeal dysphagia characterized by reduced mastication, reduced lingual manipulation, delayed swallow, decreased laryngeal 
touching assistance   [] Partial or moderate assistance   [] Substantial or maximal assistance  [] Dependent       EDUCATION:   Provided education regarding role of SLP, results of assessment, recommendations and general speech pathology plan of care.   [x] Pt verbalized understanding and agreement ; but states has never had any problems  [x] Pt requires ongoing learning   [] No evidence of comprehension     If patient discharges prior to next visit, this note will serve as discharge.     Timed Code Minutes: 0  Total Treatment Minutes: 38    Electronically signed by:    Lizzie Roth MS,CCC,SLP [id number]  Speech and Language Pathologist  1/2/2024 at 1625 pm    
Therapy: YES    Interventions: Bolus Control Exercise, [place] Protocol, Laryngeal Exercises , Pharyngeal Exercise, Diet Tolerance Monitoring , Oral Motor Exercises , Patient/Family Education , Therapeutic Trials with SLP   Duration/Frequency of therapy while on unit: Speech therapy for dysphagia tx 3-5 times per week during acute care stay.      Discharge Instructions:   Recommend ongoing SLP for dysphagia therapy upon discharge from hospital     This note serves as a D/C Summary in the event that this patient is discharged prior to the next therapy session.    Coded treatment time: 0  Total treatment time: 13    Electronically signed by   Lizzie Roth MS,CCC,SLP [id number]  Speech and Language Pathologist  on 1/9/2024 at 1:56 PM

## 2024-01-10 NOTE — DISCHARGE SUMMARY
V2.0  Discharge Summary    Name:  Maurice Ackerman Jr /Age/Sex: 1930 (89 y.o. male)   Admit Date: 2023  Discharge Date: 24    MRN & CSN:  [id number] & [id number] Encounter Date and Time 24 7:00 PM EST    Attending:  No att. providers found Discharging Provider: Gordon Plunkett MD       Hospital Course:     Brief HPI: Maurice Ackerman Jr is a 89 y.o. male who presented with shortness of breath and lower extremity swelling, diagnosed with acute heart failure and cellulitis    Brief Problem Based Course:   Acute on chronic heart failure with preserved ejection fraction: Patient presented with shortness of breath and significant lower extremity swelling. Nephrology and cardiology were consulted.  He was diuresed with Lasix drip.  Creatinine remained stable throughout diuresis. Patient was transition to oral torsemide for discharge with plan to follow-up with his cardiologist and nephrologist as outpatient  Lower extremity cellulitis: Patient had erythema and pain in addition to swelling of lower extremities.  Completed 8 days of IV antibiotics.    The patient expressed appropriate understanding of, and agreement with the discharge recommendations, medications, and plan.     Consults this admission:  IP CONSULT TO HEART FAILURE NURSE/COORDINATOR  IP CONSULT TO DIETITIAN  PHARMACY TO DOSE WARFARIN  IP CONSULT TO NEPHROLOGY  IP CONSULT TO CARDIOLOGY  IP CONSULT TO HOME CARE NEEDS    Discharge Diagnosis:   SOB (shortness of breath)  Acute on chronic heart failure preserved ejection fraction  Lower extremity cellulitis    Discharge Instruction:   Follow up appointments:   Primary care physician: Holly Govea within 2 weeks  Follow-up with nephrologist Dr. Lundberg within 1 month  Follow-up with cardiologist Dr. Sandoval within 1 week  Diet: cardiac diet   Activity: activity as tolerated  Disposition: Discharged to:   []Home, [x]HHC, []SNF, []Acute Rehab, []Hospice   Condition on discharge: Stable  Labs and

## 2024-01-11 ENCOUNTER — TELEPHONE (OUTPATIENT)
Dept: PHARMACY | Age: 89
End: 2024-01-11

## 2024-01-11 NOTE — TELEPHONE ENCOUNTER
Jacklyn Ackerman is a patient of our outpatient center for warfarin management.     He was hospitalized 12/28-1/9/24. Discharged to his assisted living facility with [facility] home care. I called [facility] [phone] to verify INRs were being checked and that results would be called to us to manage warfarin. Catie reports he preferred [facility] for his home care needs. Called [facility] at [phone] and they do not have him in their system.     Left a message for Jacklyn to get an update on his status and the plan for warfarin management.     Will follow along.     Mami Arciniega, PharmD  [facility]  Anticoagulation  [phone]    For Pharmacy Admin Tracking Only    Intervention Detail:   Total # of Interventions Recommended:   Total # of Interventions Accepted:   Time Spent (min): 15

## 2024-01-15 ENCOUNTER — TELEPHONE (OUTPATIENT)
Dept: PHARMACY | Age: 89
End: 2024-01-15

## 2024-01-15 NOTE — TELEPHONE ENCOUNTER
Left message to please return our call for update on status of warfarin management.     Went ahead and called his sister Hetal. She gave me a more accurate phone number [phone]. Updated contact list.     Left a message on this new number to please return our call.     Lives at [facility] with assisted living. Receiving therapy there per Hetal.     Will follow along. Need to assess for heart failure as well.     Mami Arciniega, PharmD  [facility]  [facility]  Anticoagulation  [phone]    For Pharmacy Admin Tracking Only    Intervention Detail:   Total # of Interventions Recommended:   Total # of Interventions Accepted:   Time Spent (min): 10

## 2024-01-17 ENCOUNTER — ANTI-COAG VISIT (OUTPATIENT)
Dept: PHARMACY | Age: 89
End: 2024-01-17
Payer: MEDICARE

## 2024-01-17 VITALS — BODY MASS INDEX: 28.81 KG/M2 | WEIGHT: 218.4 LBS

## 2024-01-17 DIAGNOSIS — I48.91 ATRIAL FIBRILLATION, UNSPECIFIED TYPE (HCC): Primary | ICD-10-CM

## 2024-01-17 DIAGNOSIS — I50.33 ACUTE ON CHRONIC DIASTOLIC HEART FAILURE (HCC): ICD-10-CM

## 2024-01-17 LAB — INTERNATIONAL NORMALIZATION RATIO, POC: 3.1

## 2024-01-17 PROCEDURE — 99212 OFFICE O/P EST SF 10 MIN: CPT

## 2024-01-17 PROCEDURE — 85610 PROTHROMBIN TIME: CPT

## 2024-01-17 NOTE — PATIENT INSTRUCTIONS
Continue to weigh yourself every day.  Call Dr. Sandoval if your weight increase 3 pounds in a day or 5 pounds in a week.   Be sure to make it to your appointment with cardiology on 1/22/2024.  Do not drink more than 64 ounces of fluid per day  Do not get more then 2000mg of sodium per day.   Call Dr. Sandoval right away if you have symptoms of heart failure like increased swelling or increased shortness of breath.

## 2024-01-17 NOTE — PROGRESS NOTES
Maurice Ackerman Jr is a 89 y.o. here for warfarin management.  Maurice had an INR test today. Results were reviewed and appropriate warfarin management was completed.     Patient verifies current warfarin dosing regimen: Yes     Warfarin medication reviewed and updated on the patient 's home medication list: Yes   All other medications reviewed and updated on the patient 's home medication list: Yes     Lab Results   Component Value Date    INR 3.1 01/17/2024    INR 2.75 (H) 01/09/2024    INR 2.87 (H) 01/08/2024     Patient Findings       Positives:  Change in medications, Hospital admission    Negatives:  Signs/symptoms of thrombosis, Signs/symptoms of bleeding, Missed doses, Change in diet/appetite, Bruising    Comments:  Admission 12/28-1/9/24 for acute heart failure  Furosemide changed to torsemide  Started famotidine  Do not expect these medications to interact with warfarin   Fluid retention can increase the INR          Anticoagulation Summary  As of 1/17/2024      INR goal:  2.0-3.0   TTR:  78.4 % (11.1 y)   INR used for dosing:  3.1 (1/17/2024)   Warfarin maintenance plan:  1.25 mg (2.5 mg x 0.5) every Mon, Wed, Fri; 2.5 mg (2.5 mg x 1) all other days   Weekly warfarin total:  13.75 mg   Plan last modified:  Mami Arciniega RPH (1/17/2024)   Next INR check:  1/31/2024   Priority:  Maintenance   Target end date:  Indefinite    Indications    Atrial fibrillation (HCC) [I48.91]                 Anticoagulation Episode Summary       INR check location:  Anticoagulation Clinic    Preferred lab:      Send INR reminders to:  [facility]    Comments:  FAX            Anticoagulation Care Providers       Provider Role Specialty Phone number    Matias Sandoval Responsible Internal Medicine [phone]          Wt 99.1 kg (218 lb 6.4 oz)   BMI 28.81 kg/m²     Warfarin assessment / plan:     Appears well  Supra-therapeutic INR.     Denies signs and symptoms of bleeding/bruising.  Denies extra

## 2024-01-17 NOTE — PROGRESS NOTES
Spoke with Rachel, Dr. Sandoval's nurse to make her aware of Mr. Ackerman's weight gain.     Weight at hospital discharge 2024 was 196. He weighed 205 at his PCP's visit 2024. Note states that patient weight was a reported weight on 24. Today his weight is 212 at home and 218 on our scale here in our clinic.     Reported to Rachel that he did not appear to be SOB. Walked with a walker to his appointment which is a pretty good walk. He denies and swelling.  Reports his SOB has improved at hospital stay and continues to improve at home. Reports \" a little\" SOB.     He had blood work at their office this am.  She will make NP aware to see if they want to change anything prior to his appt with them 24.     Rachel will call him as well.     Mami Arciniega, PharmD  [facility]  [facility]  Heart Failure Service  [phone]    For Pharmacy Admin Tracking Only    Program: Medication Management  CPA in place:  No  Recommendation Provided To: Provider: 1 via Called provider office  Intervention Detail: Adherence Monitorin  Intervention Accepted By: Provider: 1  Gap Closed?: Yes   Time Spent (min): 15

## 2024-01-31 ENCOUNTER — ANTI-COAG VISIT (OUTPATIENT)
Dept: PHARMACY | Age: 89
End: 2024-01-31
Payer: MEDICARE

## 2024-01-31 DIAGNOSIS — I48.91 ATRIAL FIBRILLATION, UNSPECIFIED TYPE (HCC): Primary | ICD-10-CM

## 2024-01-31 LAB — INTERNATIONAL NORMALIZATION RATIO, POC: 3.1

## 2024-01-31 PROCEDURE — 85610 PROTHROMBIN TIME: CPT

## 2024-01-31 PROCEDURE — 99211 OFF/OP EST MAY X REQ PHY/QHP: CPT

## 2024-01-31 RX ORDER — METOPROLOL SUCCINATE 25 MG/1
25 TABLET, EXTENDED RELEASE ORAL DAILY
COMMUNITY

## 2024-01-31 NOTE — PROGRESS NOTES
Maurice Ackerman Jr is a 89 y.o. here for warfarin management.  Maurice had an INR test today. Results were reviewed and appropriate warfarin management was completed.     Patient verifies current warfarin dosing regimen: Yes     Warfarin medication reviewed and updated on the patient 's home medication list: Yes   All other medications reviewed and updated on the patient 's home medication list: Yes     Lab Results   Component Value Date    INR 3.1 01/31/2024    INR 3.1 01/17/2024    INR 2.75 (H) 01/09/2024     Patient Findings       Positives:  Change in medications    Negatives:  Signs/symptoms of thrombosis, Signs/symptoms of bleeding, Laboratory test error suspected, Change in health, Change in alcohol use, Change in activity, Upcoming invasive procedure, Emergency department visit, Upcoming dental procedure, Missed doses, Extra doses, Change in diet/appetite, Hospital admission, Bruising, Other complaints    Comments:  Made a few changes to his med list, stopped furosemide, atenolol and lisinopril. Started famotidine, metoprolol and torsemide.          Anticoagulation Summary  As of 1/31/2024      INR goal:  2.0-3.0   TTR:  78.2 % (11.1 y)   INR used for dosing:  3.1 (1/31/2024)   Warfarin maintenance plan:  1.25 mg (2.5 mg x 0.5) every Mon, Wed, Fri; 2.5 mg (2.5 mg x 1) all other days   Weekly warfarin total:  13.75 mg   Plan last modified:  Mami Arciniega RPH (1/17/2024)   Next INR check:  2/28/2024   Priority:  Maintenance   Target end date:  Indefinite    Indications    Atrial fibrillation (HCC) [I48.91]                 Anticoagulation Episode Summary       INR check location:  Anticoagulation Clinic    Preferred lab:      Send INR reminders to:  [facility]    Comments:  FAX  consent done 1/31/24          Anticoagulation Care Providers       Provider Role Specialty Phone number    Matias Sandoval GOSIA [facility] Internal Medicine [phone]          There were no vitals taken for

## 2024-02-01 PROBLEM — R79.89 ELEVATED TROPONIN: Status: RESOLVED | Noted: 2024-01-02 | Resolved: 2024-02-01

## 2024-02-26 ENCOUNTER — ANTI-COAG VISIT (OUTPATIENT)
Dept: PHARMACY | Age: 89
End: 2024-02-26
Payer: MEDICARE

## 2024-02-26 DIAGNOSIS — I48.91 ATRIAL FIBRILLATION, UNSPECIFIED TYPE (HCC): Primary | ICD-10-CM

## 2024-02-26 LAB — INR BLD: 2.4

## 2024-02-26 PROCEDURE — 99211 OFF/OP EST MAY X REQ PHY/QHP: CPT

## 2024-02-26 PROCEDURE — 85610 PROTHROMBIN TIME: CPT

## 2024-02-26 NOTE — PROGRESS NOTES
Maurice Ackerman Jr is a 89 y.o. here for warfarin management.  Maurice had an INR test today. Results were reviewed and appropriate warfarin management was completed.     Patient verifies current warfarin dosing regimen: Yes: Was taking differently - 2.5 mg daily except 1.25 mg on mon/fri     Warfarin medication reviewed and updated on the patient 's home medication list: Yes   All other medications reviewed and updated on the patient 's home medication list: Yes     Lab Results   Component Value Date    INR 2.40 2024    INR 3.1 2024    INR 3.1 2024     Patient Findings       Positives:  Extra doses    Negatives:  Signs/symptoms of thrombosis, Signs/symptoms of bleeding, Laboratory test error suspected, Change in health, Missed doses, Change in medications, Change in diet/appetite, Bruising    Comments:  Patient is taking warfarin differently (had the wrong paper that he was following) taking 2.5 mg QD except 1.25 mg Monday and Friday          Anticoagulation Summary  As of 2024      INR goal:  2.0-3.0   TTR:  78.2 % (11.2 y)   INR used for dosin.40 (2024)   Warfarin maintenance plan:  1.25 mg (2.5 mg x 0.5) every Mon, Fri; 2.5 mg (2.5 mg x 1) all other days   Weekly warfarin total:  15 mg   Plan last modified:  Julio Cesar Jones (2024)   Next INR check:  2024   Priority:  Maintenance   Target end date:  Indefinite    Indications    Atrial fibrillation (HCC) [I48.91]                 Anticoagulation Episode Summary       INR check location:  Anticoagulation Clinic    Preferred lab:      Send INR reminders to:  [facility]    Comments:  FAX  consent done 24          Anticoagulation Care Providers       Provider Role Specialty Phone number    Matias Sandoval GOSIA Responsible Internal Medicine [phone]          There were no vitals taken for this visit.    Warfarin assessment / plan:     Patient appears well today.      No acute findings with regards to

## 2024-04-08 ENCOUNTER — ANTI-COAG VISIT (OUTPATIENT)
Dept: PHARMACY | Age: 89
End: 2024-04-08
Payer: MEDICARE

## 2024-04-08 DIAGNOSIS — I48.91 ATRIAL FIBRILLATION, UNSPECIFIED TYPE (HCC): Primary | ICD-10-CM

## 2024-04-08 LAB — INTERNATIONAL NORMALIZATION RATIO, POC: 3.1

## 2024-04-08 PROCEDURE — 99211 OFF/OP EST MAY X REQ PHY/QHP: CPT

## 2024-04-08 PROCEDURE — 85610 PROTHROMBIN TIME: CPT

## 2024-04-08 NOTE — PROGRESS NOTES
Maurice Ackerman Jr is a 89 y.o. here for warfarin management.  Maurice had an INR test today. Results were reviewed and appropriate warfarin management was completed.     Patient verifies current warfarin dosing regimen: Yes     Warfarin medication reviewed and updated on the patient 's home medication list: Yes   All other medications reviewed and updated on the patient 's home medication list: No new medications     Lab Results   Component Value Date    INR 3.1 04/08/2024    INR 2.40 02/26/2024    INR 3.1 01/31/2024     Patient Findings       Negatives:  Signs/symptoms of thrombosis, Signs/symptoms of bleeding, Change in health, Missed doses, Change in medications, Change in diet/appetite, Bruising          Anticoagulation Summary  As of 4/8/2024      INR goal:  2.0-3.0   TTR:  78.3 % (11.3 y)   INR used for dosing:  3.1 (4/8/2024)   Warfarin maintenance plan:  1.25 mg (2.5 mg x 0.5) every Mon, Fri; 2.5 mg (2.5 mg x 1) all other days   Weekly warfarin total:  15 mg   Plan last modified:  Julio Cesar Jones (2/26/2024)   Next INR check:  5/6/2024   Priority:  Maintenance   Target end date:  Indefinite    Indications    Atrial fibrillation (HCC) [I48.91]                 Anticoagulation Episode Summary       INR check location:  Anticoagulation Clinic    Preferred lab:      Send INR reminders to:  [facility]    Comments:  FAX  consent done 1/31/24          Anticoagulation Care Providers       Provider Role Specialty Phone number    Matias Sandoval Responsible Internal Medicine [phone]          There were no vitals taken for this visit.    Warfarin assessment / plan:     Appears well  Supra-therapeutic INR.     Denies signs and symptoms of bleeding/bruising.  Denies medication changes.  Denies extra warfarin doses.     Jacklyn's INR is slightly high at 3.1 today.  No changes to diet or medications.  Held dose for today and will continue routine weekly warfarin therapy.     Description    Hold

## 2024-05-06 ENCOUNTER — ANTI-COAG VISIT (OUTPATIENT)
Dept: PHARMACY | Age: 89
End: 2024-05-06
Payer: MEDICARE

## 2024-05-06 DIAGNOSIS — I48.91 ATRIAL FIBRILLATION, UNSPECIFIED TYPE (HCC): Primary | ICD-10-CM

## 2024-05-06 LAB — INTERNATIONAL NORMALIZATION RATIO, POC: 2.4

## 2024-05-06 PROCEDURE — 99211 OFF/OP EST MAY X REQ PHY/QHP: CPT

## 2024-05-06 PROCEDURE — 85610 PROTHROMBIN TIME: CPT

## 2024-05-06 NOTE — PROGRESS NOTES
Maurice Ackerman Jr is a 89 y.o. here for warfarin management.  Maurice had an INR test today. Results were reviewed and appropriate warfarin management was completed.     Patient verifies current warfarin dosing regimen: Yes     Warfarin medication reviewed and updated on the patient 's home medication list: Yes   All other medications reviewed and updated on the patient 's home medication list: Yes     Lab Results   Component Value Date    INR 2.4 2024    INR 3.1 2024    INR 2.40 2024       Anticoagulation Summary  As of 2024      INR goal:  2.0-3.0   TTR:  78.3 % (11.4 y)   INR used for dosin.4 (2024)   Warfarin maintenance plan:  1.25 mg (2.5 mg x 0.5) every Mon, Fri; 2.5 mg (2.5 mg x 1) all other days   Weekly warfarin total:  15 mg   Plan last modified:  Julio Cesar Jones (2024)   Next INR check:  6/10/2024   Priority:  Maintenance   Target end date:  Indefinite    Indications    Atrial fibrillation (HCC) [I48.91]                 Anticoagulation Episode Summary       INR check location:  Anticoagulation Clinic    Preferred lab:      Send INR reminders to:  [facility]    Comments:  FAX  consent done 24          Anticoagulation Care Providers       Provider Role Specialty Phone number    Matias Sandoval MD Responsible Internal Medicine [phone]          There were no vitals taken for this visit.    Warfarin assessment / plan:     Patient appears well today.      No changes affecting warfarin therapy were noted.   No acute findings with regards to warfarin therapy.  INR today is within goal range.     Instructed to continue the same weekly warfarin dose.    See in 5 weeks    Description    Warfarin 2.5mg (1 tablet) daily EXCEPT 1.25mg (1/2 tablet) every Monday and Friday    Keep greens consistent. Iceberg lettuce occasionally.     Call [phone] with signs or symptoms of bleeding or ANY medication changes (including over-the-counter

## 2024-06-10 ENCOUNTER — ANTI-COAG VISIT (OUTPATIENT)
Dept: PHARMACY | Age: 89
End: 2024-06-10
Payer: MEDICARE

## 2024-06-10 DIAGNOSIS — I48.91 ATRIAL FIBRILLATION, UNSPECIFIED TYPE (HCC): Primary | ICD-10-CM

## 2024-06-10 LAB — INTERNATIONAL NORMALIZATION RATIO, POC: 2.6

## 2024-06-10 PROCEDURE — 99211 OFF/OP EST MAY X REQ PHY/QHP: CPT

## 2024-06-10 PROCEDURE — 85610 PROTHROMBIN TIME: CPT

## 2024-06-10 RX ORDER — TORSEMIDE 100 MG/1
100 TABLET ORAL DAILY
COMMUNITY

## 2024-06-10 NOTE — PROGRESS NOTES
Maurice Ackerman Jr is a 89 y.o. here for warfarin management.  Maurice had an INR test today. Results were reviewed and appropriate warfarin management was completed.     Patient verifies current warfarin dosing regimen: Yes     Warfarin medication reviewed and updated on the patient 's home medication list: Yes   All other medications reviewed and updated on the patient 's home medication list: No: no changes     Lab Results   Component Value Date    INR 2.6 06/10/2024    INR 2.4 2024    INR 3.1 2024     Anticoagulation Summary  As of 6/10/2024      INR goal:  2.0-3.0   TTR:  78.5 % (11.5 y)   INR used for dosin.6 (6/10/2024)   Warfarin maintenance plan:  1.25 mg (2.5 mg x 0.5) every Mon, Fri; 2.5 mg (2.5 mg x 1) all other days   Weekly warfarin total:  15 mg   Plan last modified:  Julio Cesar Jones (2024)   Next INR check:  2024   Priority:  Maintenance   Target end date:  Indefinite    Indications    Atrial fibrillation (HCC) [I48.91]                 Anticoagulation Episode Summary       INR check location:  Anticoagulation Clinic    Preferred lab:      Send INR reminders to:  [facility]    Comments:  FAX  consent done 24          Anticoagulation Care Providers       Provider Role Specialty Phone number    Matias Sandoval MD Responsible Internal Medicine [phone]          There were no vitals taken for this visit.    Warfarin assessment / plan:     Patient appears well today.      No changes affecting warfarin therapy were noted.   No acute findings with regards to warfarin therapy.  INR today is within goal range.     Instructed to continue the same weekly warfarin dose.      Updated torsemide to 100mg daily that was changed a month ago.  Verified the dose per his nephrology OV with Dr. Lundberg .    Description    Warfarin 2.5mg (1 tablet) daily EXCEPT 1.25mg (1/2 tablet) every Monday and Friday    Keep greens consistent. Iceberg lettuce

## 2024-07-08 ENCOUNTER — ANTI-COAG VISIT (OUTPATIENT)
Dept: PHARMACY | Age: 89
End: 2024-07-08
Payer: MEDICARE

## 2024-07-08 DIAGNOSIS — I48.91 ATRIAL FIBRILLATION, UNSPECIFIED TYPE (HCC): Primary | ICD-10-CM

## 2024-07-08 LAB
INTERNATIONAL NORMALIZATION RATIO, POC: 2.4
PROTHROMBIN TIME, POC: NORMAL

## 2024-07-08 PROCEDURE — 85610 PROTHROMBIN TIME: CPT

## 2024-07-08 PROCEDURE — 99211 OFF/OP EST MAY X REQ PHY/QHP: CPT

## 2024-07-08 NOTE — PROGRESS NOTES
Maurice Ackerman Jr is a 89 y.o. here for warfarin management.  Maurice had an INR test today. Results were reviewed and appropriate warfarin management was completed.     Patient verifies current warfarin dosing regimen: Yes     Warfarin medication reviewed and updated on the patient 's home medication list: Yes   All other medications reviewed and updated on the patient 's home medication list: No: no changes     Lab Results   Component Value Date    INR 2.4 2024    INR 2.6 06/10/2024    INR 2.4 2024     Anticoagulation Summary  As of 2024      INR goal:  2.0-3.0   TTR:  78.7 % (11.5 y)   INR used for dosin.4 (2024)   Warfarin maintenance plan:  1.25 mg (2.5 mg x 0.5) every Mon, Fri; 2.5 mg (2.5 mg x 1) all other days   Weekly warfarin total:  15 mg   Plan last modified:  Julio Cesar Jones (2024)   Next INR check:  2024   Priority:  Maintenance   Target end date:  Indefinite    Indications    Atrial fibrillation (HCC) [I48.91]                 Anticoagulation Episode Summary       INR check location:  Anticoagulation Clinic    Preferred lab:      Send INR reminders to:  [facility]    Comments:  FAX  consent done 24          Anticoagulation Care Providers       Provider Role Specialty Phone number    Matias Sandoval MD Responsible Internal Medicine [phone]          There were no vitals taken for this visit.    Warfarin assessment / plan:     Patient appears well today.      No changes affecting warfarin therapy were noted.   No acute findings with regards to warfarin therapy.  INR today is within goal range.     Instructed to continue the same weekly warfarin dose.      Description    Warfarin 2.5mg (1 tablet) daily EXCEPT 1.25mg (1/2 tablet) every Monday and Friday    Keep greens consistent. Iceberg lettuce occasionally.     Call [phone] with signs or symptoms of bleeding or ANY medication changes (including over-the-counter medications or

## 2024-08-05 ENCOUNTER — ANTI-COAG VISIT (OUTPATIENT)
Dept: PHARMACY | Age: 89
End: 2024-08-05
Payer: MEDICARE

## 2024-08-05 DIAGNOSIS — I48.91 ATRIAL FIBRILLATION, UNSPECIFIED TYPE (HCC): Primary | ICD-10-CM

## 2024-08-05 LAB
INTERNATIONAL NORMALIZATION RATIO, POC: 3.1
PROTHROMBIN TIME, POC: NORMAL

## 2024-08-05 PROCEDURE — 99211 OFF/OP EST MAY X REQ PHY/QHP: CPT | Performed by: SPEECH-LANGUAGE PATHOLOGIST

## 2024-08-05 PROCEDURE — 85610 PROTHROMBIN TIME: CPT | Performed by: SPEECH-LANGUAGE PATHOLOGIST

## 2024-08-05 NOTE — PROGRESS NOTES
Maurice Ackerman Jr is a 89 y.o. here for warfarin management.  Maurice had an INR test today. Results were reviewed and appropriate warfarin management was completed.     Patient verifies current warfarin dosing regimen: Yes     Warfarin medication reviewed and updated on the patient 's home medication list: Yes   All other medications reviewed and updated on the patient 's home medication list: Yes     Lab Results   Component Value Date    INR 3.1 08/05/2024    INR 2.4 07/08/2024    INR 2.6 06/10/2024       Anticoagulation Summary  As of 8/5/2024      INR goal:  2.0-3.0   TTR:  78.7% (11.6 y)   INR used for dosing:  3.1 (8/5/2024)   Warfarin maintenance plan:  1.25 mg (2.5 mg x 0.5) every Mon, Fri; 2.5 mg (2.5 mg x 1) all other days   Weekly warfarin total:  15 mg   Plan last modified:  Julio Cesar Jones (2/26/2024)   Next INR check:  9/4/2024   Priority:  Maintenance   Target end date:  Indefinite    Indications    Atrial fibrillation (HCC) [I48.91]                 Anticoagulation Episode Summary       INR check location:  [facility]ticoagulation Clinic    Preferred lab:  --    Send INR reminders to:  [facility]    Comments:  FAX  consent done 1/31/24          Anticoagulation Care Providers       Provider Role Specialty Phone number    Matias Sandoval MD [facility] [phone]          There were no vitals taken for this visit.    Warfarin assessment / plan:     Appears well  Supra-therapeutic INR.     Denies signs and symptoms of bleeding/bruising.  Denies medication changes.  Denies extra warfarin doses.  Denies increased alcohol intake.  Denies change in appetite.  Denies illness, fever, vomiting or diarrhea.  Denies recent hospitalization / ED visit  Denies decrease in vitamin K intake.  Denies cranberry or grapefruit juice intake.  Denies changes in physical activity.  Denies changes in smoking habits.   Denies fluid retention.  Denies recent falls / injuries     Patient

## 2024-08-23 ENCOUNTER — APPOINTMENT (OUTPATIENT)
Dept: GENERAL RADIOLOGY | Age: 89
End: 2024-08-23
Payer: MEDICARE

## 2024-08-23 ENCOUNTER — HOSPITAL ENCOUNTER (INPATIENT)
Age: 89
LOS: 8 days | Discharge: NURSING, REHAB OR LONG-TERM CARE | End: 2024-08-31
Attending: STUDENT IN AN ORGANIZED HEALTH CARE EDUCATION/TRAINING PROGRAM | Admitting: STUDENT IN AN ORGANIZED HEALTH CARE EDUCATION/TRAINING PROGRAM
Payer: MEDICARE

## 2024-08-23 DIAGNOSIS — N17.9 ACUTE KIDNEY INJURY SUPERIMPOSED ON CKD (HCC): ICD-10-CM

## 2024-08-23 DIAGNOSIS — N18.9 ACUTE KIDNEY INJURY SUPERIMPOSED ON CKD (HCC): ICD-10-CM

## 2024-08-23 DIAGNOSIS — L03.115 CELLULITIS OF RIGHT LOWER EXTREMITY: Primary | ICD-10-CM

## 2024-08-23 DIAGNOSIS — Z71.89 GOALS OF CARE, COUNSELING/DISCUSSION: ICD-10-CM

## 2024-08-23 DIAGNOSIS — R60.9 EDEMA, UNSPECIFIED TYPE: ICD-10-CM

## 2024-08-23 DIAGNOSIS — E86.0 DEHYDRATION: ICD-10-CM

## 2024-08-23 DIAGNOSIS — R79.1 SUPRATHERAPEUTIC INR: ICD-10-CM

## 2024-08-23 LAB
ALBUMIN SERPL-MCNC: 3.7 G/DL (ref 3.4–5)
ALBUMIN/GLOB SERPL: 1.1 {RATIO} (ref 1.1–2.2)
ALP SERPL-CCNC: 199 U/L (ref 40–129)
ALT SERPL-CCNC: 52 U/L (ref 10–40)
ANION GAP SERPL CALCULATED.3IONS-SCNC: 13 MMOL/L (ref 3–16)
AST SERPL-CCNC: 83 U/L (ref 15–37)
BASOPHILS # BLD: 0 K/UL (ref 0–0.2)
BASOPHILS NFR BLD: 0.5 %
BILIRUB SERPL-MCNC: 0.7 MG/DL (ref 0–1)
BUN SERPL-MCNC: 82 MG/DL (ref 7–20)
CALCIUM SERPL-MCNC: 9.1 MG/DL (ref 8.3–10.6)
CHLORIDE SERPL-SCNC: 94 MMOL/L (ref 99–110)
CK SERPL-CCNC: 88 U/L (ref 39–308)
CO2 SERPL-SCNC: 28 MMOL/L (ref 21–32)
CREAT SERPL-MCNC: 2.8 MG/DL (ref 0.8–1.3)
CRP SERPL-MCNC: 161 MG/L (ref 0–5.1)
DEPRECATED RDW RBC AUTO: 14 % (ref 12.4–15.4)
EOSINOPHIL # BLD: 0.1 K/UL (ref 0–0.6)
EOSINOPHIL NFR BLD: 1.3 %
ERYTHROCYTE [SEDIMENTATION RATE] IN BLOOD BY WESTERGREN METHOD: 82 MM/HR (ref 0–20)
GFR SERPLBLD CREATININE-BSD FMLA CKD-EPI: 20 ML/MIN/{1.73_M2}
GLUCOSE SERPL-MCNC: 99 MG/DL (ref 70–99)
HCT VFR BLD AUTO: 29.3 % (ref 40.5–52.5)
HGB BLD-MCNC: 10.1 G/DL (ref 13.5–17.5)
INR PPP: 4.25 (ref 0.85–1.15)
LACTATE BLDV-SCNC: 0.8 MMOL/L (ref 0.4–1.9)
LYMPHOCYTES # BLD: 0.5 K/UL (ref 1–5.1)
LYMPHOCYTES NFR BLD: 8.1 %
MCH RBC QN AUTO: 31 PG (ref 26–34)
MCHC RBC AUTO-ENTMCNC: 34.4 G/DL (ref 31–36)
MCV RBC AUTO: 90.2 FL (ref 80–100)
MONOCYTES # BLD: 0.7 K/UL (ref 0–1.3)
MONOCYTES NFR BLD: 9.8 %
NEUTROPHILS # BLD: 5.4 K/UL (ref 1.7–7.7)
NEUTROPHILS NFR BLD: 80.3 %
PLATELET # BLD AUTO: 161 K/UL (ref 135–450)
PMV BLD AUTO: 8.5 FL (ref 5–10.5)
POTASSIUM SERPL-SCNC: 3.7 MMOL/L (ref 3.5–5.1)
PROCALCITONIN SERPL IA-MCNC: 0.38 NG/ML (ref 0–0.15)
PROT SERPL-MCNC: 7 G/DL (ref 6.4–8.2)
PROTHROMBIN TIME: 40.5 SEC (ref 11.9–14.9)
RBC # BLD AUTO: 3.25 M/UL (ref 4.2–5.9)
SODIUM SERPL-SCNC: 135 MMOL/L (ref 136–145)
WBC # BLD AUTO: 6.7 K/UL (ref 4–11)

## 2024-08-23 PROCEDURE — 83605 ASSAY OF LACTIC ACID: CPT

## 2024-08-23 PROCEDURE — 86140 C-REACTIVE PROTEIN: CPT

## 2024-08-23 PROCEDURE — 85652 RBC SED RATE AUTOMATED: CPT

## 2024-08-23 PROCEDURE — 85610 PROTHROMBIN TIME: CPT

## 2024-08-23 PROCEDURE — 99285 EMERGENCY DEPT VISIT HI MDM: CPT

## 2024-08-23 PROCEDURE — 6370000000 HC RX 637 (ALT 250 FOR IP): Performed by: STUDENT IN AN ORGANIZED HEALTH CARE EDUCATION/TRAINING PROGRAM

## 2024-08-23 PROCEDURE — 2580000003 HC RX 258: Performed by: GENERAL ACUTE CARE HOSPITAL

## 2024-08-23 PROCEDURE — 94760 N-INVAS EAR/PLS OXIMETRY 1: CPT

## 2024-08-23 PROCEDURE — 96375 TX/PRO/DX INJ NEW DRUG ADDON: CPT

## 2024-08-23 PROCEDURE — 82550 ASSAY OF CK (CPK): CPT

## 2024-08-23 PROCEDURE — 6360000002 HC RX W HCPCS: Performed by: GENERAL ACUTE CARE HOSPITAL

## 2024-08-23 PROCEDURE — 80053 COMPREHEN METABOLIC PANEL: CPT

## 2024-08-23 PROCEDURE — 2580000003 HC RX 258: Performed by: STUDENT IN AN ORGANIZED HEALTH CARE EDUCATION/TRAINING PROGRAM

## 2024-08-23 PROCEDURE — 87040 BLOOD CULTURE FOR BACTERIA: CPT

## 2024-08-23 PROCEDURE — 73610 X-RAY EXAM OF ANKLE: CPT

## 2024-08-23 PROCEDURE — 51798 US URINE CAPACITY MEASURE: CPT

## 2024-08-23 PROCEDURE — 6370000000 HC RX 637 (ALT 250 FOR IP): Performed by: GENERAL ACUTE CARE HOSPITAL

## 2024-08-23 PROCEDURE — 96365 THER/PROPH/DIAG IV INF INIT: CPT

## 2024-08-23 PROCEDURE — 73590 X-RAY EXAM OF LOWER LEG: CPT

## 2024-08-23 PROCEDURE — 1200000000 HC SEMI PRIVATE

## 2024-08-23 PROCEDURE — 84145 PROCALCITONIN (PCT): CPT

## 2024-08-23 PROCEDURE — 85025 COMPLETE CBC W/AUTO DIFF WBC: CPT

## 2024-08-23 RX ORDER — TAMSULOSIN HYDROCHLORIDE 0.4 MG/1
0.4 CAPSULE ORAL EVERY EVENING
Status: DISCONTINUED | OUTPATIENT
Start: 2024-08-23 | End: 2024-08-31 | Stop reason: HOSPADM

## 2024-08-23 RX ORDER — SODIUM CHLORIDE 0.9 % (FLUSH) 0.9 %
5-40 SYRINGE (ML) INJECTION EVERY 12 HOURS SCHEDULED
Status: DISCONTINUED | OUTPATIENT
Start: 2024-08-23 | End: 2024-08-31 | Stop reason: HOSPADM

## 2024-08-23 RX ORDER — TORSEMIDE 100 MG/1
100 TABLET ORAL DAILY
Status: DISCONTINUED | OUTPATIENT
Start: 2024-08-24 | End: 2024-08-31 | Stop reason: HOSPADM

## 2024-08-23 RX ORDER — TRAMADOL HYDROCHLORIDE 50 MG/1
50 TABLET ORAL EVERY 6 HOURS PRN
Status: DISCONTINUED | OUTPATIENT
Start: 2024-08-23 | End: 2024-08-31 | Stop reason: HOSPADM

## 2024-08-23 RX ORDER — ACETAMINOPHEN 325 MG/1
650 TABLET ORAL EVERY 6 HOURS PRN
Status: DISCONTINUED | OUTPATIENT
Start: 2024-08-23 | End: 2024-08-31 | Stop reason: HOSPADM

## 2024-08-23 RX ORDER — POLYETHYLENE GLYCOL 3350 17 G/17G
17 POWDER, FOR SOLUTION ORAL DAILY PRN
Status: DISCONTINUED | OUTPATIENT
Start: 2024-08-23 | End: 2024-08-31 | Stop reason: HOSPADM

## 2024-08-23 RX ORDER — 0.9 % SODIUM CHLORIDE 0.9 %
500 INTRAVENOUS SOLUTION INTRAVENOUS ONCE
Status: COMPLETED | OUTPATIENT
Start: 2024-08-23 | End: 2024-08-23

## 2024-08-23 RX ORDER — SODIUM CHLORIDE 9 MG/ML
INJECTION, SOLUTION INTRAVENOUS PRN
Status: DISCONTINUED | OUTPATIENT
Start: 2024-08-23 | End: 2024-08-31 | Stop reason: HOSPADM

## 2024-08-23 RX ORDER — SODIUM CHLORIDE 0.9 % (FLUSH) 0.9 %
5-40 SYRINGE (ML) INJECTION PRN
Status: DISCONTINUED | OUTPATIENT
Start: 2024-08-23 | End: 2024-08-31 | Stop reason: HOSPADM

## 2024-08-23 RX ORDER — ONDANSETRON 4 MG/1
4 TABLET, ORALLY DISINTEGRATING ORAL EVERY 8 HOURS PRN
Status: DISCONTINUED | OUTPATIENT
Start: 2024-08-23 | End: 2024-08-31 | Stop reason: HOSPADM

## 2024-08-23 RX ORDER — METOPROLOL SUCCINATE 25 MG/1
25 TABLET, EXTENDED RELEASE ORAL DAILY
Status: DISCONTINUED | OUTPATIENT
Start: 2024-08-24 | End: 2024-08-31 | Stop reason: HOSPADM

## 2024-08-23 RX ORDER — METOPROLOL SUCCINATE 25 MG/1
25 TABLET, EXTENDED RELEASE ORAL DAILY
Status: DISCONTINUED | OUTPATIENT
Start: 2024-08-23 | End: 2024-08-23 | Stop reason: SDUPTHER

## 2024-08-23 RX ORDER — TRAMADOL HYDROCHLORIDE 50 MG/1
50 TABLET ORAL ONCE
Status: COMPLETED | OUTPATIENT
Start: 2024-08-23 | End: 2024-08-23

## 2024-08-23 RX ORDER — ONDANSETRON 2 MG/ML
4 INJECTION INTRAMUSCULAR; INTRAVENOUS EVERY 6 HOURS PRN
Status: DISCONTINUED | OUTPATIENT
Start: 2024-08-23 | End: 2024-08-31 | Stop reason: HOSPADM

## 2024-08-23 RX ORDER — ACETAMINOPHEN 650 MG/1
650 SUPPOSITORY RECTAL EVERY 6 HOURS PRN
Status: DISCONTINUED | OUTPATIENT
Start: 2024-08-23 | End: 2024-08-31 | Stop reason: HOSPADM

## 2024-08-23 RX ORDER — TAMSULOSIN HYDROCHLORIDE 0.4 MG/1
0.4 CAPSULE ORAL NIGHTLY
Status: DISCONTINUED | OUTPATIENT
Start: 2024-08-23 | End: 2024-08-23 | Stop reason: SDUPTHER

## 2024-08-23 RX ORDER — SODIUM CHLORIDE 9 MG/ML
INJECTION, SOLUTION INTRAVENOUS CONTINUOUS
Status: DISCONTINUED | OUTPATIENT
Start: 2024-08-23 | End: 2024-08-23

## 2024-08-23 RX ORDER — ALLOPURINOL 100 MG/1
100 TABLET ORAL DAILY
Status: DISCONTINUED | OUTPATIENT
Start: 2024-08-24 | End: 2024-08-31 | Stop reason: HOSPADM

## 2024-08-23 RX ADMIN — TAMSULOSIN HYDROCHLORIDE 0.4 MG: 0.4 CAPSULE ORAL at 17:57

## 2024-08-23 RX ADMIN — TRAMADOL HYDROCHLORIDE 50 MG: 50 TABLET ORAL at 13:06

## 2024-08-23 RX ADMIN — VANCOMYCIN HYDROCHLORIDE 1500 MG: 1.5 INJECTION, POWDER, LYOPHILIZED, FOR SOLUTION INTRAVENOUS at 13:10

## 2024-08-23 RX ADMIN — CEFEPIME 2000 MG: 2 INJECTION, POWDER, FOR SOLUTION INTRAVENOUS at 12:10

## 2024-08-23 RX ADMIN — TRAMADOL HYDROCHLORIDE 50 MG: 50 TABLET ORAL at 19:51

## 2024-08-23 RX ADMIN — SODIUM CHLORIDE, PRESERVATIVE FREE 10 ML: 5 INJECTION INTRAVENOUS at 19:51

## 2024-08-23 RX ADMIN — SODIUM CHLORIDE 500 ML: 9 INJECTION, SOLUTION INTRAVENOUS at 13:11

## 2024-08-23 ASSESSMENT — PAIN DESCRIPTION - FREQUENCY: FREQUENCY: INTERMITTENT

## 2024-08-23 ASSESSMENT — PAIN - FUNCTIONAL ASSESSMENT: PAIN_FUNCTIONAL_ASSESSMENT: 0-10

## 2024-08-23 ASSESSMENT — LIFESTYLE VARIABLES
HOW OFTEN DO YOU HAVE A DRINK CONTAINING ALCOHOL: MONTHLY OR LESS
HOW MANY STANDARD DRINKS CONTAINING ALCOHOL DO YOU HAVE ON A TYPICAL DAY: 1 OR 2

## 2024-08-23 ASSESSMENT — PAIN DESCRIPTION - PAIN TYPE
TYPE: ACUTE PAIN
TYPE: CHRONIC PAIN

## 2024-08-23 ASSESSMENT — PAIN DESCRIPTION - LOCATION
LOCATION: LEG
LOCATION: LEG
LOCATION: BACK;LEG

## 2024-08-23 ASSESSMENT — PAIN DESCRIPTION - DESCRIPTORS
DESCRIPTORS: ACHING;DISCOMFORT
DESCRIPTORS: ACHING
DESCRIPTORS: ACHING

## 2024-08-23 ASSESSMENT — PAIN DESCRIPTION - ORIENTATION
ORIENTATION: LEFT
ORIENTATION: RIGHT
ORIENTATION: RIGHT;LEFT;LOWER
ORIENTATION: RIGHT;LOWER

## 2024-08-23 ASSESSMENT — PAIN DESCRIPTION - ONSET: ONSET: ON-GOING

## 2024-08-23 ASSESSMENT — PAIN SCALES - GENERAL
PAINLEVEL_OUTOF10: 8
PAINLEVEL_OUTOF10: 6

## 2024-08-23 NOTE — PROGRESS NOTES
Patient Admitted. Patient is alert and oriented, patient is on room air, VSS, pain 5/10; Redness BLE due to chronic cellulitis; redness on buttocks. Patient is resting in bed; bed at lowest position; call light within reach. Will continue to monitor.

## 2024-08-23 NOTE — PROGRESS NOTES
4 Eyes Skin Assessment     NAME:  Maurice Ackerman Jr  YOB: 1930  MEDICAL RECORD NUMBER:  [id number]    The patient is being assessed for  Admission    I agree that at least one RN has performed a thorough Head to Toe Skin Assessment on the patient. ALL assessment sites listed below have been assessed.      Areas assessed by both nurses:    Head, Face, Ears, Shoulders, Back, Chest, Arms, Elbows, Hands, Sacrum. Buttock, Coccyx, Ischium, Legs. Feet and Heels, and Under Medical Devices         Does the Patient have a Wound? No noted wound(s)       Chris Prevention initiated by RN: Yes  Wound Care Orders initiated by RN: No    Pressure Injury (Stage 3,4, Unstageable, DTI, NWPT, and Complex wounds) if present, place Wound referral order by RN under : No    New Ostomies, if present place, Ostomy referral order under : No     Nurse 1 eSignature: Electronically signed by KLEVER GUO RN on 8/23/24 at 3:31 PM EDT    **SHARE this note so that the co-signing nurse can place an eSignature**    Nurse 2 eSignature: Electronically signed by Lotus Nogueira RN on 8/23/24 at 3:45 PM EDT

## 2024-08-23 NOTE — CONSULTS
Clinical Pharmacy Note  Vancomycin Consult    Pharmacy consult received for one-time dose of vancomycin in the Emergency Department per Morena Tavarez APRN-CNP.    Ht Readings from Last 1 Encounters:   08/23/24 1.854 m (6' 1\")        Wt Readings from Last 1 Encounters:   08/23/24 102.9 kg (226 lb 13.7 oz)         Assessment/Plan:  Vancomycin 1500mg (14.5mg/kg) x 1 in ED.  If vancomycin is to continue on admission and pharmacy is to manage dosing, please re-consult with admission orders.  Alonso Sinclair [facility], 8/23/2024 12:17 PM

## 2024-08-23 NOTE — PROGRESS NOTES
Bladder scan patient per order with 710 ML in the bladder; Nephrology walked in as patient was getting bladder scanned; Nephrology recommended patient to urinate on the urinals. Patient had the output of 300 ml; Nephrology gave a verbal order to straight cath. Straight cath and had 405 ML out. Patient resting in bed comfortably. Will continue to monitor.

## 2024-08-23 NOTE — H&P
V2.0  History and Physical      Name:  Maurice Ackerman Jr /Age/Sex: 1930  (89 y.o. male)   MRN & CSN:  [id number] & [id number] Encounter Date/Time: 2024 1:17 PM EDT   Location:  [place] [address]01 PCP: Holly Govea MD       Hospital Day: 1      History of Present Illness:     Chief Complaint: right leg pain    Maurice Ackerman Jr is a 89 y.o. male with PMH of CKD, gout, HTN, atrial fibrillation who presents with worsening leg pain. Pt had a mechanical fall 7 days ago and states his leg pain has worsened since then. Describes pain as dull, constant and worse w ambulation. Denies any discharge or drainage. Denies any fever. No other symptoms or concerns. .     At ED, pt was afebrile, hemodynamically stable; on room air. Labs Na 135, Cr 2.8, WBC 6.7. had right tibia/fibula and ankle x-ray no fracture just soft tissue swelling around ankle. Pt received vanc+Cefepime. He also received 500cc IVF NS. Discussed with ED, will admit pt to med surg  for cellulitis  workup and management.       Assessment and Plan:   Right leg cellulitis.  Likely triggered by abrasion from fall.   Has chronic venous stasis.   No sign of sepsis.   No purulent or discharge or abscess  - will do rocephin  - check inflmmary markers  - demarcate area  - symtpomatic treatment  - may need CT if no improvement    CKD III.  Came w Cr 2.8, baseline 2.6-3.0. concern for urinary retention  Noted to have right leg edema but suspect from cellulitis, no edema left leg.   - nephro consulted  - home torsemide resumed per nephro  - monitor I/o  - bladder scan; straight cath as needed  - fluid restriction     Atrial fibrillation. On home metoprolol and warfarin  INR high on admission  - resume home metoprolol  - pharmacy to dose warfarin    Chronic anemia. Suspect from CKD.  No sign of bleeding  Hgb around baseline  - monitor    Code Status: Code status was discussed in detail with Patient/POA; verbalized understanding of the different types of code   MG TABS Take 1 tablet by mouth nightly as needed    Ari Hooks MD   vitamin D 1000 UNITS CAPS Take 1 capsule by mouth daily 3/4/11   Ari Hooks MD   magnesium (MAGNESIUM-OXIDE) 250 MG TABS tablet Take 1 tablet by mouth in the morning and at bedtime    Ari Hooks MD   Multiple Vitamin (MULTIVITAMINS PO) Take 1 tablet by mouth daily     Ari Hooks MD   nitroGLYCERIN (NITROSTAT) 0.4 MG SL tablet Place 1 tablet under the tongue every 5 minutes as needed for Chest pain 4/11/13   Ari Hooks MD   traMADol (ULTRAM) 50 MG tablet Take 1 tablet by mouth every 6 hours as needed for Pain. 6/20/14   Ari Hooks MD   allopurinol (ZYLOPRIM) 100 MG tablet Take 1 tablet by mouth daily 7/28/14   Ari Hooks MD   rosuvastatin (CRESTOR) 20 MG tablet Take 1 tablet by mouth every evening    Ari Hooks MD   tamsulosin (FLOMAX) 0.4 MG capsule Take 1 capsule by mouth every evening    Ari Hooks MD       Physical Exam: Need 8 Elements   Physical Exam     General: NAD  Eyes: EOMI  ENT: neck supple  Cardiovascular: Regular rate.  Respiratory: Clear to auscultation  Gastrointestinal: Soft, non tender  Genitourinary: no suprapubic tenderness  Musculoskeletal: RLE edema 1+ w tenderness, LLE no tenderness chronic venous stasis changes  Skin: warm, dry, right leg erythematous   Neuro: Alert.  Psych: Mood appropriate.       Past Medical History:   PMHx   Past Medical History:   Diagnosis Date    A-fib (HCC)     Hypertension     Malignant neoplasm of kidney (HCC) 6/15/2012     PSHX:  has a past surgical history that includes Cholecystectomy; Kidney removal; Appendectomy; and angioplasty.  Allergies:   Allergies   Allergen Reactions    2-(Ethylmercuriothio)Benzoic Acid      Leg pain, abd cramps    Atorvastatin      myalgias    Celecoxib Nausea Only    Ciprofloxacin Nausea Only and Other (See Comments)     Stomach upset    Clarithromycin Nausea Only

## 2024-08-23 NOTE — CONSULTS
[facility]  Phone: [phone]  Fax: [phone]  [employer]         Reason for Consult:   CKD stage IV  Requesting Physician:  Dr.. MO    History Obtained From:  patient, electronic medical record    History of Present Ilness: 89-year-old pleasant white gentleman with history of CKD stage IV with recent baseline creatinine of 2.6 to 3 mg follows with Dr. Tony  History of right nephrectomy many years ago for renal cell carcinoma  Has history of atrial fibrillation, coronary artery disease, hypertension  He has been maintained on torsemide 100 mg daily  He lives in assisted living and presented after mechanical fall last night when he tried to get up to use the restroom  His right lower extremity had increased swelling and redness  He has chronic cellulitis of lower extremities  He has history of BPH and is on Flomax  Reports urgency and frequency of urination  Denies dysuria hematuria or flank pains  No recent use of nonsteroidal anti-inflammatory drugs  He was diagnosed with cellulitis of right lower extremity  Creatinine noted to be 2.8 mg BUN of 82 mg    Past Medical History:        Diagnosis Date    A-fib (HCC)     Hypertension     Malignant neoplasm of kidney (HCC) 6/15/2012       Past Surgical History:        Procedure Laterality Date    ANGIOPLASTY      1998,2005,2010    APPENDECTOMY      CHOLECYSTECTOMY      KIDNEY REMOVAL         Home Medications:    No current facility-administered medications on file prior to encounter.     Current Outpatient Medications on File Prior to Encounter   Medication Sig Dispense Refill    torsemide (DEMADEX) 100 MG tablet Take 1 tablet by mouth daily      metoprolol succinate (TOPROL XL) 25 MG extended release tablet Take 1 tablet by mouth daily      famotidine (PEPCID) 20 MG tablet Take 0.5 tablets by mouth daily 60 tablet 3    warfarin (COUMADIN) 2.5 MG tablet Take 1 tablet by mouth daily EXCEPT 1.25mg every Monday, Wednesday and Friday or

## 2024-08-23 NOTE — ED NOTES
Report called to Genaro RN/ No questions at this time VSS. Pt and family updated on plan of care.

## 2024-08-23 NOTE — ED PROVIDER NOTES
[facility] EMERGENCY DEPARTMENT  EMERGENCY DEPARTMENT ENCOUNTER        Pt Name: Maurice Ackerman Jr  MRN: [id number]  Birthdate 7/6/1930  Date of evaluation: 8/23/2024  Provider: KINGSLEY Garner - CNP  PCP: Holly Govea MD  Note Started: 12:11 PM EDT 8/23/24      SE. I have evaluated this patient.        CHIEF COMPLAINT       Chief Complaint   Patient presents with    Fall     Mechanical Fall 2 days ago. Pt refused EMS transport at the time. Chronic cellulitis of right lower leg. Pt has had persistent right leg pain following fall and wanted checked out.        HISTORY OF PRESENT ILLNESS: 1 or more Elements     History From: Patient  Limitations to history : None    Maurice Ackerman Jr is a 89 y.o. male who presents to the emergency department today via EMS from assisted living and local [facility] for evaluation of right lower leg pain, swelling, and redness.  Patient reports sustaining a mechanical fall 7 days ago and states that symptoms have been progressive since then.  Patient states that he was sitting in his recliner and attempted to get up and reports sliding down onto the floor onto his buttocks.  Patient states that he did not hit his head.  He states that there was no injury at that time.  He states that approximately 2 days later he noticed increased redness swelling and pain to the affected right lower extremity.  Patient is anticoagulated on Coumadin due to history of A-fib.  Patient denies headache, dizziness, or blurred vision.  He denies neck pain or stiffness.  He denies chest pain or trouble breathing.  He states that he has had a good appetite.  He reports a pain level of 6 out of 10.  He describes the pain as constant, dull, and throbbing.  The pain is nonmigratory.  Patient states the pain is worse with movement and with ambulation.  He has not taken anything for the symptoms.  He denies fever, chills, or other symptoms.    Nursing Notes were all reviewed and agreed with or  of this note were completed with a voice recognition program.  Efforts were made to edit the dictations but occasionally words are mis-transcribed.)    KINGSLEY Garner CNP (electronically signed)        Morena Tavarez APRN - CNP  08/23/24 1305       Morena Tavarez APRN - CNP  08/23/24 4330

## 2024-08-23 NOTE — CONSULTS
Clinical Pharmacy Note  Warfarin Consult    Maurice Ackerman Jr is a 89 y.o. male receiving warfarin managed by pharmacy.    Warfarin Indication: afib  Target INR range: 2-3   Dose prior to admission: Warfarin 2.5mg (1 tablet) daily EXCEPT 1.25mg (1/2 tablet) every Monday and Friday     Current warfarin drug-drug interactions: none of clinical significance     Recent Labs     08/23/24  1140   HGB 10.1*   HCT 29.3*   INR 4.25*     Assessment/Plan:  -INR 4.25 --> HOLD WARFARIN TODAY (8/23)  -Daily PT/INR until stable within therapeutic range.     Thank you for the consult.  Will continue to follow.  Hazel Pinedo, PharmD, BCPS  8/23/2024 3:19 PM

## 2024-08-23 NOTE — ED TRIAGE NOTES
Mechanical Fall 2 days ago. Pt refused EMS transport at the time. Chronic cellulitis of right lower leg. Pt has had persistent right leg pain following fall and wanted checked out.

## 2024-08-24 LAB
ALBUMIN SERPL-MCNC: 3.3 G/DL (ref 3.4–5)
ANION GAP SERPL CALCULATED.3IONS-SCNC: 12 MMOL/L (ref 3–16)
BASOPHILS # BLD: 0.1 K/UL (ref 0–0.2)
BASOPHILS NFR BLD: 1.2 %
BUN SERPL-MCNC: 82 MG/DL (ref 7–20)
CALCIUM SERPL-MCNC: 9.2 MG/DL (ref 8.3–10.6)
CHLORIDE SERPL-SCNC: 94 MMOL/L (ref 99–110)
CO2 SERPL-SCNC: 28 MMOL/L (ref 21–32)
CREAT SERPL-MCNC: 2.8 MG/DL (ref 0.8–1.3)
DEPRECATED RDW RBC AUTO: 13.8 % (ref 12.4–15.4)
EOSINOPHIL # BLD: 0.1 K/UL (ref 0–0.6)
EOSINOPHIL NFR BLD: 2 %
GFR SERPLBLD CREATININE-BSD FMLA CKD-EPI: 20 ML/MIN/{1.73_M2}
GLUCOSE SERPL-MCNC: 91 MG/DL (ref 70–99)
HCT VFR BLD AUTO: 28 % (ref 40.5–52.5)
HGB BLD-MCNC: 9.5 G/DL (ref 13.5–17.5)
INR PPP: 4.29 (ref 0.85–1.15)
LYMPHOCYTES # BLD: 0.5 K/UL (ref 1–5.1)
LYMPHOCYTES NFR BLD: 6.8 %
MCH RBC QN AUTO: 30.4 PG (ref 26–34)
MCHC RBC AUTO-ENTMCNC: 34 G/DL (ref 31–36)
MCV RBC AUTO: 89.6 FL (ref 80–100)
MONOCYTES # BLD: 0.6 K/UL (ref 0–1.3)
MONOCYTES NFR BLD: 8.8 %
NEUTROPHILS # BLD: 5.8 K/UL (ref 1.7–7.7)
NEUTROPHILS NFR BLD: 81.2 %
PHOSPHATE SERPL-MCNC: 3.5 MG/DL (ref 2.5–4.9)
PLATELET # BLD AUTO: 172 K/UL (ref 135–450)
PMV BLD AUTO: 9.1 FL (ref 5–10.5)
POTASSIUM SERPL-SCNC: 4.2 MMOL/L (ref 3.5–5.1)
PROTHROMBIN TIME: 40.7 SEC (ref 11.9–14.9)
RBC # BLD AUTO: 3.13 M/UL (ref 4.2–5.9)
SODIUM SERPL-SCNC: 134 MMOL/L (ref 136–145)
WBC # BLD AUTO: 7.2 K/UL (ref 4–11)

## 2024-08-24 PROCEDURE — 6370000000 HC RX 637 (ALT 250 FOR IP): Performed by: STUDENT IN AN ORGANIZED HEALTH CARE EDUCATION/TRAINING PROGRAM

## 2024-08-24 PROCEDURE — 36415 COLL VENOUS BLD VENIPUNCTURE: CPT

## 2024-08-24 PROCEDURE — 94760 N-INVAS EAR/PLS OXIMETRY 1: CPT

## 2024-08-24 PROCEDURE — 85025 COMPLETE CBC W/AUTO DIFF WBC: CPT

## 2024-08-24 PROCEDURE — 6370000000 HC RX 637 (ALT 250 FOR IP): Performed by: INTERNAL MEDICINE

## 2024-08-24 PROCEDURE — 2580000003 HC RX 258: Performed by: INTERNAL MEDICINE

## 2024-08-24 PROCEDURE — 80069 RENAL FUNCTION PANEL: CPT

## 2024-08-24 PROCEDURE — 6360000002 HC RX W HCPCS: Performed by: INTERNAL MEDICINE

## 2024-08-24 PROCEDURE — 85610 PROTHROMBIN TIME: CPT

## 2024-08-24 PROCEDURE — 1200000000 HC SEMI PRIVATE

## 2024-08-24 PROCEDURE — 2580000003 HC RX 258: Performed by: STUDENT IN AN ORGANIZED HEALTH CARE EDUCATION/TRAINING PROGRAM

## 2024-08-24 RX ADMIN — ALLOPURINOL 100 MG: 100 TABLET ORAL at 08:25

## 2024-08-24 RX ADMIN — TORSEMIDE 100 MG: 100 TABLET ORAL at 08:25

## 2024-08-24 RX ADMIN — METOPROLOL SUCCINATE 25 MG: 25 TABLET, EXTENDED RELEASE ORAL at 08:25

## 2024-08-24 RX ADMIN — TAMSULOSIN HYDROCHLORIDE 0.4 MG: 0.4 CAPSULE ORAL at 18:34

## 2024-08-24 RX ADMIN — TRAMADOL HYDROCHLORIDE 50 MG: 50 TABLET ORAL at 08:25

## 2024-08-24 RX ADMIN — SODIUM CHLORIDE, PRESERVATIVE FREE 10 ML: 5 INJECTION INTRAVENOUS at 08:30

## 2024-08-24 RX ADMIN — CEFTRIAXONE SODIUM 2000 MG: 2 INJECTION, POWDER, FOR SOLUTION INTRAMUSCULAR; INTRAVENOUS at 12:32

## 2024-08-24 ASSESSMENT — PAIN DESCRIPTION - DESCRIPTORS: DESCRIPTORS: DISCOMFORT;ACHING

## 2024-08-24 ASSESSMENT — PAIN DESCRIPTION - ORIENTATION: ORIENTATION: RIGHT;LEFT;LOWER

## 2024-08-24 ASSESSMENT — PAIN SCALES - GENERAL: PAINLEVEL_OUTOF10: 7

## 2024-08-24 ASSESSMENT — PAIN DESCRIPTION - LOCATION: LOCATION: LEG;BACK

## 2024-08-24 NOTE — PROGRESS NOTES
[facility]  Phone: [phone]  Fax: [phone]  [employer]         Reason for Consult:   CKD stage IV  Requesting Physician:  Dr.. MO    History Obtained From:  patient, electronic medical record    History of Present Ilness: 89-year-old pleasant white gentleman with history of CKD stage IV with recent baseline creatinine of 2.6 to 3 mg follows with Dr. Tony  History of right nephrectomy many years ago for renal cell carcinoma  Has history of atrial fibrillation, coronary artery disease, hypertension  He has been maintained on torsemide 100 mg daily  He lives in assisted living and presented after mechanical fall last night when he tried to get up to use the restroom  His right lower extremity had increased swelling and redness  He has chronic cellulitis of lower extremities  He has history of BPH and is on Flomax  Reports urgency and frequency of urination  Denies dysuria hematuria or flank pains  No recent use of nonsteroidal anti-inflammatory drugs  He was diagnosed with cellulitis of right lower extremity  Creatinine noted to be 2.8 mg BUN of 82 mg      Review of Systems:     Feels better PVRs have been < 300 ml so has not required ISC      Physical exam:   Constitutional:  VITALS:  BP (!) 121/54   Pulse 83   Temp 97.6 °F (36.4 °C) (Oral)   Resp 18   Ht 1.854 m (6' 1\")   Wt 100.6 kg (221 lb 12.5 oz)   SpO2 92%   BMI 29.26 kg/m²   Gen: alert, awake, nad  Skin: no rash, Erythema RLE Ch discoloration LLE   Heent:  eomi, mmm  Neck: no bruits or jvd noted, thyroid normal  Cardiovascular: Irregular rhythm  Respiratory: CTA B without w/r/r; respiratory effort normal  Abdomen:  +bs, soft, nt, nd, no hepatosplenomegaly  Ext: + R  lower extremity edema  Psychiatric: mood and affect appropriate; judgement and insight intact    Data/  CBC:   Lab Results   Component Value Date/Time    WBC 7.2 08/24/2024 04:43 AM    RBC 3.13 08/24/2024 04:43 AM    HGB 9.5 08/24/2024 04:43 AM    HCT

## 2024-08-24 NOTE — CONSULTS
Clinical Pharmacy Note  Warfarin Consult    Maurice Ackerman Jr is a 89 y.o. male receiving warfarin managed by pharmacy.    Warfarin Indication: afib  Target INR range: 2-3   Dose prior to admission: Warfarin 2.5mg (1 tablet) daily EXCEPT 1.25mg (1/2 tablet) every Monday and Friday     Current warfarin drug-drug interactions: none of clinical significance     Recent Labs     08/23/24  1140 08/24/24  0443   HGB 10.1* 9.5*   HCT 29.3* 28.0*   INR 4.25* 4.29*     Assessment/Plan:  -INR 4.29 --> HOLD WARFARIN TODAY (8/24)  -Daily PT/INR until stable within therapeutic range.     Thank you for the consult.  Will continue to follow.  Hazel Pinedo, PharmD, BCPS  8/24/2024 11:27 AM

## 2024-08-24 NOTE — PROGRESS NOTES
Clinical Pharmacy Note  Ceftriaxone Dose Adjustment    Maurice Ackerman Jr is receiving ceftriaxone for Skin and Soft Tissue Infection . The dose has been adjusted to 2000mg per protocol,     Wt Readings from Last 1 Encounters:   08/24/24 100.6 kg (221 lb 12.5 oz)         Ceftriaxone Empiric Dosing Table    Indication Weight < 100 kg** Weight ? 100 kg*   Bacteremia      Non-pneumococcal      Pneumococcal     2 g daily  2 g Q12H    2 g daily  2 g Q12H   Community Acquired PNA      Non- critically ill      Critically ill   1 g daily  2 g daily   2 g daily  2 g daily   CNS Infection 2 g Q12H 2 g Q12H   COPD exacerbation 1 g daily 2 g daily   Diabetic Foot Infection or SSTI 1 g daily 2 g daily   Endocarditis      Enterococcus faecalis (in         combination with ampicillin)        Strep sp., gram-negative         organisms     2 g Q12H      2 g daily   2 g Q12H      2 g daily   Intra-abdominal infection 1 g daily 2 g daily   Osteomyelitis  Discitis  Prosthetic Joint infection    Septic arthritis 2 g daily 2 g daily   Urinary Tract Infection     Uncomplicated     Complicated   1 g daily  2 g daily   2 g daily  2 g daily     Fracisco SARY Jain, 8/24/2024 11:34 AM

## 2024-08-24 NOTE — PLAN OF CARE
Problem: Discharge Planning  Goal: Discharge to home or other facility with appropriate resources  Outcome: Progressing     Problem: Pain  Goal: Verbalizes/displays adequate comfort level or baseline comfort level  Outcome: Progressing     Problem: Skin/Tissue Integrity  Goal: Absence of new skin breakdown  Description: 1.  Monitor for areas of redness and/or skin breakdown  2.  Assess vascular access sites hourly  3.  Every 4-6 hours minimum:  Change oxygen saturation probe site  4.  Every 4-6 hours:  If on nasal continuous positive airway pressure, respiratory therapy assess nares and determine need for appliance change or resting period.  Outcome: Progressing     Problem: ABCDS Injury Assessment  Goal: Absence of physical injury  Outcome: Progressing     Problem: Safety - Adult  Goal: Free from fall injury  Outcome: Progressing     Problem: Cardiovascular - Adult  Goal: Maintains optimal cardiac output and hemodynamic stability  Outcome: Progressing  Goal: Absence of cardiac dysrhythmias or at baseline  Outcome: Progressing     Problem: Skin/Tissue Integrity - Adult  Goal: Skin integrity remains intact  Outcome: Progressing  Goal: Incisions, wounds, or drain sites healing without S/S of infection  Outcome: Progressing  Goal: Oral mucous membranes remain intact  Outcome: Progressing     Problem: Musculoskeletal - Adult  Goal: Return mobility to safest level of function  Outcome: Progressing  Goal: Maintain proper alignment of affected body part  Outcome: Progressing  Goal: Return ADL status to a safe level of function  Outcome: Progressing     Problem: Metabolic/Fluid and Electrolytes - Adult  Goal: Electrolytes maintained within normal limits  Outcome: Progressing  Goal: Hemodynamic stability and optimal renal function maintained  Outcome: Progressing

## 2024-08-24 NOTE — PROGRESS NOTES
Medication Reconciliation    List of medications patient is currently taking is complete.     Source of information:   Conversation with patient  EPIC records/Dispense history     Allergies  2-(ethylmercuriothio)benzoic acid, Atorvastatin, Celecoxib, Ciprofloxacin, Clarithromycin, Ezetimibe, Fish oil, Metoprolol, Moxifloxacin, Naproxen, Petrolatum-zinc oxide, Rofecoxib, Simvastatin, Sulfa antibiotics, and Adhesive tape     Notes regarding home medications:   Taking warfarin 2.5 mg daily except (1/2 tablet) 1.25 mg on Mondays and Fridays    Sumi Pastor PharmD  8/24/2024 10:23 AM

## 2024-08-24 NOTE — PROGRESS NOTES
Family at bedside ,states patient has been falling asleep in the bed all day. Easy to arouse and alert and oriented. Bed alarm on, care ongoing.

## 2024-08-24 NOTE — PROGRESS NOTES
Hospitalist Progress Note  8/24/2024 11:28 AM  Subjective:   Admit Date: 8/23/2024  PCP: Holly Govea MD Status: Inpatient   Interval History: Hospital Day: 2, admitted with right lower extremity cellulitis, likely triggered from abrasion from fall in setting of chronic venous stasis.  No purulent discharge or abscess. Initiated on ceftriaxone. Continues on warfarin for atrial fibrillation.     Diet: regular, 1500 ml fluid restriction  Right antecubital peripheral IV (8/23, day #2)    Medications:     warfarin   2.5 mg Oral Daily   torsemide  100 mg Oral Daily   allopurinol  100 mg Oral Daily   metoprolol succinate  25 mg Oral Daily   tamsulosin  0.4 mg Oral QPM   ceftriaxone  2 gram Intravenous Daily (8/23, day #2)       Labs:     Recent Labs     08/23/24  1140 08/24/24  0443   WBC 6.7 7.2   HGB 10.1* 9.5*    172   MCV 90.2 89.6        * 134*   K 3.7 4.2   CL 94* 94*   CO2 28 28   BUN 82* 82*   CREATININE 2.8* 2.8*   GLUCOSE 99 91        PHOS  --  3.5   CALCIUM 9.1 9.2   ALBUMIN 3.7 3.3*   INR 4.25* 4.29*   AST 83*  --    ALT 52*  --    ALKPHOS 199*  --      Procalcitonin (8/23) 0.38 ng/mL  Lactate (8/23) 0.8 mmol/L  CRP (8/23) 161.0 mg/L  ESR (8/23) 82 mm/hr  CK (8/23) 88 U/L    Right ankle X-ray (8/23) Generalized soft tissue swelling about the ankle.  No underlying fracture or  evidence of osteomyelitis.     Right tibia fibular X-ray (8/23) No acute findings     Objective:   Vitals:  /54   Pulse 83   Temp 97.6 °F (oral)   Resp 18   Ht 6' 1\"  Wt 100.6 kg  SpO2 92% on RA  BMI 29.26 kg/m²   General appearance: alert and cooperative with exam  Lungs: clear to auscultation bilaterally  Heart: irregular rate and rhythm, S1, S2 normal, no murmur, click, rub or gallop  Abdomen: soft, non-tender; bowel sounds normal; no masses,  no organomegaly  Extremities: right lower extremity cellulitis, marked, neurovascular status intact  Neurologic: No obvious focal neurologic deficits.    Assessment

## 2024-08-24 NOTE — PROGRESS NOTES
Family called nurse into room states patient choked on chicken during dinner. Able to cough and clear airway without intervention. O2 sats remained stable. Patient was semi fowlers in bed. Discussed getting up to chair for all meals to aid in swallowing. Patient agrees.

## 2024-08-24 NOTE — PROGRESS NOTES
Pt was able to stand up and use urinal with assistance. 150 ml output. Bladder scanned pt, 265 ml in bladder.   Order states to place read catheter is PVR is >300 ml.  ml. No action required at this time.   Pt is alert and oriented x4. VSS. Pt resting in bed, call light within reach, bed alarm on. Denies any needs at this time.

## 2024-08-25 LAB
ALBUMIN SERPL-MCNC: 3.3 G/DL (ref 3.4–5)
ANION GAP SERPL CALCULATED.3IONS-SCNC: 14 MMOL/L (ref 3–16)
BASOPHILS # BLD: 0.1 K/UL (ref 0–0.2)
BASOPHILS NFR BLD: 0.6 %
BUN SERPL-MCNC: 90 MG/DL (ref 7–20)
CALCIUM SERPL-MCNC: 8.7 MG/DL (ref 8.3–10.6)
CHLORIDE SERPL-SCNC: 94 MMOL/L (ref 99–110)
CO2 SERPL-SCNC: 25 MMOL/L (ref 21–32)
CREAT SERPL-MCNC: 2.8 MG/DL (ref 0.8–1.3)
DEPRECATED RDW RBC AUTO: 14 % (ref 12.4–15.4)
EOSINOPHIL # BLD: 0.2 K/UL (ref 0–0.6)
EOSINOPHIL NFR BLD: 2 %
FERRITIN SERPL IA-MCNC: 154 NG/ML (ref 30–400)
GFR SERPLBLD CREATININE-BSD FMLA CKD-EPI: 20 ML/MIN/{1.73_M2}
GLUCOSE SERPL-MCNC: 98 MG/DL (ref 70–99)
HCT VFR BLD AUTO: 27.9 % (ref 40.5–52.5)
HGB BLD-MCNC: 9.6 G/DL (ref 13.5–17.5)
INR PPP: 3.82 (ref 0.85–1.15)
IRON SATN MFR SERPL: 8 % (ref 20–50)
IRON SERPL-MCNC: 20 UG/DL (ref 59–158)
LYMPHOCYTES # BLD: 0.5 K/UL (ref 1–5.1)
LYMPHOCYTES NFR BLD: 5.8 %
MAGNESIUM SERPL-MCNC: 2.07 MG/DL (ref 1.8–2.4)
MCH RBC QN AUTO: 30.5 PG (ref 26–34)
MCHC RBC AUTO-ENTMCNC: 34.2 G/DL (ref 31–36)
MCV RBC AUTO: 89.1 FL (ref 80–100)
MONOCYTES # BLD: 0.7 K/UL (ref 0–1.3)
MONOCYTES NFR BLD: 8.4 %
NEUTROPHILS # BLD: 6.7 K/UL (ref 1.7–7.7)
NEUTROPHILS NFR BLD: 83.2 %
PHOSPHATE SERPL-MCNC: 3.7 MG/DL (ref 2.5–4.9)
PLATELET # BLD AUTO: 175 K/UL (ref 135–450)
PMV BLD AUTO: 8.4 FL (ref 5–10.5)
POTASSIUM SERPL-SCNC: 3.9 MMOL/L (ref 3.5–5.1)
PROTHROMBIN TIME: 37.3 SEC (ref 11.9–14.9)
RBC # BLD AUTO: 3.13 M/UL (ref 4.2–5.9)
SODIUM SERPL-SCNC: 133 MMOL/L (ref 136–145)
TIBC SERPL-MCNC: 245 UG/DL (ref 260–445)
WBC # BLD AUTO: 8 K/UL (ref 4–11)

## 2024-08-25 PROCEDURE — 97162 PT EVAL MOD COMPLEX 30 MIN: CPT

## 2024-08-25 PROCEDURE — 83735 ASSAY OF MAGNESIUM: CPT

## 2024-08-25 PROCEDURE — 51798 US URINE CAPACITY MEASURE: CPT

## 2024-08-25 PROCEDURE — 85610 PROTHROMBIN TIME: CPT

## 2024-08-25 PROCEDURE — 80069 RENAL FUNCTION PANEL: CPT

## 2024-08-25 PROCEDURE — 97530 THERAPEUTIC ACTIVITIES: CPT

## 2024-08-25 PROCEDURE — 6360000002 HC RX W HCPCS: Performed by: INTERNAL MEDICINE

## 2024-08-25 PROCEDURE — 36415 COLL VENOUS BLD VENIPUNCTURE: CPT

## 2024-08-25 PROCEDURE — 1200000000 HC SEMI PRIVATE

## 2024-08-25 PROCEDURE — 94760 N-INVAS EAR/PLS OXIMETRY 1: CPT

## 2024-08-25 PROCEDURE — 6370000000 HC RX 637 (ALT 250 FOR IP): Performed by: STUDENT IN AN ORGANIZED HEALTH CARE EDUCATION/TRAINING PROGRAM

## 2024-08-25 PROCEDURE — 83540 ASSAY OF IRON: CPT

## 2024-08-25 PROCEDURE — 85025 COMPLETE CBC W/AUTO DIFF WBC: CPT

## 2024-08-25 PROCEDURE — 2580000003 HC RX 258: Performed by: STUDENT IN AN ORGANIZED HEALTH CARE EDUCATION/TRAINING PROGRAM

## 2024-08-25 PROCEDURE — 83550 IRON BINDING TEST: CPT

## 2024-08-25 PROCEDURE — 82728 ASSAY OF FERRITIN: CPT

## 2024-08-25 PROCEDURE — 2580000003 HC RX 258: Performed by: INTERNAL MEDICINE

## 2024-08-25 PROCEDURE — 97116 GAIT TRAINING THERAPY: CPT

## 2024-08-25 PROCEDURE — 6370000000 HC RX 637 (ALT 250 FOR IP): Performed by: INTERNAL MEDICINE

## 2024-08-25 RX ADMIN — METOPROLOL SUCCINATE 25 MG: 25 TABLET, EXTENDED RELEASE ORAL at 08:27

## 2024-08-25 RX ADMIN — TORSEMIDE 100 MG: 100 TABLET ORAL at 08:53

## 2024-08-25 RX ADMIN — CEFTRIAXONE SODIUM 2000 MG: 2 INJECTION, POWDER, FOR SOLUTION INTRAMUSCULAR; INTRAVENOUS at 12:19

## 2024-08-25 RX ADMIN — TRAMADOL HYDROCHLORIDE 50 MG: 50 TABLET ORAL at 06:28

## 2024-08-25 RX ADMIN — POLYETHYLENE GLYCOL 3350 17 G: 17 POWDER, FOR SOLUTION ORAL at 08:27

## 2024-08-25 RX ADMIN — TAMSULOSIN HYDROCHLORIDE 0.4 MG: 0.4 CAPSULE ORAL at 17:36

## 2024-08-25 RX ADMIN — SODIUM CHLORIDE 125 MG: 9 INJECTION, SOLUTION INTRAVENOUS at 14:57

## 2024-08-25 RX ADMIN — ALLOPURINOL 100 MG: 100 TABLET ORAL at 08:27

## 2024-08-25 RX ADMIN — SODIUM CHLORIDE, PRESERVATIVE FREE 10 ML: 5 INJECTION INTRAVENOUS at 20:30

## 2024-08-25 ASSESSMENT — PAIN SCALES - GENERAL
PAINLEVEL_OUTOF10: 9
PAINLEVEL_OUTOF10: 3
PAINLEVEL_OUTOF10: 2
PAINLEVEL_OUTOF10: 0

## 2024-08-25 ASSESSMENT — PAIN DESCRIPTION - LOCATION
LOCATION: LEG
LOCATION: BACK

## 2024-08-25 ASSESSMENT — PAIN DESCRIPTION - ORIENTATION
ORIENTATION: MID
ORIENTATION: RIGHT

## 2024-08-25 ASSESSMENT — PAIN DESCRIPTION - FREQUENCY: FREQUENCY: INTERMITTENT

## 2024-08-25 ASSESSMENT — PAIN DESCRIPTION - DESCRIPTORS
DESCRIPTORS: ACHING
DESCRIPTORS: DULL

## 2024-08-25 ASSESSMENT — PAIN - FUNCTIONAL ASSESSMENT: PAIN_FUNCTIONAL_ASSESSMENT: PREVENTS OR INTERFERES WITH MANY ACTIVE NOT PASSIVE ACTIVITIES

## 2024-08-25 NOTE — PROGRESS NOTES
Physical Therapy  Facility/Department: [address] MED SURG  Physical Therapy Initial Assessment  This note serves as patient discharge summary if pt discharges prior to next PT visit      Name: Maurice Ackerman Jr  : 1930  MRN: [id number]  Date of Service: 2024    Discharge Recommendations:  Therapy recommended at discharge (3-5)   Maurice Ackerman Jr scored a 13/24 on the AM-PAC short mobility form. Current research shows that an AM-PAC score of 17 or less is typically not associated with a discharge to the patient's home setting. Based on the patient's AM-PAC score and their current functional mobility deficits, it is recommended that the patient have 3-5 sessions per week of Physical Therapy at d/c to increase the patient's independence.  Please see assessment section for further patient specific details.        Patient Diagnosis(es): The primary encounter diagnosis was Cellulitis of right lower extremity. Diagnoses of Acute kidney injury superimposed on CKD (HCC), Dehydration, Supratherapeutic INR, and Goals of care, counseling/discussion were also pertinent to this visit.  Past Medical History:  has a past medical history of A-fib (HCC), Hypertension, and Malignant neoplasm of kidney (HCC).  Past Surgical History:  has a past surgical history that includes Cholecystectomy; Kidney removal; Appendectomy; and angioplasty.    Assessment  Body Structures, Functions, Activity Limitations Requiring Skilled Therapeutic Intervention: Decreased functional mobility ;Decreased ADL status;Decreased ROM;Decreased strength;Decreased balance;Increased pain;Decreased posture  Assessment: Per H and P: \"89 y.o. male with PMH of CKD, gout, HTN, atrial fibrillation who presents with worsening leg pain. Pt had a mechanical fall 7 days ago and states his leg pain has worsened since then.\" R lower leg Xray neg for fx/osteomyelitis. Work up reveals cellulitis R LE. PMHx as noted.  Prior status: Lives in [facility]   Reports independence in transfers and RW gait, and ADLs. Does report 2 falls, one leading to present hospital stay.  Status 8-25-24:  Transfers Mod A. RW gait 20', with Min A, Fair quality and tolerance.  Patient at high risk for falls.  At current presentation, recommend ongoing skilled therapy of moderate intensity to address deficits in LE functional strength, and functional mobility. Will monitor progress.  Treatment Diagnosis: Impaired functional mobility  Therapy Prognosis: Good  Decision Making: Medium Complexity  History: as noted  Clinical Presentation: Evolving  Requires PT Follow-Up: Yes  Activity Tolerance  Activity Tolerance: Patient tolerated evaluation without incident  Activity Tolerance Comments: 93% HR 92 resting.  94%  after 20' RW gait. On room air.    Plan  Physical Therapy Plan  General Plan: 3-5 times per week  Current Treatment Recommendations: Strengthening, ROM, Functional mobility training, Transfer training, Gait training, Patient/Caregiver education & training, Equipment evaluation, education, & procurement  Safety Devices  Type of Devices: Call light within reach, Chair alarm in place, Gait belt, Patient at risk for falls, Left in chair, Nurse notified (waffle cushion in place.)    Restrictions  Restrictions/Precautions  Restrictions/Precautions: Fall Risk  Position Activity Restriction  Other position/activity restrictions: 1500 mL Fluid restriction     Subjective  General  Chart Reviewed: Yes  Additional Pertinent Hx: Per H and P: \"89 y.o. male with PMH of CKD, gout, HTN, atrial fibrillation who presents with worsening leg pain. Pt had a mechanical fall 7 days ago and states his leg pain has worsened since then.\"  R lower leg Xray neg for fx/osteomyelitis.  Work up reveals cellulitis R LE.  PMHx as noted.  Response To Previous Treatment: Not applicable  Family / Caregiver Present: No  Referring Practitioner: Jose Caldwell MD  Referral Date :  placement)  Ambulation  Surface: Level tile  Device: Rolling Walker (paient's)  Assistance: Minimal assistance  Quality of Gait: Flexed trunk, small steps, Cues and assist to reamain within wh walker base, which patient does not correct.  Gait Deviations: Slow Amber;Decreased step length;Decreased step height  Distance: 20', 20'  Comments: Patient sits oncommode and has large BM/urinates. Therapist performs eun care with patient in stance. Urinates a small amount in urinal.  RN informed.     Balance  Posture: Fair (mod flexed trunk)  Comments: SBA unsupported sitting. Min A limited RW gait.    -[facility] - Mobility    AM-PAC Basic Mobility - Inpatient   How much help is needed turning from your back to your side while in a flat bed without using bedrails?: A Lot  How much help is needed moving from lying on your back to sitting on the side of a flat bed without using bedrails?: A Lot  How much help is needed moving to and from a bed to a chair?: A Little  How much help is needed standing up from a chair using your arms?: A Lot  How much help is needed walking in hospital room?: A Little  How much help is needed climbing 3-5 steps with a railing?: Total  AM-PAC Inpatient Mobility Raw Score : 13  AM-PAC Inpatient T-Scale Score : 36.74  Mobility Inpatient CMS 0-100% Score: 64.91  Mobility Inpatient CMS G-Code Modifier : CL    Goals  Short Term Goals  Time Frame for Short Term Goals: By acute DC  Short Term Goal 1: Transfers CGA-Irina  Short Term Goal 2: RW gait 40' with CGA  Short Term Goal 3: Tolerates B LE exs x 10 each.  Patient Goals   Patient Goals : \"To get back to walking like I was before I came to the hospital.\"       Education  Patient Education  Education Given To: Patient  Education Provided: Role of Therapy;Plan of Care;Transfer Training  Education Method: Verbal;Demonstration  Education Outcome: Verbalized understanding;Demonstrated understanding;Continued education needed      Therapy Time   Individual

## 2024-08-25 NOTE — PROGRESS NOTES
[facility]  Phone: [phone]  Fax: [phone]  [place]         Reason for Consult:   CKD stage IV  Requesting Physician:  Dr.. MO    History Obtained From:  patient, electronic medical record    History of Present Ilness: 89-year-old pleasant white gentleman with history of CKD stage IV with recent baseline creatinine of 2.6 to 3 mg follows with Dr. Tony  History of right nephrectomy many years ago for renal cell carcinoma  Has history of atrial fibrillation, coronary artery disease, hypertension  He has been maintained on torsemide 100 mg daily  He lives in assisted living and presented after mechanical fall last night when he tried to get up to use the restroom  His right lower extremity had increased swelling and redness  He has chronic cellulitis of lower extremities  He has history of BPH and is on Flomax  Reports urgency and frequency of urination  Denies dysuria hematuria or flank pains  No recent use of nonsteroidal anti-inflammatory drugs  He was diagnosed with cellulitis of right lower extremity  Creatinine noted to be 2.8 mg BUN of 82 mg      Review of Systems:     Feels better PVRs have been < 300 ml   Receiving IV Fe       Physical exam:   Constitutional:  VITALS:  /68   Pulse 78   Temp 98.2 °F (36.8 °C) (Oral)   Resp 18   Ht 1.854 m (6' 1\")   Wt 100.6 kg (221 lb 12.5 oz)   SpO2 93%   BMI 29.26 kg/m²   Gen: alert, awake, nad  Skin: no rash, Erythema RLE Ch discoloration LLE   Heent:  eomi, mmm  Neck: no bruits or jvd noted, thyroid normal  Cardiovascular: Irregular rhythm  Respiratory: CTA B without w/r/r; respiratory effort normal  Abdomen:  +bs, soft, nt, nd, no hepatosplenomegaly  Ext: + R  lower extremity edema    Data/  CBC:   Lab Results   Component Value Date/Time    WBC 8.0 08/25/2024 04:20 AM    RBC 3.13 08/25/2024 04:20 AM    HGB 9.6 08/25/2024 04:20 AM    HCT 27.9 08/25/2024 04:20 AM    MCV 89.1 08/25/2024 04:20 AM    MCH 30.5 08/25/2024

## 2024-08-25 NOTE — PROGRESS NOTES
Hospitalist Progress Note  8/25/2024 12:27 PM  Subjective:   Admit Date: 8/23/2024  PCP: Holly Govea MD Status: Inpatient   Interval History: Hospital Day: 3,     History of present illness:  Admitted with right lower extremity cellulitis, likely triggered from abrasion from fall in setting of chronic venous stasis.  No purulent discharge or abscess. Initiated on ceftriaxone. Continues on warfarin for atrial fibrillation, INR supratherapeutic, dosed by pharmacy.       Diet: regular, 1500 ml fluid restriction  Right antecubital peripheral IV (8/23, day #3)    Medications:     ceftriaxone   2,000 mg IntraVENous Q24H   warfarin   2.5 mg Oral Daily [held]   torsemide  100 mg Oral Daily   allopurinol  100 mg Oral Daily   metoprolol succinate  25 mg Oral Daily   tamsulosin  0.4 mg Oral QPM       Labs:       08/23/24  1140 08/24/24  0443 08/25/24  0419   WBC 6.7 7.2 8.0   HGB 10.1* 9.5* 9.6*    172 175   MCV 90.2 89.6 89.1          * 134* 133*   K 3.7 4.2 3.9   CL 94* 94* 94*   CO2 28 28 25   BUN 82* 82* 90*   CREATININE 2.8* 2.8* 2.8*   GLUCOSE 99 91 98         MG  --   --  2.07   PHOS  --  3.5 3.7   CALCIUM 9.1 9.2 8.7   ALBUMIN 3.7 3.3* 3.3*   INR 4.25* 4.29* 3.82   AST 83*  --   --    ALT 52*  --   --    ALKPHOS 199*  --   --      Procalcitonin (8/23) 0.38 ng/mL  Lactate (8/23) 0.8 mmol/L  CRP (8/23) 161.0 mg/L  ESR (8/23) 82 mm/hr  CK (8/23) 88 U/L    Fe / TIBC (8/25) 20 / 245 = 8% iron saturation  Ferritin (8/25) 154.0 ng/mL     Right ankle X-ray (8/23) Generalized soft tissue swelling about the ankle.  No underlying fracture or  evidence of osteomyelitis.      Right tibia fibular X-ray (8/23) No acute findings     Objective:   Vitals:  /84   Pulse 87   Temp 97.6 °F (oral)   Resp 18   Ht 6' 1\"  Wt 100.6 kg  SpO2 92% on RA  BMI 29.26 kg/m²   General appearance: alert and cooperative with exam  Lungs: clear to auscultation bilaterally  Heart: irregular rate and rhythm, S1, S2 normal, no

## 2024-08-25 NOTE — PLAN OF CARE
Problem: Discharge Planning  Goal: Discharge to home or other facility with appropriate resources  8/25/2024 1312 by Diana Arnold RN  Outcome: Progressing  8/25/2024 0414 by Ewa Rabago RN  Outcome: Progressing     Problem: Pain  Goal: Verbalizes/displays adequate comfort level or baseline comfort level  8/25/2024 1312 by Diana Arnold RN  Outcome: Progressing  8/25/2024 0414 by Ewa Rabago RN  Outcome: Progressing     Problem: Skin/Tissue Integrity  Goal: Absence of new skin breakdown  Description: 1.  Monitor for areas of redness and/or skin breakdown  2.  Assess vascular access sites hourly  3.  Every 4-6 hours minimum:  Change oxygen saturation probe site  4.  Every 4-6 hours:  If on nasal continuous positive airway pressure, respiratory therapy assess nares and determine need for appliance change or resting period.  8/25/2024 1312 by Diana Arnold RN  Outcome: Progressing  8/25/2024 0414 by Ewa Rabago RN  Outcome: Progressing     Problem: ABCDS Injury Assessment  Goal: Absence of physical injury  8/25/2024 1312 by Diana Arnold RN  Outcome: Progressing  8/25/2024 0414 by Ewa Rabago RN  Outcome: Progressing     Problem: Safety - Adult  Goal: Free from fall injury  8/25/2024 1312 by Diana Arnold RN  Outcome: Progressing  8/25/2024 0414 by Ewa Rabago RN  Outcome: Progressing     Problem: Cardiovascular - Adult  Goal: Maintains optimal cardiac output and hemodynamic stability  8/25/2024 1312 by Diana Arnold RN  Outcome: Progressing  8/25/2024 0414 by Ewa Rabago RN  Outcome: Progressing  Goal: Absence of cardiac dysrhythmias or at baseline  8/25/2024 1312 by Diana Arnold RN  Outcome: Progressing  8/25/2024 0414 by Ewa Rabago RN  Outcome: Progressing     Problem: Skin/Tissue Integrity - Adult  Goal: Skin integrity remains intact  8/25/2024 1312 by Diana Arnold RN  Outcome: Progressing  Flowsheets (Taken  8/25/2024 0910)  Skin Integrity Remains Intact:   Monitor for areas of redness and/or skin breakdown   Assess vascular access sites hourly   Every 4-6 hours minimum: Change oxygen saturation probe site  8/25/2024 0414 by Ewa Rabago RN  Outcome: Progressing  Goal: Incisions, wounds, or drain sites healing without S/S of infection  8/25/2024 1312 by Diana Arnold RN  Outcome: Progressing  8/25/2024 0414 by Ewa Rabago RN  Outcome: Progressing  Goal: Oral mucous membranes remain intact  8/25/2024 1312 by Diana Arnold RN  Outcome: Progressing  8/25/2024 0414 by Ewa Rabago RN  Outcome: Progressing     Problem: Musculoskeletal - Adult  Goal: Return mobility to safest level of function  8/25/2024 1312 by Diana Arnold RN  Outcome: Progressing  8/25/2024 0414 by Ewa Rabago RN  Outcome: Progressing  Goal: Maintain proper alignment of affected body part  8/25/2024 1312 by Diana Arnold RN  Outcome: Progressing  8/25/2024 0414 by Ewa Rabago RN  Outcome: Progressing  Goal: Return ADL status to a safe level of function  8/25/2024 1312 by Diana Arnold RN  Outcome: Progressing  8/25/2024 0414 by Ewa Rabago RN  Outcome: Progressing     Problem: Metabolic/Fluid and Electrolytes - Adult  Goal: Electrolytes maintained within normal limits  8/25/2024 1312 by Diana Arnold RN  Outcome: Progressing  8/25/2024 0414 by Ewa Rabago RN  Outcome: Progressing  Goal: Hemodynamic stability and optimal renal function maintained  8/25/2024 1312 by Diana Arnold RN  Outcome: Progressing  8/25/2024 0414 by Ewa Rabago RN  Outcome: Progressing     Problem: Genitourinary - Adult  Goal: Absence of urinary retention  8/25/2024 1312 by Diana Arnold RN  Outcome: Progressing  8/25/2024 0414 by Ewa Rabago RN  Outcome: Progressing     Problem: Respiratory - Adult  Goal: Achieves optimal ventilation and oxygenation  8/25/2024

## 2024-08-25 NOTE — PLAN OF CARE
Problem: Discharge Planning  Goal: Discharge to home or other facility with appropriate resources  8/25/2024 0414 by Ewa Rabago RN  Outcome: Progressing     Problem: Pain  Goal: Verbalizes/displays adequate comfort level or baseline comfort level  8/25/2024 0414 by Ewa Rabago RN  Outcome: Progressing     Problem: Skin/Tissue Integrity  Goal: Absence of new skin breakdown  Description: 1.  Monitor for areas of redness and/or skin breakdown  2.  Assess vascular access sites hourly  3.  Every 4-6 hours minimum:  Change oxygen saturation probe site  4.  Every 4-6 hours:  If on nasal continuous positive airway pressure, respiratory therapy assess nares and determine need for appliance change or resting period.  8/25/2024 0414 by Ewa Rabago RN  Outcome: Progressing     Problem: ABCDS Injury Assessment  Goal: Absence of physical injury  8/25/2024 0414 by Ewa Rabago RN  Outcome: Progressing     Problem: Safety - Adult  Goal: Free from fall injury  8/25/2024 0414 by Ewa Rabago RN  Outcome: Progressing     Problem: Cardiovascular - Adult  Goal: Maintains optimal cardiac output and hemodynamic stability  8/25/2024 0414 by Ewa Rabago RN  Outcome: Progressing     Problem: Cardiovascular - Adult  Goal: Absence of cardiac dysrhythmias or at baseline  8/25/2024 0414 by Ewa Rabago RN  Outcome: Progressing     Problem: Skin/Tissue Integrity - Adult  Goal: Skin integrity remains intact  8/25/2024 0414 by Ewa Rabago RN  Outcome: Progressing     Problem: Skin/Tissue Integrity - Adult  Goal: Incisions, wounds, or drain sites healing without S/S of infection  8/25/2024 0414 by Ewa Rabago RN  Outcome: Progressing     Problem: Skin/Tissue Integrity - Adult  Goal: Oral mucous membranes remain intact  8/25/2024 0414 by Ewa Rabago RN  Outcome: Progressing     Problem: Musculoskeletal - Adult  Goal: Return mobility

## 2024-08-25 NOTE — PROGRESS NOTES
Patient woke up from nap confused. Called out for help down to the dining room, thought he was at assisted living. Reoriented patient to place and situation. Dinner ordered for patient. Bed alarm on, call light in reach.

## 2024-08-25 NOTE — PROGRESS NOTES
Clinical Pharmacy Note  Warfarin Consult    Maurice Ackerman Jr is a 89 y.o. male receiving warfarin managed by pharmacy.    Warfarin Indication: afib  Target INR range: 2-3   Dose prior to admission: Warfarin 2.5mg (1 tablet) daily EXCEPT 1.25mg (1/2 tablet) every Monday and Friday     Current warfarin drug-drug interactions: none of clinical significance     Recent Labs     08/23/24  1140 08/24/24  0443 08/25/24  0420   HGB 10.1* 9.5* 9.6*   HCT 29.3* 28.0* 27.9*   INR 4.25* 4.29* 3.82*     Assessment/Plan:  -INR 3.82 --> HOLD WARFARIN TODAY (8/25)  -Daily PT/INR until stable within therapeutic range.     Thank you for the consult.  Will continue to follow.  Hazel Pinedo, PharmD, BCPS  8/25/2024 11:28 AM

## 2024-08-26 LAB
ALBUMIN SERPL-MCNC: 3.3 G/DL (ref 3.4–5)
ANION GAP SERPL CALCULATED.3IONS-SCNC: 14 MMOL/L (ref 3–16)
BASOPHILS # BLD: 0 K/UL (ref 0–0.2)
BASOPHILS NFR BLD: 0.2 %
BUN SERPL-MCNC: 94 MG/DL (ref 7–20)
CALCIUM SERPL-MCNC: 8.7 MG/DL (ref 8.3–10.6)
CHLORIDE SERPL-SCNC: 93 MMOL/L (ref 99–110)
CO2 SERPL-SCNC: 25 MMOL/L (ref 21–32)
CREAT SERPL-MCNC: 3 MG/DL (ref 0.8–1.3)
DEPRECATED RDW RBC AUTO: 14.2 % (ref 12.4–15.4)
EOSINOPHIL # BLD: 0.1 K/UL (ref 0–0.6)
EOSINOPHIL NFR BLD: 1.5 %
GFR SERPLBLD CREATININE-BSD FMLA CKD-EPI: 19 ML/MIN/{1.73_M2}
GLUCOSE SERPL-MCNC: 107 MG/DL (ref 70–99)
HCT VFR BLD AUTO: 26.6 % (ref 40.5–52.5)
HGB BLD-MCNC: 9.3 G/DL (ref 13.5–17.5)
INR PPP: 3.28 (ref 0.85–1.15)
LYMPHOCYTES # BLD: 0.5 K/UL (ref 1–5.1)
LYMPHOCYTES NFR BLD: 5.5 %
MCH RBC QN AUTO: 30.9 PG (ref 26–34)
MCHC RBC AUTO-ENTMCNC: 35.1 G/DL (ref 31–36)
MCV RBC AUTO: 88.2 FL (ref 80–100)
MONOCYTES # BLD: 0.6 K/UL (ref 0–1.3)
MONOCYTES NFR BLD: 7.4 %
NEUTROPHILS # BLD: 7.1 K/UL (ref 1.7–7.7)
NEUTROPHILS NFR BLD: 85.4 %
PHOSPHATE SERPL-MCNC: 3.7 MG/DL (ref 2.5–4.9)
PLATELET # BLD AUTO: 200 K/UL (ref 135–450)
PMV BLD AUTO: 8.9 FL (ref 5–10.5)
POTASSIUM SERPL-SCNC: 4 MMOL/L (ref 3.5–5.1)
PROTHROMBIN TIME: 33.2 SEC (ref 11.9–14.9)
RBC # BLD AUTO: 3.02 M/UL (ref 4.2–5.9)
SODIUM SERPL-SCNC: 132 MMOL/L (ref 136–145)
WBC # BLD AUTO: 8.3 K/UL (ref 4–11)

## 2024-08-26 PROCEDURE — 2580000003 HC RX 258: Performed by: STUDENT IN AN ORGANIZED HEALTH CARE EDUCATION/TRAINING PROGRAM

## 2024-08-26 PROCEDURE — 97530 THERAPEUTIC ACTIVITIES: CPT

## 2024-08-26 PROCEDURE — 85610 PROTHROMBIN TIME: CPT

## 2024-08-26 PROCEDURE — 1200000000 HC SEMI PRIVATE

## 2024-08-26 PROCEDURE — 6360000002 HC RX W HCPCS: Performed by: INTERNAL MEDICINE

## 2024-08-26 PROCEDURE — 6370000000 HC RX 637 (ALT 250 FOR IP): Performed by: INTERNAL MEDICINE

## 2024-08-26 PROCEDURE — 85025 COMPLETE CBC W/AUTO DIFF WBC: CPT

## 2024-08-26 PROCEDURE — 94760 N-INVAS EAR/PLS OXIMETRY 1: CPT

## 2024-08-26 PROCEDURE — 97166 OT EVAL MOD COMPLEX 45 MIN: CPT

## 2024-08-26 PROCEDURE — 2580000003 HC RX 258: Performed by: INTERNAL MEDICINE

## 2024-08-26 PROCEDURE — 97116 GAIT TRAINING THERAPY: CPT

## 2024-08-26 PROCEDURE — 80069 RENAL FUNCTION PANEL: CPT

## 2024-08-26 PROCEDURE — 97535 SELF CARE MNGMENT TRAINING: CPT

## 2024-08-26 PROCEDURE — 51798 US URINE CAPACITY MEASURE: CPT

## 2024-08-26 PROCEDURE — 36415 COLL VENOUS BLD VENIPUNCTURE: CPT

## 2024-08-26 PROCEDURE — 6370000000 HC RX 637 (ALT 250 FOR IP): Performed by: STUDENT IN AN ORGANIZED HEALTH CARE EDUCATION/TRAINING PROGRAM

## 2024-08-26 RX ADMIN — METOPROLOL SUCCINATE 25 MG: 25 TABLET, EXTENDED RELEASE ORAL at 09:01

## 2024-08-26 RX ADMIN — SODIUM CHLORIDE, PRESERVATIVE FREE 10 ML: 5 INJECTION INTRAVENOUS at 21:52

## 2024-08-26 RX ADMIN — ALLOPURINOL 100 MG: 100 TABLET ORAL at 09:01

## 2024-08-26 RX ADMIN — TRAMADOL HYDROCHLORIDE 50 MG: 50 TABLET ORAL at 00:43

## 2024-08-26 RX ADMIN — TORSEMIDE 100 MG: 100 TABLET ORAL at 09:01

## 2024-08-26 RX ADMIN — CEFTRIAXONE SODIUM 2000 MG: 2 INJECTION, POWDER, FOR SOLUTION INTRAMUSCULAR; INTRAVENOUS at 12:44

## 2024-08-26 RX ADMIN — TAMSULOSIN HYDROCHLORIDE 0.4 MG: 0.4 CAPSULE ORAL at 17:53

## 2024-08-26 RX ADMIN — TRAMADOL HYDROCHLORIDE 50 MG: 50 TABLET ORAL at 09:01

## 2024-08-26 RX ADMIN — SODIUM CHLORIDE, PRESERVATIVE FREE 10 ML: 5 INJECTION INTRAVENOUS at 09:02

## 2024-08-26 ASSESSMENT — PAIN SCALES - GENERAL
PAINLEVEL_OUTOF10: 0
PAINLEVEL_OUTOF10: 8
PAINLEVEL_OUTOF10: 0
PAINLEVEL_OUTOF10: 9
PAINLEVEL_OUTOF10: 7
PAINLEVEL_OUTOF10: 8

## 2024-08-26 ASSESSMENT — PAIN DESCRIPTION - DESCRIPTORS
DESCRIPTORS: ACHING
DESCRIPTORS: ACHING;POUNDING

## 2024-08-26 ASSESSMENT — PAIN DESCRIPTION - ORIENTATION
ORIENTATION: RIGHT;LEFT
ORIENTATION: RIGHT

## 2024-08-26 ASSESSMENT — PAIN DESCRIPTION - LOCATION
LOCATION: FOOT
LOCATION: LEG

## 2024-08-26 ASSESSMENT — PAIN - FUNCTIONAL ASSESSMENT: PAIN_FUNCTIONAL_ASSESSMENT: ACTIVITIES ARE NOT PREVENTED

## 2024-08-26 NOTE — ACP (ADVANCE CARE PLANNING)
Advance Care Planning     Advance Care Planning Activator (Inpatient)  Conversation Note      Date of ACP Conversation: 8/26/2024     Conversation Conducted with: Patient with Decision Making Capacity and Healthcare Decision Maker    ACP Activator: SABRINA Taylor      Health Care Decision Maker:     Current Designated Health Care Decision Maker:     Primary Decision Maker: Lizzie Ruiz - Child - [phone]    Secondary Decision Maker: Hetal Hunter - Child - [phone]    Care Preferences    Per conversation with Dr. Harman at 8/23/24  \"Code Status: Code status was discussed in detail with Patient/POA; verbalized understanding of the different types of code status; Patient/POA  opted for DNR-CCA \"    Ventilation:  \"If you were in your present state of health and suddenly became very ill and were unable to breathe on your own, what would your preference be about the use of a ventilator (breathing machine) if it were available to you?\"      Would the patient desire the use of ventilator (breathing machine)?: no    \"If your health worsens and it becomes clear that your chance of recovery is unlikely, what would your preference be about the use of a ventilator (breathing machine) if it were available to you?\"     Would the patient desire the use of ventilator (breathing machine)?: No      Resuscitation  \"CPR works best to restart the heart when there is a sudden event, like a heart attack, in someone who is otherwise healthy. Unfortunately, CPR does not typically restart the heart for people who have serious health conditions or who are very sick.\"    \"In the event your heart stopped as a result of an underlying serious health condition, would you want attempts to be made to restart your heart (answer \"yes\" for attempt to resuscitate) or would you prefer a natural death (answer \"no\" for do not attempt to resuscitate)?\" no       [] Yes   [] No   Educated Patient / Decision Maker regarding differences between

## 2024-08-26 NOTE — PROGRESS NOTES
Grab bars around toilet, Built-in shower seat  Home Equipment: Walker - Rolling, Alert button  Has the patient had two or more falls in the past year or any fall with injury in the past year?: Yes  ADL Assistance: Independent  Homemaking Responsibilities: No  Ambulation Assistance: Independent (with RW)  Transfer Assistance: Independent  Active : Yes  Occupation: Retired  Type of Occupation: salesman.  Additional Comments: sleeps in lift chair    Objective        Observation/Palpation  Observation: R lower leg and foot red and edematous.    Safety Devices  Type of Devices: Call light within reach;Chair alarm in place;Gait belt;Patient at risk for falls;Left in chair;Nurse notified (waffle cushion in place.)      AROM: Generally decreased, functional (R shoulder flexion limited to ~90*, WFL distally. L shoulder flexion limited to ~100*, WFL distally)  Strength: Generally decreased, functional  Coordination: Within functional limits  Tone: Normal  Sensation: Intact (pt denies N/T in rhina hands)    ADL  Grooming: Contact guard assistance  Grooming Skilled Clinical Factors: standing at sink to wash hands  LE Dressing: Maximum assistance  LE Dressing Skilled Clinical Factors: assist to don rhina slippers d/t R LE pain/edema, anticipate max A for complete LB dressing  Toileting: Contact guard assistance  Toileting Skilled Clinical Factors: standing at toilet to void urine, pt managed clothing down/up CGA, but urinated on floor requiring assist for environmental clean-up  Functional Mobility: Contact guard assistance  Functional Mobility Skilled Clinical Factors: ~80 ft in hallway, to/from BR with RW and CGA. Pt slow, pushes RW too far out in front requiring VC's for safe technique.  Additional Comments: Anticipate pt is indepedent feeding, mod A for complete bathing and dressing based on ROM/strength, balance, endurance.    Bed mobility  Supine to Sit: Minimal assistance (HOB elevated, increased time)  Sit to Supine:  Unable to assess (the pt up to the chair)  Scooting: Stand by assistance  Bed Mobility Comments: sleeps in LIFT chair at home    Transfers  Sit to stand: Minimal assistance (EOB>RW, required 2 attempts)  Stand to sit: Minimal assistance (RW>recliner)  Transfer Comments: to/from RW, VC's for hand placement    Vision  Vision: Impaired  Vision Exceptions: Wears glasses at all times  Hearing  Hearing: Within functional limits    Cognition  Overall Cognitive Status: Exceptions  Following Commands: Follows one step commands with increased time  Attention Span: Attends with cues to redirect  Memory: Decreased recall of recent events  Sequencing: Requires cues for some  Orientation  Overall Orientation Status: Impaired  Orientation Level: Oriented to place;Disoriented to time;Oriented to situation;Oriented to person    Static Sitting Balance : seated EOB supervision  Static Standing Balance : SBA/CGA at RW  Dynamic Standing Balance : CGA at RW during fxl mobility and during clothing management/grooming tasks at sink    Education Given To: Patient  Education Provided: Role of Therapy;Plan of Care;Transfer Training;ADL Adaptive Strategies;Orientation  Education Method: Demonstration;Verbal  Barriers to Learning: Hearing;Cognition  Education Outcome: Verbalized understanding;Demonstrated understanding;Continued education needed                       AM-PAC - ADL  AM-PAC Daily Activity - Inpatient   How much help is needed for putting on and taking off regular lower body clothing?: A Lot  How much help is needed for bathing (which includes washing, rinsing, drying)?: A Lot  How much help is needed for toileting (which includes using toilet, bedpan, or urinal)?: A Lot  How much help is needed for putting on and taking off regular upper body clothing?: A Little  How much help is needed for taking care of personal grooming?: A Little  How much help for eating meals?: None  AM-[facility] Inpatient Daily Activity Raw Score: 16  AM-PAC  Inpatient ADL T-Scale Score : 35.96  ADL Inpatient CMS 0-100% Score: 53.32  ADL Inpatient CMS G-Code Modifier : CK           Goals  Short Term Goals  Time Frame for Short Term Goals: Prior to d/c:  Short Term Goal 1: Pt will bathe with SBA.  Short Term Goal 2: Pt will dress with SBA using A/E prn.  Short Term Goal 3: Pt will toilet with supervision.  Short Term Goal 4: Pt will complete fxl mobility and fxl transfers to/from ADL surfaces with supervision using AD.  Short Term Goal 5: Pt will tolerate standing >5 minutes for functional task with supervision to improve standing tolerance for ADL routine.  Long Term Goals  Time Frame for Long Term Goals : STGs=LTGs  Patient Goals   Patient goals : to return to [facility].      Therapy Time   Individual Concurrent Group Co-treatment   Time In 0739         Time Out 0817         Minutes 38         Timed Code Treatment Minutes: 23 Minutes       Diana Green OTR/L [id number]

## 2024-08-26 NOTE — PROGRESS NOTES
Clinical Pharmacy Note  Warfarin Consult    Maurice Ackerman Jr is a 89 y.o. male receiving warfarin managed by pharmacy.    Warfarin Indication: afib  Target INR range: 2-3   Dose prior to admission: Warfarin 2.5mg (1 tablet) daily EXCEPT 1.25mg (1/2 tablet) every Monday and Friday     Current warfarin drug-drug interactions: none of clinical significance     Recent Labs     08/24/24  0443 08/25/24  0420 08/26/24  0505 08/26/24  0506   HGB 9.5* 9.6* 9.3*  --    HCT 28.0* 27.9* 26.6*  --    INR 4.29* 3.82*  --  3.28*     Assessment/Plan:  -INR 3.28 --> HOLD WARFARIN TODAY (8/26)  -Daily PT/INR until stable within therapeutic range.     Thank you for the consult.  Will continue to follow.  Renetta Torres, PharmD  8/26/2024 10:12 AM

## 2024-08-26 NOTE — PROGRESS NOTES
2113 - Pt accidentally removed his IV while he was attempting to reach for the call light. Bleeding was stopped. Pt was cleaned up. New gown and blanket. Call light given to patient to hold and lay on his lap. Pt uses call light appropriately. Bed in lowest position and clocked, bed alarm on.     Electronically signed by LAMBERT GIORDANO RN on 8/26/2024 at 12:53 AM

## 2024-08-26 NOTE — PROGRESS NOTES
Spiritual Health Assessment/Progress Note  [facility]    Spiritual/Emotional Needs,  ,  ,      Name: Maurice Ackerman Jr MRN: [id number]    Age: 89 y.o.     Sex: male   Language: English   Yarsanism: Episcopalian   Cellulitis of right leg     Date: 8/26/2024            Total Time Calculated: 5 min              Spiritual Assessment continued in [facility]        Referral/Consult From: Patient, Rounding   Encounter Overview/Reason: Spiritual/Emotional Needs  Service Provided For: Patient    Leonora, Belief, Meaning:   Patient identifies as spiritual  Family/Friends identify as spiritual      Importance and Influence:  Patient has no beliefs influential to healthcare decision-making identified during this visit  Family/Friends have no beliefs influential to healthcare decision-making identified during this visit    Community:  Patient is connected with a spiritual community  Family/Friends are connected with a spiritual community:    Assessment and Plan of Care:     Patient Interventions include: Facilitated expression of thoughts and feelings, Explored spiritual coping/struggle/distress and theological reflection, Affirmed coping skills/support systems, and Provided sacramental/Mormonism ritual  Family/Friends Interventions include: Family/Friends Interventions, Explored spiritual coping/struggle/distress and theological reflection, and Affirmed coping skills/support systems    Patient Plan of Care: No spiritual needs identified for follow-up  Family/Friends Plan of Care: No spiritual needs identified for follow-up    Electronically signed by Chaplain ROBBIE on 8/26/2024 at 2:31 PM

## 2024-08-26 NOTE — PROGRESS NOTES
Physical Therapy  Facility/Department: [address] MED SURG  Physical Therapy Daily Note  If patient discharges prior to next session this note will serve as a discharge summary.  Please see below for the latest assessment towards goals.     Name: Maurice Ackerman Jr  : 1930  MRN: [id number]  Date of Service: 2024    Discharge Recommendations:  Patient would benefit from continued therapy after discharge (3-5)   Maurice Ackerman Jr scored a 17/24 on the AM-PAC short mobility form. Current research shows that an AM-PAC score of 17 or less is typically not associated with a discharge to the patient's home setting. Based on the patient's AM-PAC score and their current functional mobility deficits, it is recommended that the patient have 3-5 sessions per week of Physical Therapy at d/c to increase the patient's independence.  Please see assessment section for further patient specific details.  PT Equipment Recommendations  Equipment Needed: No      Patient Diagnosis(es): The primary encounter diagnosis was Cellulitis of right lower extremity. Diagnoses of Acute kidney injury superimposed on CKD (HCC), Dehydration, Supratherapeutic INR, and Goals of care, counseling/discussion were also pertinent to this visit.  Past Medical History:  has a past medical history of A-fib (HCC), Hypertension, and Malignant neoplasm of kidney (HCC).  Past Surgical History:  has a past surgical history that includes Cholecystectomy; Kidney removal; Appendectomy; and angioplasty.    Assessment  Assessment: Today, the pt showed some improvement in levels of assist and in distance walked but is still requiring at least min A for bed mobility, min A for sit <> stand transfers and CGA for ambulation with the RW. The pt con't to report pain in the R LE. Anticipate that the pt con't to function below his functional baseline and would benefit from con't skilled PT services at d/c to progress him back to his baseline. Will con't to follow.  Therapy

## 2024-08-26 NOTE — CARE COORDINATION
Case Management Assessment  Initial Evaluation    Date/Time of Evaluation: 8/26/2024 3:46 PM  Assessment Completed by: SABRINA Taylor    If patient is discharged prior to next notation, then this note serves as note for discharge by case management.    Patient Name: Maurice Ackerman Jr                   YOB: 1930  Diagnosis: Dehydration [E86.0]  Cellulitis of right leg [L03.115]  Goals of care, counseling/discussion [Z71.89]  Supratherapeutic INR [R79.1]  Cellulitis of right lower extremity [L03.115]  Acute kidney injury superimposed on CKD (HCC) [N17.9, N18.9]                   Date / Time: 8/23/2024 10:49 AM    Patient Admission Status: Inpatient   Readmission Risk (Low < 19, Mod (19-27), High > 27): Readmission Risk Score: 21.8    Current PCP: Holly Govea MD  PCP verified by CM? (P) Yes    Chart Reviewed: Yes      History Provided by: (P) Child/Family  Patient Orientation: (P) Other (see comment) (Pt was very sleeping and had a difficult time staying awake.  SW spoke with pts dgtr ( Cayden Hunter) and son in law)    Patient Cognition: (P) Alert    Hospitalization in the last 30 days (Readmission):  No    If yes, Readmission Assessment in  Navigator will be completed.    Advance Directives:      Code Status: DNR-CCA   Patient's Primary Decision Maker is: (P) Named in Scanned ACP Document    Primary Decision Maker: Lizzie Ruiz Jo - Child - [phone]    Secondary Decision Maker: Hetal Hunter - Child - [phone]    Discharge Planning:    Patient lives with: (P) Alone Type of Home: (P) Independent Living ([facility])  Primary Care Giver: (P) Self  Patient Support Systems include: (P) Children, Family Members   Current Financial resources: (P) Medicare, Other (Comment) ([facility])  Current community resources: (P) None  Current services prior to admission: (P) Durable Medical Equipment            Current DME: (P) Other (Comment), Shower Chair, Walker (Grab bars, life alert)            Type of  Home Care services:  (P) None    ADLS  Prior functional level: (P) Assistance with the following:, Housework, Shopping, Cooking  Current functional level: (P) Assistance with the following:, Bathing, Dressing, Toileting, Cooking, Housework, Shopping, Mobility    PT AM-PAC: 17 /24  OT AM-PAC: 16 /24    Family can provide assistance at DC: (P) No  Would you like Case Management to discuss the discharge plan with any other family members/significant others, and if so, who? (P) Yes (Dgtrs)  Plans to Return to Present Housing: (P) No  Other Identified Issues/Barriers to RETURNING to current housing: Care needs  Potential Assistance needed at discharge: (P) Skilled Nursing Facility            Potential DME:  No  Patient expects to discharge to: (P) Skilled nursing facility  Plan for transportation at discharge:  Undetermined at this time    Financial    Payor: MEDICARE / Plan: MEDICARE PART A AND B / Product Type: *No Product type* /     Does insurance require precert for SNF: No    Potential assistance Purchasing Medications: (P) No  Meds-to-Beds request: Yes      [facility] [id number] - [place], [place] - [address] - P [phone] - F [phone]  [address]  [facility] [address]  Phone: [phone] Fax: [phone]      Notes:    Factors facilitating achievement of predicted outcomes: Family support, Has needed Durable Medical Equipment at home, and Knowledge about rehab    Barriers to discharge: Upper extremity weakness and Lower extremity weakness    Additional Case Management Notes: Pt resides at [facility].  PT/OT recommending SNF placement.  Pts dgtr and son in law present in the room and stated they would like a referral to [facility] and [facility] and then will discuss options with pts dgtr, Lizzie Butler.  Referrals sent via [employer].  No pre cert required.  Will need HENS.    PLAN: From [facility].  SNF recommended.  Per dgtr, referrals sent to [facility]

## 2024-08-26 NOTE — PROGRESS NOTES
V2.0    [facility] Progress Note      Name:  Maurice Ackerman Jr /Age/Sex: 1930  (89 y.o. male)   MRN & CSN:  [id number] & [id number] Encounter Date/Time: 2024 11:17 AM EDT   Location:  [place] [address] PCP: Holly Govea MD     Attending:Dc Cameron MD       Hospital Day: 4    Assessment and Recommendations   Maurice Ackerman Jr is a 89 y.o. male who presents with Cellulitis of right leg      Plan:   Right lower extremity cellulitis IV antibiotics since admission  A-fib on Coumadin and metoprolol rate controlled  CKD stage IV follow-up as outpatient  Iron deficiency anemia and likely anemia of chronic disease due to renal function follow-up as outpatient  BPH on tamsulosin keep for now      Diet ADULT DIET; Regular; 1500 ml   DVT Prophylaxis [] Lovenox, []  Heparin, [] SCDs, [] Ambulation,  [] Eliquis, [] Xarelto  [] Coumadin   Code Status DNR-CCA             Personally reviewed Lab Studies and Imaging       Medical Decision Making:  The following items were considered in medical decision making:  Discussion of patient care with other providers  Reviewed clinical lab tests  Reviewed radiology tests  Reviewed other diagnostic tests/interventions  Independent review of radiologic images  Microbiology cultures and other micro tests reviewed      Subjective:     Chief Complaint: Right leg pain    Maurice Ackeramn Jr is a 89 y.o. male who presents with right leg pain and erythema overall improving up to chair nurse at bedside no fever chills      Review of Systems:      Pertinent positives and negatives discussed in HPI    Objective:     Intake/Output Summary (Last 24 hours) at 2024 1117  Last data filed at 2024 0908  Gross per 24 hour   Intake 720 ml   Output 970 ml   Net -250 ml      Vitals:   Vitals:    24 0446 24 0600 24 0828 24 0901   BP: 125/65  123/63    Pulse: 76  83    Resp: 20  16 18   Temp: 97.7 °F (36.5 °C)  97.7 °F (36.5 °C)    TempSrc: Oral  Oral    SpO2: 96%

## 2024-08-26 NOTE — PLAN OF CARE
Problem: Discharge Planning  Goal: Discharge to home or other facility with appropriate resources  Outcome: Progressing     Problem: Pain  Goal: Verbalizes/displays adequate comfort level or baseline comfort level  Outcome: Progressing     Problem: Skin/Tissue Integrity  Goal: Absence of new skin breakdown  Description: 1.  Monitor for areas of redness and/or skin breakdown  2.  Assess vascular access sites hourly  3.  Every 4-6 hours minimum:  Change oxygen saturation probe site  4.  Every 4-6 hours:  If on nasal continuous positive airway pressure, respiratory therapy assess nares and determine need for appliance change or resting period.  Outcome: Progressing     Problem: ABCDS Injury Assessment  Goal: Absence of physical injury  Outcome: Progressing     Problem: Safety - Adult  Goal: Free from fall injury  Outcome: Progressing     Problem: Cardiovascular - Adult  Goal: Maintains optimal cardiac output and hemodynamic stability  Outcome: Progressing  Goal: Absence of cardiac dysrhythmias or at baseline  Outcome: Progressing     Problem: Skin/Tissue Integrity - Adult  Goal: Skin integrity remains intact  Outcome: Progressing  Goal: Incisions, wounds, or drain sites healing without S/S of infection  Outcome: Progressing  Goal: Oral mucous membranes remain intact  Outcome: Progressing     Problem: Musculoskeletal - Adult  Goal: Return mobility to safest level of function  Outcome: Progressing  Goal: Maintain proper alignment of affected body part  Outcome: Progressing  Goal: Return ADL status to a safe level of function  Outcome: Progressing     Problem: Metabolic/Fluid and Electrolytes - Adult  Goal: Electrolytes maintained within normal limits  Outcome: Progressing  Goal: Hemodynamic stability and optimal renal function maintained  Outcome: Progressing     Problem: Genitourinary - Adult  Goal: Absence of urinary retention  Outcome: Progressing     Problem: Respiratory - Adult  Goal: Achieves optimal ventilation

## 2024-08-26 NOTE — PROGRESS NOTES
[facility]  Phone: [phone]  Fax: [phone]  [employer]         Reason for Consult:   CKD stage IV  Requesting Physician:  Dr.. MO    History Obtained From:  patient, electronic medical record    History of Present Ilness: 89-year-old pleasant white gentleman with history of CKD stage IV with recent baseline creatinine of 2.6 to 3 mg follows with Dr. Tony. History of right nephrectomy many years ago for renal cell carcinoma. Has history of atrial fibrillation, coronary artery disease, hypertension  He has been maintained on torsemide 100 mg daily. He lives in assisted living and presented after mechanical fall last night when he tried to get up to use the restroom. His right lower extremity had increased swelling and redness. He has chronic cellulitis of lower extremities  He has history of BPH and is on Flomax. Reports urgency and frequency of urination. Denies dysuria hematuria or flank pains  No recent use of nonsteroidal anti-inflammatory drugs. He was diagnosed with cellulitis of right lower extremity. Creatinine noted to be 2.8 mg BUN of 82 mg      Subjective:   Breathing comfortably; NAD      Physical exam:   Constitutional:  VITALS:  /65   Pulse 79   Temp 97.4 °F (36.3 °C) (Oral)   Resp 16   Ht 1.854 m (6' 1\")   Wt 101.6 kg (223 lb 15.8 oz)   SpO2 95%   BMI 29.55 kg/m²     Intake/Output Summary (Last 24 hours) at 8/26/2024 1246  Last data filed at 8/26/2024 1245  Gross per 24 hour   Intake 720 ml   Output 920 ml   Net -200 ml       Gen: alert, awake, NAD  Skin: Erythema RLE Ch discoloration LLE   Heent:  NCAT  Cardiovascular: Irregular rhythm  Respiratory: CTA B   Abdomen:  +bs, soft, NTND  Ext: + R  lower extremity edema    Medications   cefTRIAXone (ROCEPHIN) IV  2,000 mg IntraVENous Q24H    sodium chloride flush  5-40 mL IntraVENous 2 times per day    warfarin placeholder: dosing by pharmacy   Other RX Placeholder    torsemide  100 mg Oral Daily     allopurinol  100 mg Oral Daily    metoprolol succinate  25 mg Oral Daily    tamsulosin  0.4 mg Oral QPM       Data/  CBC:   Lab Results   Component Value Date/Time    WBC 8.3 08/26/2024 05:05 AM    RBC 3.02 08/26/2024 05:05 AM    HGB 9.3 08/26/2024 05:05 AM    HCT 26.6 08/26/2024 05:05 AM    MCV 88.2 08/26/2024 05:05 AM    MCH 30.9 08/26/2024 05:05 AM    MCHC 35.1 08/26/2024 05:05 AM    RDW 14.2 08/26/2024 05:05 AM     08/26/2024 05:05 AM    MPV 8.9 08/26/2024 05:05 AM     BMP:    Lab Results   Component Value Date/Time     08/26/2024 05:05 AM    K 4.0 08/26/2024 05:05 AM    K 3.7 08/23/2024 11:40 AM    CL 93 08/26/2024 05:05 AM    CO2 25 08/26/2024 05:05 AM    BUN 94 08/26/2024 05:05 AM    CREATININE 3.0 08/26/2024 05:05 AM    CALCIUM 8.7 08/26/2024 05:05 AM    LABGLOM 19 08/26/2024 05:05 AM    LABGLOM 23 01/09/2024 05:12 AM    GLUCOSE 107 08/26/2024 05:05 AM         Assessment/Plan       1-CKD stage IV solitary left kidney follows with Dr. Tony baseline creatinine 2.6 to 3 mg creatinine is at baseline. Has Azotemia    2-history of BPH - PVR's are < 300 ml - Continue Flomax   3-cellulitis right lower extremity - on ABX per primary team  4-history of congestive heart failure - continue torsemide - FR 1500 ml/d  5-history of atrial fibrillation controlled on metoprolol  6-HTN - controlled  7-Anemia CKD - hemoglobin close to target    Patient stated he would like to pursue HD if he needs dialysis support; that apparently is a different stance or opinion than the one he had prior. At this point in time RRT is not indicated

## 2024-08-26 NOTE — PLAN OF CARE
Problem: Discharge Planning  Goal: Discharge to home or other facility with appropriate resources  8/25/2024 1312 by Diana Arnold RN  Outcome: Progressing     Problem: Pain  Goal: Verbalizes/displays adequate comfort level or baseline comfort level  8/25/2024 1312 by Diana Arnold RN  Outcome: Progressing     Problem: Skin/Tissue Integrity  Goal: Absence of new skin breakdown  Description: 1.  Monitor for areas of redness and/or skin breakdown  2.  Assess vascular access sites hourly  3.  Every 4-6 hours minimum:  Change oxygen saturation probe site  4.  Every 4-6 hours:  If on nasal continuous positive airway pressure, respiratory therapy assess nares and determine need for appliance change or resting period.  8/25/2024 1312 by Diana Arnold RN  Outcome: Progressing     Problem: ABCDS Injury Assessment  Goal: Absence of physical injury  8/25/2024 1312 by Diana Arnold RN  Outcome: Progressing     Problem: Safety - Adult  Goal: Free from fall injury  8/25/2024 2339 by Zuri Florez RN  Outcome: Progressing  8/25/2024 1312 by Diana Arnold RN  Outcome: Progressing     Problem: Cardiovascular - Adult  Goal: Maintains optimal cardiac output and hemodynamic stability  8/25/2024 1312 by Diana Arnold RN  Outcome: Progressing  Goal: Absence of cardiac dysrhythmias or at baseline  8/25/2024 1312 by Diana Arnold RN  Outcome: Progressing     Problem: Skin/Tissue Integrity - Adult  Goal: Skin integrity remains intact  8/25/2024 2339 by Zuri Florez RN  Outcome: Progressing  Flowsheets (Taken 8/25/2024 2242)  Skin Integrity Remains Intact:   Monitor for areas of redness and/or skin breakdown   Assess vascular access sites hourly  8/25/2024 1312 by Diana Arnold RN  Outcome: Progressing  Flowsheets (Taken 8/25/2024 0910)  Skin Integrity Remains Intact:   Monitor for areas of redness and/or skin breakdown   Assess vascular access sites hourly   Every 4-6 hours minimum: Change oxygen saturation probe  site  Goal: Incisions, wounds, or drain sites healing without S/S of infection  8/25/2024 1312 by Diana Arnold RN  Outcome: Progressing  Goal: Oral mucous membranes remain intact  8/25/2024 1312 by Diana Arnold RN  Outcome: Progressing     Problem: Musculoskeletal - Adult  Goal: Return mobility to safest level of function  8/25/2024 2339 by Zuri Florez RN  Outcome: Progressing  8/25/2024 1312 by Diana Arnold RN  Outcome: Progressing  Goal: Maintain proper alignment of affected body part  8/25/2024 2339 by Zuri Florez RN  Outcome: Progressing  8/25/2024 1312 by Diana Arnold RN  Outcome: Progressing  Goal: Return ADL status to a safe level of function  8/25/2024 2339 by Zuri Florez RN  Outcome: Progressing  8/25/2024 1312 by Diana Arnold RN  Outcome: Progressing     Problem: Metabolic/Fluid and Electrolytes - Adult  Goal: Electrolytes maintained within normal limits  8/25/2024 1312 by Diana Arnold RN  Outcome: Progressing  Goal: Hemodynamic stability and optimal renal function maintained  8/25/2024 1312 by Diana Arnold RN  Outcome: Progressing     Problem: Genitourinary - Adult  Goal: Absence of urinary retention  8/25/2024 1312 by Diana Arnold RN  Outcome: Progressing     Problem: Respiratory - Adult  Goal: Achieves optimal ventilation and oxygenation  8/25/2024 1312 by Diana Arnold RN  Outcome: Progressing

## 2024-08-27 ENCOUNTER — APPOINTMENT (OUTPATIENT)
Dept: VASCULAR LAB | Age: 89
End: 2024-08-27
Attending: INTERNAL MEDICINE
Payer: MEDICARE

## 2024-08-27 LAB
ALBUMIN SERPL-MCNC: 3.1 G/DL (ref 3.4–5)
ANION GAP SERPL CALCULATED.3IONS-SCNC: 14 MMOL/L (ref 3–16)
BACTERIA BLD CULT ORG #2: NORMAL
BACTERIA BLD CULT: NORMAL
BASOPHILS # BLD: 0.1 K/UL (ref 0–0.2)
BASOPHILS NFR BLD: 0.8 %
BUN SERPL-MCNC: 97 MG/DL (ref 7–20)
CALCIUM SERPL-MCNC: 9 MG/DL (ref 8.3–10.6)
CHLORIDE SERPL-SCNC: 93 MMOL/L (ref 99–110)
CO2 SERPL-SCNC: 24 MMOL/L (ref 21–32)
CREAT SERPL-MCNC: 2.9 MG/DL (ref 0.8–1.3)
DEPRECATED RDW RBC AUTO: 13.8 % (ref 12.4–15.4)
EOSINOPHIL # BLD: 0.2 K/UL (ref 0–0.6)
EOSINOPHIL NFR BLD: 2.4 %
GFR SERPLBLD CREATININE-BSD FMLA CKD-EPI: 19 ML/MIN/{1.73_M2}
GLUCOSE SERPL-MCNC: 94 MG/DL (ref 70–99)
HCT VFR BLD AUTO: 28.4 % (ref 40.5–52.5)
HGB BLD-MCNC: 9.5 G/DL (ref 13.5–17.5)
INR PPP: 2.57 (ref 0.85–1.15)
LYMPHOCYTES # BLD: 0.5 K/UL (ref 1–5.1)
LYMPHOCYTES NFR BLD: 6.9 %
MAGNESIUM SERPL-MCNC: 2.39 MG/DL (ref 1.8–2.4)
MCH RBC QN AUTO: 29.7 PG (ref 26–34)
MCHC RBC AUTO-ENTMCNC: 33.4 G/DL (ref 31–36)
MCV RBC AUTO: 89.1 FL (ref 80–100)
MONOCYTES # BLD: 0.5 K/UL (ref 0–1.3)
MONOCYTES NFR BLD: 6.4 %
NEUTROPHILS # BLD: 6.6 K/UL (ref 1.7–7.7)
NEUTROPHILS NFR BLD: 83.5 %
PHOSPHATE SERPL-MCNC: 4.3 MG/DL (ref 2.5–4.9)
PLATELET # BLD AUTO: 236 K/UL (ref 135–450)
PMV BLD AUTO: 9.1 FL (ref 5–10.5)
POTASSIUM SERPL-SCNC: 4.2 MMOL/L (ref 3.5–5.1)
PROTHROMBIN TIME: 27.6 SEC (ref 11.9–14.9)
RBC # BLD AUTO: 3.19 M/UL (ref 4.2–5.9)
SODIUM SERPL-SCNC: 131 MMOL/L (ref 136–145)
URATE SERPL-MCNC: 7 MG/DL (ref 3.5–7.2)
WBC # BLD AUTO: 8 K/UL (ref 4–11)

## 2024-08-27 PROCEDURE — 97530 THERAPEUTIC ACTIVITIES: CPT

## 2024-08-27 PROCEDURE — 94760 N-INVAS EAR/PLS OXIMETRY 1: CPT

## 2024-08-27 PROCEDURE — 97110 THERAPEUTIC EXERCISES: CPT

## 2024-08-27 PROCEDURE — 93970 EXTREMITY STUDY: CPT

## 2024-08-27 PROCEDURE — 36415 COLL VENOUS BLD VENIPUNCTURE: CPT

## 2024-08-27 PROCEDURE — 97535 SELF CARE MNGMENT TRAINING: CPT

## 2024-08-27 PROCEDURE — 51798 US URINE CAPACITY MEASURE: CPT

## 2024-08-27 PROCEDURE — 6370000000 HC RX 637 (ALT 250 FOR IP): Performed by: STUDENT IN AN ORGANIZED HEALTH CARE EDUCATION/TRAINING PROGRAM

## 2024-08-27 PROCEDURE — 6370000000 HC RX 637 (ALT 250 FOR IP): Performed by: INTERNAL MEDICINE

## 2024-08-27 PROCEDURE — 2580000003 HC RX 258: Performed by: INTERNAL MEDICINE

## 2024-08-27 PROCEDURE — 2580000003 HC RX 258: Performed by: STUDENT IN AN ORGANIZED HEALTH CARE EDUCATION/TRAINING PROGRAM

## 2024-08-27 PROCEDURE — 85610 PROTHROMBIN TIME: CPT

## 2024-08-27 PROCEDURE — 6360000002 HC RX W HCPCS: Performed by: INTERNAL MEDICINE

## 2024-08-27 PROCEDURE — 80069 RENAL FUNCTION PANEL: CPT

## 2024-08-27 PROCEDURE — 83735 ASSAY OF MAGNESIUM: CPT

## 2024-08-27 PROCEDURE — 85025 COMPLETE CBC W/AUTO DIFF WBC: CPT

## 2024-08-27 PROCEDURE — 97116 GAIT TRAINING THERAPY: CPT

## 2024-08-27 PROCEDURE — 84550 ASSAY OF BLOOD/URIC ACID: CPT

## 2024-08-27 PROCEDURE — 1200000000 HC SEMI PRIVATE

## 2024-08-27 RX ORDER — WARFARIN SODIUM 2.5 MG/1
2.5 TABLET ORAL
Status: COMPLETED | OUTPATIENT
Start: 2024-08-27 | End: 2024-08-27

## 2024-08-27 RX ADMIN — TORSEMIDE 100 MG: 100 TABLET ORAL at 08:31

## 2024-08-27 RX ADMIN — ALLOPURINOL 100 MG: 100 TABLET ORAL at 08:31

## 2024-08-27 RX ADMIN — WARFARIN SODIUM 2.5 MG: 2.5 TABLET ORAL at 17:44

## 2024-08-27 RX ADMIN — ACETAMINOPHEN 325MG 650 MG: 325 TABLET ORAL at 17:42

## 2024-08-27 RX ADMIN — SODIUM CHLORIDE, PRESERVATIVE FREE 10 ML: 5 INJECTION INTRAVENOUS at 08:30

## 2024-08-27 RX ADMIN — TAMSULOSIN HYDROCHLORIDE 0.4 MG: 0.4 CAPSULE ORAL at 17:43

## 2024-08-27 RX ADMIN — METOPROLOL SUCCINATE 25 MG: 25 TABLET, EXTENDED RELEASE ORAL at 08:30

## 2024-08-27 RX ADMIN — SODIUM CHLORIDE, PRESERVATIVE FREE 10 ML: 5 INJECTION INTRAVENOUS at 20:40

## 2024-08-27 RX ADMIN — CEFTRIAXONE SODIUM 2000 MG: 2 INJECTION, POWDER, FOR SOLUTION INTRAMUSCULAR; INTRAVENOUS at 12:49

## 2024-08-27 ASSESSMENT — PAIN DESCRIPTION - ORIENTATION
ORIENTATION: RIGHT;LEFT
ORIENTATION: RIGHT;LEFT

## 2024-08-27 ASSESSMENT — PAIN DESCRIPTION - DESCRIPTORS
DESCRIPTORS: ACHING
DESCRIPTORS: ACHING

## 2024-08-27 ASSESSMENT — PAIN DESCRIPTION - LOCATION
LOCATION: BACK;LEG
LOCATION: BACK;LEG

## 2024-08-27 ASSESSMENT — PAIN SCALES - GENERAL
PAINLEVEL_OUTOF10: 0
PAINLEVEL_OUTOF10: 4
PAINLEVEL_OUTOF10: 6

## 2024-08-27 NOTE — PROGRESS NOTES
Occupational Therapy  Facility/Department: [address] MED SURG  Occupational Therapy Daily Treatment Note    Name: Maurice Ackerman Jr  : 1930  MRN: [id number]  Date of Service: 2024    Discharge Recommendations:  3-5 sessions per week, Patient would benefit from continued therapy after discharge  OT Equipment Recommendations  Other: TBD by d/c  site    Maurice Ackerman Jr scored a 14/ on the AM-PAC ADL Inpatient form. Current research shows that an AM-PAC score of 17 or less is typically not associated with a discharge to the patient's home setting. Based on the patient's AM-PAC score and their current ADL deficits, it is recommended that the patient have 3-5 sessions per week of Occupational Therapy at d/c to increase the patient's independence.  Please see assessment section for further patient specific details.    If patient discharges prior to next session this note will serve as a discharge summary.  Please see below for the latest assessment towards goals.         Patient Diagnosis(es): The primary encounter diagnosis was Cellulitis of right lower extremity. Diagnoses of Acute kidney injury superimposed on CKD (HCC), Dehydration, Supratherapeutic INR, Goals of care, counseling/discussion, and Edema, unspecified type were also pertinent to this visit.  Past Medical History:  has a past medical history of A-fib (HCC), Hypertension, and Malignant neoplasm of kidney (HCC).  Past Surgical History:  has a past surgical history that includes Cholecystectomy; Kidney removal; Appendectomy; and angioplasty.    Treatment Diagnosis: impaired ADL status, fxl mobility      Assessment  Performance deficits / Impairments: Decreased functional mobility ;Decreased ADL status;Decreased strength;Decreased endurance;Decreased balance;Decreased high-level IADLs  Assessment: Pt is a 89 y.o. male admitted with R LE cellulitis. PTA[facility] and independent ADLs and fxl mobility with RW. Pt currently functioning

## 2024-08-27 NOTE — PROGRESS NOTES
[facility]  Phone: [phone]  Fax: [phone]  [employer]         Reason for Consult:   CKD stage IV  Requesting Physician:  Dr.. MO    History Obtained From:  patient, electronic medical record    History of Present Ilness: 89-year-old pleasant white gentleman with history of CKD stage IV with recent baseline creatinine of 2.6 to 3 mg follows with Dr. Tony. History of right nephrectomy many years ago for renal cell carcinoma. Has history of atrial fibrillation, coronary artery disease, hypertension  He has been maintained on torsemide 100 mg daily. He lives in assisted living and presented after mechanical fall last night when he tried to get up to use the restroom. His right lower extremity had increased swelling and redness. He has chronic cellulitis of lower extremities  He has history of BPH and is on Flomax. Reports urgency and frequency of urination. Denies dysuria hematuria or flank pains  No recent use of nonsteroidal anti-inflammatory drugs. He was diagnosed with cellulitis of right lower extremity. Creatinine noted to be 2.8 mg BUN of 82 mg      Subjective:   Breathing comfortably; NAD      Physical exam:   Constitutional:  VITALS:  BP (!) 142/49   Pulse 84   Temp 98.7 °F (37.1 °C) (Oral)   Resp 18   Ht 1.854 m (6' 1\")   Wt 103.4 kg (227 lb 15.3 oz)   SpO2 92%   BMI 30.08 kg/m²     Intake/Output Summary (Last 24 hours) at 8/27/2024 1400  Last data filed at 8/27/2024 1221  Gross per 24 hour   Intake 1200 ml   Output 1625 ml   Net -425 ml       Gen: alert, awake, NAD  Skin: Erythema RLE Ch discoloration LLE   Heent:  NCAT  Cardiovascular: Irregular rhythm  Respiratory: CTA B   Abdomen:  +bs, soft, NTND  Ext: + R  lower extremity edema    Medications   warfarin  2.5 mg Oral Once    cefTRIAXone (ROCEPHIN) IV  2,000 mg IntraVENous Q24H    sodium chloride flush  5-40 mL IntraVENous 2 times per day    warfarin placeholder: dosing by pharmacy   Other RX Placeholder

## 2024-08-27 NOTE — PROGRESS NOTES
Clinical Pharmacy Note  Warfarin Consult    Maurice Ackerman Jr is a 89 y.o. male receiving warfarin managed by pharmacy.    Warfarin Indication: afib  Target INR range: 2-3   Dose prior to admission: Warfarin 2.5mg (1 tablet) daily EXCEPT 1.25mg (1/2 tablet) every Monday and Friday     Current warfarin drug-drug interactions: ceftriaxone     Recent Labs     08/25/24  0420 08/26/24  0505 08/26/24  0506 08/27/24  0546   HGB 9.6* 9.3*  --  9.5*   HCT 27.9* 26.6*  --  28.4*   INR 3.82*  --  3.28* 2.57*     Assessment/Plan:  -INR therapeutic today at 2.57   - Give Warfarin 2.5 mg today   -Daily PT/INR until stable within therapeutic range.     Thank you for the consult.  Will continue to follow.  Renetta Torres, PharmD  8/27/2024 9:54 AM

## 2024-08-27 NOTE — PLAN OF CARE
Problem: Discharge Planning  Goal: Discharge to home or other facility with appropriate resources  8/27/2024 1102 by Sarah Michel RN  Outcome: Progressing  8/27/2024 0515 by Ana Story RN  Outcome: Progressing  Flowsheets (Taken 8/26/2024 2107)  Discharge to home or other facility with appropriate resources:   Identify barriers to discharge with patient and caregiver   Identify discharge learning needs (meds, wound care, etc)   Arrange for needed discharge resources and transportation as appropriate     Problem: Pain  Goal: Verbalizes/displays adequate comfort level or baseline comfort level  8/27/2024 1102 by Sarah Michel RN  Outcome: Progressing  8/27/2024 0515 by Ana Story RN  Outcome: Progressing     Problem: Skin/Tissue Integrity  Goal: Absence of new skin breakdown  Description: 1.  Monitor for areas of redness and/or skin breakdown  2.  Assess vascular access sites hourly  3.  Every 4-6 hours minimum:  Change oxygen saturation probe site  4.  Every 4-6 hours:  If on nasal continuous positive airway pressure, respiratory therapy assess nares and determine need for appliance change or resting period.  8/27/2024 1102 by Sarah Michel RN  Outcome: Progressing  8/27/2024 0515 by Ana Story RN  Outcome: Progressing     Problem: ABCDS Injury Assessment  Goal: Absence of physical injury  8/27/2024 1102 by Sarah Michel RN  Outcome: Progressing  8/27/2024 0515 by Ana Story RN  Outcome: Progressing     Problem: Safety - Adult  Goal: Free from fall injury  8/27/2024 1102 by Sarah Michel RN  Outcome: Progressing  8/27/2024 0515 by Ana Story RN  Outcome: Progressing     Problem: Cardiovascular - Adult  Goal: Maintains optimal cardiac output and hemodynamic stability  8/27/2024 1102 by Sarah Michel RN  Outcome: Progressing  8/27/2024 0515 by Ana Story RN  Outcome: Progressing  Flowsheets (Taken 8/26/2024 2107)  Maintains optimal cardiac output and hemodynamic stability:    Adult  Goal: Absence of urinary retention  8/27/2024 1102 by Sarah Michel RN  Outcome: Progressing  8/27/2024 0515 by Ana Story RN  Outcome: Progressing  Flowsheets (Taken 8/26/2024 2107)  Absence of urinary retention:   Assess patient’s ability to void and empty bladder   Monitor intake/output and perform bladder scan as needed     Problem: Respiratory - Adult  Goal: Achieves optimal ventilation and oxygenation  8/27/2024 1102 by Sarah Michel RN  Outcome: Progressing  8/27/2024 0515 by Ana Story RN  Outcome: Progressing  Flowsheets (Taken 8/26/2024 2107)  Achieves optimal ventilation and oxygenation:   Assess for changes in respiratory status   Assess for changes in mentation and behavior   Position to facilitate oxygenation and minimize respiratory effort

## 2024-08-27 NOTE — PROGRESS NOTES
Shower/Tub: Walk-in shower  Bathroom Toilet: Handicap height  Bathroom Equipment: Grab bars in shower, Grab bars around toilet, Built-in shower seat  Home Equipment: Walker - Rolling, Alert button  Has the patient had two or more falls in the past year or any fall with injury in the past year?: Yes  ADL Assistance: Independent  Homemaking Responsibilities: No  Ambulation Assistance: Independent (with RW)  Transfer Assistance: Independent  Active : Yes  Occupation: Retired  Type of Occupation: salesman.  Additional Comments: sleeps in lift chair  Vision/Hearing  Vision  Vision: Impaired  Vision Exceptions: Wears glasses at all times  Hearing  Hearing: Within functional limits    Cognition   Cognition  Overall Cognitive Status: WFL    Objective    Bed mobility  Bed Mobility Comments: n/a this session, the pt already up in the chair and stayed up at end of session    Transfers  Sit to Stand: Minimal Assistance;Moderate Assistance (multiple attempts)  Stand to Sit: Contact guard assistance  Comment: the pt stood from recliner 1st time with mod A; from elevated EOB with min A and from recliner with built up seat x 4 reps with variable assist from min/mod A to mod A    Ambulation  Surface: Level tile  Device: Rolling Walker  Assistance: Contact guard assistance  Quality of Gait: Flexed trunk, small and shuffled steps, Cues and assist to remain within the walker base  Distance: 25 feet x 1, 80 feet x 1  Comments: the pty stood to urinate and stood to wash hands, please see OT note for levels of asisst  More Ambulation?: No  Stairs/Curb  Stairs?: No       Balance  Posture: Fair (forward head, neck and trunk)  Sitting - Static: Good  Sitting - Dynamic: Good  Standing - Static: Good;-  Standing - Dynamic: Fair;+ (with walker)  Comments: stood at the commode to urinate; CGA for balance          AM-PAC - Mobility    AM-PAC Basic Mobility - Inpatient   How much help is needed turning from your back to your side while in a  flat bed without using bedrails?: A Little  How much help is needed moving from lying on your back to sitting on the side of a flat bed without using bedrails?: A Little  How much help is needed moving to and from a bed to a chair?: A Little  How much help is needed standing up from a chair using your arms?: A Lot  How much help is needed walking in hospital room?: A Little  How much help is needed climbing 3-5 steps with a railing?: A Lot  AM-PAC Inpatient Mobility Raw Score : 16  AM-PAC Inpatient T-Scale Score : 40.78  Mobility Inpatient CMS 0-100% Score: 54.16  Mobility Inpatient CMS G-Code Modifier : CK      Goals  Short Term Goals  Time Frame for Short Term Goals: By acute DC (progressing slowly)  Short Term Goal 1: Transfers CGA-Irina  Short Term Goal 2: RW gait 40' with CGA (met) new goal: ambulate with  feet with SBA.  Short Term Goal 3: Tolerates B LE exs x 10 each.  Patient Goals   Patient Goals : \"To get back to walking like I was before I came to the hospital.\"       Education  Patient Education  Education Given To: Patient  Education Provided: Role of Therapy;Plan of Care;Transfer Training;Precautions;Equipment  Education Provided Comments: safe walker management and posture while ambulating  Education Method: Verbal;Demonstration  Barriers to Learning: Cognition;Other (Comment) (memory)  Education Outcome: Verbalized understanding;Demonstrated understanding;Continued education needed      Therapy Time   Individual Concurrent Group Co-treatment   Time In 1322         Time Out 1410         Minutes 48            Electronically signed by Kaylen Arriaga, PT [id number] on 8/27/2024 at 2:18 PM

## 2024-08-27 NOTE — PLAN OF CARE
Problem: Discharge Planning  Goal: Discharge to home or other facility with appropriate resources  8/27/2024 0515 by Ana Story RN  Outcome: Progressing  Flowsheets (Taken 8/26/2024 2107)  Discharge to home or other facility with appropriate resources:   Identify barriers to discharge with patient and caregiver   Identify discharge learning needs (meds, wound care, etc)   Arrange for needed discharge resources and transportation as appropriate  8/26/2024 1551 by Sarah Michel RN  Outcome: Progressing     Problem: Pain  Goal: Verbalizes/displays adequate comfort level or baseline comfort level  8/27/2024 0515 by Ana Story RN  Outcome: Progressing  8/26/2024 1551 by Sarah Michel RN  Outcome: Progressing     Problem: Skin/Tissue Integrity  Goal: Absence of new skin breakdown  Description: 1.  Monitor for areas of redness and/or skin breakdown  2.  Assess vascular access sites hourly  3.  Every 4-6 hours minimum:  Change oxygen saturation probe site  4.  Every 4-6 hours:  If on nasal continuous positive airway pressure, respiratory therapy assess nares and determine need for appliance change or resting period.  8/27/2024 0515 by Ana Story RN  Outcome: Progressing  8/26/2024 1551 by Sarah Michel RN  Outcome: Progressing     Problem: ABCDS Injury Assessment  Goal: Absence of physical injury  8/27/2024 0515 by Ana Story RN  Outcome: Progressing  8/26/2024 1551 by Sarah Michel RN  Outcome: Progressing     Problem: Safety - Adult  Goal: Free from fall injury  8/27/2024 0515 by Ana Story RN  Outcome: Progressing  8/26/2024 1551 by Sarah Michel RN  Outcome: Progressing     Problem: Cardiovascular - Adult  Goal: Maintains optimal cardiac output and hemodynamic stability  8/27/2024 0515 by Ana Story RN  Outcome: Progressing  Flowsheets (Taken 8/26/2024 2107)  Maintains optimal cardiac output and hemodynamic stability:   Monitor blood pressure and heart rate   Assess for signs of  wounds/incisions during mobilization  8/26/2024 1551 by Sarah Michel RN  Outcome: Progressing  Goal: Maintain proper alignment of affected body part  8/27/2024 0515 by Ana Story RN  Outcome: Progressing  Flowsheets (Taken 8/26/2024 2107)  Maintain proper alignment of affected body part: Support and protect limb and body alignment per provider's orders  8/26/2024 1551 by Sarah Michel RN  Outcome: Progressing  Goal: Return ADL status to a safe level of function  8/27/2024 0515 by Ana Story RN  Outcome: Progressing  Flowsheets (Taken 8/26/2024 2107)  Return ADL Status to a Safe Level of Function:   Administer medication as ordered   Assess activities of daily living deficits and provide assistive devices as needed   Assist and instruct patient to increase activity and self care as tolerated  8/26/2024 1551 by Sarah Michel RN  Outcome: Progressing     Problem: Metabolic/Fluid and Electrolytes - Adult  Goal: Electrolytes maintained within normal limits  8/27/2024 0515 by Ana Story RN  Outcome: Progressing  Flowsheets (Taken 8/26/2024 2107)  Electrolytes maintained within normal limits:   Monitor labs and assess patient for signs and symptoms of electrolyte imbalances   Administer electrolyte replacement as ordered   Monitor response to electrolyte replacements, including repeat lab results as appropriate   Fluid restriction as ordered  8/26/2024 1551 by Sarah Michel RN  Outcome: Progressing  Goal: Hemodynamic stability and optimal renal function maintained  8/27/2024 0515 by Ana Story RN  Outcome: Progressing  Flowsheets (Taken 8/26/2024 2107)  Hemodynamic stability and optimal renal function maintained:   Monitor labs and assess for signs and symptoms of volume excess or deficit   Monitor intake, output and patient weight   Instruct patient on fluid and nutrition restrictions as appropriate  8/26/2024 1551 by Sarah Michel RN  Outcome: Progressing     Problem: Genitourinary -

## 2024-08-27 NOTE — PROGRESS NOTES
V2.0    [facility] Progress Note      Name:  Maurice Ackerman Jr /Age/Sex: 1930  (89 y.o. male)   MRN & CSN:  [id number] & [id number] Encounter Date/Time: 2024 12:46 PM EDT   Location:  [id number] PCP: Holly Govea MD     Attending:Dc Cameron MD       Hospital Day: 5    Assessment and Recommendations   Maurice Ackerman Jr is a 89 y.o. male who presents with Cellulitis of right leg      Plan:       Plan:   Right lower extremity cellulitis IV antibiotics since admission  A-fib on Coumadin and metoprolol rate controlled  CKD stage IV follow-up as outpatient  Iron deficiency anemia and likely anemia of chronic disease due to renal function follow-up as outpatient  BPH on tamsulosin keep for now  Leg edema, right more than left, will get an US.         Diet ADULT DIET; Regular; 1500 ml   DVT Prophylaxis [] Lovenox, []  Heparin, [] SCDs, [] Ambulation,  [] Eliquis, [] Xarelto  [] Coumadin   Code Status DNR-CCA             Personally reviewed Lab Studies and Imaging         Medical Decision Making:  The following items were considered in medical decision making:  Discussion of patient care with other providers  Reviewed clinical lab tests  Reviewed radiology tests  Reviewed other diagnostic tests/interventions  Independent review of radiologic images  Microbiology cultures and other micro tests reviewed      Subjective:     Chief Complaint: leg edema pain    Maurice Ackerman Jr is a 89 y.o. male who presents with leg edema and pain, still edematous, less pain, no fever.       Review of Systems:      Pertinent positives and negatives discussed in HPI    Objective:     Intake/Output Summary (Last 24 hours) at 2024 1246  Last data filed at 2024 1221  Gross per 24 hour   Intake 1200 ml   Output 1625 ml   Net -425 ml      Vitals:   Vitals:    24 0546 24 0815 24 0835 24 1118   BP:  (!) 153/60  (!) 142/49   Pulse:  100  84   Resp:  18  18   Temp:  98 °F (36.7 °C)  98.7 °F  8/23/2024 12:09 pm COMPARISON: None. HISTORY: ORDERING SYSTEM PROVIDED HISTORY: injury/cellulitis TECHNOLOGIST PROVIDED HISTORY: Reason for exam:->injury/cellulitis Reason for Exam: injury/cellulitis FINDINGS: There is generalized soft tissue swelling about the ankle.  Extensive atherosclerotic vascular calcifications are seen.  No soft tissue gas.  No fracture or destructive osseous changes.     Generalized soft tissue swelling about the ankle.  No underlying fracture or evidence of osteomyelitis     XR TIBIA FIBULA RIGHT (2 VIEWS)    Result Date: 8/23/2024  EXAMINATION: TWO XRAY VIEWS OF THE RIGHT TIBIA/FIBULA 8/23/2024 12:09 pm COMPARISON: None. HISTORY: ORDERING SYSTEM PROVIDED HISTORY: fall/leg injury TECHNOLOGIST PROVIDED HISTORY: Reason for exam:->fall/leg injury Reason for Exam: fall/leg injury FINDINGS: The patient is status post right knee arthroplasty.  The hardware is intact without complication.  No acute fracture     No acute findings       CBC:   Recent Labs     08/25/24  0420 08/26/24  0505 08/27/24  0546   WBC 8.0 8.3 8.0   HGB 9.6* 9.3* 9.5*    200 236     BMP:    Recent Labs     08/25/24  0419 08/26/24  0505 08/27/24  0547   * 132* 131*   K 3.9 4.0 4.2   CL 94* 93* 93*   CO2 25 25 24   BUN 90* 94* 97*   CREATININE 2.8* 3.0* 2.9*   GLUCOSE 98 107* 94     Hepatic: No results for input(s): \"AST\", \"ALT\", \"BILITOT\", \"ALKPHOS\" in the last 72 hours.    Invalid input(s): \"ALB\"  Lipids:   Lab Results   Component Value Date/Time    CHOL 100 12/29/2023 06:16 AM    HDL 61 12/29/2023 06:16 AM    TRIG 49 12/29/2023 06:16 AM     Hemoglobin A1C: No results found for: \"LABA1C\"  TSH:   Lab Results   Component Value Date/Time    TSH 1.50 12/28/2023 07:54 PM     Troponin: No results found for: \"TROPONINT\"  Lactic Acid: No results for input(s): \"LACTA\" in the last 72 hours.  BNP: No results for input(s): \"PROBNP\" in the last 72 hours.  UA:No results found for: \"NITRU\", \"COLORU\", \"PHUR\", \"LABCAST\",

## 2024-08-27 NOTE — CARE COORDINATION
Discharge Planning:  HAYLEY received a VM from Leslye with [facility]. [phone]  Leslye confirmed this facility will be able to accept this pt for skilled care.     HAYLEY contacted [place] [address] [phone].  Message left requesting a return call to discuss SNF bed availability.   SABRINA Han [facility]  [phone]  Electronically signed by SABRINA Taylor on 8/27/2024 at 9:28 AM    Addendum:  HAYLEY received a call from Yolie with [facility].  Yolie confirmed this facility is also able to accept this pt for skilled care.   SABRINA Han [facility]  [phone]  Electronically signed by SABRINA Taylor on 8/27/2024 at 9:41 AM    Addendum:  HAYLEY met with pt to discuss d/c plans.  HAYLEY explained that [facility] and [facility] are both able to accept pt for skilled care.  Pt stated he has no preference but would like this SW to speak with his dgtr, Lizzie Butler.  HAYLEY spoke with pts dgtr, Lizzie Butler [phone].  Lizzie Butler stated she would prefer [facility].    HAYLEY contacted Yolie at [facility] to inform her that pt and family would prefer [facility] and pt may be medically stable for d/c today.  HAYLEY updated Leslye with [facility].     PLAN: From [facility].  [facility] SNF.  No pre cert needed.  Will need HENS.  SABRINA Han [facility]  [phone]  Electronically signed by SABRINA Taylor on 8/27/2024 at 11:43 AM

## 2024-08-28 PROBLEM — L03.90 RECURRENT CELLULITIS: Status: ACTIVE | Noted: 2024-08-28

## 2024-08-28 PROBLEM — N18.9 ACUTE KIDNEY INJURY SUPERIMPOSED ON CKD (HCC): Status: ACTIVE | Noted: 2024-08-28

## 2024-08-28 PROBLEM — M79.89 LEG SWELLING: Status: ACTIVE | Noted: 2024-08-28

## 2024-08-28 PROBLEM — L03.115 CELLULITIS OF RIGHT LOWER EXTREMITY: Status: ACTIVE | Noted: 2024-08-28

## 2024-08-28 PROBLEM — Z79.01 ANTICOAGULATED: Status: ACTIVE | Noted: 2024-08-28

## 2024-08-28 PROBLEM — E86.0 DEHYDRATION: Status: ACTIVE | Noted: 2024-08-28

## 2024-08-28 PROBLEM — R79.1 SUPRATHERAPEUTIC INR: Status: ACTIVE | Noted: 2024-08-28

## 2024-08-28 PROBLEM — R79.82 CRP ELEVATED: Status: ACTIVE | Noted: 2024-08-28

## 2024-08-28 PROBLEM — N17.9 ACUTE KIDNEY INJURY SUPERIMPOSED ON CKD (HCC): Status: ACTIVE | Noted: 2024-08-28

## 2024-08-28 LAB
ALBUMIN SERPL-MCNC: 3.3 G/DL (ref 3.4–5)
ANION GAP SERPL CALCULATED.3IONS-SCNC: 17 MMOL/L (ref 3–16)
BASOPHILS # BLD: 0 K/UL (ref 0–0.2)
BASOPHILS NFR BLD: 0.3 %
BUN SERPL-MCNC: 100 MG/DL (ref 7–20)
CALCIUM SERPL-MCNC: 9.4 MG/DL (ref 8.3–10.6)
CHLORIDE SERPL-SCNC: 95 MMOL/L (ref 99–110)
CO2 SERPL-SCNC: 24 MMOL/L (ref 21–32)
CREAT SERPL-MCNC: 2.7 MG/DL (ref 0.8–1.3)
DEPRECATED RDW RBC AUTO: 13.8 % (ref 12.4–15.4)
ECHO BSA: 2.3 M2
EOSINOPHIL # BLD: 0.2 K/UL (ref 0–0.6)
EOSINOPHIL NFR BLD: 2.6 %
GFR SERPLBLD CREATININE-BSD FMLA CKD-EPI: 21 ML/MIN/{1.73_M2}
GLUCOSE SERPL-MCNC: 94 MG/DL (ref 70–99)
HCT VFR BLD AUTO: 27.3 % (ref 40.5–52.5)
HGB BLD-MCNC: 9.5 G/DL (ref 13.5–17.5)
INR PPP: 2.12 (ref 0.85–1.15)
LYMPHOCYTES # BLD: 0.7 K/UL (ref 1–5.1)
LYMPHOCYTES NFR BLD: 8.5 %
MAGNESIUM SERPL-MCNC: 2.05 MG/DL (ref 1.8–2.4)
MCH RBC QN AUTO: 30.9 PG (ref 26–34)
MCHC RBC AUTO-ENTMCNC: 34.8 G/DL (ref 31–36)
MCV RBC AUTO: 88.8 FL (ref 80–100)
MONOCYTES # BLD: 0.6 K/UL (ref 0–1.3)
MONOCYTES NFR BLD: 7.2 %
NEUTROPHILS # BLD: 6.3 K/UL (ref 1.7–7.7)
NEUTROPHILS NFR BLD: 81.4 %
PHOSPHATE SERPL-MCNC: 4.2 MG/DL (ref 2.5–4.9)
PLATELET # BLD AUTO: 249 K/UL (ref 135–450)
PMV BLD AUTO: 8.8 FL (ref 5–10.5)
POTASSIUM SERPL-SCNC: 4.1 MMOL/L (ref 3.5–5.1)
PROTHROMBIN TIME: 23.7 SEC (ref 11.9–14.9)
RBC # BLD AUTO: 3.08 M/UL (ref 4.2–5.9)
SODIUM SERPL-SCNC: 136 MMOL/L (ref 136–145)
URATE SERPL-MCNC: 7.3 MG/DL (ref 3.5–7.2)
WBC # BLD AUTO: 7.8 K/UL (ref 4–11)

## 2024-08-28 PROCEDURE — 6370000000 HC RX 637 (ALT 250 FOR IP): Performed by: STUDENT IN AN ORGANIZED HEALTH CARE EDUCATION/TRAINING PROGRAM

## 2024-08-28 PROCEDURE — 36415 COLL VENOUS BLD VENIPUNCTURE: CPT

## 2024-08-28 PROCEDURE — 99223 1ST HOSP IP/OBS HIGH 75: CPT | Performed by: INTERNAL MEDICINE

## 2024-08-28 PROCEDURE — 84550 ASSAY OF BLOOD/URIC ACID: CPT

## 2024-08-28 PROCEDURE — 1200000000 HC SEMI PRIVATE

## 2024-08-28 PROCEDURE — 80069 RENAL FUNCTION PANEL: CPT

## 2024-08-28 PROCEDURE — 93970 EXTREMITY STUDY: CPT | Performed by: SURGERY

## 2024-08-28 PROCEDURE — 2580000003 HC RX 258: Performed by: STUDENT IN AN ORGANIZED HEALTH CARE EDUCATION/TRAINING PROGRAM

## 2024-08-28 PROCEDURE — 83735 ASSAY OF MAGNESIUM: CPT

## 2024-08-28 PROCEDURE — 2580000003 HC RX 258: Performed by: INTERNAL MEDICINE

## 2024-08-28 PROCEDURE — 6370000000 HC RX 637 (ALT 250 FOR IP): Performed by: INTERNAL MEDICINE

## 2024-08-28 PROCEDURE — 85610 PROTHROMBIN TIME: CPT

## 2024-08-28 PROCEDURE — 6360000002 HC RX W HCPCS: Performed by: INTERNAL MEDICINE

## 2024-08-28 PROCEDURE — 85025 COMPLETE CBC W/AUTO DIFF WBC: CPT

## 2024-08-28 PROCEDURE — 94760 N-INVAS EAR/PLS OXIMETRY 1: CPT

## 2024-08-28 RX ORDER — WARFARIN SODIUM 2.5 MG/1
2.5 TABLET ORAL
Status: COMPLETED | OUTPATIENT
Start: 2024-08-28 | End: 2024-08-28

## 2024-08-28 RX ADMIN — ALLOPURINOL 100 MG: 100 TABLET ORAL at 08:32

## 2024-08-28 RX ADMIN — ACETAMINOPHEN 325MG 650 MG: 325 TABLET ORAL at 00:21

## 2024-08-28 RX ADMIN — WARFARIN SODIUM 2.5 MG: 2.5 TABLET ORAL at 17:30

## 2024-08-28 RX ADMIN — ACETAMINOPHEN 325MG 650 MG: 325 TABLET ORAL at 20:58

## 2024-08-28 RX ADMIN — SODIUM CHLORIDE 5 ML/HR: 9 INJECTION, SOLUTION INTRAVENOUS at 12:19

## 2024-08-28 RX ADMIN — CEFTRIAXONE SODIUM 2000 MG: 2 INJECTION, POWDER, FOR SOLUTION INTRAMUSCULAR; INTRAVENOUS at 12:20

## 2024-08-28 RX ADMIN — TAMSULOSIN HYDROCHLORIDE 0.4 MG: 0.4 CAPSULE ORAL at 17:30

## 2024-08-28 RX ADMIN — METOPROLOL SUCCINATE 25 MG: 25 TABLET, EXTENDED RELEASE ORAL at 08:32

## 2024-08-28 RX ADMIN — SODIUM CHLORIDE, PRESERVATIVE FREE 10 ML: 5 INJECTION INTRAVENOUS at 08:33

## 2024-08-28 RX ADMIN — SODIUM CHLORIDE, PRESERVATIVE FREE 10 ML: 5 INJECTION INTRAVENOUS at 20:52

## 2024-08-28 RX ADMIN — TORSEMIDE 100 MG: 100 TABLET ORAL at 08:32

## 2024-08-28 ASSESSMENT — PAIN DESCRIPTION - ORIENTATION
ORIENTATION: RIGHT;LEFT
ORIENTATION: RIGHT

## 2024-08-28 ASSESSMENT — PAIN SCALES - GENERAL
PAINLEVEL_OUTOF10: 4
PAINLEVEL_OUTOF10: 0
PAINLEVEL_OUTOF10: 4
PAINLEVEL_OUTOF10: 0

## 2024-08-28 ASSESSMENT — PAIN DESCRIPTION - DESCRIPTORS: DESCRIPTORS: ACHING

## 2024-08-28 ASSESSMENT — PAIN DESCRIPTION - LOCATION
LOCATION: LEG
LOCATION: BACK;FOOT

## 2024-08-28 NOTE — PROGRESS NOTES
[facility]  Phone: [phone]  Fax: [phone]  [employer]         Reason for Consult:   CKD stage IV  Requesting Physician:  Dr.. MO    History Obtained From:  patient, electronic medical record    History of Present Ilness: 89-year-old pleasant white gentleman with history of CKD stage IV with recent baseline creatinine of 2.6 to 3 mg follows with Dr. Tony. History of right nephrectomy many years ago for renal cell carcinoma. Has history of atrial fibrillation, coronary artery disease, hypertension  He has been maintained on torsemide 100 mg daily. He lives in assisted living and presented after mechanical fall last night when he tried to get up to use the restroom. His right lower extremity had increased swelling and redness. He has chronic cellulitis of lower extremities  He has history of BPH and is on Flomax. Reports urgency and frequency of urination. Denies dysuria hematuria or flank pains  No recent use of nonsteroidal anti-inflammatory drugs. He was diagnosed with cellulitis of right lower extremity. Creatinine noted to be 2.8 mg BUN of 82 mg      Subjective:   Breathing comfortably; NAD      Physical exam:   Constitutional:  VITALS:  /70   Pulse 76   Temp 97.8 °F (36.6 °C) (Oral)   Resp 14   Ht 1.854 m (6' 1\")   Wt 100.9 kg (222 lb 6.4 oz)   SpO2 97%   BMI 29.34 kg/m²     Intake/Output Summary (Last 24 hours) at 8/28/2024 1437  Last data filed at 8/28/2024 1311  Gross per 24 hour   Intake 600 ml   Output 2440 ml   Net -1840 ml       Gen: alert, awake, NAD  Skin: Erythema RLE, Chronic skin discoloration LLE   Heent:  NCAT  Cardiovascular: Irregular rhythm  Respiratory: CTA B   Abdomen:  +bs, soft, NTND  Ext: + R  lower extremity edema    Medications   warfarin  2.5 mg Oral Once    cefTRIAXone (ROCEPHIN) IV  2,000 mg IntraVENous Q24H    sodium chloride flush  5-40 mL IntraVENous 2 times per day    warfarin placeholder: dosing by pharmacy   Other RX  Placeholder    torsemide  100 mg Oral Daily    allopurinol  100 mg Oral Daily    metoprolol succinate  25 mg Oral Daily    tamsulosin  0.4 mg Oral QPM       Data/  CBC:   Lab Results   Component Value Date/Time    WBC 7.8 08/28/2024 04:54 AM    RBC 3.08 08/28/2024 04:54 AM    HGB 9.5 08/28/2024 04:54 AM    HCT 27.3 08/28/2024 04:54 AM    MCV 88.8 08/28/2024 04:54 AM    MCH 30.9 08/28/2024 04:54 AM    MCHC 34.8 08/28/2024 04:54 AM    RDW 13.8 08/28/2024 04:54 AM     08/28/2024 04:54 AM    MPV 8.8 08/28/2024 04:54 AM     BMP:    Lab Results   Component Value Date/Time     08/28/2024 04:54 AM    K 4.1 08/28/2024 04:54 AM    K 3.7 08/23/2024 11:40 AM    CL 95 08/28/2024 04:54 AM    CO2 24 08/28/2024 04:54 AM     08/28/2024 04:54 AM    CREATININE 2.7 08/28/2024 04:54 AM    CALCIUM 9.4 08/28/2024 04:54 AM    LABGLOM 21 08/28/2024 04:54 AM    LABGLOM 23 01/09/2024 05:12 AM    GLUCOSE 94 08/28/2024 04:54 AM         Assessment/Plan       1-CKD stage IV solitary left kidney follows with Dr. Tony baseline creatinine 2.6 to 3 mg creatinine is at baseline. Has Azotemia    2-history of BPH - PVR's are < 300 ml - Continue Flomax   3-cellulitis right lower extremity - on ABX per primary team  4-history of congestive heart failure - continue torsemide - FR 1500 ml/d  5-history of atrial fibrillation controlled on metoprolol  6-HTN - controlled  7-Anemia CKD - hemoglobin close to target    Patient stated he would like to pursue HD if he needs dialysis support; that apparently is a different stance or opinion than the one he had prior. At this point in time RRT is not indicated

## 2024-08-28 NOTE — CONSULTS
Infectious Diseases Inpatient Consult Note      Reason for Consult: Right leg swelling with redness concern for cellulitis    Requesting Physician:        Primary Care Physician:  Holly Govea MD    History Obtained From:  Epic     CHIEF COMPLAINT:     Chief Complaint   Patient presents with    Fall     Mechanical Fall 2 days ago. Pt refused EMS transport at the time. Chronic cellulitis of right lower leg. Pt has had persistent right leg pain following fall and wanted checked out.          HISTORY OF PRESENT ILLNESS:  89 y.o. male with a significant history for atrial fibrillation on anticoagulation, hypertension, history of renal cancer status post nephrectomy many years ago chronic kidney disease stage IV followed by nephrology admitted to the hospital and assisted living facility noted having difficulty with ambulation and sustained a fall there was a concern for cellulitis in the right lower extremity secondary to ongoing redness and swelling.  X-ray of the right ankle generalized soft tissue swelling no fracture noted x-ray right tibia negative for fracture.  Labs indicate creatinine 2.8 sodium 131 WBC normal hemoglobin 9.5 INR 2.5 we are consulted for recommendations    Past Medical History:    Past Medical History:   Diagnosis Date    A-fib (HCC)     Hypertension     Malignant neoplasm of kidney (HCC) 6/15/2012       Past Surgical History:    Past Surgical History:   Procedure Laterality Date    ANGIOPLASTY      1998,2005,2010    APPENDECTOMY      CHOLECYSTECTOMY      KIDNEY REMOVAL         Current Medications:    Outpatient Medications Marked as Taking for the 8/23/24 encounter (Hospital Encounter)   Medication Sig Dispense Refill    torsemide (DEMADEX) 100 MG tablet Take 1 tablet by mouth nightly      metoprolol succinate (TOPROL XL) 25 MG extended release tablet Take 1 tablet by mouth daily      warfarin (COUMADIN) 2.5 MG tablet Take 1 tablet by mouth daily EXCEPT 1.25mg every Monday

## 2024-08-28 NOTE — PLAN OF CARE
Problem: Discharge Planning  Goal: Discharge to home or other facility with appropriate resources  Recent Flowsheet Documentation  Taken 8/27/2024 2038 by Ana Story RN  Discharge to home or other facility with appropriate resources:   Identify barriers to discharge with patient and caregiver   Identify discharge learning needs (meds, wound care, etc)   Arrange for needed discharge resources and transportation as appropriate     Problem: Cardiovascular - Adult  Goal: Maintains optimal cardiac output and hemodynamic stability  Recent Flowsheet Documentation  Taken 8/27/2024 2038 by Ana Story RN  Maintains optimal cardiac output and hemodynamic stability:   Monitor blood pressure and heart rate   Monitor urine output and notify Licensed Independent Practitioner for values outside of normal range   Assess for signs of decreased cardiac output   Administer fluid and/or volume expanders as ordered  Goal: Absence of cardiac dysrhythmias or at baseline  Recent Flowsheet Documentation  Taken 8/27/2024 2038 by Ana Story RN  Absence of cardiac dysrhythmias or at baseline:   Monitor cardiac rate and rhythm   Assess for signs of decreased cardiac output   Administer antiarrhythmia medication and electrolyte replacement as ordered     Problem: Skin/Tissue Integrity - Adult  Goal: Skin integrity remains intact  Recent Flowsheet Documentation  Taken 8/27/2024 2038 by Ana Story RN  Skin Integrity Remains Intact: Monitor for areas of redness and/or skin breakdown  Goal: Incisions, wounds, or drain sites healing without S/S of infection  Recent Flowsheet Documentation  Taken 8/27/2024 2038 by Ana Story RN  Incisions, Wounds, or Drain Sites Healing Without Sign and Symptoms of Infection: ADMISSION and DAILY: Assess and document risk factors for pressure ulcer development  Goal: Oral mucous membranes remain intact  Recent Flowsheet Documentation  Taken 8/27/2024 2038 by Ana Story RN  Oral Mucous Membranes Remain  ability to void and empty bladder   Monitor intake/output and perform bladder scan as needed   Place urinary catheter per Licensed Independent Practitioner order if needed     Problem: Respiratory - Adult  Goal: Achieves optimal ventilation and oxygenation  Recent Flowsheet Documentation  Taken 8/27/2024 2038 by Ana Story RN  Achieves optimal ventilation and oxygenation:   Assess for changes in respiratory status   Assess for changes in mentation and behavior   Position to facilitate oxygenation and minimize respiratory effort

## 2024-08-28 NOTE — PLAN OF CARE
Problem: Discharge Planning  Goal: Discharge to home or other facility with appropriate resources  Outcome: Progressing  Flowsheets (Taken 8/27/2024 2038)  Discharge to home or other facility with appropriate resources:   Identify barriers to discharge with patient and caregiver   Identify discharge learning needs (meds, wound care, etc)   Arrange for needed discharge resources and transportation as appropriate     Problem: Pain  Goal: Verbalizes/displays adequate comfort level or baseline comfort level  Outcome: Progressing     Problem: Skin/Tissue Integrity  Goal: Absence of new skin breakdown  Description: 1.  Monitor for areas of redness and/or skin breakdown  2.  Assess vascular access sites hourly  3.  Every 4-6 hours minimum:  Change oxygen saturation probe site  4.  Every 4-6 hours:  If on nasal continuous positive airway pressure, respiratory therapy assess nares and determine need for appliance change or resting period.  Outcome: Progressing     Problem: ABCDS Injury Assessment  Goal: Absence of physical injury  Outcome: Progressing     Problem: Safety - Adult  Goal: Free from fall injury  Outcome: Progressing     Problem: Cardiovascular - Adult  Goal: Maintains optimal cardiac output and hemodynamic stability  Outcome: Progressing  Flowsheets (Taken 8/27/2024 2038)  Maintains optimal cardiac output and hemodynamic stability:   Monitor blood pressure and heart rate   Monitor urine output and notify Licensed Independent Practitioner for values outside of normal range   Assess for signs of decreased cardiac output   Administer fluid and/or volume expanders as ordered  Goal: Absence of cardiac dysrhythmias or at baseline  Outcome: Progressing  Flowsheets (Taken 8/27/2024 2038)  Absence of cardiac dysrhythmias or at baseline:   Monitor cardiac rate and rhythm   Assess for signs of decreased cardiac output   Administer antiarrhythmia medication and electrolyte replacement as ordered     Problem: Skin/Tissue  Administer electrolyte replacement as ordered  Goal: Hemodynamic stability and optimal renal function maintained  Outcome: Progressing  Flowsheets (Taken 8/27/2024 2038)  Hemodynamic stability and optimal renal function maintained:   Monitor labs and assess for signs and symptoms of volume excess or deficit   Monitor intake, output and patient weight     Problem: Genitourinary - Adult  Goal: Absence of urinary retention  Outcome: Progressing  Flowsheets (Taken 8/27/2024 2038)  Absence of urinary retention:   Assess patient’s ability to void and empty bladder   Monitor intake/output and perform bladder scan as needed   Place urinary catheter per Licensed Independent Practitioner order if needed     Problem: Respiratory - Adult  Goal: Achieves optimal ventilation and oxygenation  Outcome: Progressing  Flowsheets (Taken 8/27/2024 2038)  Achieves optimal ventilation and oxygenation:   Assess for changes in respiratory status   Assess for changes in mentation and behavior   Position to facilitate oxygenation and minimize respiratory effort

## 2024-08-28 NOTE — DISCHARGE INSTR - COC
Continuity of Care Form    Patient Name: Maurice Ackerman Jr   :  1930  MRN:  [id number]    Admit date:  2024  Discharge date:  2024    Code Status Order: DNR-CCA   Advance Directives:   Advance Care Flowsheet Documentation             Admitting Physician:  Delonte Harman MD  PCP: Holly Govea MD    Discharging Nurse: ALYSSA Bennett  Discharging Hospital Unit/Room#: W2P-3904/4110-01  Discharging Unit Phone Number: [phone]    Emergency Contact:   Extended Emergency Contact Information  Primary Emergency Contact: Hetal Hunter  Mobile Phone: [phone]  Relation: Child  Secondary Emergency Contact: Lizzie Ruiz  Mobile Phone: [phone]  Relation: Child    Past Surgical History:  Past Surgical History:   Procedure Laterality Date    ANGIOPLASTY      ,,    APPENDECTOMY      CHOLECYSTECTOMY      KIDNEY REMOVAL         Immunization History:   Immunization History   Administered Date(s) Administered    COVID-19, MODERNA BLUE border, Primary or Immunocompromised, (age 12y+), IM, 100 mcg/0.5mL 2021, 2021, 2021, 2022    Influenza Vaccine, unspecified formulation 2012, 2013, 2014, 2015, 10/04/2016, 2017, 10/01/2018, 10/10/2019    Influenza, FLUZONE High Dose (age 65 y+), IM, Quadv, 0.7mL 10/19/2021    Pneumococcal Conjugate 7-valent (Prevnar7) 2016    Pneumococcal, PPSV23, PNEUMOVAX 23, (age 2y+), SC/IM, 0.5mL 2004       Active Problems:  Patient Active Problem List   Diagnosis Code    Hypertension I10    Dermatophytosis of nail B35.1    Radial styloid tenosynovitis M65.4    Anemia D64.9    Atrial fibrillation (HCC) I48.91    Pain in soft tissues of limb M79.609    Coronary atherosclerosis I25.10    Malignant neoplasm of kidney (HCC) C64.9    Chronic kidney disease, stage III (moderate) (HCC) N18.30    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  Ventilator:    - No ventilator support    Rehab Therapies: Physical Therapy and Occupational Therapy  Weight Bearing Status/Restrictions: No weight bearing restrictions  Other Medical Equipment (for information only, NOT a DME order):  walker  Other Treatments:     Patient's personal belongings (please select all that are sent with patient):  Belongings    RN SIGNATURE:  Electronically signed by Donald Torres RN on 8/31/24 at 11:02 AM EDT    CASE MANAGEMENT/SOCIAL WORK SECTION    Inpatient Status Date: ***    Readmission Risk Assessment Score:  Readmission Risk              Risk of Unplanned Readmission:  29           Discharging to Facility/ Agency   Name:   Address:  Phone:  Fax:    Dialysis Facility (if applicable)   Name:  Address:  Dialysis Schedule:  Phone:  Fax:    / signature: {Esignature:[id number]}    PHYSICIAN SECTION    Prognosis: Good    Condition at Discharge: Stable    Rehab Potential (if transferring to Rehab): Good    Recommended Labs or Other Treatments After Discharge: PT, OT, follow-up with PCP in 3 days    Oral Cephalexin x  500 mg Q 12 HR X 5 DAYS    Dr.Ravindhar Benjamin HARDING  Infectious Disease  [facility] Physician  Phone: [phone]   Fax : [phone]       Follow-up with PCP in 3 days    Physician Certification: I certify the above information and transfer of Maurice Ackerman Jr  is necessary for the continuing treatment of the diagnosis listed and that he requires Skilled Nursing Facility for less 30 days.     Update Admission H&P: No change in H&P    PHYSICIAN SIGNATURE:  Electronically signed by Dc Cameron MD on 8/28/24 at 12:45 PM EDT

## 2024-08-28 NOTE — PROGRESS NOTES
Clinical Pharmacy Note  Warfarin Consult    Maurice Ackerman Jr is a 89 y.o. male receiving warfarin managed by pharmacy.    Warfarin Indication: afib  Target INR range: 2-3   Dose prior to admission: Warfarin 2.5mg (1 tablet) daily EXCEPT 1.25mg (1/2 tablet) every Monday and Friday     Current warfarin drug-drug interactions: ceftriaxone     Recent Labs     08/26/24  0505 08/26/24  0506 08/27/24  0546 08/28/24  0454   HGB 9.3*  --  9.5* 9.5*   HCT 26.6*  --  28.4* 27.3*   INR  --  3.28* 2.57* 2.12*     Assessment/Plan:    Warfarin 2.5 mg tonight.    Daily PT/INR until stable within therapeutic range.     Thank you for the consult.  Will continue to follow.    Radha Castillo RPH   8/28/2024 8:16 AM

## 2024-08-28 NOTE — CARE COORDINATION
ACP 8/28 Plan: To [facility]. No precert required. Need HENs. From [facility]. New ID Consult. Electronically signed by Juana Plascencia RN on 8/28/2024 at 4:39 PM

## 2024-08-29 LAB
ALBUMIN SERPL-MCNC: 3.3 G/DL (ref 3.4–5)
ANION GAP SERPL CALCULATED.3IONS-SCNC: 14 MMOL/L (ref 3–16)
ASO AB SERPL-ACNC: <20 IU/ML (ref 0–200)
BASOPHILS # BLD: 0.1 K/UL (ref 0–0.2)
BASOPHILS NFR BLD: 0.8 %
BUN SERPL-MCNC: 96 MG/DL (ref 7–20)
CALCIUM SERPL-MCNC: 9.5 MG/DL (ref 8.3–10.6)
CHLORIDE SERPL-SCNC: 97 MMOL/L (ref 99–110)
CO2 SERPL-SCNC: 27 MMOL/L (ref 21–32)
CREAT SERPL-MCNC: 2.6 MG/DL (ref 0.8–1.3)
CRP SERPL-MCNC: 97.6 MG/L (ref 0–5.1)
DEPRECATED RDW RBC AUTO: 14.2 % (ref 12.4–15.4)
EOSINOPHIL # BLD: 0.2 K/UL (ref 0–0.6)
EOSINOPHIL NFR BLD: 2.8 %
GFR SERPLBLD CREATININE-BSD FMLA CKD-EPI: 22 ML/MIN/{1.73_M2}
GLUCOSE SERPL-MCNC: 104 MG/DL (ref 70–99)
HCT VFR BLD AUTO: 28.1 % (ref 40.5–52.5)
HGB BLD-MCNC: 9.5 G/DL (ref 13.5–17.5)
INR PPP: 2.32 (ref 0.85–1.15)
LYMPHOCYTES # BLD: 0.8 K/UL (ref 1–5.1)
LYMPHOCYTES NFR BLD: 10.6 %
MAGNESIUM SERPL-MCNC: 2.05 MG/DL (ref 1.8–2.4)
MCH RBC QN AUTO: 30 PG (ref 26–34)
MCHC RBC AUTO-ENTMCNC: 33.7 G/DL (ref 31–36)
MCV RBC AUTO: 89.2 FL (ref 80–100)
MONOCYTES # BLD: 0.6 K/UL (ref 0–1.3)
MONOCYTES NFR BLD: 8.3 %
NEUTROPHILS # BLD: 5.5 K/UL (ref 1.7–7.7)
NEUTROPHILS NFR BLD: 77.5 %
PHOSPHATE SERPL-MCNC: 3.7 MG/DL (ref 2.5–4.9)
PLATELET # BLD AUTO: 291 K/UL (ref 135–450)
PMV BLD AUTO: 8.6 FL (ref 5–10.5)
POTASSIUM SERPL-SCNC: 3.8 MMOL/L (ref 3.5–5.1)
PROTHROMBIN TIME: 25.5 SEC (ref 11.9–14.9)
RBC # BLD AUTO: 3.15 M/UL (ref 4.2–5.9)
SODIUM SERPL-SCNC: 138 MMOL/L (ref 136–145)
URATE SERPL-MCNC: 7.6 MG/DL (ref 3.5–7.2)
WBC # BLD AUTO: 7.1 K/UL (ref 4–11)

## 2024-08-29 PROCEDURE — 6370000000 HC RX 637 (ALT 250 FOR IP): Performed by: INTERNAL MEDICINE

## 2024-08-29 PROCEDURE — 97530 THERAPEUTIC ACTIVITIES: CPT

## 2024-08-29 PROCEDURE — 36415 COLL VENOUS BLD VENIPUNCTURE: CPT

## 2024-08-29 PROCEDURE — 6360000002 HC RX W HCPCS: Performed by: INTERNAL MEDICINE

## 2024-08-29 PROCEDURE — 84550 ASSAY OF BLOOD/URIC ACID: CPT

## 2024-08-29 PROCEDURE — 85025 COMPLETE CBC W/AUTO DIFF WBC: CPT

## 2024-08-29 PROCEDURE — 2580000003 HC RX 258: Performed by: INTERNAL MEDICINE

## 2024-08-29 PROCEDURE — 99233 SBSQ HOSP IP/OBS HIGH 50: CPT | Performed by: INTERNAL MEDICINE

## 2024-08-29 PROCEDURE — 1200000000 HC SEMI PRIVATE

## 2024-08-29 PROCEDURE — 85610 PROTHROMBIN TIME: CPT

## 2024-08-29 PROCEDURE — 2580000003 HC RX 258: Performed by: STUDENT IN AN ORGANIZED HEALTH CARE EDUCATION/TRAINING PROGRAM

## 2024-08-29 PROCEDURE — 6370000000 HC RX 637 (ALT 250 FOR IP): Performed by: STUDENT IN AN ORGANIZED HEALTH CARE EDUCATION/TRAINING PROGRAM

## 2024-08-29 PROCEDURE — 86140 C-REACTIVE PROTEIN: CPT

## 2024-08-29 PROCEDURE — 83735 ASSAY OF MAGNESIUM: CPT

## 2024-08-29 PROCEDURE — 80069 RENAL FUNCTION PANEL: CPT

## 2024-08-29 PROCEDURE — 97535 SELF CARE MNGMENT TRAINING: CPT

## 2024-08-29 PROCEDURE — 86060 ANTISTREPTOLYSIN O TITER: CPT

## 2024-08-29 RX ORDER — WARFARIN SODIUM 2.5 MG/1
2.5 TABLET ORAL
Status: COMPLETED | OUTPATIENT
Start: 2024-08-29 | End: 2024-08-29

## 2024-08-29 RX ADMIN — SODIUM CHLORIDE, PRESERVATIVE FREE 10 ML: 5 INJECTION INTRAVENOUS at 10:41

## 2024-08-29 RX ADMIN — CEFAZOLIN 2000 MG: 2 INJECTION, POWDER, FOR SOLUTION INTRAVENOUS at 12:21

## 2024-08-29 RX ADMIN — METOPROLOL SUCCINATE 25 MG: 25 TABLET, EXTENDED RELEASE ORAL at 10:38

## 2024-08-29 RX ADMIN — TAMSULOSIN HYDROCHLORIDE 0.4 MG: 0.4 CAPSULE ORAL at 17:25

## 2024-08-29 RX ADMIN — ACETAMINOPHEN 325MG 650 MG: 325 TABLET ORAL at 06:24

## 2024-08-29 RX ADMIN — CEFAZOLIN 2000 MG: 2 INJECTION, POWDER, FOR SOLUTION INTRAVENOUS at 23:41

## 2024-08-29 RX ADMIN — WARFARIN SODIUM 2.5 MG: 2.5 TABLET ORAL at 17:25

## 2024-08-29 RX ADMIN — CEFAZOLIN 2000 MG: 2 INJECTION, POWDER, FOR SOLUTION INTRAVENOUS at 00:00

## 2024-08-29 RX ADMIN — SODIUM CHLORIDE, PRESERVATIVE FREE 10 ML: 5 INJECTION INTRAVENOUS at 20:29

## 2024-08-29 RX ADMIN — ALLOPURINOL 100 MG: 100 TABLET ORAL at 10:38

## 2024-08-29 RX ADMIN — TORSEMIDE 100 MG: 100 TABLET ORAL at 10:38

## 2024-08-29 ASSESSMENT — PAIN DESCRIPTION - LOCATION: LOCATION: BACK

## 2024-08-29 ASSESSMENT — PAIN SCALES - GENERAL
PAINLEVEL_OUTOF10: 8
PAINLEVEL_OUTOF10: 0

## 2024-08-29 ASSESSMENT — PAIN DESCRIPTION - DESCRIPTORS: DESCRIPTORS: DISCOMFORT

## 2024-08-29 ASSESSMENT — PAIN DESCRIPTION - ORIENTATION: ORIENTATION: LOWER

## 2024-08-29 NOTE — PROGRESS NOTES
Infectious Diseases Inpatient Progress Note      CHIEF COMPLAINT:     Right leg cellulitis  Right leg swelling  Right leg pain  Atrial fibrillation  Chronic kidney disease stage IV        HISTORY OF PRESENT ILLNESS:  89 y.o. male with a significant history for atrial fibrillation on anticoagulation, hypertension, history of renal cancer status post nephrectomy many years ago chronic kidney disease stage IV followed by nephrology admitted to the hospital and assisted living facility noted having difficulty with ambulation and sustained a fall there was a concern for cellulitis in the right lower extremity secondary to ongoing redness and swelling.  X-ray of the right ankle generalized soft tissue swelling no fracture noted x-ray right tibia negative for fracture.  Labs indicate creatinine 2.8 sodium 131 WBC normal hemoglobin 9.5 INR 2.5 we are consulted for recommendations.    Interval history: Ongoing right leg pain and swelling skin changes from cellulitis slowly resolving tolerating antibiotic therapy okay creatinine elevated 2.6    Past Medical History:    Past Medical History:   Diagnosis Date    A-fib (HCC)     Hypertension     Malignant neoplasm of kidney (HCC) 6/15/2012       Past Surgical History:    Past Surgical History:   Procedure Laterality Date    ANGIOPLASTY      1998,2005,2010    APPENDECTOMY      CHOLECYSTECTOMY      KIDNEY REMOVAL         Current Medications:    Outpatient Medications Marked as Taking for the 8/23/24 encounter (Hospital Encounter)   Medication Sig Dispense Refill    torsemide (DEMADEX) 100 MG tablet Take 1 tablet by mouth nightly      metoprolol succinate (TOPROL XL) 25 MG extended release tablet Take 1 tablet by mouth daily      warfarin (COUMADIN) 2.5 MG tablet Take 1 tablet by mouth daily EXCEPT 1.25mg every Monday and Friday or as directed by [facility] [phone]      ferrous sulfate 325 (65 FE) MG tablet Take 1 tablet by mouth daily       tissues of limb M79.609    Coronary atherosclerosis I25.10    Malignant neoplasm of kidney ([facility]) C64.9    Chronic kidney disease, stage III (moderate) ([facility]) N18.30    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    Edema R60.9    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    Bacteremia R78.81    Other hammer toe (acquired) M20.40    Hypercholesterolemia E78.00    Enlarged prostate with lower urinary tract symptoms (LUTS) N40.1    Enthesopathy of ankle and tarsus M77.50    Myalgia and myositis [id number]    Nocturia R35.1    Pain in joint, shoulder region M25.519    Pain in limb M79.609    Numbness and tingling in right hand R20.0, R20.2    Community acquired bacterial pneumonia J15.9    Stage 4 chronic kidney disease ([facility]) N18.4    SOB (shortness of breath) R06.02    Acute on chronic diastolic heart failure ([facility]) I50.33    Cellulitis of right leg L03.115    Acute kidney injury superimposed on CKD ([facility]) N17.9, N18.9    Dehydration E86.0    Supratherapeutic INR R79.1    CRP elevated R79.82    Leg swelling M79.89    Recurrent cellulitis L03.90    Anticoagulated Z79.01    Cellulitis of right lower extremity L03.115        ICD-10-CM    1. Cellulitis of right lower extremity  L03.115       2. Acute kidney injury superimposed on CKD ([facility])  N17.9     N18.9       3. Dehydration  E86.0       4. Supratherapeutic INR  R79.1       5. Goals of care, counseling/discussion  Z71.89       6. Edema, unspecified type  R60.9 Vascular duplex lower extremity venous bilateral     Vascular duplex lower extremity venous bilateral         Right leg swelling and pain  Right leg cellulitis  Right leg chronic skin changes  Chronic anticoagulation  Atrial fibrillation  Anemia of chronic kidney disease  History of renal cancer with nephrectomy  Chronic kidney disease stage IV  CRP elevation  Procalcitonin elevation    Right leg ongoing swelling with cellulitis ongoing skin changes from previous cellulitis.  He does have chronic kidney disease.  Based on the  appearance indicating streptococcal cellulitis he still has significant edema in the right lower extremity would benefit from Ace wraps and  compression        Labs, Microbiology, Radiology and pertinent results from current hospitalization and care every where were reviewed by me as a part of the consultation.    PLAN :    Cont  IV cefazolin 2 g every 12 hours  Trend creatinine  Trend WBC  Watch creatinine  Right leg Kerlix and Ace wrap with the leg elevation  Once the leg is looking better will be able to choose oral cephalexin 500 mg every 12 for discharge planning for x  7-10 days  Right leg is still inflamed may not be ready for discharge yet  Check ASO , 20  Trend CRP   97.6    Discussed with patient/Family and Nursing     Medical Decision Making:  The following items were considered in medical decision making:  Discussion of patient care with other providers  Reviewed clinical lab tests  Reviewed radiology tests  Reviewed other diagnostic tests/interventions  Independent review of radiologic images  Independent review of  Microbiology cultures and other micro tests reviewed     Risk of Complications/Morbidity: High      Illness(es)/ Infection present that pose threat to bodily function.   There is potential for severe exacerbation of infection/side effects of treatment.  Therapy requires intensive monitoring for antimicrobial agent toxicity.     Thanks for allowing me to participate in your patient's care please call me with any questions or concerns.    Dr. Solo Alexander MD  Infectious Disease  [facility] Physician  Phone: [phone]   Fax : [phone]

## 2024-08-29 NOTE — PROGRESS NOTES
Physical Therapy  Facility/Department: [address] MED SURG  Physical Therapy Treatment Note    Name: Maurice Ackerman Jr  : 1930  MRN: [id number]  Date of Service: 2024    Discharge Recommendations:  Therapy recommended at discharge (3-5)    Maurice Ackerman Jr scored a  on the AM-PAC short mobility form. Current research shows that an AM-PAC score of 17 or less is typically not associated with a discharge to the patient's home setting. Based on the patient's AM-PAC score and their current functional mobility deficits, it is recommended that the patient have 3-5 sessions per week of Physical Therapy at d/c to increase the patient's independence.  Please see assessment section for further patient specific details.    If patient discharges prior to next session this note will serve as a discharge summary.  Please see below for the latest assessment towards goals.        Patient Diagnosis(es): The primary encounter diagnosis was Cellulitis of right lower extremity. Diagnoses of Acute kidney injury superimposed on CKD (HCC), Dehydration, Supratherapeutic INR, Goals of care, counseling/discussion, and Edema, unspecified type were also pertinent to this visit.  Past Medical History:  has a past medical history of A-fib (HCC), Hypertension, and Malignant neoplasm of kidney (HCC).  Past Surgical History:  has a past surgical history that includes Cholecystectomy; Kidney removal; Appendectomy; and angioplasty.    Assessment  Body Structures, Functions, Activity Limitations Requiring Skilled Therapeutic Intervention: Decreased functional mobility ;Decreased ADL status;Decreased ROM;Decreased strength;Decreased balance;Increased pain;Decreased posture  Assessment: 89 y.o. male with PMH of CKD, gout, HTN, atrial fibrillation who presents to the ED on 24 with worsening leg pain. Pt had a mechanical fall 7 days ago and states his leg pain has worsened since then. R lower leg Xray neg for fx/osteomyelitis. Work up

## 2024-08-29 NOTE — PLAN OF CARE
Problem: Discharge Planning  Goal: Discharge to home or other facility with appropriate resources  Outcome: Progressing  Flowsheets (Taken 8/28/2024 0813)  Discharge to home or other facility with appropriate resources:   Identify barriers to discharge with patient and caregiver   Arrange for needed discharge resources and transportation as appropriate   Identify discharge learning needs (meds, wound care, etc)     Problem: Pain  Goal: Verbalizes/displays adequate comfort level or baseline comfort level  Outcome: Progressing     Problem: Skin/Tissue Integrity  Goal: Absence of new skin breakdown  Description: 1.  Monitor for areas of redness and/or skin breakdown  Outcome: Progressing     Problem: ABCDS Injury Assessment  Goal: Absence of physical injury  Outcome: Progressing  Flowsheets (Taken 8/28/2024 0813)  Absence of Physical Injury: Implement safety measures based on patient assessment     Problem: Safety - Adult  Goal: Free from fall injury  Outcome: Progressing  Flowsheets (Taken 8/28/2024 0813)  Free From Fall Injury: Instruct family/caregiver on patient safety     Problem: Cardiovascular - Adult  Goal: Maintains optimal cardiac output and hemodynamic stability  Outcome: Progressing  Flowsheets (Taken 8/28/2024 0813)  Maintains optimal cardiac output and hemodynamic stability:   Monitor blood pressure and heart rate   Monitor urine output and notify Licensed Independent Practitioner for values outside of normal range   Assess for signs of decreased cardiac output  Goal: Absence of cardiac dysrhythmias or at baseline  Outcome: Progressing  Flowsheets (Taken 8/28/2024 0813)  Absence of cardiac dysrhythmias or at baseline:   Monitor cardiac rate and rhythm   Assess for signs of decreased cardiac output     Problem: Skin/Tissue Integrity - Adult  Goal: Skin integrity remains intact  Outcome: Progressing  Flowsheets (Taken 8/28/2024 0813)  Skin Integrity Remains Intact: Monitor for areas of redness and/or skin  breakdown  Goal: Incisions, wounds, or drain sites healing without S/S of infection  Outcome: Progressing  Goal: Oral mucous membranes remain intact  Outcome: Progressing  Flowsheets (Taken 8/28/2024 0813)  Oral Mucous Membranes Remain Intact: Assess oral mucosa and hygiene practices     Problem: Musculoskeletal - Adult  Goal: Return mobility to safest level of function  Outcome: Progressing  Flowsheets (Taken 8/28/2024 0813)  Return Mobility to Safest Level of Function:   Assess patient stability and activity tolerance for standing, transferring and ambulating with or without assistive devices   Assist with transfers and ambulation using safe patient handling equipment as needed   Ensure adequate protection for wounds/incisions during mobilization   Obtain physical therapy/occupational therapy consults as needed  Goal: Maintain proper alignment of affected body part  Outcome: Progressing  Goal: Return ADL status to a safe level of function  Outcome: Progressing  Flowsheets (Taken 8/28/2024 0813)  Return ADL Status to a Safe Level of Function: Administer medication as ordered     Problem: Metabolic/Fluid and Electrolytes - Adult  Goal: Electrolytes maintained within normal limits  Outcome: Progressing  Flowsheets (Taken 8/28/2024 0813)  Electrolytes maintained within normal limits:   Monitor labs and assess patient for signs and symptoms of electrolyte imbalances   Administer electrolyte replacement as ordered   Monitor response to electrolyte replacements, including repeat lab results as appropriate   Fluid restriction as ordered   Instruct patient on fluid and nutrition restrictions as appropriate  Goal: Hemodynamic stability and optimal renal function maintained  Outcome: Progressing  Flowsheets (Taken 8/28/2024 0813)  Hemodynamic stability and optimal renal function maintained:   Monitor labs and assess for signs and symptoms of volume excess or deficit   Monitor intake, output and patient weight   Monitor

## 2024-08-29 NOTE — PROGRESS NOTES
Right leg wrapped with kerlix and acewrap Electronically signed by Sandy Hong RN on 8/29/2024 at 12:20 AM

## 2024-08-29 NOTE — PLAN OF CARE
Problem: Discharge Planning  Goal: Discharge to home or other facility with appropriate resources  8/28/2024 2124 by Sandy Hong RN  Outcome: Progressing     Problem: Pain  Goal: Verbalizes/displays adequate comfort level or baseline comfort level  8/28/2024 2124 by Sandy Hong RN  Outcome: Progressing     Problem: Skin/Tissue Integrity  Goal: Absence of new skin breakdown  Description: 1.  Monitor for areas of redness and/or skin breakdown  2.  Assess vascular access sites hourly  3.  Every 4-6 hours minimum:  Change oxygen saturation probe site  4.  Every 4-6 hours:  If on nasal continuous positive airway pressure, respiratory therapy assess nares and determine need for appliance change or resting period.  8/28/2024 2124 by Sandy Hong RN  Outcome: Progressing     Problem: ABCDS Injury Assessment  Goal: Absence of physical injury  8/28/2024 2124 by Sandy Hong RN  Outcome: Progressing     Problem: Safety - Adult  Goal: Free from fall injury  8/28/2024 2124 by Sandy Hong RN  Outcome: Progressing     Problem: Cardiovascular - Adult  Goal: Maintains optimal cardiac output and hemodynamic stability  8/28/2024 2124 by Sandy Hong RN  Outcome: Progressing  Flowsheets (Taken 8/28/2024 2058)  Maintains optimal cardiac output and hemodynamic stability: Monitor blood pressure and heart rate     Problem: Cardiovascular - Adult  Goal: Absence of cardiac dysrhythmias or at baseline  8/28/2024 2124 by aSndy Hong RN  Outcome: Progressing  Flowsheets (Taken 8/28/2024 2058)  Absence of cardiac dysrhythmias or at baseline: Monitor cardiac rate and rhythm     Problem: Skin/Tissue Integrity - Adult  Goal: Skin integrity remains intact  8/28/2024 2124 by Sandy Hong RN  Outcome: Progressing  Flowsheets (Taken 8/28/2024 2058)  Skin Integrity Remains Intact: Monitor for areas of redness and/or skin breakdown     Problem: Skin/Tissue Integrity -

## 2024-08-29 NOTE — PROGRESS NOTES
[facility]  Phone: [phone]  Fax: [phone]  [facility]         Reason for Consult:   CKD stage IV  Requesting Physician:  Dr.. MO    History Obtained From:  patient, electronic medical record    History of Present Ilness: 89-year-old pleasant white gentleman with history of CKD stage IV with recent baseline creatinine of 2.6 to 3 mg follows with Dr. Tony. History of right nephrectomy many years ago for renal cell carcinoma. Has history of atrial fibrillation, coronary artery disease, hypertension  He has been maintained on torsemide 100 mg daily. He lives in assisted living and presented after mechanical fall last night when he tried to get up to use the restroom. His right lower extremity had increased swelling and redness. He has chronic cellulitis of lower extremities  He has history of BPH and is on Flomax. Reports urgency and frequency of urination. Denies dysuria hematuria or flank pains  No recent use of nonsteroidal anti-inflammatory drugs. He was diagnosed with cellulitis of right lower extremity. Creatinine noted to be 2.8 mg BUN of 82 mg      Subjective:   Breathing comfortably; Sleeping; NAD  Stepson at bedside      Physical exam:   Constitutional:  VITALS:  /66   Pulse 82   Temp 98 °F (36.7 °C) (Oral)   Resp 18   Ht 1.854 m (6' 1\")   Wt 101.3 kg (223 lb 5.2 oz)   SpO2 94%   BMI 29.46 kg/m²     Intake/Output Summary (Last 24 hours) at 8/29/2024 1140  Last data filed at 8/29/2024 1012  Gross per 24 hour   Intake 965 ml   Output 3070 ml   Net -2105 ml       Gen: sleeping  Heent:  NCAT  Cardiovascular: Irregular rhythm  Respiratory: CTA B   Abdomen:  +bs, soft, NTND  Ext: + R  lower extremity edema/ACE wrap; no edema LLE    Medications   warfarin  2.5 mg Oral Once    ceFAZolin  2,000 mg IntraVENous Q12H    sodium chloride flush  5-40 mL IntraVENous 2 times per day    warfarin placeholder: dosing by pharmacy   Other RX Placeholder    torsemide  100 mg

## 2024-08-29 NOTE — PROGRESS NOTES
Occupational Therapy  Facility/Department: [address] MED SURG  Occupational Therapy Daily Note  Name: Maurice Ackerman Jr  : 1930  MRN: [id number]  Date of Service: 2024    Discharge Recommendations:  3-5 sessions per week, Patient would benefit from continued therapy after discharge   Maurice Ackerman Jr scored a 14/24 on the AM-PAC ADL Inpatient form. Current research shows that an AM-PAC score of 17 or less is typically not associated with a discharge to the patient's home setting. Based on the patient's AM-PAC score and their current ADL deficits, it is recommended that the patient have 3-5 sessions per week of Occupational Therapy at d/c to increase the patient's independence.  Please see assessment section for further patient specific details.    If patient discharges prior to next session this note will serve as a discharge summary.  Please see below for the latest assessment towards goals.         Patient Diagnosis(es): The primary encounter diagnosis was Cellulitis of right lower extremity. Diagnoses of Acute kidney injury superimposed on CKD (HCC), Dehydration, Supratherapeutic INR, Goals of care, counseling/discussion, and Edema, unspecified type were also pertinent to this visit.  Past Medical History:  has a past medical history of A-fib (HCC), Hypertension, and Malignant neoplasm of kidney (HCC).  Past Surgical History:  has a past surgical history that includes Cholecystectomy; Kidney removal; Appendectomy; and angioplasty.    Treatment Diagnosis: impaired ADL status, fxl mobility      Assessment  Performance deficits / Impairments: Decreased functional mobility ;Decreased ADL status;Decreased strength;Decreased endurance;Decreased balance;Decreased high-level IADLs  Assessment: Pt is a 89 y.o. male admitted with R LE cellulitis. PTA, pt living at [facility] and independent ADLs and fxl mobility with RW. TODAY, Pt currently functioning below baseline d/t the above deficits,  limited byLE  (elevated BSC), and transfer to recliner. Cues for hand placement.     Cognition  Overall Cognitive Status: WFL  Cognition Comment: Impaired STM at times.  Orientation  Overall Orientation Status: Within Functional Limits  Orientation Level:  (not to exact date, day, year stated year and month)      Education Given To: Patient  Education Provided: Role of Therapy;Plan of Care;Transfer Training;ADL Adaptive Strategies  Education Method: Demonstration;Verbal  Barriers to Learning: Hearing  Education Outcome: Verbalized understanding;Demonstrated understanding;Continued education needed            AM-PAC - ADL  AM-PAC Daily Activity - Inpatient   How much help is needed for putting on and taking off regular lower body clothing?: Total  How much help is needed for bathing (which includes washing, rinsing, drying)?: A Lot  How much help is needed for toileting (which includes using toilet, bedpan, or urinal)?: Total  How much help is needed for putting on and taking off regular upper body clothing?: A Little  How much help is needed for taking care of personal grooming?: A Little  How much help for eating meals?: None  AM-PAC Inpatient Daily Activity Raw Score: 14  AM-PAC Inpatient ADL T-Scale Score : 33.39  ADL Inpatient CMS 0-100% Score: 59.67  ADL Inpatient CMS G-Code Modifier : CK      Goals  Short Term Goals  Time Frame for Short Term Goals: Prior to d/c. Satus: goals ongoing  Short Term Goal 1: Pt will bathe with SBA.  Short Term Goal 2: Pt will dress with SBA using A/E prn.  Short Term Goal 3: Pt will toilet with supervision.  Short Term Goal 4: Pt will complete fxl mobility and fxl transfers to/from ADL surfaces with supervision using AD.  Short Term Goal 5: Pt will tolerate standing >5 minutes for functional task with supervision to improve standing tolerance for ADL routine.  Long Term Goals  Time Frame for Long Term Goals : STGs=LTGs  Patient Goals   Patient goals : to return to [facility].      Therapy Time

## 2024-08-29 NOTE — PROGRESS NOTES
V2.0    [facility] [place] Progress Note      Name:  Maurice Ackerman Jr /Age/Sex: 1930  (89 y.o. male)   MRN & CSN:  [id number] & [id number] Encounter Date/Time: 2024 9:12 AM EDT   Location:  [place] [address] PCP: Holly Govea MD     Attending:Dc Cameron MD       Hospital Day: 7    Assessment and Recommendations   Maurice Ackerman Jr is a 89 y.o. male who presents with Cellulitis of right leg      Plan:     Right lower extremity cellulitis IV antibiotics since admission ceftriaxone changed to Ancef ID following  A-fib on Coumadin and metoprolol rate controlled  CKD stage IV follow-up as outpatient  Iron deficiency anemia and likely anemia of chronic disease due to renal function follow-up as outpatient.  This was discussed with patient multiple times  BPH on tamsulosin keep for now  Leg edema, right more than left, ultrasound negative for DVT      Diet ADULT DIET; Regular; 1500 ml   DVT Prophylaxis [] Lovenox, []  Heparin, [] SCDs, [] Ambulation,  [] Eliquis, [] Xarelto  [] Coumadin   Code Status DNR-CCA             Personally reviewed Lab Studies and Imaging     Discussed management of the case with infectious disease      Medical Decision Making:  The following items were considered in medical decision making:  Discussion of patient care with other providers  Reviewed clinical lab tests  Reviewed radiology tests  Reviewed other diagnostic tests/interventions  Independent review of radiologic images  Microbiology cultures and other micro tests reviewed      Subjective:     Chief Complaint: Leg pain edema erythema    Maurice Ackerman Jr is a 89 y.o. male who presents with leg pain edema erythema      Review of Systems:      Pertinent positives and negatives discussed in HPI    Objective:     Intake/Output Summary (Last 24 hours) at 2024 0912  Last data filed at 2024 0646  Gross per 24 hour   Intake 1085 ml   Output 3690 ml   Net -2605 ml      Vitals:   Vitals:    24 0003 24 0426 24      Hepatic: No results for input(s): \"AST\", \"ALT\", \"BILITOT\", \"ALKPHOS\" in the last 72 hours.    Invalid input(s): \"ALB\"  Lipids:   Lab Results   Component Value Date/Time    CHOL 100 12/29/2023 06:16 AM    HDL 61 12/29/2023 06:16 AM    TRIG 49 12/29/2023 06:16 AM     Hemoglobin A1C: No results found for: \"LABA1C\"  TSH:   Lab Results   Component Value Date/Time    TSH 1.50 12/28/2023 07:54 PM     Troponin: No results found for: \"TROPONINT\"  Lactic Acid: No results for input(s): \"LACTA\" in the last 72 hours.  BNP: No results for input(s): \"PROBNP\" in the last 72 hours.  UA:No results found for: \"NITRU\", \"COLORU\", \"PHUR\", \"LABCAST\", \"WBCUA\", \"RBCUA\", \"MUCUS\", \"TRICHOMONAS\", \"YEAST\", \"BACTERIA\", \"CLARITYU\", \"SPECGRAV\", \"LEUKOCYTESUR\", \"UROBILINOGEN\", \"BILIRUBINUR\", \"BLOODU\", \"GLUCOSEU\", \"KETUA\", \"AMORPHOUS\"  Urine Cultures: No results found for: \"LABURIN\"  Blood Cultures:   Lab Results   Component Value Date/Time    BC No Growth after 4 days of incubation. 08/23/2024 11:39 AM     Lab Results   Component Value Date/Time    BLOODCULT2 No Growth after 4 days of incubation. 08/23/2024 11:40 AM     Organism: No results found for: \"ORG\"      Electronically signed by Dc Cameron MD on 8/29/2024 at 9:12 AM  Comment: Please note this report has been produced using speech recognition software and may contain errors related to that system including errors in grammar, punctuation, and spelling, as well as words and phrases that may be inappropriate. If there are any questions or concerns, please feel free to contact the dictating provider for clarification.

## 2024-08-29 NOTE — PLAN OF CARE
Problem: Discharge Planning  Goal: Discharge to home or other facility with appropriate resources  Outcome: Progressing  Flowsheets (Taken 8/29/2024 1030)  Discharge to home or other facility with appropriate resources:   Identify barriers to discharge with patient and caregiver   Arrange for needed discharge resources and transportation as appropriate   Identify discharge learning needs (meds, wound care, etc)     Problem: Pain  Goal: Verbalizes/displays adequate comfort level or baseline comfort level  Outcome: Progressing  Flowsheets (Taken 8/29/2024 0903)  Verbalizes/displays adequate comfort level or baseline comfort level:   Encourage patient to monitor pain and request assistance   Assess pain using appropriate pain scale   Administer analgesics based on type and severity of pain and evaluate response   Implement non-pharmacological measures as appropriate and evaluate response   Consider cultural and social influences on pain and pain management   Notify Licensed Independent Practitioner if interventions unsuccessful or patient reports new pain     Problem: Skin/Tissue Integrity  Goal: Absence of new skin breakdown  Description: 1.  Monitor for areas of redness and/or skin breakdown  Outcome: Progressing     Problem: ABCDS Injury Assessment  Goal: Absence of physical injury  Outcome: Progressing  Flowsheets (Taken 8/29/2024 1030)  Absence of Physical Injury: Implement safety measures based on patient assessment     Problem: Safety - Adult  Goal: Free from fall injury  Outcome: Progressing  Flowsheets (Taken 8/29/2024 1030)  Free From Fall Injury: Instruct family/caregiver on patient safety     Problem: Cardiovascular - Adult  Goal: Maintains optimal cardiac output and hemodynamic stability  Outcome: Progressing  Flowsheets (Taken 8/29/2024 1030)  Maintains optimal cardiac output and hemodynamic stability: Monitor blood pressure and heart rate  Goal: Absence of cardiac dysrhythmias or at baseline  Outcome:  Progressing     Problem: Skin/Tissue Integrity - Adult  Goal: Skin integrity remains intact  Outcome: Progressing  Flowsheets (Taken 8/29/2024 1030)  Skin Integrity Remains Intact: Monitor for areas of redness and/or skin breakdown  Goal: Incisions, wounds, or drain sites healing without S/S of infection  Outcome: Progressing  Flowsheets (Taken 8/29/2024 1030)  Incisions, Wounds, or Drain Sites Healing Without Sign and Symptoms of Infection: ADMISSION and DAILY: Assess and document risk factors for pressure ulcer development  Goal: Oral mucous membranes remain intact  Outcome: Progressing  Flowsheets (Taken 8/29/2024 1030)  Oral Mucous Membranes Remain Intact: Assess oral mucosa and hygiene practices     Problem: Musculoskeletal - Adult  Goal: Return mobility to safest level of function  Outcome: Progressing  Flowsheets (Taken 8/29/2024 1030)  Return Mobility to Safest Level of Function:   Assess patient stability and activity tolerance for standing, transferring and ambulating with or without assistive devices   Assist with transfers and ambulation using safe patient handling equipment as needed   Obtain physical therapy/occupational therapy consults as needed  Goal: Maintain proper alignment of affected body part  Outcome: Progressing  Flowsheets (Taken 8/29/2024 1030)  Maintain proper alignment of affected body part: Support and protect limb and body alignment per provider's orders  Goal: Return ADL status to a safe level of function  Outcome: Progressing  Flowsheets (Taken 8/29/2024 1030)  Return ADL Status to a Safe Level of Function: Administer medication as ordered     Problem: Metabolic/Fluid and Electrolytes - Adult  Goal: Electrolytes maintained within normal limits  Outcome: Progressing  Flowsheets (Taken 8/29/2024 1030)  Electrolytes maintained within normal limits:   Monitor labs and assess patient for signs and symptoms of electrolyte imbalances   Administer electrolyte replacement as ordered   Monitor

## 2024-08-29 NOTE — PROGRESS NOTES
Clinical Pharmacy Note  Warfarin Consult    Maurice Ackerman Jr is a 89 y.o. male receiving warfarin managed by pharmacy.    Warfarin Indication: afib  Target INR range: 2-3   Dose prior to admission: Warfarin 2.5mg (1 tablet) daily EXCEPT 1.25mg (1/2 tablet) every Monday and Friday     Current warfarin drug-drug interactions:     Recent Labs     08/27/24  0546 08/28/24  0454 08/29/24  0431   HGB 9.5* 9.5* 9.5*   HCT 28.4* 27.3* 28.1*   INR 2.57* 2.12* 2.32*     Assessment/Plan:    Warfarin 2.5 mg tonight.    Daily PT/INR until stable within therapeutic range.     Thank you for the consult.  Will continue to follow.    Radha Castillo RPH   8/29/2024 6:34 AM

## 2024-08-29 NOTE — PROGRESS NOTES
V2.0    [facility] [place] Progress Note      Name:  Maurice Ackerman Jr /Age/Sex: 1930  (89 y.o. male)   MRN & CSN:  [id number] & [id number] Encounter Date/Time: 2024 9:11 AM EDT   Location:  [address]- PCP: Holly Govea MD     Attending:Dc Cameron MD       Hospital Day: 7    Assessment and Recommendations   Maurice Ackerman Jr is a 89 y.o. male who presents with Cellulitis of right leg      Plan:     Right lower extremity cellulitis IV antibiotics since admission: Noted and discussed with ID, antibiotics changed to Ancef  A-fib on Coumadin and metoprolol rate controlled  CKD stage IV follow-up as outpatient  Iron deficiency anemia and likely anemia of chronic disease due to renal function follow-up as outpatient  BPH on tamsulosin keep for now no acute issues  Leg edema, right more than left, ultrasound negative for DVT    Diet ADULT DIET; Regular; 1500 ml   DVT Prophylaxis [] Lovenox, []  Heparin, [] SCDs, [] Ambulation,  [] Eliquis, [] Xarelto  [] Coumadin   Code Status DNR-CCA             Personally reviewed Lab Studies and Imaging     Discussed management of the case with infectious disease      Medical Decision Making:  The following items were considered in medical decision making:  Discussion of patient care with other providers  Reviewed clinical lab tests  Reviewed radiology tests  Reviewed other diagnostic tests/interventions  Independent review of radiologic images  Microbiology cultures and other micro tests reviewed      Subjective:     Chief Complaint: Leg pain edema erythema    Maurice Ackerman Jr is a 89 y.o. male who presents with leg pain edema erythema overall some improvement noted      Review of Systems:      Pertinent positives and negatives discussed in HPI    Objective:     Intake/Output Summary (Last 24 hours) at 2024 0911  Last data filed at 2024 0646  Gross per 24 hour   Intake 1085 ml   Output 3690 ml   Net -2605 ml      Vitals:   Vitals:    24 0003 24  vein thrombosis in the left lower extremity. Summary: No DVT or superficial venous thrombus of the visualized vessels of the bilateral lower extremities. Fluid collection of the right proximal thigh measuring 3.6 x 1.3 cm Significant pulsatility in the bilateral common femoral veins may be consistent with right heart dysfunction    Result Date: 8/28/2024    No evidence of deep or superficial vein thrombosis in the right lower extremity.   No evidence of deep or superfical vein thrombosis in the left lower extremity.     XR ANKLE RIGHT (MIN 3 VIEWS)    Result Date: 8/23/2024  EXAMINATION: THREE XRAY VIEWS OF THE RIGHT ANKLE 8/23/2024 12:09 pm COMPARISON: None. HISTORY: ORDERING SYSTEM PROVIDED HISTORY: injury/cellulitis TECHNOLOGIST PROVIDED HISTORY: Reason for exam:->injury/cellulitis Reason for Exam: injury/cellulitis FINDINGS: There is generalized soft tissue swelling about the ankle.  Extensive atherosclerotic vascular calcifications are seen.  No soft tissue gas.  No fracture or destructive osseous changes.     Generalized soft tissue swelling about the ankle.  No underlying fracture or evidence of osteomyelitis     XR TIBIA FIBULA RIGHT (2 VIEWS)    Result Date: 8/23/2024  EXAMINATION: TWO XRAY VIEWS OF THE RIGHT TIBIA/FIBULA 8/23/2024 12:09 pm COMPARISON: None. HISTORY: ORDERING SYSTEM PROVIDED HISTORY: fall/leg injury TECHNOLOGIST PROVIDED HISTORY: Reason for exam:->fall/leg injury Reason for Exam: fall/leg injury FINDINGS: The patient is status post right knee arthroplasty.  The hardware is intact without complication.  No acute fracture     No acute findings       CBC:   Recent Labs     08/27/24  0546 08/28/24  0454 08/29/24  0431   WBC 8.0 7.8 7.1   HGB 9.5* 9.5* 9.5*    249 291     BMP:    Recent Labs     08/27/24  0547 08/28/24  0454 08/29/24  0431   * 136 138   K 4.2 4.1 3.8   CL 93* 95* 97*   CO2 24 24 27   BUN 97* 100* 96*   CREATININE 2.9* 2.7* 2.6*   GLUCOSE 94 94 104*     Hepatic: No

## 2024-08-30 LAB
ALBUMIN SERPL-MCNC: 3.3 G/DL (ref 3.4–5)
ANION GAP SERPL CALCULATED.3IONS-SCNC: 14 MMOL/L (ref 3–16)
BASOPHILS # BLD: 0.1 K/UL (ref 0–0.2)
BASOPHILS NFR BLD: 1.3 %
BUN SERPL-MCNC: 89 MG/DL (ref 7–20)
CALCIUM SERPL-MCNC: 9.6 MG/DL (ref 8.3–10.6)
CHLORIDE SERPL-SCNC: 97 MMOL/L (ref 99–110)
CO2 SERPL-SCNC: 27 MMOL/L (ref 21–32)
CREAT SERPL-MCNC: 2.4 MG/DL (ref 0.8–1.3)
DEPRECATED RDW RBC AUTO: 14.5 % (ref 12.4–15.4)
EOSINOPHIL # BLD: 0.2 K/UL (ref 0–0.6)
EOSINOPHIL NFR BLD: 2.9 %
GFR SERPLBLD CREATININE-BSD FMLA CKD-EPI: 24 ML/MIN/{1.73_M2}
GLUCOSE SERPL-MCNC: 98 MG/DL (ref 70–99)
HCT VFR BLD AUTO: 28.2 % (ref 40.5–52.5)
HGB BLD-MCNC: 9.7 G/DL (ref 13.5–17.5)
INR PPP: 2.42 (ref 0.85–1.15)
LYMPHOCYTES # BLD: 0.8 K/UL (ref 1–5.1)
LYMPHOCYTES NFR BLD: 11.3 %
MAGNESIUM SERPL-MCNC: 1.85 MG/DL (ref 1.8–2.4)
MCH RBC QN AUTO: 30.6 PG (ref 26–34)
MCHC RBC AUTO-ENTMCNC: 34.5 G/DL (ref 31–36)
MCV RBC AUTO: 88.8 FL (ref 80–100)
MONOCYTES # BLD: 0.6 K/UL (ref 0–1.3)
MONOCYTES NFR BLD: 8.3 %
NEUTROPHILS # BLD: 5.2 K/UL (ref 1.7–7.7)
NEUTROPHILS NFR BLD: 76.2 %
PHOSPHATE SERPL-MCNC: 3.5 MG/DL (ref 2.5–4.9)
PLATELET # BLD AUTO: 303 K/UL (ref 135–450)
PMV BLD AUTO: 8.3 FL (ref 5–10.5)
POTASSIUM SERPL-SCNC: 3.7 MMOL/L (ref 3.5–5.1)
PROTHROMBIN TIME: 26.3 SEC (ref 11.9–14.9)
RBC # BLD AUTO: 3.18 M/UL (ref 4.2–5.9)
SODIUM SERPL-SCNC: 138 MMOL/L (ref 136–145)
URATE SERPL-MCNC: 7.5 MG/DL (ref 3.5–7.2)
WBC # BLD AUTO: 6.9 K/UL (ref 4–11)

## 2024-08-30 PROCEDURE — 97535 SELF CARE MNGMENT TRAINING: CPT

## 2024-08-30 PROCEDURE — 6370000000 HC RX 637 (ALT 250 FOR IP): Performed by: NURSE PRACTITIONER

## 2024-08-30 PROCEDURE — 80069 RENAL FUNCTION PANEL: CPT

## 2024-08-30 PROCEDURE — 6370000000 HC RX 637 (ALT 250 FOR IP): Performed by: STUDENT IN AN ORGANIZED HEALTH CARE EDUCATION/TRAINING PROGRAM

## 2024-08-30 PROCEDURE — 6360000002 HC RX W HCPCS: Performed by: INTERNAL MEDICINE

## 2024-08-30 PROCEDURE — 6370000000 HC RX 637 (ALT 250 FOR IP): Performed by: INTERNAL MEDICINE

## 2024-08-30 PROCEDURE — 97530 THERAPEUTIC ACTIVITIES: CPT

## 2024-08-30 PROCEDURE — 85610 PROTHROMBIN TIME: CPT

## 2024-08-30 PROCEDURE — 83735 ASSAY OF MAGNESIUM: CPT

## 2024-08-30 PROCEDURE — 94760 N-INVAS EAR/PLS OXIMETRY 1: CPT

## 2024-08-30 PROCEDURE — 1200000000 HC SEMI PRIVATE

## 2024-08-30 PROCEDURE — 36415 COLL VENOUS BLD VENIPUNCTURE: CPT

## 2024-08-30 PROCEDURE — 99232 SBSQ HOSP IP/OBS MODERATE 35: CPT | Performed by: INTERNAL MEDICINE

## 2024-08-30 PROCEDURE — 2580000003 HC RX 258: Performed by: STUDENT IN AN ORGANIZED HEALTH CARE EDUCATION/TRAINING PROGRAM

## 2024-08-30 PROCEDURE — 84550 ASSAY OF BLOOD/URIC ACID: CPT

## 2024-08-30 PROCEDURE — 85025 COMPLETE CBC W/AUTO DIFF WBC: CPT

## 2024-08-30 PROCEDURE — 2580000003 HC RX 258: Performed by: INTERNAL MEDICINE

## 2024-08-30 RX ORDER — LANOLIN ALCOHOL/MO/W.PET/CERES
3 CREAM (GRAM) TOPICAL ONCE
Status: COMPLETED | OUTPATIENT
Start: 2024-08-30 | End: 2024-08-30

## 2024-08-30 RX ORDER — WARFARIN SODIUM 2.5 MG/1
1.25 TABLET ORAL
Status: COMPLETED | OUTPATIENT
Start: 2024-08-30 | End: 2024-08-30

## 2024-08-30 RX ORDER — CEPHALEXIN 500 MG/1
500 CAPSULE ORAL 2 TIMES DAILY
Qty: 10 CAPSULE | Refills: 0
Start: 2024-08-30 | End: 2024-09-04

## 2024-08-30 RX ADMIN — CEFAZOLIN 2000 MG: 2 INJECTION, POWDER, FOR SOLUTION INTRAVENOUS at 11:53

## 2024-08-30 RX ADMIN — CEFAZOLIN 2000 MG: 2 INJECTION, POWDER, FOR SOLUTION INTRAVENOUS at 23:43

## 2024-08-30 RX ADMIN — SODIUM CHLORIDE, PRESERVATIVE FREE 10 ML: 5 INJECTION INTRAVENOUS at 08:26

## 2024-08-30 RX ADMIN — METOPROLOL SUCCINATE 25 MG: 25 TABLET, EXTENDED RELEASE ORAL at 08:26

## 2024-08-30 RX ADMIN — TAMSULOSIN HYDROCHLORIDE 0.4 MG: 0.4 CAPSULE ORAL at 17:54

## 2024-08-30 RX ADMIN — ACETAMINOPHEN 325MG 650 MG: 325 TABLET ORAL at 01:14

## 2024-08-30 RX ADMIN — ALLOPURINOL 100 MG: 100 TABLET ORAL at 08:26

## 2024-08-30 RX ADMIN — TORSEMIDE 100 MG: 100 TABLET ORAL at 08:26

## 2024-08-30 RX ADMIN — SODIUM CHLORIDE, PRESERVATIVE FREE 10 ML: 5 INJECTION INTRAVENOUS at 20:04

## 2024-08-30 RX ADMIN — ACETAMINOPHEN 325MG 650 MG: 325 TABLET ORAL at 15:01

## 2024-08-30 RX ADMIN — Medication 3 MG: at 21:17

## 2024-08-30 RX ADMIN — WARFARIN SODIUM 1.25 MG: 2.5 TABLET ORAL at 17:54

## 2024-08-30 ASSESSMENT — PAIN DESCRIPTION - DESCRIPTORS
DESCRIPTORS: ACHING;CRUSHING;DISCOMFORT
DESCRIPTORS: ACHING

## 2024-08-30 ASSESSMENT — PAIN DESCRIPTION - ORIENTATION
ORIENTATION: LEFT;RIGHT
ORIENTATION: RIGHT

## 2024-08-30 ASSESSMENT — PAIN SCALES - GENERAL
PAINLEVEL_OUTOF10: 6
PAINLEVEL_OUTOF10: 0
PAINLEVEL_OUTOF10: 4
PAINLEVEL_OUTOF10: 3

## 2024-08-30 ASSESSMENT — PAIN DESCRIPTION - LOCATION
LOCATION: BACK
LOCATION: LEG

## 2024-08-30 NOTE — PROGRESS NOTES
Clinical Pharmacy Note  Warfarin Consult    Maurice Ackerman Jr is a 89 y.o. male receiving warfarin managed by pharmacy.    Warfarin Indication: afib  Target INR range: 2-3   Dose prior to admission: Warfarin 2.5mg (1 tablet) daily EXCEPT 1.25mg (1/2 tablet) every Monday and Friday     Current warfarin drug-drug interactions:     Recent Labs     08/28/24  0454 08/29/24  0431 08/30/24  0445   HGB 9.5* 9.5* 9.7*   HCT 27.3* 28.1* 28.2*   INR 2.12* 2.32* 2.42*     Assessment/Plan:    Warfarin 1.25 mg tonight.    Daily PT/INR until stable within therapeutic range.     Thank you for the consult.  Will continue to follow.    Lily Alberto [facility]   8/30/2024 2:57 PM

## 2024-08-30 NOTE — PROGRESS NOTES
[facility]  Phone: [phone]  Fax: [phone]  [place]         Reason for Consult:   CKD stage IV  Requesting Physician:  Dr.. MO    History Obtained From:  patient, electronic medical record    History of Present Ilness: 89-year-old pleasant white gentleman with history of CKD stage IV with recent baseline creatinine of 2.6 to 3 mg follows with Dr. Tony. History of right nephrectomy many years ago for renal cell carcinoma. Has history of atrial fibrillation, coronary artery disease, hypertension  He has been maintained on torsemide 100 mg daily. He lives in assisted living and presented after mechanical fall last night when he tried to get up to use the restroom. His right lower extremity had increased swelling and redness. He has chronic cellulitis of lower extremities  He has history of BPH and is on Flomax. Reports urgency and frequency of urination. Denies dysuria hematuria or flank pains  No recent use of nonsteroidal anti-inflammatory drugs. He was diagnosed with cellulitis of right lower extremity. Creatinine noted to be 2.8 mg BUN of 82 mg      Subjective:   Breathing comfortably; NAD  No family at bedside      Physical exam:   Constitutional:  VITALS:  BP (!) 143/73   Pulse 75   Temp 97.7 °F (36.5 °C) (Oral)   Resp 17   Ht 1.854 m (6' 1\")   Wt 99.1 kg (218 lb 7.6 oz)   SpO2 95%   BMI 28.82 kg/m²     Intake/Output Summary (Last 24 hours) at 8/30/2024 1158  Last data filed at 8/30/2024 1033  Gross per 24 hour   Intake 1359 ml   Output 2370 ml   Net -1011 ml       Gen: awake, alert  Heent:  NCAT  Cardiovascular: Irregular rhythm  Respiratory: CTA B   Abdomen:  +bs, soft, NTND  Ext: + R  lower extremity edema/ACE wrap; no edema LLE    Medications   ceFAZolin  2,000 mg IntraVENous Q12H    sodium chloride flush  5-40 mL IntraVENous 2 times per day    warfarin placeholder: dosing by pharmacy   Other RX Placeholder    torsemide  100 mg Oral Daily    allopurinol  100

## 2024-08-30 NOTE — PROGRESS NOTES
Comprehensive Nutrition Assessment    Type and Reason for Visit:  RD Nutrition Re-Screen/LOS    Nutrition Recommendations/Plan:   Continue regular diet w/ fluid restriction per provider     Malnutrition Assessment:  Malnutrition Status:  No malnutrition (08/30/24 1455)    Context:  Acute Illness       Nutrition Assessment:    Length of stay assessment. Pt presenting w/ cellulitis of right leg. No recent decrease in intake/wt loss. Pt eating well, finishing meals recently. Nutrition related labs reviewed. Current diet order appropriate for pt. Monitor for additional nutrition needs.    Nutrition Related Findings:    nutrition related labs reviewed Wound Type: None       Current Nutrition Intake & Therapies:    Average Meal Intake: %  Average Supplements Intake: None Ordered  ADULT DIET; Regular; 1500 ml    Anthropometric Measures:  Height: 185.4 cm (6' 1\")  Ideal Body Weight (IBW): 184 lbs (84 kg)       Current Body Weight: 99.1 kg (218 lb 7.6 oz),   IBW.    Current BMI (kg/m2): 28.8                               Estimated Daily Nutrient Needs:  Energy Requirements Based On: Kcal/kg  Weight Used for Energy Requirements: Current  Energy (kcal/day): 1982 - 2480 (20 - 25 kcal/kg)  Weight Used for Protein Requirements: Ideal  Protein (g/day): 100 - 125 (1.2 - 1.5 g/kg IBW)  Method Used for Fluid Requirements: 1 ml/kcal  Fluid (ml/day): or per provider    Nutrition Diagnosis:   No nutrition diagnosis at this time    Nutrition Interventions:   Food and/or Nutrient Delivery: Continue Current Diet  Nutrition Education/Counseling: Education not indicated  Coordination of Nutrition Care: Continue to monitor while inpatient       Goals:     Goals: Meet at least 75% of estimated needs, by next RD assessment       Nutrition Monitoring and Evaluation:   Behavioral-Environmental Outcomes: None Identified  Food/Nutrient Intake Outcomes: Food and Nutrient Intake  Physical Signs/Symptoms Outcomes: Biochemical Data, Weight,  Nutrition Focused Physical Findings    Discharge Planning:    No discharge needs at this time     Navi Hernandez RD  Contact: [phone]

## 2024-08-30 NOTE — PROGRESS NOTES
Infectious Diseases Inpatient Progress Note      CHIEF COMPLAINT:     Right leg cellulitis  Right leg swelling  Right leg pain  Atrial fibrillation  Chronic kidney disease stage IV        HISTORY OF PRESENT ILLNESS:  89 y.o. male with a significant history for atrial fibrillation on anticoagulation, hypertension, history of renal cancer status post nephrectomy many years ago chronic kidney disease stage IV followed by nephrology admitted to the hospital and assisted living facility noted having difficulty with ambulation and sustained a fall there was a concern for cellulitis in the right lower extremity secondary to ongoing redness and swelling.  X-ray of the right ankle generalized soft tissue swelling no fracture noted x-ray right tibia negative for fracture.  Labs indicate creatinine 2.8 sodium 131 WBC normal hemoglobin 9.5 INR 2.5 we are consulted for recommendations.    Interval history: rt leg redness slowly improving swelling ongoing Ace wraps present tolerating antibiotic therapy okay creatinine stable at 2.4      Past Medical History:    Past Medical History:   Diagnosis Date    A-fib (HCC)     Hypertension     Malignant neoplasm of kidney (HCC) 6/15/2012       Past Surgical History:    Past Surgical History:   Procedure Laterality Date    ANGIOPLASTY      1998,2005,2010    APPENDECTOMY      CHOLECYSTECTOMY      KIDNEY REMOVAL         Current Medications:    Outpatient Medications Marked as Taking for the 8/23/24 encounter (Hospital Encounter)   Medication Sig Dispense Refill    torsemide (DEMADEX) 100 MG tablet Take 1 tablet by mouth nightly      metoprolol succinate (TOPROL XL) 25 MG extended release tablet Take 1 tablet by mouth daily      warfarin (COUMADIN) 2.5 MG tablet Take 1 tablet by mouth daily EXCEPT 1.25mg every Monday and Friday or as directed by [facility] [phone]      ferrous sulfate 325 (65 FE) MG tablet Take 1 tablet by mouth daily      diphenhydrAMINE-APAP   I25.10    Malignant neoplasm of kidney ([facility]) C64.9    Chronic kidney disease, stage III (moderate) ([facility]) N18.30    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    Edema R60.9    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    Bacteremia R78.81    Other hammer toe (acquired) M20.40    Hypercholesterolemia E78.00    Enlarged prostate with lower urinary tract symptoms (LUTS) N40.1    Enthesopathy of ankle and tarsus M77.50    Myalgia and myositis AGR1305    Nocturia R35.1    Pain in joint, shoulder region M25.519    Pain in limb M79.609    Numbness and tingling in right hand R20.0, R20.2    Community acquired bacterial pneumonia J15.9    Stage 4 chronic kidney disease ([facility]) N18.4    SOB (shortness of breath) R06.02    Acute on chronic diastolic heart failure ([facility]) I50.33    Cellulitis of right leg L03.115    Acute kidney injury superimposed on CKD ([facility]) N17.9, N18.9    Dehydration E86.0    Supratherapeutic INR R79.1    CRP elevated R79.82    Leg swelling M79.89    Recurrent cellulitis L03.90    Anticoagulated Z79.01    Cellulitis of right lower extremity L03.115        ICD-10-CM    1. Cellulitis of right lower extremity  L03.115       2. Acute kidney injury superimposed on CKD ([facility])  N17.9     N18.9       3. Dehydration  E86.0       4. Supratherapeutic INR  R79.1       5. Goals of care, counseling/discussion  Z71.89       6. Edema, unspecified type  R60.9 Vascular duplex lower extremity venous bilateral     Vascular duplex lower extremity venous bilateral         Right leg swelling and pain  Right leg cellulitis  Right leg chronic skin changes  Chronic anticoagulation  Atrial fibrillation  Anemia of chronic kidney disease  History of renal cancer with nephrectomy  Chronic kidney disease stage IV  CRP elevation  Procalcitonin elevation    Right leg ongoing swelling with cellulitis ongoing skin changes from previous cellulitis.  He does have chronic kidney disease.  Based on the appearance indicating streptococcal cellulitis he still

## 2024-08-30 NOTE — PLAN OF CARE
Problem: Discharge Planning  Goal: Discharge to home or other facility with appropriate resources  8/30/2024 0919 by Mayuri Billy RN  Outcome: Progressing  8/30/2024 0241 by Annie Regalado RN  Outcome: Progressing  Flowsheets (Taken 8/29/2024 2213)  Discharge to home or other facility with appropriate resources: Identify barriers to discharge with patient and caregiver     Problem: Pain  Goal: Verbalizes/displays adequate comfort level or baseline comfort level  8/30/2024 0919 by Mayuri Billy RN  Outcome: Progressing  8/30/2024 0241 by Annie Regalado RN  Outcome: Progressing     Problem: Skin/Tissue Integrity  Goal: Absence of new skin breakdown  Description: 1.  Monitor for areas of redness and/or skin breakdown  2.  Assess vascular access sites hourly  3.  Every 4-6 hours minimum:  Change oxygen saturation probe site  4.  Every 4-6 hours:  If on nasal continuous positive airway pressure, respiratory therapy assess nares and determine need for appliance change or resting period.  8/30/2024 0919 by Mayuri Billy RN  Outcome: Progressing  8/30/2024 0241 by Annie Regalado RN  Outcome: Progressing     Problem: ABCDS Injury Assessment  Goal: Absence of physical injury  8/30/2024 0919 by Mayuri Billy RN  Outcome: Progressing  8/30/2024 0241 by Annie Regalado RN  Outcome: Progressing     Problem: Safety - Adult  Goal: Free from fall injury  8/30/2024 0919 by Mayuri Billy RN  Outcome: Progressing  8/30/2024 0241 by Annie Regalado RN  Outcome: Progressing     Problem: Cardiovascular - Adult  Goal: Maintains optimal cardiac output and hemodynamic stability  8/30/2024 0919 by Mayuri Billy RN  Outcome: Progressing  8/30/2024 0241 by Annie Regalado RN  Outcome: Progressing  Flowsheets (Taken 8/29/2024 2213)  Maintains optimal cardiac output and hemodynamic stability: Monitor blood pressure and heart rate  Goal: Absence of cardiac dysrhythmias or at baseline  8/30/2024 0919  alignment per provider's orders  Goal: Return ADL status to a safe level of function  8/30/2024 0919 by Mayuri Billy RN  Outcome: Progressing  8/30/2024 0241 by Annie Regalado RN  Outcome: Progressing     Problem: Metabolic/Fluid and Electrolytes - Adult  Goal: Electrolytes maintained within normal limits  8/30/2024 0919 by Mayuri Billy RN  Outcome: Progressing  8/30/2024 0241 by Annie Regalado RN  Outcome: Progressing  Flowsheets (Taken 8/29/2024 2213)  Electrolytes maintained within normal limits:   Monitor labs and assess patient for signs and symptoms of electrolyte imbalances   Administer electrolyte replacement as ordered  Goal: Hemodynamic stability and optimal renal function maintained  8/30/2024 0919 by Mayuri Billy RN  Outcome: Progressing  8/30/2024 0241 by Annie Regalado RN  Outcome: Progressing  Flowsheets (Taken 8/29/2024 2213)  Hemodynamic stability and optimal renal function maintained: Monitor intake, output and patient weight     Problem: Genitourinary - Adult  Goal: Absence of urinary retention  8/30/2024 0919 by Mayuri Billy RN  Outcome: Progressing  8/30/2024 0241 by Annie Regalado RN  Outcome: Progressing  Flowsheets (Taken 8/29/2024 2213)  Absence of urinary retention: Assess patient’s ability to void and empty bladder     Problem: Respiratory - Adult  Goal: Achieves optimal ventilation and oxygenation  8/30/2024 0919 by Mayuri Billy RN  Outcome: Progressing  8/30/2024 0241 by Annie Regalado RN  Outcome: Progressing  Flowsheets (Taken 8/29/2024 2213)  Achieves optimal ventilation and oxygenation: Assess for changes in respiratory status     Problem: Neurosensory - Adult  Goal: Achieves stable or improved neurological status  8/30/2024 0919 by Mayuri Billy RN  Outcome: Progressing  8/30/2024 0241 by Annie Regalado RN  Outcome: Progressing  Flowsheets (Taken 8/29/2024 2213)  Achieves stable or improved neurological status: Assess for and

## 2024-08-30 NOTE — PROGRESS NOTES
mg, Q6H PRN   Or  acetaminophen, 650 mg, Q6H PRN  traMADol, 50 mg, Q6H PRN        Labs and Imaging   Vascular duplex lower extremity venous bilateral    Addendum Date: 8/28/2024      No evidence of deep or superficial vein thrombosis in the right lower extremity.   No evidence of deep or superfical vein thrombosis in the left lower extremity. Summary: No DVT or superficial venous thrombus of the visualized vessels of the bilateral lower extremities. Fluid collection of the right proximal thigh measuring 3.6 x 1.3 cm Significant pulsatility in the bilateral common femoral veins may be consistent with right heart dysfunction    Result Date: 8/28/2024    No evidence of deep or superficial vein thrombosis in the right lower extremity.   No evidence of deep or superfical vein thrombosis in the left lower extremity.     XR ANKLE RIGHT (MIN 3 VIEWS)    Result Date: 8/23/2024  EXAMINATION: THREE XRAY VIEWS OF THE RIGHT ANKLE 8/23/2024 12:09 pm COMPARISON: None. HISTORY: ORDERING SYSTEM PROVIDED HISTORY: injury/cellulitis TECHNOLOGIST PROVIDED HISTORY: Reason for exam:->injury/cellulitis Reason for Exam: injury/cellulitis FINDINGS: There is generalized soft tissue swelling about the ankle.  Extensive atherosclerotic vascular calcifications are seen.  No soft tissue gas.  No fracture or destructive osseous changes.     Generalized soft tissue swelling about the ankle.  No underlying fracture or evidence of osteomyelitis     XR TIBIA FIBULA RIGHT (2 VIEWS)    Result Date: 8/23/2024  EXAMINATION: TWO XRAY VIEWS OF THE RIGHT TIBIA/FIBULA 8/23/2024 12:09 pm COMPARISON: None. HISTORY: ORDERING SYSTEM PROVIDED HISTORY: fall/leg injury TECHNOLOGIST PROVIDED HISTORY: Reason for exam:->fall/leg injury Reason for Exam: fall/leg injury FINDINGS: The patient is status post right knee arthroplasty.  The hardware is intact without complication.  No acute fracture     No acute findings       CBC:   Recent Labs     08/28/24  0454  08/29/24  0431 08/30/24  0445   WBC 7.8 7.1 6.9   HGB 9.5* 9.5* 9.7*    291 303     BMP:    Recent Labs     08/28/24  0454 08/29/24  0431 08/30/24 0445    138 138   K 4.1 3.8 3.7   CL 95* 97* 97*   CO2 24 27 27   * 96* 89*   CREATININE 2.7* 2.6* 2.4*   GLUCOSE 94 104* 98     Hepatic: No results for input(s): \"AST\", \"ALT\", \"BILITOT\", \"ALKPHOS\" in the last 72 hours.    Invalid input(s): \"ALB\"  Lipids:   Lab Results   Component Value Date/Time    CHOL 100 12/29/2023 06:16 AM    HDL 61 12/29/2023 06:16 AM    TRIG 49 12/29/2023 06:16 AM     Hemoglobin A1C: No results found for: \"LABA1C\"  TSH:   Lab Results   Component Value Date/Time    TSH 1.50 12/28/2023 07:54 PM     Troponin: No results found for: \"TROPONINT\"  Lactic Acid: No results for input(s): \"LACTA\" in the last 72 hours.  BNP: No results for input(s): \"PROBNP\" in the last 72 hours.  UA:No results found for: \"NITRU\", \"COLORU\", \"PHUR\", \"LABCAST\", \"WBCUA\", \"RBCUA\", \"MUCUS\", \"TRICHOMONAS\", \"YEAST\", \"BACTERIA\", \"CLARITYU\", \"SPECGRAV\", \"LEUKOCYTESUR\", \"UROBILINOGEN\", \"BILIRUBINUR\", \"BLOODU\", \"GLUCOSEU\", \"KETUA\", \"AMORPHOUS\"  Urine Cultures: No results found for: \"LABURIN\"  Blood Cultures:   Lab Results   Component Value Date/Time    BC No Growth after 4 days of incubation. 08/23/2024 11:39 AM     Lab Results   Component Value Date/Time    BLOODCULT2 No Growth after 4 days of incubation. 08/23/2024 11:40 AM     Organism: No results found for: \"ORG\"      Electronically signed by Dc Cameron MD on 8/30/2024 at 9:11 AM  Comment: Please note this report has been produced using speech recognition software and may contain errors related to that system including errors in grammar, punctuation, and spelling, as well as words and phrases that may be inappropriate. If there are any questions or concerns, please feel free to contact the dictating provider for clarification.

## 2024-08-30 NOTE — PROGRESS NOTES
Occupational Therapy  Facility/Department: [address] MED SURG  Occupational Therapy Daily Treatment Note    Name: Maurice Ackerman Jr  : 1930  MRN: [id number]  Date of Service: 2024    Discharge Recommendations:  3-5 sessions per week, Patient would benefit from continued therapy after discharge  OT Equipment Recommendations  Other: TBD by d/c  site    Maurice Ackerman Jr scored a 14/24 on the AM-PAC ADL Inpatient form. Current research shows that an AM-PAC score of 17 or less is typically not associated with a discharge to the patient's home setting. Based on the patient's AM-PAC score and their current ADL deficits, it is recommended that the patient have 3-5 sessions per week of Occupational Therapy at d/c to increase the patient's independence.  Please see assessment section for further patient specific details.    If patient discharges prior to next session this note will serve as a discharge summary.  Please see below for the latest assessment towards goals.         Patient Diagnosis(es): The primary encounter diagnosis was Cellulitis of right lower extremity. Diagnoses of Acute kidney injury superimposed on CKD (HCC), Dehydration, Supratherapeutic INR, Goals of care, counseling/discussion, and Edema, unspecified type were also pertinent to this visit.  Past Medical History:  has a past medical history of A-fib (HCC), Hypertension, and Malignant neoplasm of kidney (HCC).  Past Surgical History:  has a past surgical history that includes Cholecystectomy; Kidney removal; Appendectomy; and angioplasty.    Treatment Diagnosis: impaired ADL status, fxl mobility      Assessment  Performance deficits / Impairments: Decreased functional mobility ;Decreased ADL status;Decreased strength;Decreased endurance;Decreased balance;Decreased high-level IADLs  Assessment: Pt is a 89 y.o. male admitted with R LE cellulitis. PTA, pt living at [facility] and independent ADLs and fxl mobility with RW. TPt currently functioning  >RW, armchair >sink)  Stand to sit: Contact guard assistance (RW >armchair at sink and recliner)  Transfer Comments: to/from RW, VC's for hand placement    Cognition  Overall Cognitive Status: WFL  Cognition Comment: Impaired STM at times.    Education Given To: Patient  Education Provided: Role of Therapy;Plan of Care;Transfer Training;ADL Adaptive Strategies  Education Method: Demonstration;Verbal  Barriers to Learning: Hearing  Education Outcome: Verbalized understanding;Demonstrated understanding;Continued education needed                       AM-PAC - ADL  AM-PAC Daily Activity - Inpatient   How much help is needed for putting on and taking off regular lower body clothing?: Total  How much help is needed for bathing (which includes washing, rinsing, drying)?: A Lot  How much help is needed for toileting (which includes using toilet, bedpan, or urinal)?: Total  How much help is needed for putting on and taking off regular upper body clothing?: A Little  How much help is needed for taking care of personal grooming?: A Little  How much help for eating meals?: None  AM-PAC Inpatient Daily Activity Raw Score: 14  AM-PAC Inpatient ADL T-Scale Score : 33.39  ADL Inpatient CMS 0-100% Score: 59.67  ADL Inpatient CMS G-Code Modifier : CK         Goals  Short Term Goals  Time Frame for Short Term Goals: Prior to d/c. Satus: goals ongoing  Short Term Goal 1: Pt will bathe with SBA.  Short Term Goal 2: Pt will dress with SBA using A/E prn.  Short Term Goal 3: Pt will toilet with supervision.  Short Term Goal 4: Pt will complete fxl mobility and fxl transfers to/from ADL surfaces with supervision using AD.  Short Term Goal 5: Pt will tolerate standing >5 minutes for functional task with supervision to improve standing tolerance for ADL routine.  Long Term Goals  Time Frame for Long Term Goals : STGs=LTGs  Patient Goals   Patient goals : to return to [facility].      Therapy Time   Individual Concurrent Group Co-treatment

## 2024-08-30 NOTE — PLAN OF CARE
Problem: Discharge Planning  Goal: Discharge to home or other facility with appropriate resources  8/30/2024 0241 by Annie Regalado RN  Outcome: Progressing  Flowsheets (Taken 8/29/2024 2213)  Discharge to home or other facility with appropriate resources: Identify barriers to discharge with patient and caregiver  8/29/2024 1346 by Perlita Ivan RN  Outcome: Progressing  Flowsheets (Taken 8/29/2024 1030)  Discharge to home or other facility with appropriate resources:   Identify barriers to discharge with patient and caregiver   Arrange for needed discharge resources and transportation as appropriate   Identify discharge learning needs (meds, wound care, etc)     Problem: Pain  Goal: Verbalizes/displays adequate comfort level or baseline comfort level  8/30/2024 0241 by Annie Regalado RN  Outcome: Progressing  8/29/2024 1346 by Perlita Ivan RN  Outcome: Progressing  Flowsheets (Taken 8/29/2024 0903)  Verbalizes/displays adequate comfort level or baseline comfort level:   Encourage patient to monitor pain and request assistance   Assess pain using appropriate pain scale   Administer analgesics based on type and severity of pain and evaluate response   Implement non-pharmacological measures as appropriate and evaluate response   Consider cultural and social influences on pain and pain management   Notify Licensed Independent Practitioner if interventions unsuccessful or patient reports new pain     Problem: Skin/Tissue Integrity  Goal: Absence of new skin breakdown  Description: 1.  Monitor for areas of redness and/or skin breakdown  2.  Assess vascular access sites hourly  3.  Every 4-6 hours minimum:  Change oxygen saturation probe site  4.  Every 4-6 hours:  If on nasal continuous positive airway pressure, respiratory therapy assess nares and determine need for appliance change or resting period.  8/30/2024 0241 by Annie Regalado RN  Outcome: Progressing  8/29/2024 1346 by Moncho  for patient's volume status, including labs, urine output, blood pressure (other measures as available)   Monitor intake, output and patient weight   Monitor urine specific gravity, serum osmolarity and serum sodium as indicated or ordered   Encourage oral intake as appropriate   Instruct patient on fluid and nutrition restrictions as appropriate     Problem: Genitourinary - Adult  Goal: Absence of urinary retention  8/30/2024 0241 by Annie Regalado RN  Outcome: Progressing  Flowsheets (Taken 8/29/2024 2213)  Absence of urinary retention: Assess patient’s ability to void and empty bladder  8/29/2024 1346 by Perlita Ivan RN  Outcome: Progressing     Problem: Respiratory - Adult  Goal: Achieves optimal ventilation and oxygenation  8/30/2024 0241 by Annie Regalado RN  Outcome: Progressing  Flowsheets (Taken 8/29/2024 2213)  Achieves optimal ventilation and oxygenation: Assess for changes in respiratory status  8/29/2024 1346 by Perlita Ivan RN  Outcome: Progressing  Flowsheets (Taken 8/29/2024 1030)  Achieves optimal ventilation and oxygenation: Assess for changes in respiratory status     Problem: Neurosensory - Adult  Goal: Achieves stable or improved neurological status  Outcome: Progressing  Flowsheets (Taken 8/29/2024 2213)  Achieves stable or improved neurological status: Assess for and report changes in neurological status

## 2024-08-31 VITALS
HEIGHT: 73 IN | SYSTOLIC BLOOD PRESSURE: 156 MMHG | TEMPERATURE: 97.5 F | OXYGEN SATURATION: 93 % | HEART RATE: 74 BPM | BODY MASS INDEX: 28.46 KG/M2 | WEIGHT: 214.73 LBS | RESPIRATION RATE: 16 BRPM | DIASTOLIC BLOOD PRESSURE: 79 MMHG

## 2024-08-31 LAB
ALBUMIN SERPL-MCNC: 3.3 G/DL (ref 3.4–5)
ANION GAP SERPL CALCULATED.3IONS-SCNC: 13 MMOL/L (ref 3–16)
BUN SERPL-MCNC: 84 MG/DL (ref 7–20)
CALCIUM SERPL-MCNC: 9.4 MG/DL (ref 8.3–10.6)
CHLORIDE SERPL-SCNC: 97 MMOL/L (ref 99–110)
CO2 SERPL-SCNC: 29 MMOL/L (ref 21–32)
CREAT SERPL-MCNC: 2.3 MG/DL (ref 0.8–1.3)
DEPRECATED RDW RBC AUTO: 14.4 % (ref 12.4–15.4)
GFR SERPLBLD CREATININE-BSD FMLA CKD-EPI: 26 ML/MIN/{1.73_M2}
GLUCOSE SERPL-MCNC: 121 MG/DL (ref 70–99)
HCT VFR BLD AUTO: 27.9 % (ref 40.5–52.5)
HGB BLD-MCNC: 9.3 G/DL (ref 13.5–17.5)
MAGNESIUM SERPL-MCNC: 1.81 MG/DL (ref 1.8–2.4)
MCH RBC QN AUTO: 30 PG (ref 26–34)
MCHC RBC AUTO-ENTMCNC: 33.3 G/DL (ref 31–36)
MCV RBC AUTO: 89.9 FL (ref 80–100)
PHOSPHATE SERPL-MCNC: 3.7 MG/DL (ref 2.5–4.9)
PLATELET # BLD AUTO: 328 K/UL (ref 135–450)
PMV BLD AUTO: 8.3 FL (ref 5–10.5)
POTASSIUM SERPL-SCNC: 3.7 MMOL/L (ref 3.5–5.1)
RBC # BLD AUTO: 3.1 M/UL (ref 4.2–5.9)
SODIUM SERPL-SCNC: 139 MMOL/L (ref 136–145)
URATE SERPL-MCNC: 7.6 MG/DL (ref 3.5–7.2)
WBC # BLD AUTO: 6.4 K/UL (ref 4–11)

## 2024-08-31 PROCEDURE — 83735 ASSAY OF MAGNESIUM: CPT

## 2024-08-31 PROCEDURE — 94760 N-INVAS EAR/PLS OXIMETRY 1: CPT

## 2024-08-31 PROCEDURE — 84550 ASSAY OF BLOOD/URIC ACID: CPT

## 2024-08-31 PROCEDURE — 85027 COMPLETE CBC AUTOMATED: CPT

## 2024-08-31 PROCEDURE — 36415 COLL VENOUS BLD VENIPUNCTURE: CPT

## 2024-08-31 PROCEDURE — 6370000000 HC RX 637 (ALT 250 FOR IP): Performed by: INTERNAL MEDICINE

## 2024-08-31 PROCEDURE — 2580000003 HC RX 258: Performed by: STUDENT IN AN ORGANIZED HEALTH CARE EDUCATION/TRAINING PROGRAM

## 2024-08-31 PROCEDURE — 6370000000 HC RX 637 (ALT 250 FOR IP): Performed by: STUDENT IN AN ORGANIZED HEALTH CARE EDUCATION/TRAINING PROGRAM

## 2024-08-31 PROCEDURE — 80069 RENAL FUNCTION PANEL: CPT

## 2024-08-31 RX ADMIN — METOPROLOL SUCCINATE 25 MG: 25 TABLET, EXTENDED RELEASE ORAL at 09:18

## 2024-08-31 RX ADMIN — SODIUM CHLORIDE, PRESERVATIVE FREE 10 ML: 5 INJECTION INTRAVENOUS at 09:18

## 2024-08-31 RX ADMIN — ACETAMINOPHEN 325MG 650 MG: 325 TABLET ORAL at 12:43

## 2024-08-31 RX ADMIN — ACETAMINOPHEN 325MG 650 MG: 325 TABLET ORAL at 01:17

## 2024-08-31 RX ADMIN — TORSEMIDE 100 MG: 100 TABLET ORAL at 09:18

## 2024-08-31 RX ADMIN — ALLOPURINOL 100 MG: 100 TABLET ORAL at 09:18

## 2024-08-31 ASSESSMENT — PAIN DESCRIPTION - LOCATION
LOCATION: BACK
LOCATION: LEG

## 2024-08-31 ASSESSMENT — PAIN DESCRIPTION - DESCRIPTORS
DESCRIPTORS: ACHING;DISCOMFORT
DESCRIPTORS: ACHING;GNAWING

## 2024-08-31 ASSESSMENT — PAIN SCALES - GENERAL
PAINLEVEL_OUTOF10: 4
PAINLEVEL_OUTOF10: 6
PAINLEVEL_OUTOF10: 7

## 2024-08-31 ASSESSMENT — PAIN DESCRIPTION - ORIENTATION
ORIENTATION: RIGHT
ORIENTATION: LOWER

## 2024-08-31 NOTE — CARE COORDINATION
08/31/24 1024   IMM Letter   IMM Letter given to Patient/Family/Significant other/Guardian/POA/by: Education provided- goldy 4 hours   IMM Letter date given: 08/31/24   IMM Letter time given: 1020

## 2024-08-31 NOTE — DISCHARGE SUMMARY
Hospital Medicine Discharge Summary    Patient ID: Maurice Ackerman Jr      Patient's PCP: Holly Govea MD    Admit Date: 8/23/2024     Discharge Date:   08/31/2024    Admitting Provider: Delonte Harman MD     Discharge Provider: Dc Cameron MD     Discharge Diagnoses:       Active Hospital Problems    Diagnosis     Acute kidney injury superimposed on CKD (HCC) [N17.9, N18.9]     Dehydration [E86.0]     Supratherapeutic INR [R79.1]     CRP elevated [R79.82]     Leg swelling [M79.89]     Recurrent cellulitis [L03.90]     Anticoagulated [Z79.01]     Cellulitis of right lower extremity [L03.115]     Cellulitis of right leg [L03.115]        The patient was seen and examined on day of discharge and this discharge summary is in conjunction with any daily progress note from day of discharge.    Hospital Course:       From HPI:\"Maurice Ackerman Jr is a 89 y.o. male with PMH of CKD, gout, HTN, atrial fibrillation who presents with worsening leg pain. Pt had a mechanical fall 7 days ago and states his leg pain has worsened since then. Describes pain as dull, constant and worse w ambulation. Denies any discharge or drainage. Denies any fever. No other symptoms or concerns. .      At ED, pt was afebrile, hemodynamically stable; on room air. Labs Na 135, Cr 2.8, WBC 6.7. had right tibia/fibula and ankle x-ray no fracture just soft tissue swelling around ankle. Pt received vanc+Cefepime. He also received 500cc IVF NS. Discussed with ED, will admit pt to med surg  for cellulitis  workup and management. \"       Right lower extremity cellulitis IV antibiotics since admission ceftriaxone changed to Ancef discussed with ID and okay to discharge home on p.o. antibiotics  A-fib on Coumadin and metoprolol rate controlled no active issues no dizziness or chest pain.  Discussed with nursing  CKD stage IV follow-up as outpatient, creatinine relatively flat with some improvement.  Follow-up as outpatient  Iron deficiency anemia and  bilateral   Final Result   Addendum (preliminary) 1 of 1        No evidence of deep or superficial vein thrombosis in the right lower    extremity.     No evidence of deep or superfical vein thrombosis in the left lower    extremity.      Summary:   No DVT or superficial venous thrombus of the visualized vessels of the    bilateral lower extremities.   Fluid collection of the right proximal thigh measuring 3.6 x 1.3 cm   Significant pulsatility in the bilateral common femoral veins may be    consistent with right heart dysfunction         XR ANKLE RIGHT (MIN 3 VIEWS)   Final Result   Generalized soft tissue swelling about the ankle.  No underlying fracture or   evidence of osteomyelitis         XR TIBIA FIBULA RIGHT (2 VIEWS)   Final Result   No acute findings                Consults:     PHARMACY TO DOSE VANCOMYCIN  IP CONSULT TO HOSPITALIST  IP CONSULT TO PHARMACY  IP CONSULT TO NEPHROLOGY  IP CONSULT TO INFECTIOUS DISEASES    Disposition: SNF    Condition at Discharge: Stable    Discharge Instructions/Follow-up: PCP    Code Status:  DNR-CCA     Activity: activity as tolerated    Diet: Cardiac      Discharge Medications:     Current Discharge Medication List             Details   cephALEXin (KEFLEX) 500 MG capsule Take 1 capsule by mouth 2 times daily for 5 days  Qty: 10 capsule, Refills: 0                Details   torsemide (DEMADEX) 100 MG tablet Take 1 tablet by mouth nightly      metoprolol succinate (TOPROL XL) 25 MG extended release tablet Take 1 tablet by mouth daily      warfarin (COUMADIN) 2.5 MG tablet Take 1 tablet by mouth daily EXCEPT 1.25mg every Monday and Friday or as directed by [facility] [phone]      ferrous sulfate 325 (65 FE) MG tablet Take 1 tablet by mouth daily      diphenhydrAMINE-APAP  MG TABS Take 1 tablet by mouth nightly as needed      vitamin D 1000 UNITS CAPS Take 1 capsule by mouth daily      magnesium (MAGNESIUM-OXIDE) 250 MG TABS tablet Take 1 tablet by

## 2024-08-31 NOTE — PROGRESS NOTES
Report given to EMS, all questions answered. IV removed w/o complications. Pt transported via stretcher by EMS. Electronically signed by Donald Torres RN on 8/31/2024 at 1:37 PM

## 2024-08-31 NOTE — PLAN OF CARE
Problem: Discharge Planning  Goal: Discharge to home or other facility with appropriate resources  8/31/2024 0939 by Donald Torres RN  Outcome: Progressing  Flowsheets (Taken 8/31/2024 0939)  Discharge to home or other facility with appropriate resources: Identify barriers to discharge with patient and caregiver     Problem: Pain  Goal: Verbalizes/displays adequate comfort level or baseline comfort level  8/31/2024 0939 by Donald Torres RN  Outcome: Progressing  Flowsheets (Taken 8/31/2024 0939)  Verbalizes/displays adequate comfort level or baseline comfort level:   Encourage patient to monitor pain and request assistance   Administer analgesics based on type and severity of pain and evaluate response   Assess pain using appropriate pain scale   Implement non-pharmacological measures as appropriate and evaluate response  Note: Pt educated on 0-10 pain scale. Pt educated on non-pharmacological pain interventions. Pain medications given as needed per MAR.      Problem: Skin/Tissue Integrity  Goal: Absence of new skin breakdown  Description: 1.  Monitor for areas of redness and/or skin breakdown  2.  Assess vascular access sites hourly  3.  Every 4-6 hours minimum:  Change oxygen saturation probe site  4.  Every 4-6 hours:  If on nasal continuous positive airway pressure, respiratory therapy assess nares and determine need for appliance change or resting period.  8/31/2024 0939 by Donald Torres RN  Outcome: Progressing  Note: Skin assessment done per shift. Pt educated on importance of frequent positioning. Pt verbalizes understanding.      Problem: ABCDS Injury Assessment  Goal: Absence of physical injury  8/31/2024 0939 by Donald Torres RN  Outcome: Progressing  Note: Pt educated on use of call light for assistance. Pt educated to wait for assistance. Appropriate ambulatory aid provided and used. Pt verbalizes understanding.      Problem: Safety - Adult  Goal: Free from fall injury  8/31/2024 0939 by Donald Torres

## 2024-08-31 NOTE — PLAN OF CARE
Problem: Discharge Planning  Goal: Discharge to home or other facility with appropriate resources  Outcome: Progressing  Flowsheets (Taken 8/30/2024 2246)  Discharge to home or other facility with appropriate resources: Identify barriers to discharge with patient and caregiver     Problem: Pain  Goal: Verbalizes/displays adequate comfort level or baseline comfort level  Outcome: Progressing     Problem: Skin/Tissue Integrity  Goal: Absence of new skin breakdown  Description: 1.  Monitor for areas of redness and/or skin breakdown  2.  Assess vascular access sites hourly  3.  Every 4-6 hours minimum:  Change oxygen saturation probe site  4.  Every 4-6 hours:  If on nasal continuous positive airway pressure, respiratory therapy assess nares and determine need for appliance change or resting period.  Outcome: Progressing     Problem: ABCDS Injury Assessment  Goal: Absence of physical injury  Outcome: Progressing     Problem: Safety - Adult  Goal: Free from fall injury  Outcome: Progressing     Problem: Cardiovascular - Adult  Goal: Maintains optimal cardiac output and hemodynamic stability  Outcome: Progressing  Flowsheets (Taken 8/30/2024 2246)  Maintains optimal cardiac output and hemodynamic stability: Monitor blood pressure and heart rate  Goal: Absence of cardiac dysrhythmias or at baseline  Outcome: Progressing  Flowsheets (Taken 8/30/2024 2246)  Absence of cardiac dysrhythmias or at baseline: Monitor cardiac rate and rhythm     Problem: Skin/Tissue Integrity - Adult  Goal: Skin integrity remains intact  Outcome: Progressing  Flowsheets (Taken 8/30/2024 2246)  Skin Integrity Remains Intact: Monitor for areas of redness and/or skin breakdown  Goal: Incisions, wounds, or drain sites healing without S/S of infection  Outcome: Progressing  Flowsheets (Taken 8/30/2024 2246)  Incisions, Wounds, or Drain Sites Healing Without Sign and Symptoms of Infection: ADMISSION and DAILY: Assess and document risk factors for  pressure ulcer development  Goal: Oral mucous membranes remain intact  Outcome: Progressing  Flowsheets (Taken 8/30/2024 2246)  Oral Mucous Membranes Remain Intact: Assess oral mucosa and hygiene practices     Problem: Musculoskeletal - Adult  Goal: Return mobility to safest level of function  Outcome: Progressing  Flowsheets (Taken 8/30/2024 2246)  Return Mobility to Safest Level of Function: Assess patient stability and activity tolerance for standing, transferring and ambulating with or without assistive devices  Goal: Maintain proper alignment of affected body part  Outcome: Progressing  Flowsheets (Taken 8/30/2024 2246)  Maintain proper alignment of affected body part: Support and protect limb and body alignment per provider's orders  Goal: Return ADL status to a safe level of function  Outcome: Progressing  Flowsheets (Taken 8/30/2024 2246)  Return ADL Status to a Safe Level of Function: Assist and instruct patient to increase activity and self care as tolerated     Problem: Metabolic/Fluid and Electrolytes - Adult  Goal: Electrolytes maintained within normal limits  Outcome: Progressing  Flowsheets (Taken 8/30/2024 2246)  Electrolytes maintained within normal limits: Monitor labs and assess patient for signs and symptoms of electrolyte imbalances  Goal: Hemodynamic stability and optimal renal function maintained  Outcome: Progressing  Flowsheets (Taken 8/30/2024 2246)  Hemodynamic stability and optimal renal function maintained: Monitor labs and assess for signs and symptoms of volume excess or deficit     Problem: Genitourinary - Adult  Goal: Absence of urinary retention  Outcome: Progressing  Flowsheets (Taken 8/30/2024 2246)  Absence of urinary retention: Assess patient’s ability to void and empty bladder     Problem: Respiratory - Adult  Goal: Achieves optimal ventilation and oxygenation  Outcome: Progressing  Flowsheets (Taken 8/30/2024 2246)  Achieves optimal ventilation and oxygenation: Assess for  changes in respiratory status     Problem: Neurosensory - Adult  Goal: Achieves stable or improved neurological status  Outcome: Progressing  Flowsheets (Taken 8/30/2024 5696)  Achieves stable or improved neurological status: Assess for and report changes in neurological status

## 2024-08-31 NOTE — FLOWSHEET NOTE
Pt. Requesting for sleep medicine.    Secure message sent to Juanis Saldivar NP, obtained an order for Melatonin, order acknowledged.

## 2024-08-31 NOTE — CARE COORDINATION
Case Management Discharge Note          Date / Time of Note: 8/31/2024 9:55 AM                  Patient Name: Maurice Ackerman Jr   YOB: 1930  Diagnosis: Dehydration [E86.0]  Cellulitis of right leg [L03.115]  Goals of care, counseling/discussion [Z71.89]  Supratherapeutic INR [R79.1]  Cellulitis of right lower extremity [L03.115]  Acute kidney injury superimposed on CKD (HCC) [N17.9, N18.9]   Date / Time: 8/23/2024 10:49 AM    Financial:  Payor: MEDICARE / Plan: MEDICARE PART A AND B / Product Type: *No Product type* /      Pharmacy:    [facility] [id number] - [facility] [address] - P [phone] - F [phone]  [address]  [facility] [address]  Phone: [phone] Fax: [phone]      DISCHARGE Disposition: Skilled Nursing Facility (SNF)    Nursing Facility:   Discharging to Facility/ Agency   Name: [facility]  Address:  [address] [place] [address]   Phone:  [phone]  Fax:  [phone]    LOC at discharge: Skilled Nursing  MIRELLA Completed: Yes             NURSING REPORT NUMBER: [phone]               NURSING FAX NUMBER: [phone]    Notification completed in HENS/PAS?:  Yes : CM has completed HENS online through secure website for SNF admission at [facility].   Document ID #: [id number]    Transportation:  Transportation PLAN for discharge: EMS transportation   Mode of Transport: Ambulance stretcher - BLS  Reason for medical transport: Bed confined: Meets the following criteria 1) unable to get out of bed without assistance or ambulate, 2) unable to safely sit up in a wheelchair, 3) unable to maintain erect seating position in a chair for time needed for transport  Name of Transport Company: [email or web address]  Phone: [phone]  Time of Transport: 1pm    Transport form completed: Yes    IMM Completed:   Yes, Case management has presented and reviewed IMM letter #2.   IMM Letter given to Patient/Family/Significant

## 2024-08-31 NOTE — PROGRESS NOTES
Report given to receiving nurse, all questions answered. Call back number left for any additional questions. Electronically signed by Donald Torres RN on 8/31/2024 at 1:36 PM

## 2024-08-31 NOTE — FLOWSHEET NOTE
Pt.  is having increased urinary frequency and urgency. Purewick applied to promote rest and comfort at night.  To remove at waking hours.

## 2024-09-04 ENCOUNTER — ANTI-COAG VISIT (OUTPATIENT)
Dept: PHARMACY | Age: 89
End: 2024-09-04

## 2024-09-04 DIAGNOSIS — I48.91 ATRIAL FIBRILLATION, UNSPECIFIED TYPE (HCC): Primary | ICD-10-CM

## 2024-09-04 LAB
INR BLD: 3
PROTIME: NORMAL

## 2024-09-04 NOTE — PROGRESS NOTES
[facility]  Home Care Visit    Lab Results   Component Value Date    INR 2.42 (H) 08/30/2024    INR 2.32 (H) 08/29/2024    INR 2.12 (H) 08/28/2024       Anticoagulation Summary  As of 9/4/2024      INR goal:  2.0-3.0   TTR:  78.4% (11.7 y)   INR used for dosing:  --   Warfarin maintenance plan:  1.25 mg (2.5 mg x 0.5) every Mon, Fri; 2.5 mg (2.5 mg x 1) all other days   Weekly warfarin total:  15 mg   Plan last modified:  Julio Cesar Jones (2/26/2024)   Next INR check:  9/9/2024   Priority:  Maintenance   Target end date:  Indefinite    Indications    Atrial fibrillation (HCC) [I48.91]                 Anticoagulation Episode Summary       INR check location:  Anticoagulation Clinic    Preferred lab:  --    Send INR reminders to:  [facility]    Comments:  FAX  consent done 1/31/24          Anticoagulation Care Providers       Provider Role Specialty Phone number    Matias Sandoval MD Responsible Internal Medicine [phone]            Assessment / Plan:  juanito Holden home care nurse called with INR results.  No changes to diet and medications reported.  No concerns reported.    Instructed to continue at 2.5mg daily EXCEPT 1.25mg every Monday and Friday.     Description    Warfarin 2.5mg (1 tablet) daily EXCEPT 1.25mg (1/2 tablet) every Monday and Friday    Keep greens consistent. Iceberg lettuce occasionally.     Call [phone] with signs or symptoms of bleeding or ANY medication changes (including over-the-counter medications or herbal supplements).     Especially if you begin oral steroids and/or antibiotics.    If significant bleeding occurs please seek immediate medical attention.    Keep the number of servings and portion size of vitamin K containing foods (dark green, leafy vegetables) the same each week. Vegetables high in vitamin K : broccoli, spinach, leaf lettuce, keke lettuce, kale, collards, asparagus, brussel sprouts.  Please call if you

## 2024-09-06 ENCOUNTER — ANTI-COAG VISIT (OUTPATIENT)
Dept: PHARMACY | Age: 89
End: 2024-09-06

## 2024-09-06 DIAGNOSIS — I48.91 ATRIAL FIBRILLATION, UNSPECIFIED TYPE (HCC): Primary | ICD-10-CM

## 2024-09-06 LAB
INR BLD: 3.3
PROTIME: NORMAL

## 2024-09-06 NOTE — PROGRESS NOTES
[facility]  Home Care Visit    Lab Results   Component Value Date    INR 3.30 09/06/2024    INR 3.00 09/04/2024    INR 2.42 (H) 08/30/2024       Anticoagulation Summary  As of 9/6/2024      INR goal:  2.0-3.0   TTR:  78.3% (11.7 y)   INR used for dosing:  3.30 (9/6/2024)   Warfarin maintenance plan:  1.25 mg (2.5 mg x 0.5) every Mon, Fri; 2.5 mg (2.5 mg x 1) all other days   Weekly warfarin total:  15 mg   Plan last modified:  Julio Cesar Jones (2/26/2024)   Next INR check:  --   Priority:  Maintenance   Target end date:  Indefinite    Indications    Atrial fibrillation (HCC) [I48.91]                 Anticoagulation Episode Summary       INR check location:  Anticoagulation Clinic    Preferred lab:  --    Send INR reminders to:  [facility]    Comments:  FAX  consent done 1/31/24          Anticoagulation Care Providers       Provider Role Specialty Phone number    Matias Sandoval MD Responsible Internal Medicine [phone]            Assessment / Plan:  nurse Adina called with INR results.  No changes to diet and medications reported.  No concerns reported.    Torri reports that they have been giving him 2.5mg daily EXCEPT 1.5mg every Monday and Friday (vs 1.25mg every Monday and Friday)    Instructed to hold warfarin today and then resume 2.5mg daily EXCEPT 1.25mg every Monday and Friday.     Description    Warfarin 2.5mg (1 tablet) daily EXCEPT 1.25mg (1/2 tablet) every Monday and Friday    Keep greens consistent. Iceberg lettuce occasionally.     Call [phone] with signs or symptoms of bleeding or ANY medication changes (including over-the-counter medications or herbal supplements).     Especially if you begin oral steroids and/or antibiotics.    If significant bleeding occurs please seek immediate medical attention.    Keep the number of servings and portion size of vitamin K containing foods (dark green, leafy vegetables) the same each week.

## 2024-09-11 ENCOUNTER — TELEPHONE (OUTPATIENT)
Dept: PHARMACY | Age: 89
End: 2024-09-11

## 2024-09-20 ENCOUNTER — ANTI-COAG VISIT (OUTPATIENT)
Dept: PHARMACY | Age: 89
End: 2024-09-20

## 2024-09-20 DIAGNOSIS — I48.91 ATRIAL FIBRILLATION, UNSPECIFIED TYPE (HCC): Primary | ICD-10-CM

## 2024-09-20 LAB
INR BLD: 1.6
PROTIME: NORMAL

## 2024-09-23 ENCOUNTER — ANTI-COAG VISIT (OUTPATIENT)
Dept: PHARMACY | Age: 89
End: 2024-09-23

## 2024-09-23 DIAGNOSIS — I48.91 ATRIAL FIBRILLATION, UNSPECIFIED TYPE (HCC): Primary | ICD-10-CM

## 2024-09-27 PROBLEM — E86.0 DEHYDRATION: Status: RESOLVED | Noted: 2024-08-28 | Resolved: 2024-09-27

## 2024-10-02 ENCOUNTER — ANTI-COAG VISIT (OUTPATIENT)
Dept: PHARMACY | Age: 89
End: 2024-10-02

## 2024-10-02 DIAGNOSIS — I48.91 ATRIAL FIBRILLATION, UNSPECIFIED TYPE (HCC): Primary | ICD-10-CM

## 2024-10-02 LAB
INR BLD: 3
PROTIME: NORMAL

## 2024-10-02 NOTE — PROGRESS NOTES
[facility]  Home Care Visit    Lab Results   Component Value Date    INR 3.00 10/02/2024    INR 3.10 09/27/2024    INR 1.60 09/20/2024       Anticoagulation Summary  As of 10/2/2024      INR goal:  2.0-3.0   TTR:  78.0% (11.7 y)   INR used for dosing:  3.00 (10/2/2024)   Warfarin maintenance plan:  1.25 mg (2.5 mg x 0.5) every Mon, Fri; 2.5 mg (2.5 mg x 1) all other days   Weekly warfarin total:  15 mg   Plan last modified:  Fracisco Marshall RPH (9/23/2024)   Next INR check:  10/9/2024   Priority:  Maintenance   Target end date:  Indefinite    Indications    Atrial fibrillation (HCC) [I48.91]                 Anticoagulation Episode Summary       INR check location:  Anticoagulation Clinic    Preferred lab:  --    Send INR reminders to:  [facility]    Comments:  FAX  consent done 1/31/24          Anticoagulation Care Providers       Provider Role Specialty Phone number    Matias Sandoval MD Responsible Internal Medicine [phone]              Assessment / Plan:  Torrithe home care nurse called with INR results.  No changes to diet and medications reported.  No concerns reported.        Description    Patient will hold warfarin today.  Then resume dose of warfarin 2.5 mg daily, except 1.25 mg every Monday and Friday       Keep greens consistent. Iceberg lettuce occasionally.     Call [phone] with signs or symptoms of bleeding or ANY medication changes (including over-the-counter medications or herbal supplements).     Especially if you begin oral steroids and/or antibiotics.    If significant bleeding occurs please seek immediate medical attention.    Keep the number of servings and portion size of vitamin K containing foods (dark green, leafy vegetables) the same each week. Vegetables high in vitamin K : broccoli, spinach, leaf lettuce, keke lettuce, kale, collards, asparagus, brussel sprouts.  Please call if you routine diet changes.     Limit

## 2024-10-09 ENCOUNTER — ANTI-COAG VISIT (OUTPATIENT)
Dept: PHARMACY | Age: 89
End: 2024-10-09

## 2024-10-09 DIAGNOSIS — I48.91 ATRIAL FIBRILLATION, UNSPECIFIED TYPE (HCC): Primary | ICD-10-CM

## 2024-10-09 LAB
INR BLD: 3.5
PROTIME: NORMAL

## 2024-10-09 NOTE — PROGRESS NOTES
symptoms of bleeding or ANY medication changes (including over-the-counter medications or herbal supplements).     Especially if you begin oral steroids and/or antibiotics.    If significant bleeding occurs please seek immediate medical attention.    Keep the number of servings and portion size of vitamin K containing foods (dark green, leafy vegetables) the same each week. Vegetables high in vitamin K : broccoli, spinach, leaf lettuce, keke lettuce, kale, collards, asparagus, brussel sprouts.  Please call if you routine diet changes.     Limit alcohol intake. Please call if this changes.     Allow 72 hours for warfarin refills.         Warfarin medication reviewed and updated on the patient 's home medication list. y    Instructions given to Adina with [facility]    Radha Patel, PharmD 10/09/24  10:25 AM        For Pharmacy Admin Tracking Only    Intervention Detail: Dose Adjustment: 1, reason: Therapy Optimization  Total # of Interventions Recommended: 1  Total # of Interventions Accepted: 1  Time Spent (min): 10

## 2024-10-17 ENCOUNTER — ANTI-COAG VISIT (OUTPATIENT)
Dept: PHARMACY | Age: 89
End: 2024-10-17

## 2024-10-17 DIAGNOSIS — I48.91 ATRIAL FIBRILLATION, UNSPECIFIED TYPE (HCC): Primary | ICD-10-CM

## 2024-10-17 LAB
INR BLD: 3.6
PROTIME: NORMAL

## 2024-10-17 NOTE — PROGRESS NOTES
[facility]  Home Care Visit    Lab Results   Component Value Date    INR 3.60 10/17/2024    INR 3.50 10/09/2024    INR 3.00 10/02/2024       Anticoagulation Summary  As of 10/17/2024      INR goal:  2.0-3.0   TTR:  77.7% (11.8 y)   INR used for dosing:  3.60 (10/17/2024)   Warfarin maintenance plan:  2.5 mg (2.5 mg x 1) every Mon, Wed, Fri; 1.25 mg (2.5 mg x 0.5) all other days   Weekly warfarin total:  12.5 mg   Plan last modified:  Ailyn Kay RPH (10/17/2024)   Next INR check:  10/23/2024   Priority:  High   Target end date:  Indefinite    Indications    Atrial fibrillation (HCC) [I48.91]                 Anticoagulation Episode Summary       INR check location:  Anticoagulation Clinic    Preferred lab:  --    Send INR reminders to:  [facility]    Comments:  FAX  consent done 1/31/24          Anticoagulation Care Providers       Provider Role Specialty Phone number    Matias Sandoval MD Responsible Internal Medicine [phone]              Assessment / Plan:  Sanaz ,the home care nurse called with INR results.  No changes to diet and medications reported.  No concerns reported.    Will hold today, then decrease dose and recheck in one week.    Description    Patient will hold warfarin today 10/17      Then new dose: warfarin 2.5 mg daily, except 1.25 mg every Monday, Thursday and Friday       Keep greens consistent. Iceberg lettuce occasionally.     Call [phone] with signs or symptoms of bleeding or ANY medication changes (including over-the-counter medications or herbal supplements).     Especially if you begin oral steroids and/or antibiotics.    If significant bleeding occurs please seek immediate medical attention.    Keep the number of servings and portion size of vitamin K containing foods (dark green, leafy vegetables) the same each week. Vegetables high in vitamin K : broccoli, spinach, leaf lettuce, keke lettuce, kale, collards,

## 2024-10-23 ENCOUNTER — ANTI-COAG VISIT (OUTPATIENT)
Dept: PHARMACY | Age: 89
End: 2024-10-23

## 2024-10-23 DIAGNOSIS — I48.91 ATRIAL FIBRILLATION, UNSPECIFIED TYPE (HCC): Primary | ICD-10-CM

## 2024-10-23 LAB
INTERNATIONAL NORMALIZATION RATIO, POC: 2.9
PROTHROMBIN TIME, POC: 0

## 2024-10-23 NOTE — PROGRESS NOTES
herbal supplements).     Especially if you begin oral steroids and/or antibiotics.    If significant bleeding occurs please seek immediate medical attention.    Keep the number of servings and portion size of vitamin K containing foods (dark green, leafy vegetables) the same each week. Vegetables high in vitamin K : broccoli, spinach, leaf lettuce, keke lettuce, kale, collards, asparagus, brussel sprouts.  Please call if you routine diet changes.     Limit alcohol intake. Please call if this changes.     Allow 72 hours for warfarin refills.         Warfarin medication reviewed and updated on the patient 's home medication list. y    Instructions given to ALYSSA Otto & patient    For Pharmacy Admin Tracking Only    Intervention Detail:   Total # of Interventions Recommended: 0  Total # of Interventions Accepted: 0  Time Spent (min): 10     Lorri RabagoD 10/23/24  9:47 AM

## 2024-10-30 ENCOUNTER — ANTI-COAG VISIT (OUTPATIENT)
Dept: PHARMACY | Age: 89
End: 2024-10-30

## 2024-10-30 DIAGNOSIS — I48.91 ATRIAL FIBRILLATION, UNSPECIFIED TYPE (HCC): Primary | ICD-10-CM

## 2024-10-30 LAB
INTERNATIONAL NORMALIZATION RATIO, POC: 3.1
PROTHROMBIN TIME, POC: 0

## 2024-10-30 NOTE — PROGRESS NOTES
[facility]  Home Care Visit: [facility]    Lab Results   Component Value Date    INR 3.1 10/30/2024    INR 2.9 10/23/2024    INR 3.60 10/17/2024       Anticoagulation Summary  As of 10/30/2024      INR goal:  2.0-3.0   TTR:  77.6% (11.8 y)   INR used for dosing:  3.1 (10/30/2024)   Warfarin maintenance plan:  2.5 mg (2.5 mg x 1) every Mon, Wed, Fri; 1.25 mg (2.5 mg x 0.5) all other days   Weekly warfarin total:  12.5 mg   Plan last modified:  Ailyn Kay RPH (10/17/2024)   Next INR check:  11/6/2024   Priority:  High   Target end date:  Indefinite    Indications    Atrial fibrillation (HCC) [I48.91]                 Anticoagulation Episode Summary       INR check location:  Anticoagulation Clinic    Preferred lab:  --    Send INR reminders to:  [facility]    Comments:  FAX  consent done 1/31/24          Anticoagulation Care Providers       Provider Role Specialty Phone number    Matias Sandoval MD Responsible Internal Medicine [phone]            Patient Findings       Negatives:  Signs/symptoms of thrombosis, Signs/symptoms of bleeding, Change in health, Missed doses, Change in medications, Change in diet/appetite, Bruising          Assessment / Plan:  Torri HOYOS ,the home care nurse called with INR results.  No changes to diet and medications reported.  No concerns reported.    He has three more weeks of  visits left  Description    Torri Hoyos with [facility] # [phone]  Take warfarin 1.25 mg on 10-30-24  CONTINUE DOSE: warfarin 1.25 mg daily, except 2.5 mg every Monday, Wednesday and Friday     Keep greens consistent. Iceberg lettuce occasionally.     Call [phone] with signs or symptoms of bleeding or ANY medication changes (including over-the-counter medications or herbal supplements).     Especially if you begin oral steroids and/or antibiotics.    If significant bleeding occurs please seek immediate medical attention.    Keep

## 2024-11-06 ENCOUNTER — ANTI-COAG VISIT (OUTPATIENT)
Dept: PHARMACY | Age: 89
End: 2024-11-06

## 2024-11-06 DIAGNOSIS — I48.91 ATRIAL FIBRILLATION, UNSPECIFIED TYPE (HCC): Primary | ICD-10-CM

## 2024-11-06 LAB
INR BLD: 2.4
PROTIME: NORMAL

## 2024-11-06 NOTE — PROGRESS NOTES
[facility]  Home Care Visit    Lab Results   Component Value Date    INR 2.40 2024    INR 3.1 10/30/2024    INR 2.9 10/23/2024     Anticoagulation Summary  As of 2024      INR goal:  2.0-3.0   TTR:  77.6% (11.8 y)   INR used for dosin.40 (2024)   Warfarin maintenance plan:  2.5 mg (2.5 mg x 1) every Mon, Fri; 1.25 mg (2.5 mg x 0.5) all other days   Weekly warfarin total:  11.25 mg   Plan last modified:  Radha Patel [facility] (2024)   Next INR check:  2024   Priority:  High   Target end date:  Indefinite    Indications    Atrial fibrillation (HCC) [I48.91]                 Anticoagulation Episode Summary       INR check location:  Anticoagulation Clinic    Preferred lab:  --    Send INR reminders to:  [facility]    Comments:  FAX  consent done 24          Anticoagulation Care Providers       Provider Role Specialty Phone number    Matias Sandoval MD Responsible Internal Medicine [phone]            Assessment / Plan:  Adinathe home care nurse called with INR results.  No changes to diet and medications reported.  No concerns reported.    INR is back in range today after lowering the dose last Wednesday.  Will adjust the weekly dose to reflect the past 7 days and [facility] to check INR again in 1 week.    Description    Torri SHAH with [facility] # [phone]    NEW DOSE: warfarin 1.25 mg daily, except 2.5 mg every Monday and Friday     Keep greens consistent. Iceberg lettuce occasionally.     Call [phone] with signs or symptoms of bleeding or ANY medication changes (including over-the-counter medications or herbal supplements).     Especially if you begin oral steroids and/or antibiotics.    If significant bleeding occurs please seek immediate medical attention.    Keep the number of servings and portion size of vitamin K containing foods (dark green, leafy vegetables) the same each week. Vegetables high in

## 2024-11-13 ENCOUNTER — ANTI-COAG VISIT (OUTPATIENT)
Dept: PHARMACY | Age: 89
End: 2024-11-13

## 2024-11-13 DIAGNOSIS — I48.91 ATRIAL FIBRILLATION, UNSPECIFIED TYPE (HCC): Primary | ICD-10-CM

## 2024-11-13 LAB
INR BLD: 2
PROTIME: NORMAL

## 2024-11-13 NOTE — PROGRESS NOTES
[facility]  Home Care Visit    Lab Results   Component Value Date    INR 2.00 2024    INR 2.40 2024    INR 3.1 10/30/2024       Anticoagulation Summary  As of 2024      INR goal:  2.0-3.0   TTR:  77.7% (11.9 y)   INR used for dosin.00 (2024)   Warfarin maintenance plan:  2.5 mg (2.5 mg x 1) every Mon, Fri; 1.25 mg (2.5 mg x 0.5) all other days   Weekly warfarin total:  11.25 mg   Plan last modified:  Radha Patel [facility] (2024)   Next INR check:  2024   Priority:  High   Target end date:  Indefinite    Indications    Atrial fibrillation (HCC) [I48.91]                 Anticoagulation Episode Summary       INR check location:  Anticoagulation Clinic    Preferred lab:  --    Send INR reminders to:  [facility]    Comments:  FAX  consent done 24          Anticoagulation Care Providers       Provider Role Specialty Phone number    Matias Sandoval MD Responsible Internal Medicine [phone]              Assessment / Plan:  Torri ,the home care nurse called with INR results.  No changes to diet and medications reported.  No concerns reported.        Description    Torri RN with [facility] # [phone]    Continue  warfarin 1.25 mg daily, except 2.5 mg every Monday and Friday     Keep greens consistent. Iceberg lettuce occasionally.     Call [phone] with signs or symptoms of bleeding or ANY medication changes (including over-the-counter medications or herbal supplements).     Especially if you begin oral steroids and/or antibiotics.    If significant bleeding occurs please seek immediate medical attention.    Keep the number of servings and portion size of vitamin K containing foods (dark green, leafy vegetables) the same each week. Vegetables high in vitamin K : broccoli, spinach, leaf lettuce, keke lettuce, kale, collards, asparagus, brussel sprouts.  Please call if you routine diet changes.     Limit

## 2024-11-20 ENCOUNTER — ANTI-COAG VISIT (OUTPATIENT)
Dept: PHARMACY | Age: 89
End: 2024-11-20

## 2024-11-20 DIAGNOSIS — I48.91 ATRIAL FIBRILLATION, UNSPECIFIED TYPE (HCC): Primary | ICD-10-CM

## 2024-11-20 LAB
INR BLD: 1.8
PROTIME: NORMAL

## 2024-11-20 NOTE — PROGRESS NOTES
[facility]  Home Care Visit    Lab Results   Component Value Date    INR 1.80 2024    INR 2.00 2024    INR 2.40 2024       Anticoagulation Summary  As of 2024      INR goal:  2.0-3.0   TTR:  77.5% (11.9 y)   INR used for dosin.80 (2024)   Warfarin maintenance plan:  2.5 mg (2.5 mg x 1) every Mon, Fri; 1.25 mg (2.5 mg x 0.5) all other days   Weekly warfarin total:  11.25 mg   Plan last modified:  Radha Patel [facility] (2024)   Next INR check:  2024   Priority:  High   Target end date:  Indefinite    Indications    Atrial fibrillation (HCC) [I48.91]                 Anticoagulation Episode Summary       INR check location:  Anticoagulation Clinic    Preferred lab:  --    Send INR reminders to:  [facility]    Comments:  FAX  consent done 24          Anticoagulation Care Providers       Provider Role Specialty Phone number    Matias Sandoval MD Responsible Internal Medicine [phone]            Patient Findings       Negatives:  Signs/symptoms of thrombosis, Signs/symptoms of bleeding, Laboratory test error suspected, Change in health, Change in alcohol use, Change in activity, Upcoming invasive procedure, Emergency department visit, Upcoming dental procedure, Missed doses, Extra doses, Change in medications, Change in diet/appetite, Hospital admission, Bruising, Other complaints          Assessment / Plan:  Adinathe home care nurse called with INR results.  No changes to diet and medications reported.  No concerns reported.    INR below goal range today, no missed doses or changes reported by RN. Will have him take an increased warfarin dose today only then back to usual dosing.    Per RN this is his last [facility] visit, we will reach out to him to schedule an in person visit for his next INR within the next 2 weeks preferably.    Description    Today only, take 2.5mg (1 tablet), Then    Continue: warfarin 1.25

## 2024-12-04 ENCOUNTER — ANTI-COAG VISIT (OUTPATIENT)
Dept: PHARMACY | Age: 88
End: 2024-12-04
Payer: MEDICARE

## 2024-12-04 DIAGNOSIS — I48.91 ATRIAL FIBRILLATION, UNSPECIFIED TYPE (HCC): Primary | ICD-10-CM

## 2024-12-04 LAB
INTERNATIONAL NORMALIZATION RATIO, POC: 1.7
PROTHROMBIN TIME, POC: 0

## 2024-12-04 PROCEDURE — 85610 PROTHROMBIN TIME: CPT

## 2024-12-04 PROCEDURE — 99211 OFF/OP EST MAY X REQ PHY/QHP: CPT

## 2024-12-19 ENCOUNTER — ANTI-COAG VISIT (OUTPATIENT)
Dept: PHARMACY | Age: 88
End: 2024-12-19
Payer: MEDICARE

## 2024-12-19 DIAGNOSIS — I48.91 ATRIAL FIBRILLATION, UNSPECIFIED TYPE (HCC): Primary | ICD-10-CM

## 2024-12-19 LAB
INR BLD: 1.9
PROTIME: NORMAL

## 2024-12-19 PROCEDURE — 99212 OFFICE O/P EST SF 10 MIN: CPT | Performed by: PHARMACIST

## 2024-12-19 PROCEDURE — 85610 PROTHROMBIN TIME: CPT | Performed by: PHARMACIST

## 2024-12-19 NOTE — PROGRESS NOTES
half of the tablet today to get to 1 tablet, then   Continue: warfarin 1.25 mg daily, except 2.5 mg every Monday, Wednesday and Friday     Keep greens consistent. Iceberg lettuce occasionally.     Call [phone] with signs or symptoms of bleeding or ANY medication changes (including over-the-counter medications or herbal supplements).     Especially if you begin oral steroids and/or antibiotics.    If significant bleeding occurs please seek immediate medical attention.    Keep the number of servings and portion size of vitamin K containing foods (dark green, leafy vegetables) the same each week. Vegetables high in vitamin K : broccoli, spinach, leaf lettuce, keke lettuce, kale, collards, asparagus, brussel sprouts.  Please call if you routine diet changes.     Limit alcohol intake. Please call if this changes.     Allow 72 hours for warfarin refills.         Orders Placed This Encounter   Procedures    Protime-INR     This external order was created through the results console.      No orders of the defined types were placed in this encounter.     Reviewed AVS with patient / caregiver.    Billing Points:  Adjust dosage and/or reconcile meds (fill pill box) </= 5 medications - 2 points  BASIC ASSESSMENT UNCOMPLICATED (including but not limited to: vital signs; physical assessment screening; review of secondary markers like lab results, allergies, home readings; instructions on treatment plan and basic education; assessment of medication list with one med change and compliance) - 2 points     For Pharmacy Admin Tracking Only    Intervention Detail: Dose Adjustment: 1, reason: Therapy Optimization  Total # of Interventions Recommended: 1   Total # of Interventions Accepted: 1  Time Spent (min): 15   Nilda Yoon  PharmD Candidate 2025    I have seen the patient and reviewed the progress note written by the PharmD Candidate. I agree with this assesment and plan.   Radha Patel RP, PharmD 12/19/24 11:30 AM

## 2025-01-03 ENCOUNTER — ANTI-COAG VISIT (OUTPATIENT)
Dept: PHARMACY | Age: 89
End: 2025-01-03
Payer: MEDICARE

## 2025-01-03 DIAGNOSIS — I48.91 ATRIAL FIBRILLATION, UNSPECIFIED TYPE (HCC): Primary | ICD-10-CM

## 2025-01-03 LAB
INTERNATIONAL NORMALIZATION RATIO, POC: 1.7
PROTHROMBIN TIME, POC: 0

## 2025-01-03 PROCEDURE — 99212 OFFICE O/P EST SF 10 MIN: CPT

## 2025-01-03 PROCEDURE — 85610 PROTHROMBIN TIME: CPT

## 2025-01-03 NOTE — PROGRESS NOTES
Maurice Ackerman  is a 89 y.o. here for warfarin management.  Maurice had an INR test today. Results were reviewed and appropriate warfarin management was completed.     Patient verifies current warfarin dosing regimen: Yes     Warfarin medication reviewed and updated on the patient 's home medication list: Yes   All other medications reviewed and updated on the patient 's home medication list: No: no changes     Lab Results   Component Value Date    INR 1.7 2025    INR 1.90 2024    INR 1.7 2024     Patient Findings       Negatives:  Signs/symptoms of thrombosis, Signs/symptoms of bleeding, Change in medications, Change in diet/appetite, Bruising          Anticoagulation Summary  As of 1/3/2025      INR goal:  2.0-3.0   TTR:  76.8% (12 y)   INR used for dosin.7 (1/3/2025)   Warfarin maintenance plan:  1.25 mg (2.5 mg x 0.5) every Mon, Wed, Fri; 2.5 mg (2.5 mg x 1) all other days   Weekly warfarin total:  13.75 mg   Plan last modified:  Mami Arciniega RP (1/3/2025)   Next INR check:  2025   Priority:  High   Target end date:  Indefinite    Indications    Atrial fibrillation (HCC) [I48.91]                 Anticoagulation Episode Summary       INR check location:  Anticoagulation Clinic    Preferred lab:  --    Send INR reminders to:  [facility]FF    Comments:  FAX  consent done 24          Anticoagulation Care Providers       Provider Role Specialty Phone number    Matias Sandoval MD [facility] Internal Medicine [phone]          There were no vitals taken for this visit.    Warfarin assessment / plan:     Appears well  Sub-therapeutic INR     Denies missed doses.  Denies increased vitamin K intake.  Denies signs or symptoms of clotting.  Denies signs or symptoms of a stroke     INR has been running low. We have been instructing to ezio one time higher dose and returning to the same weekly dose.     Today instructed to increase his weekly dose by 10% to

## 2025-01-17 ENCOUNTER — ANTI-COAG VISIT (OUTPATIENT)
Dept: PHARMACY | Age: 89
End: 2025-01-17
Payer: MEDICARE

## 2025-01-17 DIAGNOSIS — I48.91 ATRIAL FIBRILLATION, UNSPECIFIED TYPE (HCC): Primary | ICD-10-CM

## 2025-01-17 LAB
INTERNATIONAL NORMALIZATION RATIO, POC: 2.4
PROTHROMBIN TIME, POC: 0

## 2025-01-17 PROCEDURE — 99211 OFF/OP EST MAY X REQ PHY/QHP: CPT

## 2025-01-17 PROCEDURE — 85610 PROTHROMBIN TIME: CPT

## 2025-01-17 NOTE — PROGRESS NOTES
Maurice Ackerman Jr is a 89 y.o. here for warfarin management.  Maurice had an INR test today. Results were reviewed and appropriate warfarin management was completed.     Patient verifies current warfarin dosing regimen: Yes     Warfarin medication reviewed and updated on the patient 's home medication list: Yes   All other medications reviewed and updated on the patient 's home medication list: No: no changes     Lab Results   Component Value Date    INR 2.4 2025    INR 1.7 2025    INR 1.90 2024     Patient Findings       Negatives:  Signs/symptoms of thrombosis, Signs/symptoms of bleeding, Change in medications, Change in diet/appetite, Bruising          Anticoagulation Summary  As of 2025      INR goal:  2.0-3.0   TTR:  76.7% (12 y)   INR used for dosin.4 (2025)   Warfarin maintenance plan:  1.25 mg (2.5 mg x 0.5) every Mon, Wed, Fri; 2.5 mg (2.5 mg x 1) all other days   Weekly warfarin total:  13.75 mg   Plan last modified:  Mami Arciniega RPH (1/3/2025)   Next INR check:  2025   Priority:  High   Target end date:  Indefinite    Indications    Atrial fibrillation (HCC) [I48.91]                 Anticoagulation Episode Summary       INR check location:  Anticoagulation Clinic    Preferred lab:  --    Send INR reminders to:  [facility]    Comments:  FAX  consent done 24          Anticoagulation Care Providers       Provider Role Specialty Phone number    Matias Sandoval MD Responsible Internal Medicine [phone]          There were no vitals taken for this visit.    Warfarin assessment / plan:     Patient appears well today.      No changes affecting warfarin therapy were noted.   No acute findings with regards to warfarin therapy.  INR today is within goal range.     Instructed to continue the same weekly warfarin dose.      Description    Warfarin 2.5 mg daily, except 1.25 mg every Monday, Wednesday and Friday     Keep greens consistent.

## 2025-02-21 ENCOUNTER — ANTI-COAG VISIT (OUTPATIENT)
Dept: PHARMACY | Age: 89
End: 2025-02-21
Payer: MEDICARE

## 2025-02-21 DIAGNOSIS — I48.91 ATRIAL FIBRILLATION, UNSPECIFIED TYPE (HCC): Primary | ICD-10-CM

## 2025-02-21 LAB
INTERNATIONAL NORMALIZATION RATIO, POC: 2.1
PROTHROMBIN TIME, POC: 0

## 2025-02-21 PROCEDURE — 85610 PROTHROMBIN TIME: CPT | Performed by: PHARMACIST

## 2025-02-21 PROCEDURE — 99211 OFF/OP EST MAY X REQ PHY/QHP: CPT | Performed by: PHARMACIST

## 2025-02-21 NOTE — PROGRESS NOTES
Maurice Ackerman  is a 89 y.o. here for warfarin management.  Maurice had an INR test today. Results were reviewed and appropriate warfarin management was completed.     Patient verifies current warfarin dosing regimen: No: Unsure if he was taking correctly, he thinks maybe he took 1 tablet on // instead of 1/2 tab, and vice versa, not sure though    Warfarin medication reviewed and updated on the patient 's home medication list: Yes   All other medications reviewed and updated on the patient 's home medication list: Yes     Lab Results   Component Value Date    INR 2.1 2025    INR 2.4 2025    INR 1.7 2025     Patient Findings       Positives:  Missed doses, Extra doses    Negatives:  Signs/symptoms of thrombosis, Signs/symptoms of bleeding, Laboratory test error suspected, Change in health, Change in alcohol use, Change in activity, Upcoming invasive procedure, Emergency department visit, Upcoming dental procedure, Change in medications, Change in diet/appetite, Hospital admission, Bruising, Other complaints    Comments:  Unsure if he was taking the correct warfarin dose          Anticoagulation Summary  As of 2025      INR goal:  2.0-3.0   TTR:  76.9% (12.1 y)   INR used for dosin.1 (2025)   Warfarin maintenance plan:  1.25 mg (2.5 mg x 0.5) every Mon, Wed, Fri; 2.5 mg (2.5 mg x 1) all other days   Weekly warfarin total:  13.75 mg   Plan last modified:  Mami Arciniega RPH (1/3/2025)   Next INR check:  3/21/2025   Priority:  Maintenance   Target end date:  Indefinite    Indications    Atrial fibrillation (HCC) [I48.91]                 Anticoagulation Episode Summary       INR check location:  Anticoagulation Clinic    Preferred lab:  --    Send INR reminders to:  [facility]AFF    Comments:  FAX  consent done 24          Anticoagulation Care Providers       Provider Role Specialty Phone number    Matias Sandoval MD Responsible Internal Medicine

## 2025-03-21 ENCOUNTER — ANTI-COAG VISIT (OUTPATIENT)
Dept: PHARMACY | Age: 89
End: 2025-03-21
Payer: MEDICARE

## 2025-03-21 DIAGNOSIS — I48.91 ATRIAL FIBRILLATION, UNSPECIFIED TYPE (HCC): Primary | ICD-10-CM

## 2025-03-21 LAB
INTERNATIONAL NORMALIZATION RATIO, POC: 2.6
PROTHROMBIN TIME, POC: 0

## 2025-03-21 PROCEDURE — 85610 PROTHROMBIN TIME: CPT | Performed by: SPEECH-LANGUAGE PATHOLOGIST

## 2025-03-21 PROCEDURE — 99211 OFF/OP EST MAY X REQ PHY/QHP: CPT | Performed by: SPEECH-LANGUAGE PATHOLOGIST

## 2025-03-21 NOTE — PROGRESS NOTES
results, allergies, home readings; instructions on treatment plan and basic education; assessment of medication list with one med change and compliance) - 2 points     For Pharmacy Admin Tracking Only    Intervention Detail: Adherence Monitorin  Total # of Interventions Recommended: 1  Total # of Interventions Accepted: 1  Time Spent (min): 15     Hazel Pinedo PharmD, BCPS  3/21/2025 1:07 PM

## 2025-04-15 ENCOUNTER — APPOINTMENT (OUTPATIENT)
Dept: GENERAL RADIOLOGY | Age: 89
DRG: 291 | End: 2025-04-15
Payer: MEDICARE

## 2025-04-15 ENCOUNTER — HOSPITAL ENCOUNTER (INPATIENT)
Age: 89
LOS: 2 days | Discharge: HOME | DRG: 291 | End: 2025-04-17
Attending: EMERGENCY MEDICINE | Admitting: HOSPITALIST
Payer: MEDICARE

## 2025-04-15 ENCOUNTER — APPOINTMENT (OUTPATIENT)
Age: 89
DRG: 291 | End: 2025-04-15
Attending: INTERNAL MEDICINE
Payer: MEDICARE

## 2025-04-15 DIAGNOSIS — N18.9 CHRONIC KIDNEY DISEASE, UNSPECIFIED CKD STAGE: ICD-10-CM

## 2025-04-15 DIAGNOSIS — R07.9 CHEST PAIN, UNSPECIFIED TYPE: Primary | ICD-10-CM

## 2025-04-15 DIAGNOSIS — I48.20 CHRONIC ATRIAL FIBRILLATION (HCC): ICD-10-CM

## 2025-04-15 DIAGNOSIS — J90 PLEURAL EFFUSION: ICD-10-CM

## 2025-04-15 DIAGNOSIS — I50.9 CONGESTIVE HEART FAILURE, UNSPECIFIED HF CHRONICITY, UNSPECIFIED HEART FAILURE TYPE (HCC): ICD-10-CM

## 2025-04-15 DIAGNOSIS — R06.02 SHORTNESS OF BREATH: ICD-10-CM

## 2025-04-15 DIAGNOSIS — R79.89 ELEVATED TROPONIN: ICD-10-CM

## 2025-04-15 PROBLEM — M25.511 ACUTE PAIN OF RIGHT SHOULDER: Status: ACTIVE | Noted: 2025-04-15

## 2025-04-15 PROBLEM — L85.3 XEROSIS OF SKIN: Status: ACTIVE | Noted: 2025-04-15

## 2025-04-15 PROBLEM — I50.33 ACUTE ON CHRONIC DIASTOLIC CHF (CONGESTIVE HEART FAILURE) (HCC): Status: ACTIVE | Noted: 2025-04-15

## 2025-04-15 LAB
ALBUMIN SERPL-MCNC: 4.2 G/DL (ref 3.4–5)
ALBUMIN/GLOB SERPL: 1.2 {RATIO} (ref 1.1–2.2)
ALP SERPL-CCNC: 106 U/L (ref 40–129)
ALT SERPL-CCNC: 19 U/L (ref 10–40)
ANION GAP SERPL CALCULATED.3IONS-SCNC: 15 MMOL/L (ref 3–16)
APTT BLD: 41.8 SEC (ref 22.1–36.4)
AST SERPL-CCNC: 44 U/L (ref 15–37)
BASOPHILS # BLD: 0.1 K/UL (ref 0–0.2)
BASOPHILS NFR BLD: 1.1 %
BILIRUB SERPL-MCNC: 0.6 MG/DL (ref 0–1)
BUN SERPL-MCNC: 70 MG/DL (ref 7–20)
CALCIUM SERPL-MCNC: 9.9 MG/DL (ref 8.3–10.6)
CHLORIDE SERPL-SCNC: 94 MMOL/L (ref 99–110)
CO2 SERPL-SCNC: 27 MMOL/L (ref 21–32)
CREAT SERPL-MCNC: 2.7 MG/DL (ref 0.8–1.3)
DEPRECATED RDW RBC AUTO: 15.2 % (ref 12.4–15.4)
ECHO AO ASC DIAM: 4.1 CM
ECHO AO ASCENDING AORTA INDEX: 1.82 CM/M2
ECHO AO ROOT DIAM: 4.1 CM
ECHO AO ROOT INDEX: 1.82 CM/M2
ECHO AV AREA PEAK VELOCITY: 2.2 CM2
ECHO AV AREA VTI: 2.1 CM2
ECHO AV AREA/BSA PEAK VELOCITY: 1 CM2/M2
ECHO AV AREA/BSA VTI: 0.9 CM2/M2
ECHO AV MEAN GRADIENT: 2 MMHG
ECHO AV MEAN VELOCITY: 0.6 M/S
ECHO AV PEAK GRADIENT: 3 MMHG
ECHO AV PEAK VELOCITY: 0.9 M/S
ECHO AV VELOCITY RATIO: 0.67
ECHO AV VTI: 21.4 CM
ECHO BSA: 2.27 M2
ECHO EST RA PRESSURE: 10 MMHG
ECHO IVC PROX: 3.4 CM
ECHO LA AREA 2C: 39.5 CM2
ECHO LA AREA 4C: 47.1 CM2
ECHO LA MAJOR AXIS: 10.1 CM
ECHO LA MINOR AXIS: 9.4 CM
ECHO LA VOL BP: 153 ML (ref 18–58)
ECHO LA VOL MOD A2C: 130 ML (ref 18–58)
ECHO LA VOL MOD A4C: 170 ML (ref 18–58)
ECHO LA VOL/BSA BIPLANE: 68 ML/M2 (ref 16–34)
ECHO LA VOLUME INDEX MOD A2C: 58 ML/M2 (ref 16–34)
ECHO LA VOLUME INDEX MOD A4C: 76 ML/M2 (ref 16–34)
ECHO LV E' LATERAL VELOCITY: 10.3 CM/S
ECHO LV E' SEPTAL VELOCITY: 8.59 CM/S
ECHO LV EDV A2C: 85 ML
ECHO LV EDV A4C: 88 ML
ECHO LV EDV INDEX A4C: 39 ML/M2
ECHO LV EDV NDEX A2C: 38 ML/M2
ECHO LV EF PHYSICIAN: 52 %
ECHO LV EJECTION FRACTION A2C: 47 %
ECHO LV EJECTION FRACTION A4C: 60 %
ECHO LV EJECTION FRACTION BIPLANE: 52 % (ref 55–100)
ECHO LV ESV A2C: 45 ML
ECHO LV ESV A4C: 35 ML
ECHO LV ESV INDEX A2C: 20 ML/M2
ECHO LV ESV INDEX A4C: 16 ML/M2
ECHO LV FRACTIONAL SHORTENING: 27 % (ref 28–44)
ECHO LV INTERNAL DIMENSION DIASTOLE INDEX: 1.96 CM/M2
ECHO LV INTERNAL DIMENSION DIASTOLIC: 4.4 CM (ref 4.2–5.9)
ECHO LV INTERNAL DIMENSION SYSTOLIC INDEX: 1.42 CM/M2
ECHO LV INTERNAL DIMENSION SYSTOLIC: 3.2 CM
ECHO LV IVSD: 1.1 CM (ref 0.6–1)
ECHO LV MASS 2D: 203 G (ref 88–224)
ECHO LV MASS INDEX 2D: 90.2 G/M2 (ref 49–115)
ECHO LV POSTERIOR WALL DIASTOLIC: 1.4 CM (ref 0.6–1)
ECHO LV RELATIVE WALL THICKNESS RATIO: 0.64
ECHO LVOT AREA: 3.1 CM2
ECHO LVOT AV VTI INDEX: 0.66
ECHO LVOT DIAM: 2 CM
ECHO LVOT MEAN GRADIENT: 1 MMHG
ECHO LVOT PEAK GRADIENT: 1 MMHG
ECHO LVOT PEAK VELOCITY: 0.6 M/S
ECHO LVOT STROKE VOLUME INDEX: 19.7 ML/M2
ECHO LVOT SV: 44.3 ML
ECHO LVOT VTI: 14.1 CM
ECHO MV AREA VTI: 1.6 CM2
ECHO MV E DECELERATION TIME (DT): 165 MS
ECHO MV E VELOCITY: 1.34 M/S
ECHO MV E/E' LATERAL: 13.01
ECHO MV E/E' RATIO (AVERAGED): 14.3
ECHO MV E/E' SEPTAL: 15.6
ECHO MV LVOT VTI INDEX: 2.01
ECHO MV MAX VELOCITY: 1.3 M/S
ECHO MV MEAN GRADIENT: 2 MMHG
ECHO MV MEAN VELOCITY: 0.6 M/S
ECHO MV PEAK GRADIENT: 6 MMHG
ECHO MV VTI: 28.3 CM
ECHO PV MAX VELOCITY: 1 M/S
ECHO PV PEAK GRADIENT: 4 MMHG
ECHO RA AREA 4C: 36.6 CM2
ECHO RA END SYSTOLIC VOLUME APICAL 4 CHAMBER INDEX BSA: 55 ML/M2
ECHO RA VOLUME: 123 ML
ECHO RIGHT VENTRICULAR SYSTOLIC PRESSURE (RVSP): 25 MMHG
ECHO RV BASAL DIMENSION: 4.3 CM
ECHO RV FREE WALL PEAK S': 8.8 CM/S
ECHO RV MID DIMENSION: 2.9 CM
ECHO RV TAPSE: 1.2 CM (ref 1.7–?)
ECHO TV REGURGITANT MAX VELOCITY: 1.96 M/S
ECHO TV REGURGITANT PEAK GRADIENT: 15 MMHG
EKG DIAGNOSIS: NORMAL
EKG Q-T INTERVAL: 468 MS
EKG QRS DURATION: 162 MS
EKG QTC CALCULATION (BAZETT): 522 MS
EKG R AXIS: 80 DEGREES
EKG T AXIS: -10 DEGREES
EKG VENTRICULAR RATE: 75 BPM
EOSINOPHIL # BLD: 0.1 K/UL (ref 0–0.6)
EOSINOPHIL NFR BLD: 1 %
FERRITIN SERPL IA-MCNC: 124 NG/ML (ref 30–400)
GFR SERPLBLD CREATININE-BSD FMLA CKD-EPI: 21 ML/MIN/{1.73_M2}
GLUCOSE SERPL-MCNC: 92 MG/DL (ref 70–99)
HCT VFR BLD AUTO: 34.8 % (ref 40.5–52.5)
HGB BLD-MCNC: 11.7 G/DL (ref 13.5–17.5)
INR PPP: 2.62 (ref 0.85–1.15)
IRON SATN MFR SERPL: 15 % (ref 20–50)
IRON SERPL-MCNC: 46 UG/DL (ref 59–158)
LYMPHOCYTES # BLD: 1.2 K/UL (ref 1–5.1)
LYMPHOCYTES NFR BLD: 17.4 %
MCH RBC QN AUTO: 31.1 PG (ref 26–34)
MCHC RBC AUTO-ENTMCNC: 33.8 G/DL (ref 31–36)
MCV RBC AUTO: 92.2 FL (ref 80–100)
MONOCYTES # BLD: 0.7 K/UL (ref 0–1.3)
MONOCYTES NFR BLD: 10.3 %
NEUTROPHILS # BLD: 4.8 K/UL (ref 1.7–7.7)
NEUTROPHILS NFR BLD: 70.2 %
NT-PROBNP SERPL-MCNC: 4573 PG/ML (ref 0–449)
PLATELET # BLD AUTO: 201 K/UL (ref 135–450)
PMV BLD AUTO: 8.7 FL (ref 5–10.5)
POTASSIUM SERPL-SCNC: 4.3 MMOL/L (ref 3.5–5.1)
PROCALCITONIN SERPL IA-MCNC: 0.17 NG/ML (ref 0–0.15)
PROT SERPL-MCNC: 7.6 G/DL (ref 6.4–8.2)
PROTHROMBIN TIME: 28 SEC (ref 11.9–14.9)
RBC # BLD AUTO: 3.77 M/UL (ref 4.2–5.9)
SODIUM SERPL-SCNC: 136 MMOL/L (ref 136–145)
TIBC SERPL-MCNC: 302 UG/DL (ref 260–445)
TROPONIN, HIGH SENSITIVITY: 122 NG/L (ref 0–22)
TROPONIN, HIGH SENSITIVITY: 130 NG/L (ref 0–22)
WBC # BLD AUTO: 6.9 K/UL (ref 4–11)

## 2025-04-15 PROCEDURE — 93005 ELECTROCARDIOGRAM TRACING: CPT | Performed by: EMERGENCY MEDICINE

## 2025-04-15 PROCEDURE — 97535 SELF CARE MNGMENT TRAINING: CPT

## 2025-04-15 PROCEDURE — 97116 GAIT TRAINING THERAPY: CPT

## 2025-04-15 PROCEDURE — 71045 X-RAY EXAM CHEST 1 VIEW: CPT

## 2025-04-15 PROCEDURE — 6360000002 HC RX W HCPCS: Performed by: HOSPITALIST

## 2025-04-15 PROCEDURE — 94760 N-INVAS EAR/PLS OXIMETRY 1: CPT

## 2025-04-15 PROCEDURE — 99285 EMERGENCY DEPT VISIT HI MDM: CPT

## 2025-04-15 PROCEDURE — 80053 COMPREHEN METABOLIC PANEL: CPT

## 2025-04-15 PROCEDURE — 97166 OT EVAL MOD COMPLEX 45 MIN: CPT

## 2025-04-15 PROCEDURE — 84145 PROCALCITONIN (PCT): CPT

## 2025-04-15 PROCEDURE — 93306 TTE W/DOPPLER COMPLETE: CPT

## 2025-04-15 PROCEDURE — 83540 ASSAY OF IRON: CPT

## 2025-04-15 PROCEDURE — 82728 ASSAY OF FERRITIN: CPT

## 2025-04-15 PROCEDURE — 6360000002 HC RX W HCPCS: Performed by: NURSE PRACTITIONER

## 2025-04-15 PROCEDURE — 2500000003 HC RX 250 WO HCPCS: Performed by: HOSPITALIST

## 2025-04-15 PROCEDURE — 2060000000 HC ICU INTERMEDIATE R&B

## 2025-04-15 PROCEDURE — 83550 IRON BINDING TEST: CPT

## 2025-04-15 PROCEDURE — 99222 1ST HOSP IP/OBS MODERATE 55: CPT | Performed by: NURSE PRACTITIONER

## 2025-04-15 PROCEDURE — 6370000000 HC RX 637 (ALT 250 FOR IP): Performed by: EMERGENCY MEDICINE

## 2025-04-15 PROCEDURE — 20610 DRAIN/INJ JOINT/BURSA W/O US: CPT | Performed by: NURSE PRACTITIONER

## 2025-04-15 PROCEDURE — 36415 COLL VENOUS BLD VENIPUNCTURE: CPT

## 2025-04-15 PROCEDURE — 73030 X-RAY EXAM OF SHOULDER: CPT

## 2025-04-15 PROCEDURE — 6370000000 HC RX 637 (ALT 250 FOR IP)

## 2025-04-15 PROCEDURE — 83880 ASSAY OF NATRIURETIC PEPTIDE: CPT

## 2025-04-15 PROCEDURE — 85730 THROMBOPLASTIN TIME PARTIAL: CPT

## 2025-04-15 PROCEDURE — 99223 1ST HOSP IP/OBS HIGH 75: CPT | Performed by: INTERNAL MEDICINE

## 2025-04-15 PROCEDURE — 84484 ASSAY OF TROPONIN QUANT: CPT

## 2025-04-15 PROCEDURE — 85025 COMPLETE CBC W/AUTO DIFF WBC: CPT

## 2025-04-15 PROCEDURE — 97162 PT EVAL MOD COMPLEX 30 MIN: CPT

## 2025-04-15 PROCEDURE — 93306 TTE W/DOPPLER COMPLETE: CPT | Performed by: INTERNAL MEDICINE

## 2025-04-15 PROCEDURE — 6370000000 HC RX 637 (ALT 250 FOR IP): Performed by: INTERNAL MEDICINE

## 2025-04-15 PROCEDURE — 85610 PROTHROMBIN TIME: CPT

## 2025-04-15 PROCEDURE — 96374 THER/PROPH/DIAG INJ IV PUSH: CPT

## 2025-04-15 PROCEDURE — 6360000002 HC RX W HCPCS: Performed by: PHYSICIAN ASSISTANT

## 2025-04-15 PROCEDURE — 93010 ELECTROCARDIOGRAM REPORT: CPT | Performed by: INTERNAL MEDICINE

## 2025-04-15 RX ORDER — WARFARIN SODIUM 2.5 MG/1
2.5 TABLET ORAL ONCE
Status: DISCONTINUED | OUTPATIENT
Start: 2025-04-15 | End: 2025-04-15

## 2025-04-15 RX ORDER — SODIUM CHLORIDE 0.9 % (FLUSH) 0.9 %
5-40 SYRINGE (ML) INJECTION EVERY 12 HOURS SCHEDULED
Status: DISCONTINUED | OUTPATIENT
Start: 2025-04-15 | End: 2025-04-17 | Stop reason: HOSPADM

## 2025-04-15 RX ORDER — ONDANSETRON 2 MG/ML
4 INJECTION INTRAMUSCULAR; INTRAVENOUS EVERY 6 HOURS PRN
Status: DISCONTINUED | OUTPATIENT
Start: 2025-04-15 | End: 2025-04-17 | Stop reason: HOSPADM

## 2025-04-15 RX ORDER — ACETAMINOPHEN 650 MG/1
650 SUPPOSITORY RECTAL EVERY 6 HOURS PRN
Status: DISCONTINUED | OUTPATIENT
Start: 2025-04-15 | End: 2025-04-17 | Stop reason: HOSPADM

## 2025-04-15 RX ORDER — FUROSEMIDE 10 MG/ML
40 INJECTION INTRAMUSCULAR; INTRAVENOUS 2 TIMES DAILY
Status: DISCONTINUED | OUTPATIENT
Start: 2025-04-15 | End: 2025-04-16

## 2025-04-15 RX ORDER — SODIUM CHLORIDE 9 MG/ML
INJECTION, SOLUTION INTRAVENOUS PRN
Status: DISCONTINUED | OUTPATIENT
Start: 2025-04-15 | End: 2025-04-17 | Stop reason: HOSPADM

## 2025-04-15 RX ORDER — HEPARIN SODIUM 5000 [USP'U]/ML
5000 INJECTION, SOLUTION INTRAVENOUS; SUBCUTANEOUS EVERY 8 HOURS SCHEDULED
Status: DISCONTINUED | OUTPATIENT
Start: 2025-04-15 | End: 2025-04-15

## 2025-04-15 RX ORDER — ACETAMINOPHEN 325 MG/1
650 TABLET ORAL EVERY 6 HOURS PRN
Status: DISCONTINUED | OUTPATIENT
Start: 2025-04-15 | End: 2025-04-17 | Stop reason: HOSPADM

## 2025-04-15 RX ORDER — TAMSULOSIN HYDROCHLORIDE 0.4 MG/1
0.4 CAPSULE ORAL EVERY EVENING
Status: DISCONTINUED | OUTPATIENT
Start: 2025-04-15 | End: 2025-04-17 | Stop reason: HOSPADM

## 2025-04-15 RX ORDER — ALLOPURINOL 100 MG/1
100 TABLET ORAL DAILY
Status: DISCONTINUED | OUTPATIENT
Start: 2025-04-15 | End: 2025-04-17 | Stop reason: HOSPADM

## 2025-04-15 RX ORDER — BUPIVACAINE HYDROCHLORIDE 5 MG/ML
2 INJECTION, SOLUTION PERINEURAL ONCE
Status: COMPLETED | OUTPATIENT
Start: 2025-04-15 | End: 2025-04-15

## 2025-04-15 RX ORDER — WARFARIN SODIUM 2.5 MG/1
2.5 TABLET ORAL
Status: COMPLETED | OUTPATIENT
Start: 2025-04-15 | End: 2025-04-15

## 2025-04-15 RX ORDER — FENTANYL CITRATE 50 UG/ML
25 INJECTION, SOLUTION INTRAMUSCULAR; INTRAVENOUS ONCE
Status: COMPLETED | OUTPATIENT
Start: 2025-04-15 | End: 2025-04-15

## 2025-04-15 RX ORDER — ASPIRIN 81 MG/1
324 TABLET, CHEWABLE ORAL ONCE
Status: COMPLETED | OUTPATIENT
Start: 2025-04-15 | End: 2025-04-15

## 2025-04-15 RX ORDER — METOPROLOL SUCCINATE 25 MG/1
25 TABLET, EXTENDED RELEASE ORAL DAILY
Status: DISCONTINUED | OUTPATIENT
Start: 2025-04-15 | End: 2025-04-17

## 2025-04-15 RX ORDER — POLYETHYLENE GLYCOL 3350 17 G/17G
17 POWDER, FOR SOLUTION ORAL DAILY PRN
Status: DISCONTINUED | OUTPATIENT
Start: 2025-04-15 | End: 2025-04-17 | Stop reason: HOSPADM

## 2025-04-15 RX ORDER — TRIAMCINOLONE ACETONIDE 40 MG/ML
80 INJECTION, SUSPENSION INTRA-ARTICULAR; INTRAMUSCULAR ONCE
Status: COMPLETED | OUTPATIENT
Start: 2025-04-15 | End: 2025-04-15

## 2025-04-15 RX ORDER — ONDANSETRON 4 MG/1
4 TABLET, ORALLY DISINTEGRATING ORAL EVERY 8 HOURS PRN
Status: DISCONTINUED | OUTPATIENT
Start: 2025-04-15 | End: 2025-04-17 | Stop reason: HOSPADM

## 2025-04-15 RX ORDER — ROSUVASTATIN CALCIUM 20 MG/1
20 TABLET, COATED ORAL EVERY EVENING
Status: DISCONTINUED | OUTPATIENT
Start: 2025-04-15 | End: 2025-04-17 | Stop reason: HOSPADM

## 2025-04-15 RX ORDER — HYDROCODONE BITARTRATE AND ACETAMINOPHEN 5; 325 MG/1; MG/1
1 TABLET ORAL ONCE
Status: COMPLETED | OUTPATIENT
Start: 2025-04-15 | End: 2025-04-15

## 2025-04-15 RX ORDER — SODIUM CHLORIDE 0.9 % (FLUSH) 0.9 %
5-40 SYRINGE (ML) INJECTION PRN
Status: DISCONTINUED | OUTPATIENT
Start: 2025-04-15 | End: 2025-04-17 | Stop reason: HOSPADM

## 2025-04-15 RX ADMIN — BUPIVACAINE HYDROCHLORIDE 10 MG: 5 INJECTION, SOLUTION EPIDURAL; INTRACAUDAL; PERINEURAL at 14:23

## 2025-04-15 RX ADMIN — FUROSEMIDE 40 MG: 10 INJECTION, SOLUTION INTRAMUSCULAR; INTRAVENOUS at 13:54

## 2025-04-15 RX ADMIN — FUROSEMIDE 40 MG: 10 INJECTION, SOLUTION INTRAMUSCULAR; INTRAVENOUS at 18:49

## 2025-04-15 RX ADMIN — SODIUM CHLORIDE, PRESERVATIVE FREE 10 ML: 5 INJECTION INTRAVENOUS at 20:30

## 2025-04-15 RX ADMIN — HYDROCODONE BITARTRATE AND ACETAMINOPHEN 1 TABLET: 5; 325 TABLET ORAL at 03:40

## 2025-04-15 RX ADMIN — Medication 6 MG: at 20:30

## 2025-04-15 RX ADMIN — FENTANYL CITRATE 25 MCG: 50 INJECTION INTRAMUSCULAR; INTRAVENOUS at 03:26

## 2025-04-15 RX ADMIN — ALLOPURINOL 100 MG: 100 TABLET ORAL at 14:11

## 2025-04-15 RX ADMIN — TAMSULOSIN HYDROCHLORIDE 0.4 MG: 0.4 CAPSULE ORAL at 18:56

## 2025-04-15 RX ADMIN — WARFARIN SODIUM 2.5 MG: 2.5 TABLET ORAL at 18:52

## 2025-04-15 RX ADMIN — ASPIRIN 324 MG: 81 TABLET, CHEWABLE ORAL at 04:39

## 2025-04-15 RX ADMIN — TRIAMCINOLONE ACETONIDE 80 MG: 40 INJECTION, SUSPENSION INTRA-ARTICULAR; INTRAMUSCULAR at 14:23

## 2025-04-15 RX ADMIN — METOPROLOL SUCCINATE 25 MG: 25 TABLET, EXTENDED RELEASE ORAL at 14:11

## 2025-04-15 RX ADMIN — ROSUVASTATIN CALCIUM 20 MG: 20 TABLET, FILM COATED ORAL at 18:56

## 2025-04-15 RX ADMIN — SODIUM CHLORIDE, PRESERVATIVE FREE 10 ML: 5 INJECTION INTRAVENOUS at 11:22

## 2025-04-15 ASSESSMENT — ENCOUNTER SYMPTOMS
NAUSEA: 0
BACK PAIN: 1
VOMITING: 0
SORE THROAT: 0
COUGH: 0
EYE PAIN: 0
SHORTNESS OF BREATH: 0
ABDOMINAL PAIN: 0

## 2025-04-15 ASSESSMENT — PAIN DESCRIPTION - PAIN TYPE: TYPE: ACUTE PAIN

## 2025-04-15 ASSESSMENT — PAIN DESCRIPTION - ORIENTATION
ORIENTATION: UPPER
ORIENTATION: UPPER
ORIENTATION: MID
ORIENTATION: MID;UPPER
ORIENTATION: UPPER
ORIENTATION: UPPER

## 2025-04-15 ASSESSMENT — PAIN SCALES - GENERAL
PAINLEVEL_OUTOF10: 9
PAINLEVEL_OUTOF10: 6
PAINLEVEL_OUTOF10: 3
PAINLEVEL_OUTOF10: 0
PAINLEVEL_OUTOF10: 6
PAINLEVEL_OUTOF10: 6
PAINLEVEL_OUTOF10: 7

## 2025-04-15 ASSESSMENT — PAIN DESCRIPTION - ONSET
ONSET: SUDDEN
ONSET: ON-GOING

## 2025-04-15 ASSESSMENT — PAIN DESCRIPTION - LOCATION
LOCATION: BACK
LOCATION: SHOULDER;BACK
LOCATION: BACK

## 2025-04-15 ASSESSMENT — PAIN DESCRIPTION - DESCRIPTORS
DESCRIPTORS: ACHING
DESCRIPTORS: ACHING

## 2025-04-15 ASSESSMENT — PAIN DESCRIPTION - DIRECTION: RADIATING_TOWARDS: RUE

## 2025-04-15 ASSESSMENT — PAIN - FUNCTIONAL ASSESSMENT: PAIN_FUNCTIONAL_ASSESSMENT: PREVENTS OR INTERFERES SOME ACTIVE ACTIVITIES AND ADLS

## 2025-04-15 ASSESSMENT — HEART SCORE: ECG: NON-SPECIFC REPOLARIZATION DISTURBANCE/LBTB/PM

## 2025-04-15 NOTE — PROGRESS NOTES
Physical Therapy    [facility] - Physical Therapy   Phone: [phone]    Physical Therapy  Facility/Department:[facility] PROGRESSIVE CARE    [x] Initial Evaluation            [] Daily Treatment Note         [] Discharge Summary      Patient: Maurice Ackerman Jr   : 1930   MRN: [id number]   Date of Service:  4/15/2025  Staff Mobility Recommendation: CGA with RW    AM-PAC score: 17/24  [place]  Maurice Ackerman Jr scored a 17/24 on the AM-PAC short mobility form. Current research shows that an AM-PAC score of 18 or greater is typically associated with a discharge to the patient's home setting. Based on the patient's AM-PAC score and their current functional mobility deficits, it is recommended that the patient have 2-3 sessions per week of Physical Therapy at d/c to increase the patient's independence.  At this time, this patient demonstrates the endurance and safety to discharge home with prn assist and home PT (home vs OP services) and a follow up treatment frequency of 2-3x/wk.  Please see assessment section for further patient specific details.    If patient discharges prior to next session this note will serve as a discharge summary.  Please see below for the latest assessment towards goals.      Admitting Diagnosis: Acute on chronic diastolic CHF (congestive heart failure) (HCC)  Ordering Physician: Tavon Kelly MD   Current Admission Summary: The pt is a 89 yo male who presented to the ED with upper back pain radiating to his chest associated with SOB and shoulder pain; he recently was treated for bronchitis. His labs showing elevated troponins and proBNP. Cardiology consulted: recommended echo. Imaging showing R moderate pleural effusion and B shoulder: Severe degenerative changes of the glenohumeral and AC joints.     Past Medical History:  has a past medical history of A-fib (HCC), Hypertension, and Malignant neoplasm of kidney (HCC).  Past Surgical History:   has a past surgical history that includes Cholecystectomy; Kidney removal; Appendectomy; and angioplasty.    Assessment  Activity Tolerance: Good  Impairments Requiring Therapeutic Intervention: decreased functional mobility, decreased strength, decreased endurance, increased pain, decreased posture  Prognosis: good    Clinical Assessment: The pt is a 89 yo male who presented to the ED with upper back pain radiating to his chest associated with SOB and shoulder pain; he recently was treated for bronchitis. His labs showing elevated troponins and proBNP. Cardiology consulted: recommended echo. Imaging showing R moderate pleural effusion and B shoulder: Severe degenerative changes of the glenohumeral and AC joints. The pt is from [facility]. PTA, the pt was indep in mobility using a RW, indep in self-care, still drives and can do his own laundry; family assists with med management. Today, the pt demonstrated that he is functioning most likely close to his baseline and was able to come to standing from the recliner with min A and ambulated with the RW 25 feet x 1, 15 feet x 1 with CGA; He was able to stand to urinate and wash hands at the sink with close SBA and no LOB. He does report con't pain across his mid back and into his shoulders and scapular area, 5/10. Anticipate that thept will con't to progress and be safe for return [place] with con't skilled PT services. Will con't to follow to ensure a safe d/c to home.       DME Required For Discharge: patient has all required DME for discharge    _______________________________________________________________________________________________________________________________________  Restrictions:  Precautions/Restrictions: medium fall risk  Weight Bearing Restrictions: no restrictions    Required Braces/Orthotics: no braces required  Positional Restrictions:no positional restrictions      Subjective  General: the pt was found to be up in the chair,

## 2025-04-15 NOTE — CONSULTS
HF RN consult noted, chart reviewed. I am familiar with Jacklyn as I have met with him and his family in the past for HF education. I stopped by his room this afternoon. Jacklyn was resting in bed with his daughter Payal and MARGY Aguirre visiting. Pt presently on room air. He was agreeable for me to review HF education with him. I reviewed today's echo results and there was no pleural effusion noted per report, EF 50-55%. Jacklyn said he does in fact weigh himself every morning, praise given. His dry weight is 213 lbs at home. He did not notice any increase in wt at home. I reviewed the 3/5 rule and the HF Zones and who and when to call. He is presently being diuresed and cardiology is following. I reviewed the need to follow a low Na diet which pt states \"I do like my salty snacks but eat them in small batches.\" After review of typical intake of fluids, it doesn't appear he goes over 2 liters. He follows with Dr Sandoval at [facility] for cardiology. He has an upcoming appt in place for 5/22 at 11:20 which I placed on AVS along with dc instructions. Plan is likely return to Independent Living at [facility] depending on PT/OT eval. I will help ensure pt gets a hospital f/u appt

## 2025-04-15 NOTE — H&P
Hospital Medicine  History and Physical    PCP: Holly Govea MD  Patient Name: Maurice Ackerman Jr    Information for this report comes from multiple sources including the emergency room providers, the patient (when able to provide information), and from family/friends when at bedside     Date of Service: Pt seen/examined on 4/15/25 and admitted to Inpatient with expected LOS greater than two midnights due to medical therapy    CHIEF COMPLAINT:  Pt presents to ED via EMS from [facility] with a c/o sharp back pain that radiates to front of chest that started around 1800 tonight. Pt states he thought it was indigestion, so he took Pepto-bismol after dinner but had no relief. Pt reporting SOB with pain and locates pain between shoulder blades. Pt states he has hx of afib and takes warfarin. Pt also has stage 4 CKD, but does not do dialysis. VSS. AAOx4.   HISTORY OF PRESENT ILLNESS: Pt is an 89 y.o. year-old male who has a past medical history of A-fib (HCC), Atrial fibrillation (HCC), Chronic kidney disease, stage III (moderate) (HCC), Congenital cystic kidney disease, Essential hypertension, Hypercholesterolemia, Hypertension, Malignant neoplasm of kidney (HCC), and Stage 4 chronic kidney disease (HCC).who presents to the ER for evaluation of right sided pain. When I spoke with him and his daughter (Daughter at bedside), they reported that he awoke with pain this past Thursday (4/10/2025) with pain in his right neck, shoulder and some pain radiating down his right upper thorax.  He thought that he had \"slept wrong\" on his right shoulder. His account of the events that led to his admission have varied depending on the provider he was speaking to. He was worked up for chest pain in the ER and his EKG had no ischemic changes. His Troponins were elevated but flat, consistent with his CKD.  The ER found his to have a CHF exacerbation. Associated signs and symptoms do not include typical chest pain, shortness of breath,

## 2025-04-15 NOTE — DISCHARGE INSTR - COC
Continuity of Care Form    Patient Name: Maurice Ackerman Jr   :  1930  MRN:  [id number]    Admit date:  4/15/2025  Discharge date:  2025    Code Status Order: Full Code   Advance Directives:     Admitting Physician:  Matthew Bro MD  PCP: Holly Govea MD    Discharging Nurse[facility] V2E-4041/5109-01  Discharging Unit Phone Number: [phone]      Emergency Contact:   Extended Emergency Contact Information  Primary Emergency Contact: Hetal Hunter  Home Phone: [phone]  Mobile Phone: [phone]  Relation: Child  Secondary Emergency Contact: Lizzie Ruiz  Mobile Phone: [phone]  Relation: Child    Past Surgical History:  Past Surgical History:   Procedure Laterality Date    ANGIOPLASTY      ,,    APPENDECTOMY      CHOLECYSTECTOMY      KIDNEY REMOVAL         Immunization History:   Immunization History   Administered Date(s) Administered    COVID-19, MODERNA BLUE border, Primary or Immunocompromised, (age 12y+), IM, 100 mcg/0.5mL 2021, 2021, 2021, 2022    COVID-19, PFIZER, , (age 12y+), IM, 30mcg/0.3mL 2024    Influenza Vaccine, unspecified formulation 2012, 2013, 2014, 2015, 10/04/2016, 2017, 10/01/2018, 10/10/2019    Influenza, FLUZONE High Dose (age 65 y+), IM, Quadv, 0.7mL 10/19/2021    Pneumococcal Conjugate 7-valent (Prevnar7) 2016    Pneumococcal, PPSV23, PNEUMOVAX 23, (age 2y+), SC/IM, 0.5mL 2004       Active Problems:  Patient Active Problem List   Diagnosis Code    Hypertension I10    Dermatophytosis of nail B35.1    Radial styloid tenosynovitis M65.4    Anemia D64.9    Atrial fibrillation (HCC) I48.91    Pain in soft tissues of limb M79.609    Coronary atherosclerosis I25.10    Malignant neoplasm of kidney (HCC) C64.9    Chronic kidney disease, stage III (moderate) (HCC) N18.30    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  See After Visit Summary for hospital follow up appointment details.  Consider spiritual care referral for support and/or completion of advance directives [phone].  Consider: [facility] telehealth program if patient agreeable and able to participate, palliative care consult for ongoing goals of care, end of life, and/or chronic disease management discussions, and referral to [facility] ([phone]) once SNF/HHC complete.      Patient's personal belongings (please select all that are sent with patient):      RN SIGNATURE:  Electronically signed by Lotus Escalante RN on 4/17/25 at 11:39 AM EDT    CASE MANAGEMENT/SOCIAL WORK SECTION    Inpatient Status Date: 4/15/2025    Readmission Risk Assessment Score:  [facility] RISK OF UNPLANNED READMISSION 2.0             18.3 Total Score        DISCHARGED TO: [facility]                REPORT NUMBER: [phone]             FAX NUMBER: [phone]    / signature: Electronically signed by GORAN BARNETT RN on 4/17/25 at 11:45 AM EDT    PHYSICIAN SECTION    Prognosis: {Prognosis:9303396009}    Condition at Discharge: { Patient Condition:966403329}    Rehab Potential (if transferring to Rehab): {Prognosis:8965289450}    Recommended Labs or Other Treatments After Discharge: ***    Physician Certification: I certify the above information and transfer of Maurice Ackerman   is necessary for the continuing treatment of the diagnosis listed and that he requires {Admit to Appropriate Level of Care:07094} for {GREATER/LESS:886686766} 30 days.     Update Admission H&P: {CHP DME Changes in HandP:039430593}    PHYSICIAN SIGNATURE:  {Esignature:[id number]}

## 2025-04-15 NOTE — PROGRESS NOTES
Call light in 5109 on at 0827. New patient arrived from ED without RN being notified sitting on side of bed with no bed alarm on.  Telemetry box 125 applied to patient and verified a fib with rate in the 60's. VSS with axillary temp of 95.6. Will recheck. Oriented to room and call light. Patient alert and oriented x 4. Dr Gonzales at bedside. Electronically signed by Azra Rivera RN on 4/15/2025 at 9:12 AM

## 2025-04-15 NOTE — CONSULTS
[facility] Orthopedic Surgery  Consult Note    This patient is seen in consultation at the request of Dr Kelly    Reason for Consult:  right shoulder pain    CHIEF COMPLAINT:  right shoulder pain    History Obtained From:  patient, electronic medical record    HISTORY OF PRESENT ILLNESS:    The patient is a 89 y.o. male who presents with right shoulder pain. He states he had right rotator cuff surgery years ago. Has been doing well. Recent onset of right shoulder pain again without cause. Pain is described in right shoulder and with the intensity of moderate. Pain is described as aching. Discomfort is intermittent. Pain worse with use of right shoulder and better at rest. He is on Coumadin for Afib. Noted elevated INR \"a bit last week\".     Past Medical History:        Diagnosis Date    A-fib (HCC)     Hypertension     Malignant neoplasm of kidney (HCC) 6/15/2012       Past Surgical History:        Procedure Laterality Date    ANGIOPLASTY      ,,    APPENDECTOMY      CHOLECYSTECTOMY      KIDNEY REMOVAL         Social History     Tobacco Use    Smoking status: Former     Current packs/day: 0.00     Types: Cigarettes     Quit date: 1971     Years since quittin.3    Smokeless tobacco: Never   Substance Use Topics    Alcohol use: Yes     Comment: at times       Family History   Problem Relation Age of Onset    Parkinsonism Father            Current Medications:   Current Facility-Administered Medications: sodium chloride flush 0.9 % injection 5-40 mL, 5-40 mL, IntraVENous, 2 times per day  sodium chloride flush 0.9 % injection 5-40 mL, 5-40 mL, IntraVENous, PRN  0.9 % sodium chloride infusion, , IntraVENous, PRN  ondansetron (ZOFRAN-ODT) disintegrating tablet 4 mg, 4 mg, Oral, Q8H PRN **OR** ondansetron (ZOFRAN) injection 4 mg, 4 mg, IntraVENous, Q6H PRN  polyethylene glycol (GLYCOLAX) packet 17 g, 17 g, Oral, Daily PRN  acetaminophen (TYLENOL) tablet 650 mg, 650 mg, Oral, Q6H PRN **OR** acetaminophen  Value Date/Time    WBC 6.9 04/15/2025 01:22 AM     PT/INR:    Lab Results   Component Value Date/Time    PROTIME 28.0 04/15/2025 01:22 AM    PROTIME 0.0 03/21/2025 12:51 PM    INR 2.62 04/15/2025 01:22 AM     PTT:    Lab Results   Component Value Date/Time    APTT 41.8 04/15/2025 01:22 AM   [APTT  Radiology Review:    Narrative & Impression  EXAMINATION:  THREE XRAY VIEWS OF THE RIGHT SHOULDER     4/15/2025 2:04 am     COMPARISON:  None.     HISTORY:  ORDERING SYSTEM PROVIDED HISTORY: Pain  TECHNOLOGIST PROVIDED HISTORY:  Reason for exam:->Pain     FINDINGS:  Three views of the shoulder demonstrate no acute fracture or dislocation.  No  suspicious osseous lesion.  Severe degenerative changes of the glenohumeral  joint. Severe degenerative changes of the AC joint.     Visualized hemithorax is unremarkable.     No soft tissue abnormality.     IMPRESSION:  1. No acute fracture or dislocation.  2. Severe degenerative changes of the glenohumeral and AC joints.      IMPRESSION/RECOMMENDATIONS:    Right shoulder pain  Right shoulder OA  Hx Afib on Coumadin. INR 2.62 today  The risks and benefits of needle aspiration and steroid injections right shoulder are discussed including diagnosis and pain relief. The patient verbally agreed to proceed with the aspiration and injection of right shoulder in the room today. The area is first prepped with Hibiclens swab and then the right shoulder is first aspirated for less tahn one cc clean yellow fluid and then injected with 2cc Kenalog and 2cc 0.5% Marcaine under sterile conditions. The patient tolerated the procedure well and a bandaid was applied to the site.   ]Activity as tolerated right arm  Followup Dr Jauregui as needed for persistent right shoulder pain.   Discussed with DR Jauregui via [employer].       Cailin Paz, KINGSLEY - CNP  4/15/2025  2:57 PM

## 2025-04-15 NOTE — PROGRESS NOTES
Clinical Pharmacy Note  Warfarin Consult    Maurice Ackerman Jr is a 89 y.o. male receiving warfarin managed by pharmacy.  Patient being bridged with none.    Warfarin Indication: afib  Target INR range: 2-3   Dose prior to admission: 2.5mg qd ex 1.25mg MWF    Current warfarin drug-drug interactions: none    Recent Labs     04/15/25  0122   HGB 11.7*   HCT 34.8*   INR 2.62*       Assessment/Plan:    Warfarin 2.5 mg tonight. Daily PT/INR until stable within therapeutic range.     Thank you for the consult.  Will continue to follow.   Electronically signed by Fracisco Marshall RPH on 4/15/2025 at 11:29 AM

## 2025-04-15 NOTE — ED PROVIDER NOTES
[facility] EMERGENCY DEPARTMENT     EMERGENCY DEPARTMENT ENCOUNTER     Location: [facility] EMERGENCY DEPARTMENT  4/15/2025  Note Started: 6:15 AM EDT 4/15/25      Patient Identification  Maurice Ackerman Jr is a 89 y.o. male  Chief Complaint   Patient presents with    Back Pain     Pt presents to ED with a c/o sharp back pain that radiates to front of chest that started around 1800 tonight. Pt states he thought it was indigestion, so he took Pepto-bismol after dinner but had no relief. Pt reporting SOB with pain and locates pain between shoulder blades. Pt states he has hx of afib and takes warfarin. Pt also has stage 4 CKD, but does not do dialysis.        HPI:Maurice Ackerman Jr was evaluated in the Emergency Department for back pain.  Patient reports upper back pain that was sharp and radiated to the front side of his chest tonight.  He also has some shortness of breath.  No fevers, chills or sweats.. Although initial history and physical exam information was obtained by SE/NPP/MD/DO (who also dictated a record of this visit), I personally saw the patient and performed and made/approved the management plan and take responsibility for the patient management.      PHYSICAL EXAM:  Patient is awake and alert and in mild distress due to pain.  Heart regular rate and rhythm and lungs clear to auscultation bilaterally.    XR SHOULDER RIGHT (MIN 2 VIEWS)   Final Result   1. No acute fracture or dislocation.   2. Severe degenerative changes of the glenohumeral and AC joints.         XR CHEST PORTABLE   Preliminary Result   1. Right base opacity with small to moderate pleural effusion.           Labs Reviewed   CBC WITH AUTO DIFFERENTIAL - Abnormal; Notable for the following components:       Result Value    RBC 3.77 (*)     Hemoglobin 11.7 (*)     Hematocrit 34.8 (*)     All other components within normal limits   COMPREHENSIVE METABOLIC PANEL W/ REFLEX TO MG FOR LOW K - Abnormal; Notable for the  secondary to cardiac cause but more likely secondary to pleural effusion seen on x-ray.  There is also a basilar opacity associated with the effusion.  He has no leukocytosis, fever or hypoxia to suggest pneumonia.  Procalcitonin is low.  I believe the pleural effusion is more likely related to some element of CHF and fluid overload.  Patient would benefit my opinion from echocardiogram as well as trending troponins and cardiology evaluation. [MR]      ED Course User Index  [MR] Selwyn Voss MD     Patient has elevated heart score and elevated troponin.  He has CKD which I think partially explains his elevated troponin.  It is stable and downtrending.  I am concerned for ACS although I believe his pain is actually primarily due to the pleural effusion that is noted.  I doubt pneumonia given the patient's low procalcitonin as well as lack of leukocytosis, fever or hypoxia.  I believe patient benefits from further evaluation for possible ACS as well as the source of his pleural effusion.       I personally discussed the patients care with hospitalist for admission, discussion above.    CLINICAL IMPRESSION  1. Chest pain, unspecified type    2. Pleural effusion    3. Chronic kidney disease, unspecified CKD stage    4. Elevated troponin          Selwyn ELISE MD, am the primary clinician of record.  At the time of this note, I personally saw the patient and made/approved the management plan and take responsibility for the patient management.  I personally saw the patient and independently provided 30 minutes of non-concurrent critical care out of the total shared critical care time excluding separately billable procedures.    This chart was generated in part by using Dragon Dictation system and may contain errors related to that system including errors in grammar, punctuation, and spelling, as well as words and phrases that may be inappropriate. If there are any questions or concerns please feel free to

## 2025-04-15 NOTE — ED PROVIDER NOTES
[facility] EMERGENCY DEPARTMENT  EMERGENCY DEPARTMENT ENCOUNTER      Pt Name: Maurice Ackerman Jr  MRN:[id number]  Birthdate 7/6/1930  Date of evaluation: 4/15/2025  Provider: Ector Chicas PA-C  Note Started: 4:25 AM EDT 4/15/25    This patient was seen and evaluated by attending physician Dr. Selwyn Voss MD      Chief Complaint:    Chief Complaint   Patient presents with    Back Pain     Pt presents to ED with a c/o sharp back pain that radiates to front of chest that started around 1800 tonight. Pt states he thought it was indigestion, so he took Pepto-bismol after dinner but had no relief. Pt reporting SOB with pain and locates pain between shoulder blades. Pt states he has hx of afib and takes warfarin. Pt also has stage 4 CKD, but does not do dialysis.          Nursing Notes, Past Medical Hx, Past Surgical Hx, Social Hx, Allergies, and Family Hx were all reviewed and agreed with or any disagreements were addressed in the HPI.    HPI: (Location, Duration, Timing, Severity, Quality, Assoc Sx, Context, Modifying factors)    History From: Patient  Limitations to history : None    Social Determinants Significantly Affecting Health : None    Chief Complaint of back pain that radiated towards his chest on both sides.  Also complaining of tingling feeling down his arms.  Started couple hours ago.  And state he was just laying in the bed.  He tried to call the nurses to his room he said he pressed the button 5 times and nobody showed up.  So he called 911 himself.  He is from [facility] nursing home.  He denies any shortness of breath with this.  No diaphoresis.  He said he has never had a heart attack.  He does have a history of A-fib.  He is on warfarin.  And he complained of some epigastric abdominal pain associated with this.  No nausea vomiting.  No lower extremity weakness.  He denies any weakness to the extremity but he does complain of the numbness and tingling going down both extremities

## 2025-04-15 NOTE — CONSULTS
Patient does have some vague back pain and chest pain which does not quite appear typical for angina  - He is certainly at risk for underlying coronary artery disease at his age and risk factors  - I will first obtain an echocardiogram, sed rate as well as CRP  - Will not consider further cardiac workup at his age  - After reviewing the echocardiogram will consider adding isosorbide, continue beta-blocker.  Will also obtain a lipid profile and if abnormal consider adding statin therapy    Chronic atrial fibrillation  -Rate is currently controlled  - Patient is on Coumadin and will resume    Right pleural effusion     Patient has mild to moderate right pleural effusion.  Etiology remains unclear    Chronic kidney disease    Patient has CKD but renal function appears grossly unchanged from before  Total visit time  45 minutes; > 50% spend counseling / coordinating care. I reviewed interval history, physical exam, review of data including labs, imaging, development and implementation of treatment plan and coordination of complex care. Counseled on risk factor modifications.    Thank you for allowing us to participate in the care of Maurice Gonzales M.D.[facility] 4/15/2025 8:52 AM  [facility] [place]  4/15/2025 8:52 AM

## 2025-04-15 NOTE — PROGRESS NOTES
[facility] - Occupational Therapy   Phone: [phone]    Occupational Therapy  Facility/Department:[facility] 5W PROGRESSIVE CARE    [x] Initial Evaluation            [] Daily Treatment Note         [] Discharge Summary      Patient: Maurice Ackerman Jr   : 1930   MRN: [id number]   Date of Service:  4/15/2025    Staff Mobility Recommendation: SBA at RW    AM-PAC Score:   Discharge Recommendations: return to [facility] with assist PRN    Admitting Diagnosis:  Acute on chronic diastolic CHF (congestive heart failure) (HCC)  Ordering Physician: Dr. Kelly  Current Admission Summary: Pt is a 89 yo M admitted with acute on chronic CHF after presenting to ED with c/o sharp back pain.     Past Medical History:  has a past medical history of A-fib (HCC), Hypertension, and Malignant neoplasm of kidney (HCC).  Past Surgical History:  has a past surgical history that includes Cholecystectomy; Kidney removal; Appendectomy; and angioplasty.    Assessment  Activity Tolerance: Good  Impairments Requiring Therapeutic Intervention: decreased functional mobility, decreased ADL status, decreased ROM, decreased balance, decreased IADL, increased pain  Prognosis: good    Clinical Assessment: Pt is a 89 yo M admitted with acute on chronic CHF after presenting with c/o sharp back pain. PTA - pt lives in IND living @ [facility], typically IND ADLs and mobility/txs with RW. Today - pt slightly below baseline function, completing fxl txs CGA/SBA with cues, amb with RW household distances in room CGA/SBA, no LOB. Feeding IND. Grooming in stance SBA. Toileting SBA. Cont acute OT to maximize safety and IND. Anticipate adequate progress for return home with assist PRN.       DME Required For Discharge: patient has all required DME for discharge    ___________________________________________________________________________________________________________________________________________    Restrictions:  Precautions/Restrictions:  understanding  Safety Interventions: patient at risk for falls, call light within reach, patient left in chair, chair alarm in place, gait belt, family/caregiver present, and nurse notified    Plan  Frequency: 3-5 x/week  Current Treatment Recommendations: strengthening, ROM, balance training, functional mobility training, transfer training, patient/caregiver education, ADL/self-care training, IADL training, home exercise program, and safety education    Goals  Patient Goals: return to [facility]   Short Term Goals:  Time Frame: By acute discharge  Patient will complete lower body dressing with modified independent   Patient will complete upper body dressing with modified independent  Patient will complete toileting with modified independent  Patient will complete bathing with modified independent  Patient will complete functional transfers with modified independent  Patient will complete functional mobility with modified independent  Patient will increase functional standing balance to mod I for improved ADL completion  Patient will complete HEP with IND to address BUE strength/ROM/activity tolerance for fxl performance     Above goals reviewed on 4/15/2025.  All goals are ongoing at this time unless indicated above.       Therapy Session Time     Individual Group Co-treatment   Time In 923      Time Out 0952      Minutes 29           Timed Code Treatment Minutes: 14 Minutes  Total Treatment Minutes:  29 minutes       Minutes per charge:  Moderate complexity eval: 15 minutes  ADL trainin minutes      Electronically signed by Lisseth Corbin OT on 4/15/2025 at 11:50 AM

## 2025-04-15 NOTE — ED TRIAGE NOTES
Pt presents to ED via EMS from [facility] with a c/o sharp back pain that radiates to front of chest that started around 1800 tonight. Pt states he thought it was indigestion, so he took Pepto-bismol after dinner but had no relief. Pt reporting SOB with pain and locates pain between shoulder blades. Pt states he has hx of afib and takes warfarin. Pt also has stage 4 CKD, but does not do dialysis. VSS. AAOx4.

## 2025-04-15 NOTE — DISCHARGE INSTRUCTIONS
Extra Heart Failure Education/ Tools/ Resources:     https://[employer].com/publication/?j=837366   --- this is American Heart Association interactive Healthier Living with Heart Failure guidebook.  Please click hyperlink or copy / paste link into search bar. The QR Code is also available below. Use your mouse to scroll through the pages.  Lots of information about weight monitoring, diet tips, activity, meds, etc    Heart Failure Tools and Resources QR Code is below. It includes multiple resources to include symptom tracker, med tracker, further HF info, and access to a HF Support Network online Community    HF [place]  -- this is a free smart phone eric available for iPhone and Android download.  Use your phone to track sodium / fluid intake, zone tool symptom tracking, weights, medications, etc. Click on this hyperlink  HF [place]   for QR code for easy download or the link is also found in the below HF Tools and Resources.      DASH (Dietary Approach to Stop Hypertension) diet --  https://www.nhlbi.nih.gov/education/dash-eating-plan -- this diet is a flexible eating plan that promotes heart healthy eating style.  Click on hyperlink or copy / paste link into search bar.  Lots of low sodium recipes and tips.    https://www.[employer]/recipes  -- more free recipes         Followup Dr Jauregui as needed for persistent right shoulder pain  Francine Jauregui MD [facility]  Orthopaedic Surgeon - Hip Preservation & Sports Medicine   [facility] Sports Medicine and Orthopaedic [address], [address], [address]  Email: [email or web address]  Office: [phone]

## 2025-04-15 NOTE — CARE COORDINATION
Case Management Assessment  Initial Evaluation    Date/Time of Evaluation: 4/15/2025 4:23 PM  Assessment Completed by: SABRINA Ortiz, DANILOW    If patient is discharged prior to next notation, then this note serves as note for discharge by case management.    Patient Name: Maurice Ackerman Jr                   YOB: 1930  Diagnosis: Shortness of breath [R06.02]  Pleural effusion [J90]  Elevated troponin [R79.89]  Acute on chronic diastolic CHF (congestive heart failure) (HCC) [I50.33]  Chest pain, unspecified type [R07.9]  Chronic kidney disease, unspecified CKD stage [N18.9]  Congestive heart failure, unspecified HF chronicity, unspecified heart failure type (HCC) [I50.9]                   Date / Time: 4/15/2025 12:58 AM    Patient Admission Status: Inpatient   Readmission Risk (Low < 19, Mod (19-27), High > 27): Readmission Risk Score: 20.4    Current PCP: Holly Govea MD  PCP verified by CM? Yes    Chart Reviewed: Yes      History Provided by: Patient  Patient Orientation: Alert and Oriented    Patient Cognition: Alert    Hospitalization in the last 30 days (Readmission):  No    If yes, Readmission Assessment in CM Navigator will be completed.    Advance Directives:      Code Status: Full Code   Patient's Primary Decision Maker is: Named in Scanned ACP Document    Primary Decision Maker: Lizzie Ruiz Jo - Child - [phone]    Secondary Decision Maker: Hunter,Hetal - Child - [phone]    Discharge Planning:    Patient lives with: Alone Type of Home: Independent Living  Primary Care Giver: Self  Patient Support Systems include: Children, Family Members, Other (Comment) ([facility] staff.)   Current Financial resources: Medicare  Current community resources: None  Current services prior to admission: Durable Medical Equipment, Emergency Call  System            Current DME: Walker, Shower Chair            Type of Home Care services:  PT, OT    ADLS  Prior functional level: Independent in  unspecified heart failure type (HCC) [I50.9]    The Patient and/or Patient Representative Agree with the Discharge Plan?  Yes.    Respectfully submitted,    Nhung BENNETT, MICHAEL  [facility]  [phone]    Electronically signed by SABRINA Ortiz, DANILOW on 4/15/2025 at 4:23 PM

## 2025-04-15 NOTE — ED NOTES
ED TO INPATIENT SBAR HANDOFF    Patient Name: Maurice Ackerman Jr   Preferred Name: Jacklyn  : 1930  89 y.o.   Family/Caregiver Present: no   Code Status Order: Prior  PO Status: NPO:No  Telemetry Order:   C-SSRS: Risk of Suicide: No Risk  Sitter no     Restraints:     Sepsis Risk Score      Situation  Chief Complaint   Patient presents with    Back Pain     Pt presents to ED with a c/o sharp back pain that radiates to front of chest that started around 1800 tonight. Pt states he thought it was indigestion, so he took Pepto-bismol after dinner but had no relief. Pt reporting SOB with pain and locates pain between shoulder blades. Pt states he has hx of afib and takes warfarin. Pt also has stage 4 CKD, but does not do dialysis.      Brief Description of Patient's Condition: alert and oriented x4  Mental Status: oriented, alert, coherent, logical, thought processes intact, and able to concentrate and follow conversation  Arrived from:Home  Imaging:   XR SHOULDER RIGHT (MIN 2 VIEWS)   Final Result   1. No acute fracture or dislocation.   2. Severe degenerative changes of the glenohumeral and AC joints.         XR CHEST PORTABLE   Preliminary Result   1. Right base opacity with small to moderate pleural effusion.           Abnormal labs:   Abnormal Labs Reviewed   CBC WITH AUTO DIFFERENTIAL - Abnormal; Notable for the following components:       Result Value    RBC 3.77 (*)     Hemoglobin 11.7 (*)     Hematocrit 34.8 (*)     All other components within normal limits   COMPREHENSIVE METABOLIC PANEL W/ REFLEX TO MG FOR LOW K - Abnormal; Notable for the following components:    Chloride 94 (*)     BUN 70 (*)     Creatinine 2.7 (*)     Est, Glom Filt Rate 21 (*)     AST 44 (*)     All other components within normal limits   TROPONIN - Abnormal; Notable for the following components:    Troponin, High Sensitivity 130 (*)     All other components within normal limits   PROTIME-INR - Abnormal; Notable for the following

## 2025-04-15 NOTE — PROGRESS NOTES
4 Eyes Skin Assessment     NAME:  Maurice Ackerman Jr  YOB: 1930  MEDICAL RECORD NUMBER:  [id number]    The patient is being assessed for  Admission    I agree that at least one RN has performed a thorough Head to Toe Skin Assessment on the patient. ALL assessment sites listed below have been assessed.      Areas assessed by both nurses:    Head, Face, Ears, Shoulders, Back, Chest, Arms, Elbows, Hands, Sacrum. Buttock, Coccyx, Ischium, Legs. Feet and Heels, and Under Medical Devices         Does the Patient have a Wound? Yes wound(s) were present on assessment. LDA wound assessment was Initiated and completed by RN       Chris Prevention initiated by RN: Yes  Wound Care Orders initiated by RN: Yes    Pressure Injury (Stage 3,4, Unstageable, DTI, NWPT, and Complex wounds) if present, place Wound referral order by RN under : Yes    New Ostomies, if present place, Ostomy referral order under : No     Nurse 1 eSignature: Electronically signed by Azra Rivera RN on 4/15/25 at 7:16 PM EDT    **SHARE this note so that the co-signing nurse can place an eSignature**    Nurse 2 eSignature: Electronically signed by ROC GALLEGO RN on 4/15/25 at 8:04 PM EDT

## 2025-04-16 LAB
ANION GAP SERPL CALCULATED.3IONS-SCNC: 15 MMOL/L (ref 3–16)
BASOPHILS # BLD: 0 K/UL (ref 0–0.2)
BASOPHILS NFR BLD: 0.3 %
BUN SERPL-MCNC: 70 MG/DL (ref 7–20)
CALCIUM SERPL-MCNC: 9.6 MG/DL (ref 8.3–10.6)
CHLORIDE SERPL-SCNC: 96 MMOL/L (ref 99–110)
CHOLEST SERPL-MCNC: 114 MG/DL (ref 0–199)
CO2 SERPL-SCNC: 24 MMOL/L (ref 21–32)
CREAT SERPL-MCNC: 2.6 MG/DL (ref 0.8–1.3)
DEPRECATED RDW RBC AUTO: 14.8 % (ref 12.4–15.4)
EKG DIAGNOSIS: NORMAL
EKG Q-T INTERVAL: 448 MS
EKG QRS DURATION: 164 MS
EKG QTC CALCULATION (BAZETT): 494 MS
EKG R AXIS: 76 DEGREES
EKG T AXIS: -22 DEGREES
EKG VENTRICULAR RATE: 73 BPM
EOSINOPHIL # BLD: 0 K/UL (ref 0–0.6)
EOSINOPHIL NFR BLD: 0 %
GFR SERPLBLD CREATININE-BSD FMLA CKD-EPI: 22 ML/MIN/{1.73_M2}
GLUCOSE SERPL-MCNC: 137 MG/DL (ref 70–99)
HCT VFR BLD AUTO: 32.9 % (ref 40.5–52.5)
HDLC SERPL-MCNC: 58 MG/DL (ref 40–60)
HGB BLD-MCNC: 11.1 G/DL (ref 13.5–17.5)
INR PPP: 3.04 (ref 0.85–1.15)
LDL CHOLESTEROL: 48 MG/DL
LYMPHOCYTES # BLD: 0.3 K/UL (ref 1–5.1)
LYMPHOCYTES NFR BLD: 6.9 %
MCH RBC QN AUTO: 30.7 PG (ref 26–34)
MCHC RBC AUTO-ENTMCNC: 33.6 G/DL (ref 31–36)
MCV RBC AUTO: 91.4 FL (ref 80–100)
MONOCYTES # BLD: 0.1 K/UL (ref 0–1.3)
MONOCYTES NFR BLD: 3.1 %
NEUTROPHILS # BLD: 4.3 K/UL (ref 1.7–7.7)
NEUTROPHILS NFR BLD: 89.7 %
PLATELET # BLD AUTO: 208 K/UL (ref 135–450)
PMV BLD AUTO: 8.6 FL (ref 5–10.5)
POTASSIUM SERPL-SCNC: 4.3 MMOL/L (ref 3.5–5.1)
PROTHROMBIN TIME: 31.3 SEC (ref 11.9–14.9)
RBC # BLD AUTO: 3.6 M/UL (ref 4.2–5.9)
SODIUM SERPL-SCNC: 135 MMOL/L (ref 136–145)
TRIGL SERPL-MCNC: 42 MG/DL (ref 0–150)
VLDLC SERPL CALC-MCNC: 8 MG/DL
WBC # BLD AUTO: 4.8 K/UL (ref 4–11)

## 2025-04-16 PROCEDURE — 85610 PROTHROMBIN TIME: CPT

## 2025-04-16 PROCEDURE — 97530 THERAPEUTIC ACTIVITIES: CPT

## 2025-04-16 PROCEDURE — 99233 SBSQ HOSP IP/OBS HIGH 50: CPT | Performed by: INTERNAL MEDICINE

## 2025-04-16 PROCEDURE — 2500000003 HC RX 250 WO HCPCS: Performed by: HOSPITALIST

## 2025-04-16 PROCEDURE — 97535 SELF CARE MNGMENT TRAINING: CPT

## 2025-04-16 PROCEDURE — 6370000000 HC RX 637 (ALT 250 FOR IP): Performed by: INTERNAL MEDICINE

## 2025-04-16 PROCEDURE — 93010 ELECTROCARDIOGRAM REPORT: CPT | Performed by: INTERNAL MEDICINE

## 2025-04-16 PROCEDURE — 80048 BASIC METABOLIC PNL TOTAL CA: CPT

## 2025-04-16 PROCEDURE — 93005 ELECTROCARDIOGRAM TRACING: CPT

## 2025-04-16 PROCEDURE — 2060000000 HC ICU INTERMEDIATE R&B

## 2025-04-16 PROCEDURE — 94760 N-INVAS EAR/PLS OXIMETRY 1: CPT

## 2025-04-16 PROCEDURE — 80061 LIPID PANEL: CPT

## 2025-04-16 PROCEDURE — 85025 COMPLETE CBC W/AUTO DIFF WBC: CPT

## 2025-04-16 PROCEDURE — 36415 COLL VENOUS BLD VENIPUNCTURE: CPT

## 2025-04-16 RX ORDER — TORSEMIDE 20 MG/1
40 TABLET ORAL 2 TIMES DAILY
Status: DISCONTINUED | OUTPATIENT
Start: 2025-04-16 | End: 2025-04-17 | Stop reason: HOSPADM

## 2025-04-16 RX ADMIN — SODIUM CHLORIDE, PRESERVATIVE FREE 10 ML: 5 INJECTION INTRAVENOUS at 10:25

## 2025-04-16 RX ADMIN — ALLOPURINOL 100 MG: 100 TABLET ORAL at 10:25

## 2025-04-16 RX ADMIN — TAMSULOSIN HYDROCHLORIDE 0.4 MG: 0.4 CAPSULE ORAL at 18:01

## 2025-04-16 RX ADMIN — SODIUM CHLORIDE, PRESERVATIVE FREE 10 ML: 5 INJECTION INTRAVENOUS at 10:33

## 2025-04-16 RX ADMIN — TORSEMIDE 40 MG: 20 TABLET ORAL at 18:01

## 2025-04-16 RX ADMIN — SODIUM CHLORIDE, PRESERVATIVE FREE 10 ML: 5 INJECTION INTRAVENOUS at 20:17

## 2025-04-16 RX ADMIN — ROSUVASTATIN CALCIUM 20 MG: 20 TABLET, FILM COATED ORAL at 18:01

## 2025-04-16 ASSESSMENT — PAIN SCALES - GENERAL: PAINLEVEL_OUTOF10: 0

## 2025-04-16 NOTE — PROGRESS NOTES
States right shoulder \"feels a lot better\" today. Moving and using right shoulder well.   Will sign off

## 2025-04-16 NOTE — PROGRESS NOTES
[facility] - Occupational Therapy   Phone: [phone]    Occupational Therapy  Facility/Department:[address] PROGRESSIVE CARE    [] Initial Evaluation            [x] Daily Treatment Note         [] Discharge Summary      Patient: Maurice Ackerman Jr   : 1930   MRN: [id number]   Date of Service:  2025    Staff Mobility Recommendation: SBA at     AM-PAC Score: 2124  Discharge Recommendations: return to [facility] with assist PRN, home OT     Maurice Ackerman Jr scored a 21 on the AM-PAC ADL Inpatient form. Current research shows that an AM-PAC score of 18 or greater is typically associated with a discharge to the patient's home setting. Based on the patient's AM-PAC score, and their current ADL deficits, it is recommended that the patient have 2-3 sessions per week of Occupational Therapy at d/c to increase the patient's independence.  At this time, this patient demonstrates the endurance and safety to discharge home with assist PRN and a follow up treatment frequency of 2-3x/wk.   Please see assessment section for further patient specific details.    If patient discharges prior to next session this note will serve as a discharge summary.  Please see below for the latest assessment towards goals.      Admitting Diagnosis:  Acute on chronic diastolic CHF (congestive heart failure) (HCC)  Ordering Physician: Dr. Kelly  Current Admission Summary: Pt is a 89 yo M admitted with acute on chronic CHF after presenting to ED with c/o sharp back pain.     Past Medical History:  has a past medical history of A-fib (HCC), Atrial fibrillation (HCC), Chronic kidney disease, stage III (moderate) (HCC), Congenital cystic kidney disease, Essential hypertension, Hypercholesterolemia, Hypertension, Malignant neoplasm of kidney (HCC), and Stage 4 chronic kidney disease (HCC).  Past Surgical History:  has a past surgical history that includes Cholecystectomy; Kidney removal; Appendectomy; and  toilet  Bathroom Accessibility: Accessible  Home Equipment: Walker - Rolling, Lift chair, Alert button  Has the patient had two or more falls in the past year or any fall with injury in the past year?: No  Receives Help From: Home health (home PT 2x/week.)  Prior Level of Assist for ADLs: Independent  Prior Level of Assist for Homemaking: Needs assistance (family preps meds. IND laundry.)  Prior Level of Assist for Ambulation: Independent household ambulator, with or without device, Independent community ambulator, with or without device (RW)  Prior Level of Assist for Transfers: Independent  Active : Yes (local)  Occupation: Retired  Type of Occupation: Road salesman  IADL Comments: sleeps in lift chair.  Additional Comments: Family lives nearby.    Available Assistance at Discharge: 24 hr supervision (non-physical) available    Examination   Vision:   Vision: Impaired  Vision Exceptions: Wears glasses at all times  Hearing:   Hearing: Within functional limits          Activities of Daily Living  Basic Activities of Daily Living  Grooming: stand by assistance  Grooming Comments: Pt brushed hair in stance sinkside SBA, no LOB.  Upper Extremity Bathing: setup assistance stand by assistance  Lower Extremity Bathing: minimal assistance   Bathing Comments: Pt bathed UB seated on shower chair with setup (therapist bagged IV site and kept dry), SBA. Bathed LB seated, bending to thoroughly bathe lower legs, standing at grab bar to cleanse buttocks/eun area with min A for thoroughness.   Upper Extremity Dressing: setup assistance stand by assistance  Lower Extremity Dressing: setup assistance stand by assistance Increased time to complete task  Dressing Comments: Pt doffed/donned clean shirt with setup SBA. Doffed/donned clean underwear and pants seated with setup and inc time to thread BLEs into clothing, pulling up completely in stance SBA. Doffed/donned slip on shoes while seated with setup.   Toileting: stand by

## 2025-04-16 NOTE — FLOWSHEET NOTE
04/16/25 0311   Vital Signs   Pulse (!) 21     Pt has been bradying all throughout the night. The lowest has been 21. Pt VSS and pt denies any symptoms. NP at bedside. EKG ordered. 12-lead EKG was unable to catch pt bradying.     Tele strips are in pt test results showing the bradying.       Electronically signed by ROC GALLEGO RN on 4/16/25 at 3:45 AM EDT   
none

## 2025-04-16 NOTE — PROGRESS NOTES
[facility]   Daily Progress Note      Admit Date:  4/15/2025    CC: \"  Maurice Ackerman Jr is a 89 y.o. patient with prior history of chronic atrial fibrillation, hypertension, chronic kidney disease who presented to the hospital with complaints of upper back pain which radiated to his front of the chest and some shortness of breath.  Pain was sharp in nature and it got progressively worse leading to his ER visit.  He has been treated for bronchitis for last few weeks.  He is also complaining of shoulder pain which is worse with the movement of his shoulder.      He presented to the emergency room and his workup showed elevated troponin and proBNP leading to this cardiac consultation.  He continues to complain of shoulder pain with the movement that    Interval history 4/16/2025  Patient overall feels better  He is heart rate dropped into the 20s and 30s last night when the patient was asymptomatic from it  Echocardiogram showed overall  normal LV function    Objective:   /83   Pulse 64   Temp 97.2 °F (36.2 °C) (Oral)   Resp 18   Ht 1.854 m (6' 1\")   Wt 97 kg (213 lb 13.5 oz)   SpO2 94%   BMI 28.21 kg/m²     Intake/Output Summary (Last 24 hours) at 4/16/2025 0840  Last data filed at 4/16/2025 0727  Gross per 24 hour   Intake 960 ml   Output 2225 ml   Net -1265 ml     Wt Readings from Last 3 Encounters:   04/16/25 97 kg (213 lb 13.5 oz)   08/31/24 97.4 kg (214 lb 11.7 oz)   01/17/24 99.1 kg (218 lb 6.4 oz)     Telemetry: Atrial fibrillation    Physical Exam:  General:  NAD, Awake, alert and oriented X4  Skin:  Warm and dry  Neck:  Supple, no JVP appreciated, no bruit  Chest:  Clear to auscultation, no wheezes/rhonchi/rales  Cardiovascular: Irregularly irregular. S1S2  Abdomen:  Soft, nontender, +bowel sounds  Extremities:  No LE edema    Cardiac Diagnosis:  hypertension and atrial fibrillation    Medications:    sodium chloride flush  5-40 mL IntraVENous 2 times per day    furosemide  40 mg

## 2025-04-16 NOTE — PROGRESS NOTES
Clinical Pharmacy Note  Warfarin Consult    Maurice Ackerman Jr is a 89 y.o. male receiving warfarin managed by pharmacy.  Patient being bridged with none.    Warfarin Indication: afib  Target INR range: 2-3   Dose prior to admission: 2.5mg qd ex 1.25mg MWF    Current warfarin drug-drug interactions: none    Recent Labs     04/15/25  0122 04/16/25  0546   HGB 11.7* 11.1*   HCT 34.8* 32.9*   INR 2.62* 3.04*       Assessment/Plan:    Hold Warfarin tonight. Daily PT/INR until stable within therapeutic range.     Thank you for the consult.  Will continue to follow.   Electronically signed by Fracisco Marshall RPH on 4/16/2025 at 8:50 AM

## 2025-04-16 NOTE — PROGRESS NOTES
Hospitalist   Progress Note    Patient Name: Maurice Ackerman Jr  PCP: Holly Govea MD  Date of Admission: 4/15/2025    Chief Complaint on Admission: Pt presents to ED via EMS from [facility] with a c/o sharp back pain that radiates to front of chest that started around 1800 tonight. Pt states he thought it was indigestion, so he took Pepto-bismol after dinner but had no relief. Pt reporting SOB with pain and locates pain between shoulder blades.   Chief diagnosis after evaluation: Acute on chronic diastolic CHF     Brief Synopsis: Patient is a 89 y.o. man who has a past medical history of A-fib (HCC), Atrial fibrillation (HCC), Chronic kidney disease, stage III (moderate) (HCC), Congenital cystic kidney disease, Essential hypertension, Hypercholesterolemia, Hypertension, Malignant neoplasm of kidney (HCC), and Stage 4 chronic kidney disease (HCC). who was admitted on 4/15/2025 for evaluation and treatment of Acute on chronic diastolic CHF     Pt Seen/Examined and Chart Reviewed.     Subjective: Pt is feeling better and has no new complaints. His right shoulder pain has resolved s/p steroid injection    Objective:  Allergies  2-(ethylmercuriothio)benzoic acid, Atorvastatin, Celecoxib, Ciprofloxacin, Clarithromycin, Ezetimibe, Fish oil, Metoprolol, Moxifloxacin, Naproxen, Petrolatum-zinc oxide, Rofecoxib, Simvastatin, Sulfa antibiotics, and Adhesive tape    Medications    Scheduled Meds:   torsemide  40 mg Oral BID    sodium chloride flush  5-40 mL IntraVENous 2 times per day    warfarin placeholder: dosing by pharmacy   Oral RX Placeholder    tamsulosin  0.4 mg Oral QPM    rosuvastatin  20 mg Oral QPM    [Held by provider] metoprolol succinate  25 mg Oral Daily    allopurinol  100 mg Oral Daily     Infusions:   sodium chloride       PRN Meds:  sodium chloride flush, sodium chloride, ondansetron **OR** ondansetron, polyethylene glycol, acetaminophen **OR** acetaminophen, sulfur hexafluoride microspheres, sulfur  adverse effects of diuresis     Chest pain  - Pt denies current chest pain  - Cardiology consult is appreciated  Will add Imdur 30 mg daily  Will not consider further cardiac workup at his age  Will review his rhythm tomorrow and sign off if there are no additional cardiac issues.  He can be sent home off beta-blocker therapy as long as he does not develop any significant tachycardia off beta-blocker therapy      Acute pain of right shoulder - greatly improved  - XR shows degenerative disease  - Orthopedic Surgery placed a steroid injection 4/15/2025     Stage 4 chronic kidney disease (HCC) - Renal function is at/near baseline and is stable; Monitor renal function and avoid Nephrotoxic agents as able      Atrial fibrillation (HCC) - rate controlled  - Continue Metoprolol and Coumadin  - Monitor daily PT/INR for Coumadin toxicity     Essential (primary) hypertension - continue home meds and monitor blood pressure  - Monitor renal function for antihypertensive toxicity     Hyperlipidemia - No current evidence of Rhabdomyolysis or other adverse effects. Continue statin therapy while in the hospital         DVT Prophylaxis: Coumadin  Diet: ADULT DIET; Regular; No Added Salt (3-4 gm)  Code Status: Full Code      PT/OT Eval Status: Evaluations have been completed with results as follows  PT score on the AM-PAC short mobility form (if applicable) - Pending  OT score on the AM-PAC short mobility form (if applicable) - 21/24    Anticipate that Pt will discharge to: Independent Living at [facility]      Dispo - Anticipated discharge date 1 day    Tavon Kelly MD

## 2025-04-17 VITALS
OXYGEN SATURATION: 97 % | HEART RATE: 83 BPM | BODY MASS INDEX: 28.49 KG/M2 | TEMPERATURE: 97.5 F | WEIGHT: 214.95 LBS | RESPIRATION RATE: 19 BRPM | HEIGHT: 73 IN | SYSTOLIC BLOOD PRESSURE: 126 MMHG | DIASTOLIC BLOOD PRESSURE: 73 MMHG

## 2025-04-17 LAB
ANION GAP SERPL CALCULATED.3IONS-SCNC: 14 MMOL/L (ref 3–16)
BASOPHILS # BLD: 0 K/UL (ref 0–0.2)
BASOPHILS NFR BLD: 0.2 %
BUN SERPL-MCNC: 81 MG/DL (ref 7–20)
CALCIUM SERPL-MCNC: 9 MG/DL (ref 8.3–10.6)
CHLORIDE SERPL-SCNC: 94 MMOL/L (ref 99–110)
CO2 SERPL-SCNC: 24 MMOL/L (ref 21–32)
CREAT SERPL-MCNC: 2.6 MG/DL (ref 0.8–1.3)
DEPRECATED RDW RBC AUTO: 15.2 % (ref 12.4–15.4)
EOSINOPHIL # BLD: 0 K/UL (ref 0–0.6)
EOSINOPHIL NFR BLD: 0 %
GFR SERPLBLD CREATININE-BSD FMLA CKD-EPI: 22 ML/MIN/{1.73_M2}
GLUCOSE SERPL-MCNC: 128 MG/DL (ref 70–99)
HCT VFR BLD AUTO: 32.4 % (ref 40.5–52.5)
HGB BLD-MCNC: 10.9 G/DL (ref 13.5–17.5)
INR PPP: 3.31 (ref 0.85–1.15)
LYMPHOCYTES # BLD: 0.5 K/UL (ref 1–5.1)
LYMPHOCYTES NFR BLD: 6 %
MCH RBC QN AUTO: 30.5 PG (ref 26–34)
MCHC RBC AUTO-ENTMCNC: 33.6 G/DL (ref 31–36)
MCV RBC AUTO: 90.7 FL (ref 80–100)
MONOCYTES # BLD: 0.3 K/UL (ref 0–1.3)
MONOCYTES NFR BLD: 3.3 %
NEUTROPHILS # BLD: 7 K/UL (ref 1.7–7.7)
NEUTROPHILS NFR BLD: 90.5 %
PLATELET # BLD AUTO: 191 K/UL (ref 135–450)
PMV BLD AUTO: 9 FL (ref 5–10.5)
POTASSIUM SERPL-SCNC: 4.2 MMOL/L (ref 3.5–5.1)
PROTHROMBIN TIME: 33.4 SEC (ref 11.9–14.9)
RBC # BLD AUTO: 3.57 M/UL (ref 4.2–5.9)
SODIUM SERPL-SCNC: 132 MMOL/L (ref 136–145)
WBC # BLD AUTO: 7.7 K/UL (ref 4–11)

## 2025-04-17 PROCEDURE — 6370000000 HC RX 637 (ALT 250 FOR IP): Performed by: INTERNAL MEDICINE

## 2025-04-17 PROCEDURE — 94760 N-INVAS EAR/PLS OXIMETRY 1: CPT

## 2025-04-17 PROCEDURE — 2500000003 HC RX 250 WO HCPCS: Performed by: HOSPITALIST

## 2025-04-17 PROCEDURE — 85610 PROTHROMBIN TIME: CPT

## 2025-04-17 PROCEDURE — 80048 BASIC METABOLIC PNL TOTAL CA: CPT

## 2025-04-17 PROCEDURE — 85025 COMPLETE CBC W/AUTO DIFF WBC: CPT

## 2025-04-17 PROCEDURE — 36415 COLL VENOUS BLD VENIPUNCTURE: CPT

## 2025-04-17 PROCEDURE — 6370000000 HC RX 637 (ALT 250 FOR IP)

## 2025-04-17 RX ORDER — ISOSORBIDE MONONITRATE 30 MG/1
30 TABLET, EXTENDED RELEASE ORAL DAILY
Qty: 30 TABLET | Refills: 0 | Status: SHIPPED | OUTPATIENT
Start: 2025-04-17

## 2025-04-17 RX ADMIN — SODIUM CHLORIDE, PRESERVATIVE FREE 10 ML: 5 INJECTION INTRAVENOUS at 09:59

## 2025-04-17 RX ADMIN — Medication 6 MG: at 00:08

## 2025-04-17 RX ADMIN — ALLOPURINOL 100 MG: 100 TABLET ORAL at 09:59

## 2025-04-17 RX ADMIN — TORSEMIDE 40 MG: 20 TABLET ORAL at 09:59

## 2025-04-17 ASSESSMENT — PAIN SCALES - GENERAL: PAINLEVEL_OUTOF10: 0

## 2025-04-17 NOTE — CARE COORDINATION
CASE MANAGEMENT DISCHARGE SUMMARY:    DISCHARGE DATE: 4/17/2025    DISCHARGED TO: [facility]                REPORT NUMBER: [phone]             FAX NUMBER: [phone]    TRANSPORTATION: via family             TIME: TBD by patient and bedside RN    COMMENTS: Patient, bedside RN, and facility aware of discharge plan and timeline. Home healthcare orders faxed to facility at fax number listed above. AL facility has in-house therapy staff. No other dc needs identified at this time.     Electronically signed by GORAN BARNETT RN on 4/17/2025 at 11:41 AM

## 2025-04-17 NOTE — PROGRESS NOTES
Notified provider of tele strips showing pauses. No new orders     Strips in chart.    Electronically signed by ROC GALLEGO RN on 4/17/25 at 12:54 AM EDT

## 2025-04-17 NOTE — PROGRESS NOTES
Clinical Pharmacy Note  Warfarin Consult    Maurice Ackerman Jr is a 89 y.o. male receiving warfarin managed by pharmacy.  Patient being bridged with none.    Warfarin Indication: afib  Target INR range: 2-3   Dose prior to admission: 2.5mg qd ex 1.25mg MWF    Current warfarin drug-drug interactions: none    Recent Labs     04/15/25  0122 04/16/25  0546 04/17/25  0410   HGB 11.7* 11.1* 10.9*   HCT 34.8* 32.9* 32.4*   INR 2.62* 3.04* 3.31*       Assessment/Plan:    Hold Warfarin tonight. Daily PT/INR until stable within therapeutic range. Managed by [facility].     Thank you for the consult.  Will continue to follow.   Electronically signed by Alonso Sinclair RPH on 4/17/2025 at 6:53 AM

## 2025-04-17 NOTE — PROGRESS NOTES
Patient discharging today. IV removed, no complications, dressing applied. Paperwork reviewed with patient including medications and follow up appointments. All questions answered. Patient discharging to [facility] at [place]. Report called to Isabella at [facility], all questions answered. Patient discharging with belongings to family car.

## 2025-04-17 NOTE — NURSE NAVIGATOR
DC order noted. HF RN saw in consult, see note. HF dc instructions are on AVS. Hospital follow up appts made. 4/22 at 1:00 with CM Velasquez NP at PCP office. Pt follows with [facility] cardiology. Appt made for 5/22 at 11:20 with Dr Sandoval. Bedside RN updated.       Dc wt 214 lbs

## 2025-04-17 NOTE — PLAN OF CARE
Problem: Pain  Goal: Verbalizes/displays adequate comfort level or baseline comfort level  4/16/2025 2030 by Vilma Mayen RN  Outcome: Progressing  4/16/2025 1047 by Lotus Escalante RN  Outcome: Progressing     Problem: Chronic Conditions and Co-morbidities  Goal: Patient's chronic conditions and co-morbidity symptoms are monitored and maintained or improved  4/16/2025 2030 by Vilma Mayen RN  Outcome: Progressing  4/16/2025 1047 by Lotus Escalante RN  Outcome: Progressing     Problem: Discharge Planning  Goal: Discharge to home or other facility with appropriate resources  4/16/2025 2030 by Vilma Mayen RN  Outcome: Progressing  4/16/2025 1047 by Lotus Escalante RN  Outcome: Progressing     Problem: Safety - Adult  Goal: Free from fall injury  4/16/2025 2030 by Vilma Mayen RN  Outcome: Progressing  4/16/2025 1047 by Lotus Escalante RN  Outcome: Progressing     Problem: ABCDS Injury Assessment  Goal: Absence of physical injury  4/16/2025 2030 by Vilma Mayen RN  Outcome: Progressing  4/16/2025 1047 by Lotus Escalante RN  Outcome: Progressing

## 2025-04-17 NOTE — PROGRESS NOTES
CLINICAL PHARMACY NOTE: MEDS TO BEDS    Total # of Prescriptions Filled: 1   The following medications were delivered to the patient:  Current Discharge Medication List        START taking these medications    Details   isosorbide mononitrate (IMDUR) 30 MG extended release tablet Take 1 tablet by mouth daily  Qty: 30 tablet, Refills: 0               Additional Documentation:

## 2025-04-17 NOTE — DISCHARGE SUMMARY
Hospitalist Discharge Summary     Maurice Ackerman Jr  : 1930  MRN: [id number]    Admit date: 4/15/2025  Discharge date: 2025    Admitting Physician: Matthew Bro MD    Discharge Diagnoses:   Patient Active Problem List   Diagnosis    Essential hypertension    Dermatophytosis of nail    Radial styloid tenosynovitis    Anemia    Atrial fibrillation (HCC)    Pain in soft tissues of limb    Coronary atherosclerosis    Malignant neoplasm of kidney (HCC)    Edema    Congenital deformity of feet    Bacteremia    Other hammer toe (acquired)    Hypercholesterolemia    Enlarged prostate with lower urinary tract symptoms (LUTS)    Enthesopathy of ankle and tarsus    Myalgia and myositis    Nocturia    Pain in joint, shoulder region    Pain in limb    Numbness and tingling in right hand    Community acquired bacterial pneumonia    Stage 4 chronic kidney disease (HCC)    SOB (shortness of breath)    Acute on chronic diastolic heart failure (HCC)    Cellulitis of right leg    Acute kidney injury superimposed on CKD    Supratherapeutic INR    CRP elevated    Leg swelling    Recurrent cellulitis    Anticoagulated    Cellulitis of right lower extremity    Acute on chronic diastolic CHF (congestive heart failure) (HCC)    Chest pain    Acute pain of right shoulder    Xerosis of skin (bilateral lower extremities)       Admission Condition: serious    Discharged Condition: stable    Discharge Exam:  VITALS:  /73   Pulse 83   Temp 97.5 °F (36.4 °C) (Axillary)   Resp 19   Ht 1.854 m (6' 1\")   Wt 97.5 kg (214 lb 15.2 oz)   SpO2 97%   BMI 28.36 kg/m²   CONSTITUTIONAL:  awake, alert, cooperative, no apparent distress, and appears stated age  EYES:  Lids and lashes normal, PERRL, EOMI, sclera clear, conjunctiva normal  ENT:  NC/AT, MMM    NECK:  Supple, symmetrical, trachea midline, no adenopathy  HEMATOLOGIC/LYMPHATICS:  no cervical, supraclavicular or axillary lymphadenopathy  LUNGS:  clear to auscultation  wall thickness.   Right Ventricle: Right ventricle size is normal. Reduced systolic function. TAPSE is 1.2 cm. RV Peak S' is 8.8 cm/s.   Mitral Valve: Moderately thickened leaflets. Moderate regurgitation.   Tricuspid Valve: Mild to moderate regurgitation. The estimated RVSP is 25 mmHg.   Left Atrium: Left atrium is severely dilated.   Right Atrium: Right atrium is severely dilated.     XR SHOULDER RIGHT (MIN 2 VIEWS)  Result Date: 4/15/2025  EXAMINATION: THREE XRAY VIEWS OF THE RIGHT SHOULDER 4/15/2025 2:04 am COMPARISON: None. HISTORY: ORDERING SYSTEM PROVIDED HISTORY: Pain TECHNOLOGIST PROVIDED HISTORY: Reason for exam:->Pain FINDINGS: Three views of the shoulder demonstrate no acute fracture or dislocation.  No suspicious osseous lesion.  Severe degenerative changes of the glenohumeral joint. Severe degenerative changes of the AC joint. Visualized hemithorax is unremarkable. No soft tissue abnormality.     1. No acute fracture or dislocation. 2. Severe degenerative changes of the glenohumeral and AC joints.       Other Significant Diagnostic Studies: As described above    Treatments: As described above    Disposition: home ([facility] [place])    Discharge Medications:       Medication List        START taking these medications      isosorbide mononitrate 30 MG extended release tablet  Commonly known as: IMDUR  Take 1 tablet by mouth daily            CONTINUE taking these medications      allopurinol 100 MG tablet  Commonly known as: ZYLOPRIM     ferrous sulfate 325 (65 Fe) MG tablet  Commonly known as: IRON 325     magnesium 250 MG Tabs tablet  Commonly known as: MAGNESIUM-OXIDE     MULTIVITAMINS PO     nitroGLYCERIN 0.4 MG SL tablet  Commonly known as: NITROSTAT     Potassium Gluconate 595 MG Caps     rosuvastatin 20 MG tablet  Commonly known as: CRESTOR     tamsulosin 0.4 MG capsule  Commonly known as: FLOMAX     torsemide 100 MG tablet  Commonly known as: DEMADEX     TYLENOL PM EXTRA STRENGTH PO

## 2025-04-18 ENCOUNTER — APPOINTMENT (OUTPATIENT)
Dept: PHARMACY | Age: 89
End: 2025-04-18
Payer: MEDICARE

## 2025-04-21 ENCOUNTER — ANTI-COAG VISIT (OUTPATIENT)
Dept: PHARMACY | Age: 89
End: 2025-04-21
Payer: MEDICARE

## 2025-04-21 DIAGNOSIS — I48.91 ATRIAL FIBRILLATION, UNSPECIFIED TYPE (HCC): Primary | ICD-10-CM

## 2025-04-21 LAB
INTERNATIONAL NORMALIZATION RATIO, POC: 2.2
PROTHROMBIN TIME, POC: 0

## 2025-04-21 PROCEDURE — 85610 PROTHROMBIN TIME: CPT

## 2025-04-21 PROCEDURE — 99211 OFF/OP EST MAY X REQ PHY/QHP: CPT

## 2025-04-21 NOTE — PROGRESS NOTES
Maurice Ackerman Jr is a 89 y.o. here for warfarin management.  Maurice had an INR test today. Results were reviewed and appropriate warfarin management was completed.     Patient verifies current warfarin dosing regimen: Yes     Warfarin medication reviewed and updated on the patient 's home medication list: Yes   All other medications reviewed and updated on the patient 's home medication list: Yes     Lab Results   Component Value Date    INR 2.2 2025    INR 3.31 (H) 2025    INR 3.04 (H) 2025     Patient Findings       Negatives:  Signs/symptoms of thrombosis, Signs/symptoms of bleeding, Change in health, Missed doses, Change in medications, Change in diet/appetite, Bruising          Anticoagulation Summary  As of 2025      INR goal:  2.0-3.0   TTR:  77.2% (12.3 y)   INR used for dosin.2 (2025)   Warfarin maintenance plan:  1.25 mg (2.5 mg x 0.5) every Mon, Wed, Fri; 2.5 mg (2.5 mg x 1) all other days   Weekly warfarin total:  13.75 mg   Plan last modified:  Mami Arciniega RPH (1/3/2025)   Next INR check:  2025   Priority:  Maintenance   Target end date:  Indefinite    Indications    Atrial fibrillation (HCC) [I48.91]                 Anticoagulation Episode Summary       INR check location:  Anticoagulation Clinic    Preferred lab:  --    Send INR reminders to:  [facility]    Comments:  FAX. Pat = stepson  consent done 24          Anticoagulation Care Providers       Provider Role Specialty Phone number    Matias Sandoval MD [facility] Internal Medicine [phone]          There were no vitals taken for this visit.    Warfarin assessment / plan:     Patient appears well today.      No changes affecting warfarin therapy were noted.   No acute findings with regards to warfarin therapy.  INR today is within goal range.     Instructed to continue the same weekly warfarin dose.      New medication: isosorbide ER 30 mg daily.  No interaction with

## 2025-04-25 NOTE — PROGRESS NOTES
Physician Progress Note      PATIENT:               NAVI MC JR  [facility] #:                  [id number]  :                       1930  ADMIT DATE:       4/15/2025 12:58 AM  DISCH DATE:        2025 1:42 PM  RESPONDING  PROVIDER #:        Tavon PELAEZ MD          QUERY TEXT:    Acute on chronic CHF is documented in the medical record in H/P dated 4/15,   progress note dated , and in Discharge summary dated .  Please provide   additional clinical indicators supportive of your documentation. Or please   document if the diagnosis of acute on chronic diastolic CHF has been ruled out   after study.    The clinical indicators include:  89 yo m presents with sharp back pain that radiates to front of chest, also   sob  -[place] 130>>122  -CXRr base opacity with small to mod pleural effusion  -xray r shoulder-severe degenerative changes of the glenohumeral AC joints  -Cardio consult note dated 4/15, does have some vague back pain and chest   pain, does not appear typical for Angina'  -Per Ortho consult not, 'R shoulder pain, 'severe degenerative changes of the   glenohumeral and AC joints, injection of Kenalog and Marcaine into r shoulder   joint-  -treatment-Cardio consult, 2D echo, IV lasix monitored I/Os daily wts, low Na   diet, add Imdur, Ortho conosult    Thank you, in advance,  Cailin Garcia RN BSN CRCR  Clinical   [email or web address]  Options provided:  -- Acute on chronic diastolic CHF ruled out after study and chronic diastolic   CHF confirmed  -- Acute on chronic diastolic CHF ruled in, currently as evidenced by, Please   document evidence.  -- Other - I will add my own diagnosis  -- Disagree - Not applicable / Not valid  -- Disagree - Clinically unable to determine / Unknown  -- Refer to Clinical Documentation Reviewer    PROVIDER RESPONSE TEXT:    Acute on chronic diastolic CHF ruled in, currently as evidenced by Fluid   overload at time of admission

## 2025-05-12 ENCOUNTER — ANTI-COAG VISIT (OUTPATIENT)
Dept: PHARMACY | Age: 89
End: 2025-05-12
Payer: MEDICARE

## 2025-05-12 DIAGNOSIS — I48.91 ATRIAL FIBRILLATION, UNSPECIFIED TYPE (HCC): Primary | ICD-10-CM

## 2025-05-12 LAB
INTERNATIONAL NORMALIZATION RATIO, POC: 2.3
PROTHROMBIN TIME, POC: 0

## 2025-05-12 PROCEDURE — 99211 OFF/OP EST MAY X REQ PHY/QHP: CPT

## 2025-05-12 PROCEDURE — 85610 PROTHROMBIN TIME: CPT

## 2025-05-12 NOTE — PROGRESS NOTES
(including over-the-counter medications or herbal supplements).     Especially if you begin oral steroids and/or antibiotics.    If significant bleeding occurs please seek immediate medical attention.    Keep the number of servings and portion size of vitamin K containing foods (dark green, leafy vegetables) the same each week. Vegetables high in vitamin K : broccoli, spinach, leaf lettuce, keke lettuce, kale, collards, asparagus, brussel sprouts.  Please call if you routine diet changes.     Limit alcohol intake. Please call if this changes.     Please arrive 15 minutes prior to your appointment.    If you are greater than 5 minutes late for your appointment, you maybe asked to reschedule.     Allow 3 business days for warfarin refills.              Immunization History   Administered Date(s) Administered    COVID-19, MODERNA BLUE border, Primary or Immunocompromised, (age 12y+), IM, 100 mcg/0.5mL 01/29/2021, 02/26/2021, 11/09/2021, 04/21/2022    COVID-19, PFIZER, 2024/25, (age 12y+), IM, 30mcg/0.3mL 11/11/2024    Influenza Vaccine, unspecified formulation 09/28/2012, 09/25/2013, 09/04/2014, 09/16/2015, 10/04/2016, 09/20/2017, 10/01/2018, 10/10/2019    Influenza, FLUZONE High Dose (age 65 y+), IM, Quadv, 0.7mL 10/19/2021    Pneumococcal Conjugate 7-valent (Prevnar7) 09/19/2016    Pneumococcal, PPSV23, PNEUMOVAX 23, (age 2y+), SC/IM, 0.5mL 04/13/2004       Orders Placed This Encounter   Procedures    POCT INR     This order was created through the anticoagulation tracking navigator section.      No orders of the defined types were placed in this encounter.     Reviewed AVS with patient / caregiver.    Billing Points:  0 billing points this visit     For Pharmacy Admin Tracking Only    Intervention Detail:   Total # of Interventions Recommended: 0  Total # of Interventions Accepted: 0  Time Spent (min): 20

## 2025-05-25 ENCOUNTER — APPOINTMENT (OUTPATIENT)
Dept: CT IMAGING | Age: 89
End: 2025-05-25
Payer: MEDICARE

## 2025-05-25 ENCOUNTER — HOSPITAL ENCOUNTER (EMERGENCY)
Age: 89
Discharge: HOME | End: 2025-05-25
Attending: EMERGENCY MEDICINE
Payer: MEDICARE

## 2025-05-25 VITALS
HEART RATE: 98 BPM | BODY MASS INDEX: 30.16 KG/M2 | RESPIRATION RATE: 20 BRPM | OXYGEN SATURATION: 99 % | DIASTOLIC BLOOD PRESSURE: 79 MMHG | TEMPERATURE: 98 F | WEIGHT: 228.62 LBS | SYSTOLIC BLOOD PRESSURE: 138 MMHG

## 2025-05-25 DIAGNOSIS — K59.00 CONSTIPATION, UNSPECIFIED CONSTIPATION TYPE: Primary | ICD-10-CM

## 2025-05-25 LAB
ALBUMIN SERPL-MCNC: 3.5 G/DL (ref 3.4–5)
ALBUMIN/GLOB SERPL: 0.9 {RATIO} (ref 1.1–2.2)
ALP SERPL-CCNC: 230 U/L (ref 40–129)
ALT SERPL-CCNC: 50 U/L (ref 10–40)
ANION GAP SERPL CALCULATED.3IONS-SCNC: 14 MMOL/L (ref 3–16)
AST SERPL-CCNC: 76 U/L (ref 15–37)
BASOPHILS # BLD: 0 K/UL (ref 0–0.2)
BASOPHILS NFR BLD: 0.8 %
BILIRUB SERPL-MCNC: 0.5 MG/DL (ref 0–1)
BUN SERPL-MCNC: 69 MG/DL (ref 7–20)
CALCIUM SERPL-MCNC: 9.6 MG/DL (ref 8.3–10.6)
CHLORIDE SERPL-SCNC: 94 MMOL/L (ref 99–110)
CO2 SERPL-SCNC: 29 MMOL/L (ref 21–32)
CREAT SERPL-MCNC: 2.7 MG/DL (ref 0.8–1.3)
DEPRECATED RDW RBC AUTO: 15.2 % (ref 12.4–15.4)
EOSINOPHIL # BLD: 0 K/UL (ref 0–0.6)
EOSINOPHIL NFR BLD: 0.5 %
GFR SERPLBLD CREATININE-BSD FMLA CKD-EPI: 21 ML/MIN/{1.73_M2}
GLUCOSE SERPL-MCNC: 101 MG/DL (ref 70–99)
HCT VFR BLD AUTO: 33.2 % (ref 40.5–52.5)
HGB BLD-MCNC: 11.2 G/DL (ref 13.5–17.5)
LIPASE SERPL-CCNC: 38 U/L (ref 13–60)
LYMPHOCYTES # BLD: 0.5 K/UL (ref 1–5.1)
LYMPHOCYTES NFR BLD: 7.1 %
MCH RBC QN AUTO: 30.4 PG (ref 26–34)
MCHC RBC AUTO-ENTMCNC: 33.7 G/DL (ref 31–36)
MCV RBC AUTO: 90.2 FL (ref 80–100)
MONOCYTES # BLD: 0.4 K/UL (ref 0–1.3)
MONOCYTES NFR BLD: 6.5 %
NEUTROPHILS # BLD: 5.5 K/UL (ref 1.7–7.7)
NEUTROPHILS NFR BLD: 85.1 %
PLATELET # BLD AUTO: 285 K/UL (ref 135–450)
PMV BLD AUTO: 8.4 FL (ref 5–10.5)
POTASSIUM SERPL-SCNC: 4 MMOL/L (ref 3.5–5.1)
PROT SERPL-MCNC: 7.3 G/DL (ref 6.4–8.2)
RBC # BLD AUTO: 3.68 M/UL (ref 4.2–5.9)
SODIUM SERPL-SCNC: 137 MMOL/L (ref 136–145)
WBC # BLD AUTO: 6.5 K/UL (ref 4–11)

## 2025-05-25 PROCEDURE — 99284 EMERGENCY DEPT VISIT MOD MDM: CPT

## 2025-05-25 PROCEDURE — 74176 CT ABD & PELVIS W/O CONTRAST: CPT

## 2025-05-25 PROCEDURE — 83690 ASSAY OF LIPASE: CPT

## 2025-05-25 PROCEDURE — 85025 COMPLETE CBC W/AUTO DIFF WBC: CPT

## 2025-05-25 PROCEDURE — 80053 COMPREHEN METABOLIC PANEL: CPT

## 2025-05-25 RX ORDER — POLYETHYLENE GLYCOL 3350 17 G/17G
17 POWDER, FOR SOLUTION ORAL DAILY
Qty: 1530 G | Refills: 1 | Status: SHIPPED | OUTPATIENT
Start: 2025-05-25 | End: 2025-06-24

## 2025-05-25 NOTE — ED PROVIDER NOTES
Emergency Department Attending Provider Note  Location: [facility] EMERGENCY DEPARTMENT  5/25/2025   Note Started: 10:02 AM EDT 5/25/25     THIS IS MY SE SUPERVISORY AND SHARED VISIT NOTE:    Patient Identification  Maurice Ackerman Jr is a 89 y.o. male      HPI:Maurice Ackerman Jr was evaluated in the Emergency Department after being brought in by EMS for evaluation of constipation over the past 4 days.  Patient presents to the ED from his nursing home.  Patient having generalized abdominal pain.   Patient states he did try taking over-the-counter stool softeners as well as senna but did not have a bowel movement yet.  He is passing gas.  Denies any associated nausea or vomiting.  States he been having normal urinary habits.  No fevers.  No chest pain or shortness of breath.  Patient called his primary care physician was instructed to come to the emergency room for further evaluation.  Although initial history and physical exam information was obtained by SE/NPP (who also dictated a record of this visit), I personally saw the patient and made/approved the management plan and take responsibility for the patient management.       PHYSICAL EXAM:     CONSTITUTIONAL: AOx4, NAD, cooperative with exam, afebrile   HEAD: normocephalic, atraumatic   EYES: PERRL, EOMI, anicteric sclera   ENT: Moist mucous membranes, uvula midline   NECK: Supple, symmetric, trachea midline   BACK: Symmetric, no deformity, no CVA tenderness   LUNGS: Bilateral breath sounds, CTAB, no rales/ronchi/wheezes   CARDIOVASCULAR: RRR, normal S1/S2, no m/r/g, 2+ pulses throughout   ABDOMEN: Soft, non-tender, non-distended, +BS   NEUROLOGIC:  MAEx4, 5/5 strength throughout; fine touch sensation intact throughout; GCS 15   MUSCULOSKELETAL: No clubbing, cyanosis or edema   SKIN: No rash, pallor or wounds on exposed surfaces         EKG Interpretation by myself  None    Patient seen and evaluated.  Relevant records reviewed.  MDM  Patient as above.  
ANGIOPLASTY      1998,2005,2010    APPENDECTOMY      CHOLECYSTECTOMY      KIDNEY REMOVAL         CURRENTMEDICATIONS       Discharge Medication List as of 5/25/2025  4:26 PM        CONTINUE these medications which have NOT CHANGED    Details   isosorbide mononitrate (IMDUR) 30 MG extended release tablet Take 1 tablet by mouth daily, Disp-30 tablet, R-0Normal      diphenhydrAMINE-APAP, sleep, (TYLENOL PM EXTRA STRENGTH PO) Take by mouthHistorical Med      Potassium Gluconate 595 MG CAPS Take 595 mg by mouth dailyHistorical Med      torsemide (DEMADEX) 100 MG tablet Take 1 tablet by mouth dailyHistorical Med      warfarin (COUMADIN) 2.5 MG tablet Take 1 tablet by mouth daily Except 1.25 mg every Monday, Wednesday and Friday or as directed by [facility] [phone] Med      ferrous sulfate 325 (65 FE) MG tablet Take 1 tablet by mouth dailyHistorical Med      vitamin D 1000 UNITS CAPS Take 1 capsule by mouth dailyHistorical Med      magnesium (MAGNESIUM-OXIDE) 250 MG TABS tablet Take 400 mg by mouth dailyHistorical Med      Multiple Vitamin (MULTIVITAMINS PO) Take 1 tablet by mouth daily       nitroGLYCERIN (NITROSTAT) 0.4 MG SL tablet Place 1 tablet under the tongue every 5 minutes as needed for Chest painHistorical Med      allopurinol (ZYLOPRIM) 100 MG tablet Take 1 tablet by mouth dailyHistorical Med      rosuvastatin (CRESTOR) 20 MG tablet Take 1 tablet by mouth every eveningHistorical Med      tamsulosin (FLOMAX) 0.4 MG capsule Take 1 capsule by mouth every eveningHistorical Med             ALLERGIES     2-(ethylmercuriothio)benzoic acid, Atorvastatin, Celecoxib, Ciprofloxacin, Clarithromycin, Diaper rash products, Ezetimibe, Fish oil, Metoprolol, Moxifloxacin, Naproxen, Rofecoxib, Simvastatin, Sulfa antibiotics, and Adhesive tape    FAMILYHISTORY       Family History   Problem Relation Age of Onset    Parkinsonism Father         SOCIAL HISTORY       Social History     Tobacco Use    Smoking 
Negative

## 2025-06-09 ENCOUNTER — ANTI-COAG VISIT (OUTPATIENT)
Dept: PHARMACY | Age: 89
End: 2025-06-09
Payer: MEDICARE

## 2025-06-09 DIAGNOSIS — I48.91 ATRIAL FIBRILLATION, UNSPECIFIED TYPE (HCC): Primary | ICD-10-CM

## 2025-06-09 LAB
INR BLD: 5.4
PROTIME: NORMAL

## 2025-06-09 PROCEDURE — 85610 PROTHROMBIN TIME: CPT | Performed by: SPEECH-LANGUAGE PATHOLOGIST

## 2025-06-09 PROCEDURE — 99211 OFF/OP EST MAY X REQ PHY/QHP: CPT | Performed by: SPEECH-LANGUAGE PATHOLOGIST

## 2025-06-09 RX ORDER — LACTULOSE 10 G/15ML
20 SOLUTION ORAL; RECTAL 3 TIMES DAILY
COMMUNITY
Start: 2025-05-30 | End: 2025-06-09

## 2025-06-09 NOTE — PROGRESS NOTES
Maurice Ackerman  is a 89 y.o. here for warfarin management.  Maurice had an INR test today. Results were reviewed and appropriate warfarin management was completed.     Patient verifies current warfarin dosing regimen: Yes     Warfarin medication reviewed and updated on the patient 's home medication list: Yes   All other medications reviewed and updated on the patient 's home medication list: Yes     Lab Results   Component Value Date    INR 5.40 2025    INR 2.3 2025    INR 2.2 2025     Patient Findings       Negatives:  Signs/symptoms of thrombosis, Signs/symptoms of bleeding, Change in medications, Change in diet/appetite, Bruising          Anticoagulation Summary  As of 2025      INR goal:  2.0-3.0   TTR:  76.9% (12.4 y)   INR used for dosin.40 (2025)   Warfarin maintenance plan:  1.25 mg (2.5 mg x 0.5) every Mon, Wed, Fri; 2.5 mg (2.5 mg x 1) all other days   Weekly warfarin total:  13.75 mg   Plan last modified:  Mami Arciniega RPH (1/3/2025)   Next INR check:  2025   Priority:  Maintenance   Target end date:  Indefinite    Indications    Atrial fibrillation (HCC) [I48.91]                 Anticoagulation Episode Summary       INR check location:  Anticoagulation Clinic    Preferred lab:  --    Send INR reminders to:  [facility]    Comments:  FAX. Pat = stepson  consent done 24          Anticoagulation Care Providers       Provider Role Specialty Phone number    Matias Sandoval MD [facility] Internal Medicine [phone]          There were no vitals taken for this visit.    Warfarin assessment / plan:     Appears well  Supra-therapeutic INR.     Denies signs and symptoms of bleeding/bruising.     Medication changes.   Recent illness, fever or diarrhea.  Recent decrease in appetite.    He stated that he may have a slight decrease in appetite and he also has been experiencing constipation and he started a medication (we think its lactulose)

## 2025-06-13 ENCOUNTER — ANTI-COAG VISIT (OUTPATIENT)
Dept: PHARMACY | Age: 89
End: 2025-06-13
Payer: MEDICARE

## 2025-06-13 DIAGNOSIS — I48.91 ATRIAL FIBRILLATION, UNSPECIFIED TYPE (HCC): Primary | ICD-10-CM

## 2025-06-13 LAB
INR BLD: 4.3
PROTIME: NORMAL

## 2025-06-13 PROCEDURE — 99212 OFFICE O/P EST SF 10 MIN: CPT

## 2025-06-13 PROCEDURE — 85610 PROTHROMBIN TIME: CPT

## 2025-06-13 NOTE — PROGRESS NOTES
Maurice Ackerman Jr is a 89 y.o. here for warfarin management.  Maurice had an INR test today. Results were reviewed and appropriate warfarin management was completed.     Patient verifies current warfarin dosing regimen: Yes     Warfarin medication reviewed and updated on the patient 's home medication list: Yes   All other medications reviewed and updated on the patient 's home medication list: No new medications    Lab Results   Component Value Date    INR 4.30 2025    INR 5.40 2025    INR 2.3 2025     Patient Findings       Negatives:  Signs/symptoms of thrombosis, Signs/symptoms of bleeding, Missed doses, Change in medications, Change in diet/appetite, Bruising          Anticoagulation Summary  As of 2025      INR goal:  2.0-3.0   TTR:  76.9% (12.4 y)   INR used for dosin.30 (2025)   Warfarin maintenance plan:  2.5 mg (2.5 mg x 1) every Mon, Fri; 1.25 mg (2.5 mg x 0.5) all other days   Weekly warfarin total:  11.25 mg   Plan last modified:  Anneliese Rosales (2025)   Next INR check:  2025   Priority:  Maintenance   Target end date:  Indefinite    Indications    Atrial fibrillation (HCC) [I48.91]                 Anticoagulation Episode Summary       INR check location:  Anticoagulation Clinic    Preferred lab:  --    Send INR reminders to:  [facility]    Comments:  FAX. Pat = stepson  consent done 24          Anticoagulation Care Providers       Provider Role Specialty Phone number    Matias Sandoval MD [facility] Internal Medicine [phone]          There were no vitals taken for this visit.    Warfarin assessment / plan:     Appears well  Supra-therapeutic INR.     Denies signs and symptoms of bleeding/bruising.  Denies medication changes.  Denies extra warfarin doses.  Denies increased alcohol intake.  Denies change in appetite.  Denies decrease in vitamin K intake.  Denies cranberry or grapefruit juice intake.     INR is

## 2025-06-20 ENCOUNTER — ANTI-COAG VISIT (OUTPATIENT)
Dept: PHARMACY | Age: 89
End: 2025-06-20
Payer: MEDICARE

## 2025-06-20 DIAGNOSIS — I48.91 ATRIAL FIBRILLATION, UNSPECIFIED TYPE (HCC): Primary | ICD-10-CM

## 2025-06-20 LAB
INR BLD: 2
PROTIME: NORMAL

## 2025-06-20 PROCEDURE — 99213 OFFICE O/P EST LOW 20 MIN: CPT | Performed by: PHARMACIST

## 2025-06-20 PROCEDURE — 85610 PROTHROMBIN TIME: CPT | Performed by: PHARMACIST

## 2025-06-20 NOTE — PROGRESS NOTES
Maurice Ackerman Jr is a 89 y.o. here for warfarin management.  Maurice had an INR test today. Results were reviewed and appropriate warfarin management was completed.     Patient verifies current warfarin dosing regimen: Yes     Warfarin medication reviewed and updated on the patient 's home medication list: Yes   All other medications reviewed and updated on the patient 's home medication list: No new medications    Lab Results   Component Value Date    INR 2.00 2025    INR 4.30 2025    INR 5.40 2025     Patient Findings       Positives:  Change in medications    Negatives:  Signs/symptoms of thrombosis, Signs/symptoms of bleeding, Missed doses, Change in diet/appetite, Bruising    Comments:  He reported taking Tylenol PM prior but said he stopped it, this could have likely caused the increase in the INR seen at the two previous appointments.           Anticoagulation Summary  As of 2025      INR goal:  2.0-3.0   TTR:  76.8% (12.4 y)   INR used for dosin.00 (2025)   Warfarin maintenance plan:  2.5 mg (2.5 mg x 1) every Mon, Wed, Fri; 1.25 mg (2.5 mg x 0.5) all other days   Weekly warfarin total:  12.5 mg   Plan last modified:  Anneliese Rosales (2025)   Next INR check:  2025   Priority:  Maintenance   Target end date:  Indefinite    Indications    Atrial fibrillation (HCC) [I48.91]                 Anticoagulation Episode Summary       INR check location:  Anticoagulation Clinic    Preferred lab:  --    Send INR reminders to:  [facility]    Comments:  FAX. Pat = stepson  consent done 25          Anticoagulation Care Providers       Provider Role Specialty Phone number    Matias Sandoval MD Responsible Internal Medicine [phone]          There were no vitals taken for this visit.    Warfarin assessment / plan:     Patient appears well today.      No changes affecting warfarin therapy were noted.   No acute findings with regards

## 2025-07-02 ENCOUNTER — ANTI-COAG VISIT (OUTPATIENT)
Dept: PHARMACY | Age: 89
End: 2025-07-02
Payer: MEDICARE

## 2025-07-02 DIAGNOSIS — I48.91 ATRIAL FIBRILLATION, UNSPECIFIED TYPE (HCC): Primary | ICD-10-CM

## 2025-07-02 LAB
INTERNATIONAL NORMALIZATION RATIO, POC: 2.4
PROTHROMBIN TIME, POC: 0

## 2025-07-02 PROCEDURE — 85610 PROTHROMBIN TIME: CPT | Performed by: PHARMACIST

## 2025-07-02 PROCEDURE — 99211 OFF/OP EST MAY X REQ PHY/QHP: CPT | Performed by: PHARMACIST

## 2025-07-02 NOTE — PROGRESS NOTES
Maurice Ackerman Jr is a 89 y.o. here for warfarin management.  Maurice had an INR test today. Results were reviewed and appropriate warfarin management was completed.     Patient verifies current warfarin dosing regimen: Yes     Warfarin medication reviewed and updated on the patient 's home medication list: Yes   All other medications reviewed and updated on the patient 's home medication list: Yes     Lab Results   Component Value Date    INR 2.4 2025    INR 2.00 2025    INR 4.30 2025     Patient Findings       Positives:  Change in medications    Negatives:  Signs/symptoms of thrombosis, Signs/symptoms of bleeding, Laboratory test error suspected, Change in health, Change in alcohol use, Change in activity, Upcoming invasive procedure, Emergency department visit, Upcoming dental procedure, Missed doses, Extra doses, Change in diet/appetite, Hospital admission, Bruising, Other complaints    Comments:  Furosemide dose increased to BID          Anticoagulation Summary  As of 2025      INR goal:  2.0-3.0   TTR:  76.9% (12.5 y)   INR used for dosin.4 (2025)   Warfarin maintenance plan:  2.5 mg (2.5 mg x 1) every Mon, Wed, Fri; 1.25 mg (2.5 mg x 0.5) all other days   Weekly warfarin total:  12.5 mg   Plan last modified:  Anneliese Rosales (2025)   Next INR check:  2025   Priority:  Maintenance   Target end date:  Indefinite    Indications    Atrial fibrillation (HCC) [I48.91]                 Anticoagulation Episode Summary       INR check location:  Anticoagulation Clinic    Preferred lab:  --    Send INR reminders to:  [facility]    Comments:  FAX. Pat = stepson  consent done 25          Anticoagulation Care Providers       Provider Role Specialty Phone number    Matias Sandoval MD Responsible Internal Medicine [phone]          There were no vitals taken for this visit.    Warfarin assessment / plan:     Patient appears well

## 2025-07-30 ENCOUNTER — ANTI-COAG VISIT (OUTPATIENT)
Dept: PHARMACY | Age: 89
End: 2025-07-30
Payer: MEDICARE

## 2025-07-30 DIAGNOSIS — I48.91 ATRIAL FIBRILLATION, UNSPECIFIED TYPE (HCC): Primary | ICD-10-CM

## 2025-07-30 LAB
INTERNATIONAL NORMALIZATION RATIO, POC: 4
PROTHROMBIN TIME, POC: 0

## 2025-07-30 PROCEDURE — 85610 PROTHROMBIN TIME: CPT | Performed by: PHARMACIST

## 2025-07-30 PROCEDURE — 99212 OFFICE O/P EST SF 10 MIN: CPT | Performed by: PHARMACIST

## 2025-07-30 NOTE — PROGRESS NOTES
Maurice Ackerman  is a 89 y.o. here for warfarin management.  Maurice had an INR test today. Results were reviewed and appropriate warfarin management was completed.     Patient verifies current warfarin dosing regimen: Yes     Warfarin medication reviewed and updated on the patient 's home medication list: Yes   All other medications reviewed and updated on the patient 's home medication list: No: No changes     Lab Results   Component Value Date    INR 4.0 2025    INR 2.4 2025    INR 2.00 2025     Patient Findings       Negatives:  Signs/symptoms of thrombosis, Signs/symptoms of bleeding, Missed doses, Change in medications, Change in diet/appetite, Bruising          Anticoagulation Summary  As of 2025      INR goal:  2.0-3.0   TTR:  76.6% (12.6 y)   INR used for dosin.0 (2025)   Warfarin maintenance plan:  2.5 mg (2.5 mg x 1) every Mon, Fri; 1.25 mg (2.5 mg x 0.5) all other days   Weekly warfarin total:  11.25 mg   Plan last modified:  Sarmad Muniz RPH (2025)   Next INR check:  2025   Priority:  Maintenance   Target end date:  Indefinite    Indications    Atrial fibrillation (HCC) [I48.91]                 Anticoagulation Episode Summary       INR check location:  Anticoagulation Clinic    Preferred lab:  --    Send INR reminders to:  [facility]    Comments:  FAX. Pat = stepson  consent done 25          Anticoagulation Care Providers       Provider Role Specialty Phone number    Matias Sandoval MD [facility] Internal Medicine [phone]          There were no vitals taken for this visit.    Warfarin assessment / plan:     Appears well  Supra-therapeutic INR.     Denies signs and symptoms of bleeding/bruising.  Denies medication changes.  Denies extra warfarin doses.  Denies change in appetite.  Denies illness, fever, vomiting or diarrhea.  Denies decrease in vitamin K intake.       Patient's INR today elevated.   Will HOLD dose

## 2025-08-08 ENCOUNTER — ANTI-COAG VISIT (OUTPATIENT)
Dept: PHARMACY | Age: 89
End: 2025-08-08
Payer: MEDICARE

## 2025-08-08 DIAGNOSIS — I48.91 ATRIAL FIBRILLATION, UNSPECIFIED TYPE (HCC): Primary | ICD-10-CM

## 2025-08-08 LAB
INR BLD: 2.7
PROTIME: NORMAL

## 2025-08-08 PROCEDURE — 85610 PROTHROMBIN TIME: CPT

## 2025-08-08 PROCEDURE — 99211 OFF/OP EST MAY X REQ PHY/QHP: CPT

## 2025-08-19 ENCOUNTER — APPOINTMENT (OUTPATIENT)
Dept: GENERAL RADIOLOGY | Age: 89
DRG: 313 | End: 2025-08-19
Payer: MEDICARE

## 2025-08-19 ENCOUNTER — APPOINTMENT (OUTPATIENT)
Dept: NUCLEAR MEDICINE | Age: 89
DRG: 313 | End: 2025-08-19
Payer: MEDICARE

## 2025-08-19 ENCOUNTER — HOSPITAL ENCOUNTER (INPATIENT)
Age: 89
LOS: 5 days | Discharge: HOSPICE | DRG: 313 | End: 2025-08-26
Attending: EMERGENCY MEDICINE | Admitting: STUDENT IN AN ORGANIZED HEALTH CARE EDUCATION/TRAINING PROGRAM
Payer: MEDICARE

## 2025-08-19 ENCOUNTER — APPOINTMENT (OUTPATIENT)
Dept: CT IMAGING | Age: 89
DRG: 313 | End: 2025-08-19
Payer: MEDICARE

## 2025-08-19 DIAGNOSIS — Z79.01 ANTICOAGULATED ON COUMADIN: ICD-10-CM

## 2025-08-19 DIAGNOSIS — R79.89 ELEVATED TROPONIN: ICD-10-CM

## 2025-08-19 DIAGNOSIS — R07.9 CHEST PAIN, UNSPECIFIED TYPE: Primary | ICD-10-CM

## 2025-08-19 DIAGNOSIS — Z71.89 GOALS OF CARE, COUNSELING/DISCUSSION: ICD-10-CM

## 2025-08-19 LAB
ALBUMIN SERPL-MCNC: 3.4 G/DL (ref 3.4–5)
ALBUMIN/GLOB SERPL: 1.1 {RATIO} (ref 1.1–2.2)
ALP SERPL-CCNC: 85 U/L (ref 40–129)
ALT SERPL-CCNC: 14 U/L (ref 10–40)
ANION GAP SERPL CALCULATED.3IONS-SCNC: 13 MMOL/L (ref 3–16)
APTT BLD: 36 SEC (ref 22.8–35.8)
APTT BLD: 45.9 SEC (ref 22.8–35.8)
AST SERPL-CCNC: 45 U/L (ref 15–37)
BASOPHILS # BLD: 0 K/UL (ref 0–0.2)
BASOPHILS NFR BLD: 0.2 %
BILIRUB SERPL-MCNC: 0.4 MG/DL (ref 0–1)
BUN SERPL-MCNC: 56 MG/DL (ref 7–20)
CALCIUM SERPL-MCNC: 8.9 MG/DL (ref 8.3–10.6)
CHLORIDE SERPL-SCNC: 99 MMOL/L (ref 99–110)
CO2 SERPL-SCNC: 28 MMOL/L (ref 21–32)
CREAT SERPL-MCNC: 2.4 MG/DL (ref 0.8–1.3)
D-DIMER QUANTITATIVE: 2.13 UG/ML FEU (ref 0–0.6)
DEPRECATED RDW RBC AUTO: 15.6 % (ref 12.4–15.4)
EKG DIAGNOSIS: NORMAL
EKG DIAGNOSIS: NORMAL
EKG Q-T INTERVAL: 440 MS
EKG Q-T INTERVAL: 450 MS
EKG QRS DURATION: 152 MS
EKG QRS DURATION: 164 MS
EKG QTC CALCULATION (BAZETT): 471 MS
EKG QTC CALCULATION (BAZETT): 519 MS
EKG R AXIS: 72 DEGREES
EKG R AXIS: 94 DEGREES
EKG T AXIS: -26 DEGREES
EKG T AXIS: -37 DEGREES
EKG VENTRICULAR RATE: 69 BPM
EKG VENTRICULAR RATE: 80 BPM
EOSINOPHIL # BLD: 0.1 K/UL (ref 0–0.6)
EOSINOPHIL NFR BLD: 1.6 %
GFR SERPLBLD CREATININE-BSD FMLA CKD-EPI: 24 ML/MIN/{1.73_M2}
GLUCOSE SERPL-MCNC: 109 MG/DL (ref 70–99)
HCT VFR BLD AUTO: 27 % (ref 40.5–52.5)
HGB BLD-MCNC: 8.9 G/DL (ref 13.5–17.5)
INR PPP: 2.42 (ref 0.86–1.14)
LYMPHOCYTES # BLD: 0.5 K/UL (ref 1–5.1)
LYMPHOCYTES NFR BLD: 6.6 %
MCH RBC QN AUTO: 28.8 PG (ref 26–34)
MCHC RBC AUTO-ENTMCNC: 32.9 G/DL (ref 31–36)
MCV RBC AUTO: 87.7 FL (ref 80–100)
MONOCYTES # BLD: 0.6 K/UL (ref 0–1.3)
MONOCYTES NFR BLD: 7.8 %
NEUTROPHILS # BLD: 6.1 K/UL (ref 1.7–7.7)
NEUTROPHILS NFR BLD: 83.8 %
NT-PROBNP SERPL-MCNC: 6434 PG/ML (ref 0–449)
PLATELET # BLD AUTO: 217 K/UL (ref 135–450)
PMV BLD AUTO: 8.4 FL (ref 5–10.5)
POTASSIUM SERPL-SCNC: 3.7 MMOL/L (ref 3.5–5.1)
PROT SERPL-MCNC: 6.5 G/DL (ref 6.4–8.2)
PROTHROMBIN TIME: 26 SEC (ref 12.1–14.9)
RBC # BLD AUTO: 3.08 M/UL (ref 4.2–5.9)
SODIUM SERPL-SCNC: 140 MMOL/L (ref 136–145)
TROPONIN, HIGH SENSITIVITY: 136 NG/L (ref 0–22)
TROPONIN, HIGH SENSITIVITY: 144 NG/L (ref 0–22)
WBC # BLD AUTO: 7.2 K/UL (ref 4–11)

## 2025-08-19 PROCEDURE — 2500000003 HC RX 250 WO HCPCS: Performed by: STUDENT IN AN ORGANIZED HEALTH CARE EDUCATION/TRAINING PROGRAM

## 2025-08-19 PROCEDURE — 80053 COMPREHEN METABOLIC PANEL: CPT

## 2025-08-19 PROCEDURE — 85610 PROTHROMBIN TIME: CPT

## 2025-08-19 PROCEDURE — 36415 COLL VENOUS BLD VENIPUNCTURE: CPT

## 2025-08-19 PROCEDURE — 83880 ASSAY OF NATRIURETIC PEPTIDE: CPT

## 2025-08-19 PROCEDURE — G0378 HOSPITAL OBSERVATION PER HR: HCPCS

## 2025-08-19 PROCEDURE — 6360000002 HC RX W HCPCS: Performed by: REGISTERED NURSE

## 2025-08-19 PROCEDURE — 96375 TX/PRO/DX INJ NEW DRUG ADDON: CPT

## 2025-08-19 PROCEDURE — 78580 LUNG PERFUSION IMAGING: CPT

## 2025-08-19 PROCEDURE — 93010 ELECTROCARDIOGRAM REPORT: CPT | Performed by: INTERNAL MEDICINE

## 2025-08-19 PROCEDURE — A9540 TC99M MAA: HCPCS | Performed by: NURSE PRACTITIONER

## 2025-08-19 PROCEDURE — 71045 X-RAY EXAM CHEST 1 VIEW: CPT

## 2025-08-19 PROCEDURE — 85730 THROMBOPLASTIN TIME PARTIAL: CPT

## 2025-08-19 PROCEDURE — 93005 ELECTROCARDIOGRAM TRACING: CPT | Performed by: NURSE PRACTITIONER

## 2025-08-19 PROCEDURE — 85025 COMPLETE CBC W/AUTO DIFF WBC: CPT

## 2025-08-19 PROCEDURE — 85379 FIBRIN DEGRADATION QUANT: CPT

## 2025-08-19 PROCEDURE — 84484 ASSAY OF TROPONIN QUANT: CPT

## 2025-08-19 PROCEDURE — 6360000002 HC RX W HCPCS: Performed by: NURSE PRACTITIONER

## 2025-08-19 PROCEDURE — 6370000000 HC RX 637 (ALT 250 FOR IP): Performed by: STUDENT IN AN ORGANIZED HEALTH CARE EDUCATION/TRAINING PROGRAM

## 2025-08-19 PROCEDURE — 96374 THER/PROPH/DIAG INJ IV PUSH: CPT

## 2025-08-19 PROCEDURE — 99285 EMERGENCY DEPT VISIT HI MDM: CPT

## 2025-08-19 PROCEDURE — 3430000000 HC RX DIAGNOSTIC RADIOPHARMACEUTICAL: Performed by: NURSE PRACTITIONER

## 2025-08-19 PROCEDURE — 6370000000 HC RX 637 (ALT 250 FOR IP): Performed by: NURSE PRACTITIONER

## 2025-08-19 RX ORDER — HEPARIN SODIUM 1000 [USP'U]/ML
2000 INJECTION, SOLUTION INTRAVENOUS; SUBCUTANEOUS PRN
Status: DISCONTINUED | OUTPATIENT
Start: 2025-08-19 | End: 2025-08-19

## 2025-08-19 RX ORDER — POLYETHYLENE GLYCOL 3350 17 G/17G
17 POWDER, FOR SOLUTION ORAL DAILY PRN
Status: DISCONTINUED | OUTPATIENT
Start: 2025-08-19 | End: 2025-08-26 | Stop reason: HOSPADM

## 2025-08-19 RX ORDER — ATENOLOL 25 MG/1
25 TABLET ORAL DAILY
Status: DISCONTINUED | OUTPATIENT
Start: 2025-08-20 | End: 2025-08-26 | Stop reason: HOSPADM

## 2025-08-19 RX ORDER — LANOLIN ALCOHOL/MO/W.PET/CERES
400 CREAM (GRAM) TOPICAL DAILY
Status: DISCONTINUED | OUTPATIENT
Start: 2025-08-19 | End: 2025-08-26 | Stop reason: HOSPADM

## 2025-08-19 RX ORDER — LIDOCAINE 4 G/G
1 PATCH TOPICAL ONCE
Status: COMPLETED | OUTPATIENT
Start: 2025-08-19 | End: 2025-08-20

## 2025-08-19 RX ORDER — ACETAMINOPHEN 325 MG/1
650 TABLET ORAL EVERY 6 HOURS PRN
Status: DISCONTINUED | OUTPATIENT
Start: 2025-08-19 | End: 2025-08-26 | Stop reason: HOSPADM

## 2025-08-19 RX ORDER — ACETAMINOPHEN 325 MG/1
650 TABLET ORAL ONCE
Status: COMPLETED | OUTPATIENT
Start: 2025-08-19 | End: 2025-08-19

## 2025-08-19 RX ORDER — ALLOPURINOL 100 MG/1
100 TABLET ORAL DAILY
COMMUNITY

## 2025-08-19 RX ORDER — PROCHLORPERAZINE EDISYLATE 5 MG/ML
10 INJECTION INTRAMUSCULAR; INTRAVENOUS EVERY 6 HOURS PRN
Status: DISCONTINUED | OUTPATIENT
Start: 2025-08-19 | End: 2025-08-26 | Stop reason: HOSPADM

## 2025-08-19 RX ORDER — HEPARIN SODIUM 10000 [USP'U]/100ML
5-30 INJECTION, SOLUTION INTRAVENOUS CONTINUOUS
Status: DISCONTINUED | OUTPATIENT
Start: 2025-08-19 | End: 2025-08-19

## 2025-08-19 RX ORDER — FUROSEMIDE 10 MG/ML
40 INJECTION INTRAMUSCULAR; INTRAVENOUS ONCE
Status: COMPLETED | OUTPATIENT
Start: 2025-08-19 | End: 2025-08-19

## 2025-08-19 RX ORDER — ACETAMINOPHEN 650 MG/1
650 SUPPOSITORY RECTAL EVERY 6 HOURS PRN
Status: DISCONTINUED | OUTPATIENT
Start: 2025-08-19 | End: 2025-08-26 | Stop reason: HOSPADM

## 2025-08-19 RX ORDER — FAMOTIDINE 10 MG
10 TABLET ORAL DAILY
COMMUNITY
End: 2025-08-19

## 2025-08-19 RX ORDER — ROSUVASTATIN CALCIUM 20 MG/1
20 TABLET, COATED ORAL NIGHTLY
COMMUNITY

## 2025-08-19 RX ORDER — ONDANSETRON 4 MG/1
4 TABLET, ORALLY DISINTEGRATING ORAL EVERY 8 HOURS PRN
Status: DISCONTINUED | OUTPATIENT
Start: 2025-08-19 | End: 2025-08-19 | Stop reason: ALTCHOICE

## 2025-08-19 RX ORDER — ASPIRIN 81 MG/1
324 TABLET, CHEWABLE ORAL ONCE
Status: DISCONTINUED | OUTPATIENT
Start: 2025-08-19 | End: 2025-08-19

## 2025-08-19 RX ORDER — HEPARIN SODIUM 1000 [USP'U]/ML
4000 INJECTION, SOLUTION INTRAVENOUS; SUBCUTANEOUS PRN
Status: DISCONTINUED | OUTPATIENT
Start: 2025-08-19 | End: 2025-08-19

## 2025-08-19 RX ORDER — SODIUM CHLORIDE 0.9 % (FLUSH) 0.9 %
5-40 SYRINGE (ML) INJECTION PRN
Status: DISCONTINUED | OUTPATIENT
Start: 2025-08-19 | End: 2025-08-26 | Stop reason: HOSPADM

## 2025-08-19 RX ORDER — TORSEMIDE 100 MG/1
100 TABLET ORAL DAILY
Status: DISCONTINUED | OUTPATIENT
Start: 2025-08-20 | End: 2025-08-20

## 2025-08-19 RX ORDER — NITROGLYCERIN 0.4 MG/1
0.4 TABLET SUBLINGUAL PRN
COMMUNITY

## 2025-08-19 RX ORDER — ATENOLOL 25 MG/1
25 TABLET ORAL DAILY
COMMUNITY

## 2025-08-19 RX ORDER — TAMSULOSIN HYDROCHLORIDE 0.4 MG/1
0.4 CAPSULE ORAL NIGHTLY
COMMUNITY

## 2025-08-19 RX ORDER — ALLOPURINOL 100 MG/1
100 TABLET ORAL DAILY
Status: DISCONTINUED | OUTPATIENT
Start: 2025-08-20 | End: 2025-08-26 | Stop reason: HOSPADM

## 2025-08-19 RX ORDER — ASPIRIN 81 MG/1
81 TABLET, CHEWABLE ORAL DAILY
COMMUNITY

## 2025-08-19 RX ORDER — FERROUS SULFATE 325(65) MG
325 TABLET ORAL DAILY
Status: DISCONTINUED | OUTPATIENT
Start: 2025-08-20 | End: 2025-08-26 | Stop reason: HOSPADM

## 2025-08-19 RX ORDER — ONDANSETRON 2 MG/ML
4 INJECTION INTRAMUSCULAR; INTRAVENOUS EVERY 6 HOURS PRN
Status: DISCONTINUED | OUTPATIENT
Start: 2025-08-19 | End: 2025-08-19 | Stop reason: ALTCHOICE

## 2025-08-19 RX ORDER — HEPARIN SODIUM 1000 [USP'U]/ML
4000 INJECTION, SOLUTION INTRAVENOUS; SUBCUTANEOUS ONCE
Status: DISCONTINUED | OUTPATIENT
Start: 2025-08-19 | End: 2025-08-19

## 2025-08-19 RX ORDER — LIDOCAINE 4 G/G
2 PATCH TOPICAL DAILY PRN
Status: DISCONTINUED | OUTPATIENT
Start: 2025-08-19 | End: 2025-08-26 | Stop reason: HOSPADM

## 2025-08-19 RX ORDER — ROSUVASTATIN CALCIUM 20 MG/1
20 TABLET, COATED ORAL NIGHTLY
Status: DISCONTINUED | OUTPATIENT
Start: 2025-08-19 | End: 2025-08-26 | Stop reason: HOSPADM

## 2025-08-19 RX ORDER — SODIUM CHLORIDE 9 MG/ML
INJECTION, SOLUTION INTRAVENOUS PRN
Status: DISCONTINUED | OUTPATIENT
Start: 2025-08-19 | End: 2025-08-26 | Stop reason: HOSPADM

## 2025-08-19 RX ORDER — SODIUM CHLORIDE 0.9 % (FLUSH) 0.9 %
5-40 SYRINGE (ML) INJECTION EVERY 12 HOURS SCHEDULED
Status: DISCONTINUED | OUTPATIENT
Start: 2025-08-19 | End: 2025-08-26 | Stop reason: HOSPADM

## 2025-08-19 RX ORDER — ASPIRIN 81 MG/1
81 TABLET, CHEWABLE ORAL DAILY
Status: DISCONTINUED | OUTPATIENT
Start: 2025-08-20 | End: 2025-08-26 | Stop reason: HOSPADM

## 2025-08-19 RX ORDER — OXYCODONE HYDROCHLORIDE 5 MG/1
2.5 TABLET ORAL EVERY 6 HOURS PRN
Refills: 0 | Status: DISCONTINUED | OUTPATIENT
Start: 2025-08-19 | End: 2025-08-26 | Stop reason: HOSPADM

## 2025-08-19 RX ADMIN — OXYCODONE HYDROCHLORIDE 2.5 MG: 5 TABLET ORAL at 15:53

## 2025-08-19 RX ADMIN — KIT FOR THE PREPARATION OF TECHNETIUM TC 99M ALBUMIN AGGREGATED 6 MILLICURIE: 2.5 INJECTION, POWDER, FOR SOLUTION INTRAVENOUS at 11:00

## 2025-08-19 RX ADMIN — HYDROMORPHONE HYDROCHLORIDE 0.25 MG: 1 INJECTION, SOLUTION INTRAMUSCULAR; INTRAVENOUS; SUBCUTANEOUS at 22:37

## 2025-08-19 RX ADMIN — ACETAMINOPHEN 650 MG: 325 TABLET ORAL at 20:56

## 2025-08-19 RX ADMIN — SODIUM CHLORIDE, PRESERVATIVE FREE 10 ML: 5 INJECTION INTRAVENOUS at 20:57

## 2025-08-19 RX ADMIN — ROSUVASTATIN CALCIUM 20 MG: 20 TABLET, FILM COATED ORAL at 20:56

## 2025-08-19 RX ADMIN — FUROSEMIDE 40 MG: 10 INJECTION, SOLUTION INTRAMUSCULAR; INTRAVENOUS at 11:45

## 2025-08-19 RX ADMIN — ACETAMINOPHEN 650 MG: 325 TABLET ORAL at 12:55

## 2025-08-19 RX ADMIN — ALUMINUM HYDROXIDE, MAGNESIUM HYDROXIDE, AND SIMETHICONE: 200; 20; 200 SUSPENSION ORAL at 15:53

## 2025-08-19 RX ADMIN — Medication 400 MG: at 15:53

## 2025-08-19 ASSESSMENT — PAIN - FUNCTIONAL ASSESSMENT
PAIN_FUNCTIONAL_ASSESSMENT: 0-10
PAIN_FUNCTIONAL_ASSESSMENT: PREVENTS OR INTERFERES SOME ACTIVE ACTIVITIES AND ADLS
PAIN_FUNCTIONAL_ASSESSMENT: 0-10

## 2025-08-19 ASSESSMENT — LIFESTYLE VARIABLES
HOW MANY STANDARD DRINKS CONTAINING ALCOHOL DO YOU HAVE ON A TYPICAL DAY: PATIENT DOES NOT DRINK
HOW OFTEN DO YOU HAVE A DRINK CONTAINING ALCOHOL: NEVER

## 2025-08-19 ASSESSMENT — PAIN DESCRIPTION - FREQUENCY: FREQUENCY: CONTINUOUS

## 2025-08-19 ASSESSMENT — HEART SCORE: ECG: NORMAL

## 2025-08-19 ASSESSMENT — PAIN DESCRIPTION - LOCATION
LOCATION: BACK
LOCATION: CHEST
LOCATION: BACK

## 2025-08-19 ASSESSMENT — PAIN DESCRIPTION - PAIN TYPE: TYPE: ACUTE PAIN

## 2025-08-19 ASSESSMENT — PAIN DESCRIPTION - DESCRIPTORS
DESCRIPTORS: ACHING

## 2025-08-19 ASSESSMENT — PAIN DESCRIPTION - ONSET: ONSET: ON-GOING

## 2025-08-19 ASSESSMENT — PAIN DESCRIPTION - ORIENTATION
ORIENTATION: LOWER
ORIENTATION: RIGHT;LOWER

## 2025-08-19 ASSESSMENT — PAIN SCALES - GENERAL
PAINLEVEL_OUTOF10: 10
PAINLEVEL_OUTOF10: 5
PAINLEVEL_OUTOF10: 8

## 2025-08-20 LAB
ANION GAP SERPL CALCULATED.3IONS-SCNC: 15 MMOL/L (ref 3–16)
BASOPHILS # BLD: 0 K/UL (ref 0–0.2)
BASOPHILS NFR BLD: 0.4 %
BUN SERPL-MCNC: 60 MG/DL (ref 7–20)
CALCIUM SERPL-MCNC: 9.4 MG/DL (ref 8.3–10.6)
CHLORIDE SERPL-SCNC: 95 MMOL/L (ref 99–110)
CO2 SERPL-SCNC: 26 MMOL/L (ref 21–32)
CREAT SERPL-MCNC: 2.6 MG/DL (ref 0.8–1.3)
DEPRECATED RDW RBC AUTO: 15.6 % (ref 12.4–15.4)
EOSINOPHIL # BLD: 0 K/UL (ref 0–0.6)
EOSINOPHIL NFR BLD: 0.3 %
GFR SERPLBLD CREATININE-BSD FMLA CKD-EPI: 22 ML/MIN/{1.73_M2}
GLUCOSE SERPL-MCNC: 134 MG/DL (ref 70–99)
HCT VFR BLD AUTO: 30.3 % (ref 40.5–52.5)
HGB BLD-MCNC: 10 G/DL (ref 13.5–17.5)
INR PPP: 2.61 (ref 0.86–1.14)
LYMPHOCYTES # BLD: 0.5 K/UL (ref 1–5.1)
LYMPHOCYTES NFR BLD: 5.1 %
MCH RBC QN AUTO: 29.1 PG (ref 26–34)
MCHC RBC AUTO-ENTMCNC: 33.1 G/DL (ref 31–36)
MCV RBC AUTO: 88 FL (ref 80–100)
MONOCYTES # BLD: 0.8 K/UL (ref 0–1.3)
MONOCYTES NFR BLD: 7.4 %
NEUTROPHILS # BLD: 8.8 K/UL (ref 1.7–7.7)
NEUTROPHILS NFR BLD: 86.8 %
PLATELET # BLD AUTO: 218 K/UL (ref 135–450)
PMV BLD AUTO: 8.3 FL (ref 5–10.5)
POTASSIUM SERPL-SCNC: 4.2 MMOL/L (ref 3.5–5.1)
PROTHROMBIN TIME: 27.5 SEC (ref 12.1–14.9)
RBC # BLD AUTO: 3.45 M/UL (ref 4.2–5.9)
SODIUM SERPL-SCNC: 136 MMOL/L (ref 136–145)
TROPONIN, HIGH SENSITIVITY: 137 NG/L (ref 0–22)
WBC # BLD AUTO: 10.1 K/UL (ref 4–11)

## 2025-08-20 PROCEDURE — 6370000000 HC RX 637 (ALT 250 FOR IP): Performed by: STUDENT IN AN ORGANIZED HEALTH CARE EDUCATION/TRAINING PROGRAM

## 2025-08-20 PROCEDURE — 84484 ASSAY OF TROPONIN QUANT: CPT

## 2025-08-20 PROCEDURE — 94760 N-INVAS EAR/PLS OXIMETRY 1: CPT

## 2025-08-20 PROCEDURE — 97530 THERAPEUTIC ACTIVITIES: CPT

## 2025-08-20 PROCEDURE — G0378 HOSPITAL OBSERVATION PER HR: HCPCS

## 2025-08-20 PROCEDURE — 97166 OT EVAL MOD COMPLEX 45 MIN: CPT

## 2025-08-20 PROCEDURE — 80048 BASIC METABOLIC PNL TOTAL CA: CPT

## 2025-08-20 PROCEDURE — 85025 COMPLETE CBC W/AUTO DIFF WBC: CPT

## 2025-08-20 PROCEDURE — 36415 COLL VENOUS BLD VENIPUNCTURE: CPT

## 2025-08-20 PROCEDURE — 2500000003 HC RX 250 WO HCPCS: Performed by: STUDENT IN AN ORGANIZED HEALTH CARE EDUCATION/TRAINING PROGRAM

## 2025-08-20 PROCEDURE — 97162 PT EVAL MOD COMPLEX 30 MIN: CPT

## 2025-08-20 PROCEDURE — 85610 PROTHROMBIN TIME: CPT

## 2025-08-20 PROCEDURE — 97116 GAIT TRAINING THERAPY: CPT

## 2025-08-20 RX ORDER — TORSEMIDE 100 MG/1
100 TABLET ORAL 2 TIMES DAILY
Qty: 60 TABLET | Refills: 0
Start: 2025-08-20

## 2025-08-20 RX ORDER — TORSEMIDE 100 MG/1
100 TABLET ORAL 2 TIMES DAILY
Status: DISCONTINUED | OUTPATIENT
Start: 2025-08-20 | End: 2025-08-26 | Stop reason: HOSPADM

## 2025-08-20 RX ORDER — PANTOPRAZOLE SODIUM 40 MG/1
40 TABLET, DELAYED RELEASE ORAL
Qty: 30 TABLET | Refills: 0 | Status: SHIPPED | OUTPATIENT
Start: 2025-08-21

## 2025-08-20 RX ORDER — PANTOPRAZOLE SODIUM 40 MG/1
40 TABLET, DELAYED RELEASE ORAL
Status: DISCONTINUED | OUTPATIENT
Start: 2025-08-21 | End: 2025-08-26 | Stop reason: HOSPADM

## 2025-08-20 RX ADMIN — ROSUVASTATIN CALCIUM 20 MG: 20 TABLET, FILM COATED ORAL at 20:25

## 2025-08-20 RX ADMIN — ALUMINUM HYDROXIDE, MAGNESIUM HYDROXIDE, AND SIMETHICONE: 200; 20; 200 SUSPENSION ORAL at 12:15

## 2025-08-20 RX ADMIN — SODIUM CHLORIDE, PRESERVATIVE FREE 10 ML: 5 INJECTION INTRAVENOUS at 20:29

## 2025-08-20 RX ADMIN — Medication 400 MG: at 08:40

## 2025-08-20 RX ADMIN — ALLOPURINOL 100 MG: 100 TABLET ORAL at 08:40

## 2025-08-20 RX ADMIN — ATENOLOL 25 MG: 25 TABLET ORAL at 08:40

## 2025-08-20 RX ADMIN — TORSEMIDE 100 MG: 100 TABLET ORAL at 08:40

## 2025-08-20 RX ADMIN — FERROUS SULFATE TAB 325 MG (65 MG ELEMENTAL FE) 325 MG: 325 (65 FE) TAB at 08:39

## 2025-08-20 RX ADMIN — OXYCODONE HYDROCHLORIDE 2.5 MG: 5 TABLET ORAL at 20:25

## 2025-08-20 RX ADMIN — TORSEMIDE 100 MG: 100 TABLET ORAL at 20:26

## 2025-08-20 ASSESSMENT — PAIN - FUNCTIONAL ASSESSMENT
PAIN_FUNCTIONAL_ASSESSMENT: 0-10
PAIN_FUNCTIONAL_ASSESSMENT: 0-10
PAIN_FUNCTIONAL_ASSESSMENT: PREVENTS OR INTERFERES SOME ACTIVE ACTIVITIES AND ADLS

## 2025-08-20 ASSESSMENT — PAIN SCALES - GENERAL
PAINLEVEL_OUTOF10: 0
PAINLEVEL_OUTOF10: 9

## 2025-08-20 ASSESSMENT — PAIN DESCRIPTION - DESCRIPTORS: DESCRIPTORS: STABBING

## 2025-08-20 ASSESSMENT — PAIN DESCRIPTION - LOCATION: LOCATION: BACK

## 2025-08-20 ASSESSMENT — PAIN DESCRIPTION - ORIENTATION: ORIENTATION: LOWER;RIGHT

## 2025-08-21 PROBLEM — R07.89 ATYPICAL CHEST PAIN: Status: ACTIVE | Noted: 2025-08-21

## 2025-08-21 PROBLEM — R79.89 ELEVATED TROPONIN: Status: ACTIVE | Noted: 2025-08-21

## 2025-08-21 LAB
ALBUMIN SERPL-MCNC: 3 G/DL (ref 3.4–5)
ANION GAP SERPL CALCULATED.3IONS-SCNC: 14 MMOL/L (ref 3–16)
ANION GAP SERPL CALCULATED.3IONS-SCNC: 15 MMOL/L (ref 3–16)
BASOPHILS # BLD: 0.1 K/UL (ref 0–0.2)
BASOPHILS NFR BLD: 0.6 %
BUN SERPL-MCNC: 67 MG/DL (ref 7–20)
BUN SERPL-MCNC: 72 MG/DL (ref 7–20)
CALCIUM SERPL-MCNC: 8.9 MG/DL (ref 8.3–10.6)
CALCIUM SERPL-MCNC: 9.3 MG/DL (ref 8.3–10.6)
CHLORIDE SERPL-SCNC: 93 MMOL/L (ref 99–110)
CHLORIDE SERPL-SCNC: 95 MMOL/L (ref 99–110)
CO2 SERPL-SCNC: 23 MMOL/L (ref 21–32)
CO2 SERPL-SCNC: 28 MMOL/L (ref 21–32)
CREAT SERPL-MCNC: 3 MG/DL (ref 0.8–1.3)
CREAT SERPL-MCNC: 3.2 MG/DL (ref 0.8–1.3)
DEPRECATED RDW RBC AUTO: 15.4 % (ref 12.4–15.4)
EOSINOPHIL # BLD: 0 K/UL (ref 0–0.6)
EOSINOPHIL NFR BLD: 0.4 %
GFR SERPLBLD CREATININE-BSD FMLA CKD-EPI: 17 ML/MIN/{1.73_M2}
GFR SERPLBLD CREATININE-BSD FMLA CKD-EPI: 19 ML/MIN/{1.73_M2}
GLUCOSE SERPL-MCNC: 100 MG/DL (ref 70–99)
GLUCOSE SERPL-MCNC: 137 MG/DL (ref 70–99)
HCT VFR BLD AUTO: 29.8 % (ref 40.5–52.5)
HGB BLD-MCNC: 9.9 G/DL (ref 13.5–17.5)
INR PPP: 2.77 (ref 0.86–1.14)
LYMPHOCYTES # BLD: 0.6 K/UL (ref 1–5.1)
LYMPHOCYTES NFR BLD: 7.5 %
MAGNESIUM SERPL-MCNC: 2.69 MG/DL (ref 1.8–2.4)
MCH RBC QN AUTO: 29 PG (ref 26–34)
MCHC RBC AUTO-ENTMCNC: 33.1 G/DL (ref 31–36)
MCV RBC AUTO: 87.6 FL (ref 80–100)
MONOCYTES # BLD: 0.6 K/UL (ref 0–1.3)
MONOCYTES NFR BLD: 7.1 %
NEUTROPHILS # BLD: 7.3 K/UL (ref 1.7–7.7)
NEUTROPHILS NFR BLD: 84.4 %
NT-PROBNP SERPL-MCNC: ABNORMAL PG/ML (ref 0–449)
PHOSPHATE SERPL-MCNC: 3.7 MG/DL (ref 2.5–4.9)
PLATELET # BLD AUTO: 211 K/UL (ref 135–450)
PMV BLD AUTO: 8.5 FL (ref 5–10.5)
POTASSIUM SERPL-SCNC: 3.8 MMOL/L (ref 3.5–5.1)
POTASSIUM SERPL-SCNC: 4.3 MMOL/L (ref 3.5–5.1)
PROTHROMBIN TIME: 28.7 SEC (ref 12.1–14.9)
RBC # BLD AUTO: 3.4 M/UL (ref 4.2–5.9)
SODIUM SERPL-SCNC: 133 MMOL/L (ref 136–145)
SODIUM SERPL-SCNC: 135 MMOL/L (ref 136–145)
WBC # BLD AUTO: 8.7 K/UL (ref 4–11)

## 2025-08-21 PROCEDURE — 6370000000 HC RX 637 (ALT 250 FOR IP): Performed by: STUDENT IN AN ORGANIZED HEALTH CARE EDUCATION/TRAINING PROGRAM

## 2025-08-21 PROCEDURE — 85025 COMPLETE CBC W/AUTO DIFF WBC: CPT

## 2025-08-21 PROCEDURE — 1200000000 HC SEMI PRIVATE

## 2025-08-21 PROCEDURE — 36415 COLL VENOUS BLD VENIPUNCTURE: CPT

## 2025-08-21 PROCEDURE — 2500000003 HC RX 250 WO HCPCS: Performed by: STUDENT IN AN ORGANIZED HEALTH CARE EDUCATION/TRAINING PROGRAM

## 2025-08-21 PROCEDURE — 94760 N-INVAS EAR/PLS OXIMETRY 1: CPT

## 2025-08-21 PROCEDURE — 97530 THERAPEUTIC ACTIVITIES: CPT

## 2025-08-21 PROCEDURE — 80069 RENAL FUNCTION PANEL: CPT

## 2025-08-21 PROCEDURE — 83880 ASSAY OF NATRIURETIC PEPTIDE: CPT

## 2025-08-21 PROCEDURE — 85610 PROTHROMBIN TIME: CPT

## 2025-08-21 PROCEDURE — 51798 US URINE CAPACITY MEASURE: CPT

## 2025-08-21 PROCEDURE — 99223 1ST HOSP IP/OBS HIGH 75: CPT | Performed by: INTERNAL MEDICINE

## 2025-08-21 PROCEDURE — 6360000002 HC RX W HCPCS: Performed by: STUDENT IN AN ORGANIZED HEALTH CARE EDUCATION/TRAINING PROGRAM

## 2025-08-21 PROCEDURE — 83735 ASSAY OF MAGNESIUM: CPT

## 2025-08-21 PROCEDURE — 96376 TX/PRO/DX INJ SAME DRUG ADON: CPT

## 2025-08-21 RX ORDER — FUROSEMIDE 10 MG/ML
40 INJECTION INTRAMUSCULAR; INTRAVENOUS ONCE
Status: DISCONTINUED | OUTPATIENT
Start: 2025-08-21 | End: 2025-08-21

## 2025-08-21 RX ORDER — WARFARIN SODIUM 1 MG/1
0.5 TABLET ORAL
Status: COMPLETED | OUTPATIENT
Start: 2025-08-21 | End: 2025-08-21

## 2025-08-21 RX ORDER — FUROSEMIDE 10 MG/ML
80 INJECTION INTRAMUSCULAR; INTRAVENOUS ONCE
Status: COMPLETED | OUTPATIENT
Start: 2025-08-21 | End: 2025-08-21

## 2025-08-21 RX ADMIN — PANTOPRAZOLE SODIUM 40 MG: 40 TABLET, DELAYED RELEASE ORAL at 05:09

## 2025-08-21 RX ADMIN — FUROSEMIDE 80 MG: 10 INJECTION, SOLUTION INTRAMUSCULAR; INTRAVENOUS at 09:32

## 2025-08-21 RX ADMIN — FUROSEMIDE 80 MG: 10 INJECTION, SOLUTION INTRAMUSCULAR; INTRAVENOUS at 17:34

## 2025-08-21 RX ADMIN — SODIUM CHLORIDE, PRESERVATIVE FREE 10 ML: 5 INJECTION INTRAVENOUS at 20:22

## 2025-08-21 RX ADMIN — ACETAMINOPHEN 650 MG: 325 TABLET ORAL at 14:51

## 2025-08-21 RX ADMIN — FERROUS SULFATE TAB 325 MG (65 MG ELEMENTAL FE) 325 MG: 325 (65 FE) TAB at 08:27

## 2025-08-21 RX ADMIN — OXYCODONE HYDROCHLORIDE 2.5 MG: 5 TABLET ORAL at 05:09

## 2025-08-21 RX ADMIN — ALLOPURINOL 100 MG: 100 TABLET ORAL at 08:27

## 2025-08-21 RX ADMIN — ACETAMINOPHEN 650 MG: 325 TABLET ORAL at 20:17

## 2025-08-21 RX ADMIN — WARFARIN SODIUM 0.5 MG: 1 TABLET ORAL at 17:34

## 2025-08-21 RX ADMIN — OXYCODONE HYDROCHLORIDE 2.5 MG: 5 TABLET ORAL at 14:52

## 2025-08-21 RX ADMIN — Medication 400 MG: at 08:26

## 2025-08-21 RX ADMIN — ROSUVASTATIN CALCIUM 20 MG: 20 TABLET, FILM COATED ORAL at 20:17

## 2025-08-21 RX ADMIN — OXYCODONE HYDROCHLORIDE 2.5 MG: 5 TABLET ORAL at 20:17

## 2025-08-21 RX ADMIN — ATENOLOL 25 MG: 25 TABLET ORAL at 08:26

## 2025-08-21 ASSESSMENT — PAIN - FUNCTIONAL ASSESSMENT
PAIN_FUNCTIONAL_ASSESSMENT: 0-10
PAIN_FUNCTIONAL_ASSESSMENT: PREVENTS OR INTERFERES SOME ACTIVE ACTIVITIES AND ADLS
PAIN_FUNCTIONAL_ASSESSMENT: 0-10
PAIN_FUNCTIONAL_ASSESSMENT: ACTIVITIES ARE NOT PREVENTED
PAIN_FUNCTIONAL_ASSESSMENT: 0-10

## 2025-08-21 ASSESSMENT — PAIN DESCRIPTION - LOCATION
LOCATION: BACK
LOCATION: BACK;BUTTOCKS
LOCATION: BACK

## 2025-08-21 ASSESSMENT — PAIN DESCRIPTION - DESCRIPTORS
DESCRIPTORS: ACHING
DESCRIPTORS: STABBING
DESCRIPTORS: STABBING

## 2025-08-21 ASSESSMENT — PAIN DESCRIPTION - ORIENTATION
ORIENTATION: LOWER;RIGHT
ORIENTATION: MID
ORIENTATION: LOWER

## 2025-08-21 ASSESSMENT — PAIN SCALES - GENERAL
PAINLEVEL_OUTOF10: 0
PAINLEVEL_OUTOF10: 5
PAINLEVEL_OUTOF10: 7
PAINLEVEL_OUTOF10: 0
PAINLEVEL_OUTOF10: 9
PAINLEVEL_OUTOF10: 2

## 2025-08-22 ENCOUNTER — APPOINTMENT (OUTPATIENT)
Dept: GENERAL RADIOLOGY | Age: 89
DRG: 313 | End: 2025-08-22
Payer: MEDICARE

## 2025-08-22 LAB
ANION GAP SERPL CALCULATED.3IONS-SCNC: 14 MMOL/L (ref 3–16)
BASOPHILS # BLD: 0.1 K/UL (ref 0–0.2)
BASOPHILS NFR BLD: 0.9 %
BUN SERPL-MCNC: 77 MG/DL (ref 7–20)
CALCIUM SERPL-MCNC: 9.4 MG/DL (ref 8.3–10.6)
CHLORIDE SERPL-SCNC: 92 MMOL/L (ref 99–110)
CO2 SERPL-SCNC: 26 MMOL/L (ref 21–32)
CREAT SERPL-MCNC: 3.4 MG/DL (ref 0.8–1.3)
DEPRECATED RDW RBC AUTO: 15.5 % (ref 12.4–15.4)
EOSINOPHIL # BLD: 0.1 K/UL (ref 0–0.6)
EOSINOPHIL NFR BLD: 1.4 %
GFR SERPLBLD CREATININE-BSD FMLA CKD-EPI: 16 ML/MIN/{1.73_M2}
GLUCOSE SERPL-MCNC: 92 MG/DL (ref 70–99)
HCT VFR BLD AUTO: 29.9 % (ref 40.5–52.5)
HGB BLD-MCNC: 9.8 G/DL (ref 13.5–17.5)
INR PPP: 2.9 (ref 0.86–1.14)
LYMPHOCYTES # BLD: 0.7 K/UL (ref 1–5.1)
LYMPHOCYTES NFR BLD: 9.6 %
MCH RBC QN AUTO: 28.6 PG (ref 26–34)
MCHC RBC AUTO-ENTMCNC: 32.8 G/DL (ref 31–36)
MCV RBC AUTO: 87.2 FL (ref 80–100)
MONOCYTES # BLD: 0.6 K/UL (ref 0–1.3)
MONOCYTES NFR BLD: 7.4 %
NEUTROPHILS # BLD: 6 K/UL (ref 1.7–7.7)
NEUTROPHILS NFR BLD: 80.7 %
PLATELET # BLD AUTO: 215 K/UL (ref 135–450)
PMV BLD AUTO: 8.8 FL (ref 5–10.5)
POTASSIUM SERPL-SCNC: 4.3 MMOL/L (ref 3.5–5.1)
PROTHROMBIN TIME: 29.7 SEC (ref 12.1–14.9)
RBC # BLD AUTO: 3.43 M/UL (ref 4.2–5.9)
SODIUM SERPL-SCNC: 132 MMOL/L (ref 136–145)
WBC # BLD AUTO: 7.5 K/UL (ref 4–11)

## 2025-08-22 PROCEDURE — 85610 PROTHROMBIN TIME: CPT

## 2025-08-22 PROCEDURE — 6370000000 HC RX 637 (ALT 250 FOR IP): Performed by: STUDENT IN AN ORGANIZED HEALTH CARE EDUCATION/TRAINING PROGRAM

## 2025-08-22 PROCEDURE — 80048 BASIC METABOLIC PNL TOTAL CA: CPT

## 2025-08-22 PROCEDURE — 85025 COMPLETE CBC W/AUTO DIFF WBC: CPT

## 2025-08-22 PROCEDURE — 94760 N-INVAS EAR/PLS OXIMETRY 1: CPT

## 2025-08-22 PROCEDURE — 36415 COLL VENOUS BLD VENIPUNCTURE: CPT

## 2025-08-22 PROCEDURE — 97535 SELF CARE MNGMENT TRAINING: CPT

## 2025-08-22 PROCEDURE — 97530 THERAPEUTIC ACTIVITIES: CPT

## 2025-08-22 PROCEDURE — 71045 X-RAY EXAM CHEST 1 VIEW: CPT

## 2025-08-22 PROCEDURE — 6360000002 HC RX W HCPCS: Performed by: STUDENT IN AN ORGANIZED HEALTH CARE EDUCATION/TRAINING PROGRAM

## 2025-08-22 PROCEDURE — P9047 ALBUMIN (HUMAN), 25%, 50ML: HCPCS | Performed by: STUDENT IN AN ORGANIZED HEALTH CARE EDUCATION/TRAINING PROGRAM

## 2025-08-22 PROCEDURE — 1200000000 HC SEMI PRIVATE

## 2025-08-22 PROCEDURE — 2500000003 HC RX 250 WO HCPCS: Performed by: STUDENT IN AN ORGANIZED HEALTH CARE EDUCATION/TRAINING PROGRAM

## 2025-08-22 RX ORDER — FUROSEMIDE 10 MG/ML
80 INJECTION INTRAMUSCULAR; INTRAVENOUS ONCE
Status: COMPLETED | OUTPATIENT
Start: 2025-08-22 | End: 2025-08-22

## 2025-08-22 RX ORDER — ALBUMIN (HUMAN) 12.5 G/50ML
25 SOLUTION INTRAVENOUS ONCE
Status: COMPLETED | OUTPATIENT
Start: 2025-08-22 | End: 2025-08-22

## 2025-08-22 RX ADMIN — ASPIRIN 81 MG: 81 TABLET, CHEWABLE ORAL at 10:15

## 2025-08-22 RX ADMIN — FERROUS SULFATE TAB 325 MG (65 MG ELEMENTAL FE) 325 MG: 325 (65 FE) TAB at 10:15

## 2025-08-22 RX ADMIN — OXYCODONE HYDROCHLORIDE 2.5 MG: 5 TABLET ORAL at 21:15

## 2025-08-22 RX ADMIN — SODIUM CHLORIDE, PRESERVATIVE FREE 10 ML: 5 INJECTION INTRAVENOUS at 22:13

## 2025-08-22 RX ADMIN — ROSUVASTATIN CALCIUM 20 MG: 20 TABLET, FILM COATED ORAL at 22:11

## 2025-08-22 RX ADMIN — SODIUM CHLORIDE, PRESERVATIVE FREE 10 ML: 5 INJECTION INTRAVENOUS at 10:16

## 2025-08-22 RX ADMIN — ALLOPURINOL 100 MG: 100 TABLET ORAL at 10:15

## 2025-08-22 RX ADMIN — ALBUMIN (HUMAN) 25 G: 0.25 INJECTION, SOLUTION INTRAVENOUS at 18:45

## 2025-08-22 RX ADMIN — ATENOLOL 25 MG: 25 TABLET ORAL at 10:15

## 2025-08-22 RX ADMIN — PANTOPRAZOLE SODIUM 40 MG: 40 TABLET, DELAYED RELEASE ORAL at 05:18

## 2025-08-22 RX ADMIN — Medication 400 MG: at 10:15

## 2025-08-22 RX ADMIN — FUROSEMIDE 80 MG: 10 INJECTION, SOLUTION INTRAMUSCULAR; INTRAVENOUS at 22:11

## 2025-08-22 ASSESSMENT — PAIN - FUNCTIONAL ASSESSMENT
PAIN_FUNCTIONAL_ASSESSMENT: PREVENTS OR INTERFERES SOME ACTIVE ACTIVITIES AND ADLS
PAIN_FUNCTIONAL_ASSESSMENT: 0-10

## 2025-08-22 ASSESSMENT — PAIN SCALES - GENERAL
PAINLEVEL_OUTOF10: 0
PAINLEVEL_OUTOF10: 0
PAINLEVEL_OUTOF10: 7
PAINLEVEL_OUTOF10: 0
PAINLEVEL_OUTOF10: 0

## 2025-08-22 ASSESSMENT — PAIN DESCRIPTION - ORIENTATION: ORIENTATION: RIGHT

## 2025-08-22 ASSESSMENT — PAIN DESCRIPTION - LOCATION: LOCATION: SHOULDER

## 2025-08-22 ASSESSMENT — PAIN DESCRIPTION - DESCRIPTORS: DESCRIPTORS: ACHING

## 2025-08-23 LAB
ALBUMIN SERPL-MCNC: 3.9 G/DL (ref 3.4–5)
ANION GAP SERPL CALCULATED.3IONS-SCNC: 15 MMOL/L (ref 3–16)
BASOPHILS # BLD: 0 K/UL (ref 0–0.2)
BASOPHILS NFR BLD: 0.6 %
BUN SERPL-MCNC: 83 MG/DL (ref 7–20)
CALCIUM SERPL-MCNC: 9.5 MG/DL (ref 8.3–10.6)
CHLORIDE SERPL-SCNC: 90 MMOL/L (ref 99–110)
CO2 SERPL-SCNC: 25 MMOL/L (ref 21–32)
CREAT SERPL-MCNC: 3.5 MG/DL (ref 0.8–1.3)
DEPRECATED RDW RBC AUTO: 14.9 % (ref 12.4–15.4)
EOSINOPHIL # BLD: 0.1 K/UL (ref 0–0.6)
EOSINOPHIL NFR BLD: 0.7 %
GFR SERPLBLD CREATININE-BSD FMLA CKD-EPI: 15 ML/MIN/{1.73_M2}
GLUCOSE SERPL-MCNC: 126 MG/DL (ref 70–99)
HCT VFR BLD AUTO: 31.5 % (ref 40.5–52.5)
HGB BLD-MCNC: 10.2 G/DL (ref 13.5–17.5)
INR PPP: 2.83 (ref 0.86–1.14)
LYMPHOCYTES # BLD: 0.4 K/UL (ref 1–5.1)
LYMPHOCYTES NFR BLD: 5.3 %
MCH RBC QN AUTO: 28.1 PG (ref 26–34)
MCHC RBC AUTO-ENTMCNC: 32.4 G/DL (ref 31–36)
MCV RBC AUTO: 86.7 FL (ref 80–100)
MONOCYTES # BLD: 0.5 K/UL (ref 0–1.3)
MONOCYTES NFR BLD: 6.4 %
NEUTROPHILS # BLD: 6.2 K/UL (ref 1.7–7.7)
NEUTROPHILS NFR BLD: 87 %
NT-PROBNP SERPL-MCNC: ABNORMAL PG/ML (ref 0–449)
PLATELET # BLD AUTO: 254 K/UL (ref 135–450)
PMV BLD AUTO: 9 FL (ref 5–10.5)
POTASSIUM SERPL-SCNC: 4.3 MMOL/L (ref 3.5–5.1)
PROTHROMBIN TIME: 29.2 SEC (ref 12.1–14.9)
RBC # BLD AUTO: 3.63 M/UL (ref 4.2–5.9)
SODIUM SERPL-SCNC: 130 MMOL/L (ref 136–145)
WBC # BLD AUTO: 7.1 K/UL (ref 4–11)

## 2025-08-23 PROCEDURE — 94761 N-INVAS EAR/PLS OXIMETRY MLT: CPT

## 2025-08-23 PROCEDURE — 83880 ASSAY OF NATRIURETIC PEPTIDE: CPT

## 2025-08-23 PROCEDURE — 6370000000 HC RX 637 (ALT 250 FOR IP)

## 2025-08-23 PROCEDURE — P9047 ALBUMIN (HUMAN), 25%, 50ML: HCPCS | Performed by: STUDENT IN AN ORGANIZED HEALTH CARE EDUCATION/TRAINING PROGRAM

## 2025-08-23 PROCEDURE — 2500000003 HC RX 250 WO HCPCS: Performed by: STUDENT IN AN ORGANIZED HEALTH CARE EDUCATION/TRAINING PROGRAM

## 2025-08-23 PROCEDURE — 85610 PROTHROMBIN TIME: CPT

## 2025-08-23 PROCEDURE — 80048 BASIC METABOLIC PNL TOTAL CA: CPT

## 2025-08-23 PROCEDURE — 82040 ASSAY OF SERUM ALBUMIN: CPT

## 2025-08-23 PROCEDURE — 6360000002 HC RX W HCPCS

## 2025-08-23 PROCEDURE — 36415 COLL VENOUS BLD VENIPUNCTURE: CPT

## 2025-08-23 PROCEDURE — 85025 COMPLETE CBC W/AUTO DIFF WBC: CPT

## 2025-08-23 PROCEDURE — 1200000000 HC SEMI PRIVATE

## 2025-08-23 PROCEDURE — 2700000000 HC OXYGEN THERAPY PER DAY

## 2025-08-23 PROCEDURE — 6370000000 HC RX 637 (ALT 250 FOR IP): Performed by: STUDENT IN AN ORGANIZED HEALTH CARE EDUCATION/TRAINING PROGRAM

## 2025-08-23 PROCEDURE — 6360000002 HC RX W HCPCS: Performed by: STUDENT IN AN ORGANIZED HEALTH CARE EDUCATION/TRAINING PROGRAM

## 2025-08-23 RX ORDER — ALBUMIN (HUMAN) 12.5 G/50ML
25 SOLUTION INTRAVENOUS ONCE
Status: COMPLETED | OUTPATIENT
Start: 2025-08-23 | End: 2025-08-23

## 2025-08-23 RX ORDER — CETIRIZINE HYDROCHLORIDE 10 MG/1
5 TABLET ORAL DAILY
Status: DISCONTINUED | OUTPATIENT
Start: 2025-08-23 | End: 2025-08-26 | Stop reason: HOSPADM

## 2025-08-23 RX ORDER — DIAZEPAM 2 MG/1
2 TABLET ORAL EVERY 8 HOURS PRN
Status: DISCONTINUED | OUTPATIENT
Start: 2025-08-23 | End: 2025-08-24

## 2025-08-23 RX ORDER — FUROSEMIDE 10 MG/ML
20 INJECTION INTRAMUSCULAR; INTRAVENOUS ONCE
Status: COMPLETED | OUTPATIENT
Start: 2025-08-23 | End: 2025-08-23

## 2025-08-23 RX ADMIN — SODIUM CHLORIDE, PRESERVATIVE FREE 10 ML: 5 INJECTION INTRAVENOUS at 19:57

## 2025-08-23 RX ADMIN — ATENOLOL 25 MG: 25 TABLET ORAL at 08:03

## 2025-08-23 RX ADMIN — OXYCODONE HYDROCHLORIDE 2.5 MG: 5 TABLET ORAL at 22:05

## 2025-08-23 RX ADMIN — ACETAMINOPHEN 650 MG: 325 TABLET ORAL at 19:53

## 2025-08-23 RX ADMIN — DIAZEPAM 2 MG: 2 TABLET ORAL at 23:31

## 2025-08-23 RX ADMIN — FUROSEMIDE 20 MG: 10 INJECTION, SOLUTION INTRAMUSCULAR; INTRAVENOUS at 23:31

## 2025-08-23 RX ADMIN — SODIUM CHLORIDE, PRESERVATIVE FREE 10 ML: 5 INJECTION INTRAVENOUS at 08:03

## 2025-08-23 RX ADMIN — ALBUMIN (HUMAN) 25 G: 0.25 INJECTION, SOLUTION INTRAVENOUS at 15:06

## 2025-08-23 RX ADMIN — ASPIRIN 81 MG: 81 TABLET, CHEWABLE ORAL at 08:03

## 2025-08-23 RX ADMIN — PANTOPRAZOLE SODIUM 40 MG: 40 TABLET, DELAYED RELEASE ORAL at 05:29

## 2025-08-23 RX ADMIN — ALLOPURINOL 100 MG: 100 TABLET ORAL at 08:03

## 2025-08-23 RX ADMIN — CETIRIZINE HYDROCHLORIDE 5 MG: 10 TABLET, FILM COATED ORAL at 23:31

## 2025-08-23 RX ADMIN — ROSUVASTATIN CALCIUM 20 MG: 20 TABLET, FILM COATED ORAL at 19:54

## 2025-08-23 RX ADMIN — FERROUS SULFATE TAB 325 MG (65 MG ELEMENTAL FE) 325 MG: 325 (65 FE) TAB at 08:03

## 2025-08-23 RX ADMIN — Medication 400 MG: at 08:03

## 2025-08-23 ASSESSMENT — PAIN - FUNCTIONAL ASSESSMENT
PAIN_FUNCTIONAL_ASSESSMENT: PREVENTS OR INTERFERES SOME ACTIVE ACTIVITIES AND ADLS
PAIN_FUNCTIONAL_ASSESSMENT: 0-10
PAIN_FUNCTIONAL_ASSESSMENT: 0-10
PAIN_FUNCTIONAL_ASSESSMENT: PREVENTS OR INTERFERES SOME ACTIVE ACTIVITIES AND ADLS

## 2025-08-23 ASSESSMENT — PAIN DESCRIPTION - ORIENTATION: ORIENTATION: POSTERIOR

## 2025-08-23 ASSESSMENT — PAIN SCALES - GENERAL
PAINLEVEL_OUTOF10: 0
PAINLEVEL_OUTOF10: 0
PAINLEVEL_OUTOF10: 6
PAINLEVEL_OUTOF10: 0
PAINLEVEL_OUTOF10: 5
PAINLEVEL_OUTOF10: 0

## 2025-08-23 ASSESSMENT — PAIN DESCRIPTION - DESCRIPTORS
DESCRIPTORS: ACHING
DESCRIPTORS: ACHING

## 2025-08-23 ASSESSMENT — PAIN DESCRIPTION - LOCATION
LOCATION: BACK
LOCATION: BACK

## 2025-08-24 LAB
ALBUMIN SERPL-MCNC: 3.8 G/DL (ref 3.4–5)
ALP SERPL-CCNC: 139 U/L (ref 40–129)
ALT SERPL-CCNC: 11 U/L (ref 10–40)
ANION GAP SERPL CALCULATED.3IONS-SCNC: 15 MMOL/L (ref 3–16)
AST SERPL-CCNC: 31 U/L (ref 15–37)
BILIRUB DIRECT SERPL-MCNC: 0.3 MG/DL (ref 0–0.3)
BILIRUB INDIRECT SERPL-MCNC: 0.3 MG/DL (ref 0–1)
BILIRUB SERPL-MCNC: 0.6 MG/DL (ref 0–1)
BUN SERPL-MCNC: 91 MG/DL (ref 7–20)
CALCIUM SERPL-MCNC: 9 MG/DL (ref 8.3–10.6)
CHLORIDE SERPL-SCNC: 88 MMOL/L (ref 99–110)
CO2 SERPL-SCNC: 24 MMOL/L (ref 21–32)
CREAT SERPL-MCNC: 3.9 MG/DL (ref 0.8–1.3)
GFR SERPLBLD CREATININE-BSD FMLA CKD-EPI: 14 ML/MIN/{1.73_M2}
GLUCOSE SERPL-MCNC: 112 MG/DL (ref 70–99)
INR PPP: 2.74 (ref 0.86–1.14)
POTASSIUM SERPL-SCNC: 4.3 MMOL/L (ref 3.5–5.1)
PROT SERPL-MCNC: 6.7 G/DL (ref 6.4–8.2)
PROTHROMBIN TIME: 28.5 SEC (ref 12.1–14.9)
SODIUM SERPL-SCNC: 127 MMOL/L (ref 136–145)

## 2025-08-24 PROCEDURE — 80076 HEPATIC FUNCTION PANEL: CPT

## 2025-08-24 PROCEDURE — 1200000000 HC SEMI PRIVATE

## 2025-08-24 PROCEDURE — 85610 PROTHROMBIN TIME: CPT

## 2025-08-24 PROCEDURE — P9047 ALBUMIN (HUMAN), 25%, 50ML: HCPCS

## 2025-08-24 PROCEDURE — 80048 BASIC METABOLIC PNL TOTAL CA: CPT

## 2025-08-24 PROCEDURE — 2500000003 HC RX 250 WO HCPCS: Performed by: STUDENT IN AN ORGANIZED HEALTH CARE EDUCATION/TRAINING PROGRAM

## 2025-08-24 PROCEDURE — 6370000000 HC RX 637 (ALT 250 FOR IP): Performed by: STUDENT IN AN ORGANIZED HEALTH CARE EDUCATION/TRAINING PROGRAM

## 2025-08-24 PROCEDURE — 36415 COLL VENOUS BLD VENIPUNCTURE: CPT

## 2025-08-24 PROCEDURE — 6360000002 HC RX W HCPCS: Performed by: STUDENT IN AN ORGANIZED HEALTH CARE EDUCATION/TRAINING PROGRAM

## 2025-08-24 PROCEDURE — 6360000002 HC RX W HCPCS

## 2025-08-24 PROCEDURE — 94761 N-INVAS EAR/PLS OXIMETRY MLT: CPT

## 2025-08-24 PROCEDURE — 2700000000 HC OXYGEN THERAPY PER DAY

## 2025-08-24 PROCEDURE — 6370000000 HC RX 637 (ALT 250 FOR IP)

## 2025-08-24 RX ORDER — FUROSEMIDE 10 MG/ML
80 INJECTION INTRAMUSCULAR; INTRAVENOUS ONCE
Status: COMPLETED | OUTPATIENT
Start: 2025-08-24 | End: 2025-08-24

## 2025-08-24 RX ORDER — WARFARIN SODIUM 1 MG/1
0.5 TABLET ORAL
Status: COMPLETED | OUTPATIENT
Start: 2025-08-24 | End: 2025-08-24

## 2025-08-24 RX ORDER — ALBUMIN (HUMAN) 12.5 G/50ML
25 SOLUTION INTRAVENOUS ONCE
Status: COMPLETED | OUTPATIENT
Start: 2025-08-24 | End: 2025-08-24

## 2025-08-24 RX ORDER — DIAZEPAM 2 MG/1
1 TABLET ORAL EVERY 8 HOURS PRN
Status: DISPENSED | OUTPATIENT
Start: 2025-08-24 | End: 2025-08-24

## 2025-08-24 RX ADMIN — ATENOLOL 25 MG: 25 TABLET ORAL at 09:39

## 2025-08-24 RX ADMIN — SODIUM CHLORIDE, PRESERVATIVE FREE 10 ML: 5 INJECTION INTRAVENOUS at 09:44

## 2025-08-24 RX ADMIN — SODIUM CHLORIDE, PRESERVATIVE FREE 10 ML: 5 INJECTION INTRAVENOUS at 22:15

## 2025-08-24 RX ADMIN — ALBUMIN (HUMAN) 25 G: 0.25 INJECTION, SOLUTION INTRAVENOUS at 03:52

## 2025-08-24 RX ADMIN — WARFARIN SODIUM 0.5 MG: 1 TABLET ORAL at 17:38

## 2025-08-24 RX ADMIN — FERROUS SULFATE TAB 325 MG (65 MG ELEMENTAL FE) 325 MG: 325 (65 FE) TAB at 09:39

## 2025-08-24 RX ADMIN — CETIRIZINE HYDROCHLORIDE 5 MG: 10 TABLET, FILM COATED ORAL at 09:39

## 2025-08-24 RX ADMIN — ROSUVASTATIN CALCIUM 20 MG: 20 TABLET, FILM COATED ORAL at 21:55

## 2025-08-24 RX ADMIN — OXYCODONE HYDROCHLORIDE 2.5 MG: 5 TABLET ORAL at 05:02

## 2025-08-24 RX ADMIN — FUROSEMIDE 80 MG: 10 INJECTION, SOLUTION INTRAMUSCULAR; INTRAVENOUS at 22:04

## 2025-08-24 RX ADMIN — Medication 400 MG: at 09:39

## 2025-08-24 RX ADMIN — DIAZEPAM 2 MG: 2 TABLET ORAL at 12:22

## 2025-08-24 RX ADMIN — DIAZEPAM 1 MG: 2 TABLET ORAL at 21:55

## 2025-08-24 RX ADMIN — ASPIRIN 81 MG: 81 TABLET, CHEWABLE ORAL at 09:39

## 2025-08-24 RX ADMIN — PANTOPRAZOLE SODIUM 40 MG: 40 TABLET, DELAYED RELEASE ORAL at 06:21

## 2025-08-24 RX ADMIN — ALLOPURINOL 100 MG: 100 TABLET ORAL at 09:39

## 2025-08-24 ASSESSMENT — PAIN - FUNCTIONAL ASSESSMENT: PAIN_FUNCTIONAL_ASSESSMENT: 0-10

## 2025-08-24 ASSESSMENT — PAIN SCALES - GENERAL
PAINLEVEL_OUTOF10: 0
PAINLEVEL_OUTOF10: 9

## 2025-08-24 ASSESSMENT — PAIN DESCRIPTION - LOCATION: LOCATION: BACK

## 2025-08-24 ASSESSMENT — PAIN DESCRIPTION - ORIENTATION: ORIENTATION: POSTERIOR

## 2025-08-25 LAB
ANION GAP SERPL CALCULATED.3IONS-SCNC: 15 MMOL/L (ref 3–16)
BASOPHILS # BLD: 0 K/UL (ref 0–0.2)
BASOPHILS NFR BLD: 0.4 %
BUN SERPL-MCNC: 97 MG/DL (ref 7–20)
CALCIUM SERPL-MCNC: 9.4 MG/DL (ref 8.3–10.6)
CHLORIDE SERPL-SCNC: 86 MMOL/L (ref 99–110)
CO2 SERPL-SCNC: 23 MMOL/L (ref 21–32)
CREAT SERPL-MCNC: 3.9 MG/DL (ref 0.8–1.3)
DEPRECATED RDW RBC AUTO: 15.3 % (ref 12.4–15.4)
EOSINOPHIL # BLD: 0.1 K/UL (ref 0–0.6)
EOSINOPHIL NFR BLD: 0.9 %
GFR SERPLBLD CREATININE-BSD FMLA CKD-EPI: 14 ML/MIN/{1.73_M2}
GLUCOSE SERPL-MCNC: 113 MG/DL (ref 70–99)
HCT VFR BLD AUTO: 30.4 % (ref 40.5–52.5)
HGB BLD-MCNC: 10.3 G/DL (ref 13.5–17.5)
INR PPP: 2.64 (ref 0.86–1.14)
LYMPHOCYTES # BLD: 0.6 K/UL (ref 1–5.1)
LYMPHOCYTES NFR BLD: 8.8 %
MCH RBC QN AUTO: 28.7 PG (ref 26–34)
MCHC RBC AUTO-ENTMCNC: 34 G/DL (ref 31–36)
MCV RBC AUTO: 84.6 FL (ref 80–100)
MONOCYTES # BLD: 0.5 K/UL (ref 0–1.3)
MONOCYTES NFR BLD: 7 %
NEUTROPHILS # BLD: 5.8 K/UL (ref 1.7–7.7)
NEUTROPHILS NFR BLD: 82.9 %
PLATELET # BLD AUTO: 228 K/UL (ref 135–450)
PMV BLD AUTO: 8.6 FL (ref 5–10.5)
POTASSIUM SERPL-SCNC: 4.5 MMOL/L (ref 3.5–5.1)
PROTHROMBIN TIME: 27.7 SEC (ref 12.1–14.9)
RBC # BLD AUTO: 3.59 M/UL (ref 4.2–5.9)
SODIUM SERPL-SCNC: 124 MMOL/L (ref 136–145)
WBC # BLD AUTO: 7.1 K/UL (ref 4–11)

## 2025-08-25 PROCEDURE — 2700000000 HC OXYGEN THERAPY PER DAY

## 2025-08-25 PROCEDURE — 6370000000 HC RX 637 (ALT 250 FOR IP): Performed by: NURSE PRACTITIONER

## 2025-08-25 PROCEDURE — 6370000000 HC RX 637 (ALT 250 FOR IP)

## 2025-08-25 PROCEDURE — 85025 COMPLETE CBC W/AUTO DIFF WBC: CPT

## 2025-08-25 PROCEDURE — 6370000000 HC RX 637 (ALT 250 FOR IP): Performed by: STUDENT IN AN ORGANIZED HEALTH CARE EDUCATION/TRAINING PROGRAM

## 2025-08-25 PROCEDURE — 6360000002 HC RX W HCPCS: Performed by: STUDENT IN AN ORGANIZED HEALTH CARE EDUCATION/TRAINING PROGRAM

## 2025-08-25 PROCEDURE — 94761 N-INVAS EAR/PLS OXIMETRY MLT: CPT

## 2025-08-25 PROCEDURE — 36415 COLL VENOUS BLD VENIPUNCTURE: CPT

## 2025-08-25 PROCEDURE — 85610 PROTHROMBIN TIME: CPT

## 2025-08-25 PROCEDURE — 80048 BASIC METABOLIC PNL TOTAL CA: CPT

## 2025-08-25 PROCEDURE — 97530 THERAPEUTIC ACTIVITIES: CPT

## 2025-08-25 PROCEDURE — 1200000000 HC SEMI PRIVATE

## 2025-08-25 PROCEDURE — 2500000003 HC RX 250 WO HCPCS: Performed by: STUDENT IN AN ORGANIZED HEALTH CARE EDUCATION/TRAINING PROGRAM

## 2025-08-25 RX ORDER — HYDROXYZINE PAMOATE 25 MG/1
25 CAPSULE ORAL 3 TIMES DAILY PRN
Status: DISCONTINUED | OUTPATIENT
Start: 2025-08-25 | End: 2025-08-26 | Stop reason: HOSPADM

## 2025-08-25 RX ORDER — LORAZEPAM 0.5 MG/1
0.25 TABLET ORAL ONCE
Status: COMPLETED | OUTPATIENT
Start: 2025-08-25 | End: 2025-08-25

## 2025-08-25 RX ORDER — MORPHINE SULFATE 2 MG/ML
1 INJECTION, SOLUTION INTRAMUSCULAR; INTRAVENOUS ONCE
Status: COMPLETED | OUTPATIENT
Start: 2025-08-25 | End: 2025-08-25

## 2025-08-25 RX ORDER — HYDROXYZINE PAMOATE 25 MG/1
25 CAPSULE ORAL ONCE
Status: COMPLETED | OUTPATIENT
Start: 2025-08-25 | End: 2025-08-25

## 2025-08-25 RX ADMIN — HYDROXYZINE PAMOATE 25 MG: 25 CAPSULE ORAL at 22:49

## 2025-08-25 RX ADMIN — Medication 400 MG: at 08:06

## 2025-08-25 RX ADMIN — FERROUS SULFATE TAB 325 MG (65 MG ELEMENTAL FE) 325 MG: 325 (65 FE) TAB at 08:06

## 2025-08-25 RX ADMIN — PROCHLORPERAZINE EDISYLATE 10 MG: 5 INJECTION INTRAMUSCULAR; INTRAVENOUS at 22:49

## 2025-08-25 RX ADMIN — CETIRIZINE HYDROCHLORIDE 5 MG: 10 TABLET, FILM COATED ORAL at 08:06

## 2025-08-25 RX ADMIN — OXYCODONE HYDROCHLORIDE 2.5 MG: 5 TABLET ORAL at 00:19

## 2025-08-25 RX ADMIN — OXYCODONE HYDROCHLORIDE 2.5 MG: 5 TABLET ORAL at 22:49

## 2025-08-25 RX ADMIN — ASPIRIN 81 MG: 81 TABLET, CHEWABLE ORAL at 08:05

## 2025-08-25 RX ADMIN — MORPHINE SULFATE 1 MG: 2 INJECTION, SOLUTION INTRAMUSCULAR; INTRAVENOUS at 20:50

## 2025-08-25 RX ADMIN — ROSUVASTATIN CALCIUM 20 MG: 20 TABLET, FILM COATED ORAL at 20:50

## 2025-08-25 RX ADMIN — PANTOPRAZOLE SODIUM 40 MG: 40 TABLET, DELAYED RELEASE ORAL at 06:11

## 2025-08-25 RX ADMIN — ALLOPURINOL 100 MG: 100 TABLET ORAL at 08:06

## 2025-08-25 RX ADMIN — HYDROXYZINE PAMOATE 25 MG: 25 CAPSULE ORAL at 01:58

## 2025-08-25 RX ADMIN — HYDROXYZINE PAMOATE 25 MG: 25 CAPSULE ORAL at 10:04

## 2025-08-25 RX ADMIN — SODIUM CHLORIDE, PRESERVATIVE FREE 10 ML: 5 INJECTION INTRAVENOUS at 20:53

## 2025-08-25 RX ADMIN — SODIUM CHLORIDE, PRESERVATIVE FREE 10 ML: 5 INJECTION INTRAVENOUS at 08:06

## 2025-08-25 RX ADMIN — LORAZEPAM 0.25 MG: 0.5 TABLET ORAL at 12:15

## 2025-08-25 RX ADMIN — ATENOLOL 25 MG: 25 TABLET ORAL at 08:05

## 2025-08-25 ASSESSMENT — PAIN DESCRIPTION - DESCRIPTORS
DESCRIPTORS: ACHING

## 2025-08-25 ASSESSMENT — PAIN - FUNCTIONAL ASSESSMENT
PAIN_FUNCTIONAL_ASSESSMENT: 0-10
PAIN_FUNCTIONAL_ASSESSMENT: ACTIVITIES ARE NOT PREVENTED
PAIN_FUNCTIONAL_ASSESSMENT: 0-10

## 2025-08-25 ASSESSMENT — PAIN SCALES - GENERAL
PAINLEVEL_OUTOF10: 7
PAINLEVEL_OUTOF10: 8
PAINLEVEL_OUTOF10: 10
PAINLEVEL_OUTOF10: 6

## 2025-08-25 ASSESSMENT — PAIN DESCRIPTION - LOCATION
LOCATION: GENERALIZED
LOCATION: GENERALIZED
LOCATION: BACK
LOCATION: GENERALIZED

## 2025-08-26 VITALS
RESPIRATION RATE: 22 BRPM | DIASTOLIC BLOOD PRESSURE: 77 MMHG | BODY MASS INDEX: 29.95 KG/M2 | HEART RATE: 92 BPM | TEMPERATURE: 97.2 F | OXYGEN SATURATION: 99 % | WEIGHT: 225.97 LBS | SYSTOLIC BLOOD PRESSURE: 127 MMHG | HEIGHT: 73 IN

## 2025-08-26 LAB
INR PPP: 2.47 (ref 0.86–1.14)
PROTHROMBIN TIME: 26.4 SEC (ref 12.1–14.9)

## 2025-08-26 PROCEDURE — 85610 PROTHROMBIN TIME: CPT

## 2025-08-26 PROCEDURE — 2500000003 HC RX 250 WO HCPCS: Performed by: STUDENT IN AN ORGANIZED HEALTH CARE EDUCATION/TRAINING PROGRAM

## 2025-08-26 PROCEDURE — 6360000002 HC RX W HCPCS

## 2025-08-26 PROCEDURE — 94761 N-INVAS EAR/PLS OXIMETRY MLT: CPT

## 2025-08-26 PROCEDURE — 6370000000 HC RX 637 (ALT 250 FOR IP)

## 2025-08-26 PROCEDURE — 2700000000 HC OXYGEN THERAPY PER DAY

## 2025-08-26 PROCEDURE — 36415 COLL VENOUS BLD VENIPUNCTURE: CPT

## 2025-08-26 PROCEDURE — 6370000000 HC RX 637 (ALT 250 FOR IP): Performed by: STUDENT IN AN ORGANIZED HEALTH CARE EDUCATION/TRAINING PROGRAM

## 2025-08-26 RX ORDER — MORPHINE SULFATE 2 MG/ML
1 INJECTION, SOLUTION INTRAMUSCULAR; INTRAVENOUS ONCE
Status: COMPLETED | OUTPATIENT
Start: 2025-08-26 | End: 2025-08-26

## 2025-08-26 RX ADMIN — Medication 400 MG: at 09:35

## 2025-08-26 RX ADMIN — ASPIRIN 81 MG: 81 TABLET, CHEWABLE ORAL at 09:35

## 2025-08-26 RX ADMIN — SODIUM CHLORIDE, PRESERVATIVE FREE 10 ML: 5 INJECTION INTRAVENOUS at 09:35

## 2025-08-26 RX ADMIN — ATENOLOL 25 MG: 25 TABLET ORAL at 09:35

## 2025-08-26 RX ADMIN — MORPHINE SULFATE 1 MG: 2 INJECTION, SOLUTION INTRAMUSCULAR; INTRAVENOUS at 05:05

## 2025-08-26 RX ADMIN — OXYCODONE HYDROCHLORIDE 2.5 MG: 5 TABLET ORAL at 09:30

## 2025-08-26 RX ADMIN — CETIRIZINE HYDROCHLORIDE 5 MG: 10 TABLET, FILM COATED ORAL at 09:35

## 2025-08-26 RX ADMIN — PANTOPRAZOLE SODIUM 40 MG: 40 TABLET, DELAYED RELEASE ORAL at 05:08

## 2025-08-26 RX ADMIN — ALLOPURINOL 100 MG: 100 TABLET ORAL at 09:35

## 2025-08-26 RX ADMIN — FERROUS SULFATE TAB 325 MG (65 MG ELEMENTAL FE) 325 MG: 325 (65 FE) TAB at 09:35

## 2025-08-26 ASSESSMENT — PAIN DESCRIPTION - ORIENTATION: ORIENTATION: LOWER

## 2025-08-26 ASSESSMENT — PAIN SCALES - GENERAL
PAINLEVEL_OUTOF10: 8
PAINLEVEL_OUTOF10: 2
PAINLEVEL_OUTOF10: 8

## 2025-08-26 ASSESSMENT — PAIN DESCRIPTION - LOCATION
LOCATION: GENERALIZED
LOCATION: GENERALIZED

## 2025-08-26 ASSESSMENT — PAIN DESCRIPTION - DESCRIPTORS
DESCRIPTORS: ACHING
DESCRIPTORS: ACHING

## 2025-08-26 ASSESSMENT — PAIN DESCRIPTION - PAIN TYPE: TYPE: ACUTE PAIN

## 2025-08-26 ASSESSMENT — PAIN - FUNCTIONAL ASSESSMENT
PAIN_FUNCTIONAL_ASSESSMENT: 0-10
PAIN_FUNCTIONAL_ASSESSMENT: 0-10

## 2025-09-02 ENCOUNTER — TELEPHONE (OUTPATIENT)
Dept: PHARMACY | Age: 89
End: 2025-09-02

## 2025-09-02 ENCOUNTER — ANTI-COAG VISIT (OUTPATIENT)
Dept: PHARMACY | Age: 89
End: 2025-09-02